# Patient Record
Sex: FEMALE | Race: BLACK OR AFRICAN AMERICAN | NOT HISPANIC OR LATINO | Employment: OTHER | URBAN - METROPOLITAN AREA
[De-identification: names, ages, dates, MRNs, and addresses within clinical notes are randomized per-mention and may not be internally consistent; named-entity substitution may affect disease eponyms.]

---

## 2017-01-16 ENCOUNTER — TELEPHONE (OUTPATIENT)
Dept: GASTROENTEROLOGY | Facility: CLINIC | Age: 67
End: 2017-01-16

## 2017-01-16 DIAGNOSIS — K63.5 COLON POLYPS: Primary | ICD-10-CM

## 2017-01-16 PROBLEM — K21.9 GASTROESOPHAGEAL REFLUX DISEASE: Status: ACTIVE | Noted: 2017-01-16

## 2017-01-16 PROBLEM — R13.10 DYSPHAGIA: Status: ACTIVE | Noted: 2017-01-16

## 2017-01-16 PROBLEM — R89.9 ABNORMAL LABORATORY TEST RESULT: Status: ACTIVE | Noted: 2017-01-16

## 2017-02-14 ENCOUNTER — OFFICE VISIT (OUTPATIENT)
Dept: GASTROENTEROLOGY | Facility: CLINIC | Age: 67
End: 2017-02-14

## 2017-02-14 VITALS
DIASTOLIC BLOOD PRESSURE: 70 MMHG | WEIGHT: 111.6 LBS | SYSTOLIC BLOOD PRESSURE: 112 MMHG | BODY MASS INDEX: 17.94 KG/M2 | HEIGHT: 66 IN

## 2017-02-14 DIAGNOSIS — R13.10 DYSPHAGIA, UNSPECIFIED TYPE: ICD-10-CM

## 2017-02-14 DIAGNOSIS — K21.9 GASTROESOPHAGEAL REFLUX DISEASE WITHOUT ESOPHAGITIS: Primary | ICD-10-CM

## 2017-02-14 DIAGNOSIS — K63.5 COLON POLYPS: ICD-10-CM

## 2017-02-14 PROCEDURE — 99213 OFFICE O/P EST LOW 20 MIN: CPT | Performed by: INTERNAL MEDICINE

## 2017-02-14 RX ORDER — OMEPRAZOLE 20 MG/1
20 CAPSULE, DELAYED RELEASE ORAL 2 TIMES DAILY
Qty: 60 CAPSULE | Refills: 5 | Status: SHIPPED | OUTPATIENT
Start: 2017-02-14 | End: 2017-09-25 | Stop reason: SDUPTHER

## 2017-02-14 NOTE — PROGRESS NOTES
"    PATIENT INFORMATION  Argelia Nguyen       - 1950    CHIEF COMPLAINT  Chief Complaint   Patient presents with   • Difficulty Swallowing   • Heartburn       HISTORY OF PRESENT ILLNESS  Difficulty Swallowing     Heartburn     67 yo with GERD on prilosec daily here with increasing nocturnal reflux. She states I get \"stopped up\" at night. She has regurgitation at night. No difficulty swallowing solids or liquids.  She does note that large pills are difficult to swallow.  She does feel reflux is getting worse. She recalls being dilated previously. Path from last years egd and clsWeight has been stable.  She denies any odynophagia. No hematemesis. She drink one can of carbonated beverage. One cup of coffee daily.  She eats dinner at 5. She does not snack after dinner. Bed is at 9pm.   She has multiple episodes of regurgitation at night.        REVIEW OF SYSTEMS  Review of Systems   Gastrointestinal:        REFLUX   All other systems reviewed and are negative.        ACTIVE PROBLEMS  Patient Active Problem List    Diagnosis   • Abnormal laboratory test result [R89.9]   • Dysphagia [R13.10]   • Gastroesophageal reflux disease [K21.9]   • SVT (supraventricular tachycardia) [I47.1]   • Leukopenia [D72.819]   • Monoclonal gammopathy [D47.2]   • Neutropenia [D70.9]   • Normocytic anemia [D64.9]         PAST MEDICAL HISTORY  Past Medical History   Diagnosis Date   • Anxiety    • Diabetes mellitus    • Dysphagia    • Fatigue    • GERD (gastroesophageal reflux disease)    • Hypertension    • Reflux gastritis          SURGICAL HISTORY  Past Surgical History   Procedure Laterality Date   • Hernia repair     • Cyst removal           FAMILY HISTORY  Family History   Problem Relation Age of Onset   • Colon cancer Mother    • Colon cancer Other    • Lung cancer Father    • Breast cancer Maternal Aunt          SOCIAL HISTORY  Social History     Occupational History   • Not on file.     Social History Main Topics   • Smoking " "status: Never Smoker   • Smokeless tobacco: Not on file   • Alcohol use No   • Drug use: Not on file   • Sexual activity: Not on file         CURRENT MEDICATIONS    Current Outpatient Prescriptions:   •  Brinzolamide-Brimonidine (SIMBRINZA) 1-0.2 % suspension, Apply  to eye., Disp: , Rfl:   •  LORazepam (ATIVAN) 1 MG tablet, Take by mouth., Disp: , Rfl:   •  metoprolol succinate XL (TOPROL-XL) 25 MG 24 hr tablet, Take 1 tablet by mouth Daily., Disp: 90 tablet, Rfl: 3  •  omeprazole (priLOSEC) 20 MG capsule, Take 1 capsule by mouth 2 (Two) Times a Day., Disp: 60 capsule, Rfl: 5  •  polyethylene glycol (GoLYTELY) 236 G solution, Drink 1/2 starting at 2:00pm the day prior. Drink remaining 1/2 at 10:00 pm. May add flavor packet., Disp: 4000 mL, Rfl: 0  •  Saxagliptin-Metformin ER 5-1000 MG tablet sustained-release 24 hour, Take by mouth., Disp: , Rfl:   •  timolol (BETIMOL) 0.25 % ophthalmic solution, 1-2 drops 2 (Two) Times a Day., Disp: , Rfl:     ALLERGIES  Review of patient's allergies indicates no known allergies.    VITALS  Vitals:    02/14/17 1329   BP: 112/70   Weight: 111 lb 9.6 oz (50.6 kg)   Height: 66\" (167.6 cm)       LAST RESULTS   Hospital Outpatient Visit on 02/08/2016   Component Date Value Ref Range Status   • Glucose 02/08/2016 95  60 - 110 mg/dL Final     No results found.    PHYSICAL EXAM  Physical Exam   Constitutional: She is oriented to person, place, and time. She appears well-developed and well-nourished. No distress.   HENT:   Head: Normocephalic and atraumatic.   Mouth/Throat: Oropharynx is clear and moist.   Eyes: EOM are normal. Pupils are equal, round, and reactive to light.   Neck: Normal range of motion. No tracheal deviation present.   Cardiovascular: Normal rate, regular rhythm, normal heart sounds and intact distal pulses.  Exam reveals no gallop and no friction rub.    No murmur heard.  Pulmonary/Chest: Effort normal and breath sounds normal. No stridor. No respiratory distress. She " has no wheezes. She has no rales. She exhibits no tenderness.   Abdominal: Soft. Bowel sounds are normal. She exhibits no distension. There is no tenderness. There is no rebound and no guarding.   Musculoskeletal: She exhibits no edema.   Lymphadenopathy:     She has no cervical adenopathy.   Neurological: She is alert and oriented to person, place, and time.   Skin: Skin is warm. She is not diaphoretic.   Psychiatric: She has a normal mood and affect. Her behavior is normal. Judgment and thought content normal.   Nursing note and vitals reviewed.      ASSESSMENT  Diagnoses and all orders for this visit:    Gastroesophageal reflux disease without esophagitis    Dysphagia, unspecified type    Colon polyps    Other orders  -     omeprazole (priLOSEC) 20 MG capsule; Take 1 capsule by mouth 2 (Two) Times a Day.          PLAN  No Follow-up on file.  Colon is scheduled for in March.    Antireflux measures and dietary modifications reviewed. Low acid diet reviewed. Keep head of bed elevated. Stop eating/drinking at least 3 hours prior to bedtime. Eliminate caffeine and carbonated beverages.  Weight loss encouraged if BMI over 25.        5-6 small,low fat meals daily.  Antireflux measures discussed.

## 2017-03-28 ENCOUNTER — ANESTHESIA EVENT (OUTPATIENT)
Dept: PERIOP | Facility: HOSPITAL | Age: 67
End: 2017-03-28

## 2017-03-29 ENCOUNTER — ANESTHESIA (OUTPATIENT)
Dept: PERIOP | Facility: HOSPITAL | Age: 67
End: 2017-03-29

## 2017-03-29 ENCOUNTER — HOSPITAL ENCOUNTER (OUTPATIENT)
Facility: HOSPITAL | Age: 67
Setting detail: HOSPITAL OUTPATIENT SURGERY
Discharge: HOME | End: 2017-03-29
Attending: INTERNAL MEDICINE | Admitting: INTERNAL MEDICINE

## 2017-03-29 VITALS
DIASTOLIC BLOOD PRESSURE: 73 MMHG | RESPIRATION RATE: 14 BRPM | WEIGHT: 109 LBS | OXYGEN SATURATION: 98 % | HEART RATE: 70 BPM | SYSTOLIC BLOOD PRESSURE: 114 MMHG | TEMPERATURE: 97.8 F | HEIGHT: 66 IN | BODY MASS INDEX: 17.52 KG/M2

## 2017-03-29 DIAGNOSIS — K63.5 COLON POLYPS: ICD-10-CM

## 2017-03-29 LAB — GLUCOSE BLDC GLUCOMTR-MCNC: 73 MG/DL (ref 70–130)

## 2017-03-29 PROCEDURE — C1726 CATH, BAL DIL, NON-VASCULAR: HCPCS | Performed by: INTERNAL MEDICINE

## 2017-03-29 PROCEDURE — 43249 ESOPH EGD DILATION <30 MM: CPT | Performed by: INTERNAL MEDICINE

## 2017-03-29 PROCEDURE — 82962 GLUCOSE BLOOD TEST: CPT

## 2017-03-29 PROCEDURE — 25010000002 PROPOFOL 10 MG/ML EMULSION: Performed by: NURSE ANESTHETIST, CERTIFIED REGISTERED

## 2017-03-29 PROCEDURE — 45380 COLONOSCOPY AND BIOPSY: CPT | Performed by: INTERNAL MEDICINE

## 2017-03-29 RX ORDER — LIDOCAINE HYDROCHLORIDE 10 MG/ML
0.3 INJECTION, SOLUTION EPIDURAL; INFILTRATION; INTRACAUDAL; PERINEURAL ONCE
Status: COMPLETED | OUTPATIENT
Start: 2017-03-29 | End: 2017-03-29

## 2017-03-29 RX ORDER — LIDOCAINE HYDROCHLORIDE 10 MG/ML
INJECTION, SOLUTION EPIDURAL; INFILTRATION; INTRACAUDAL; PERINEURAL
Status: COMPLETED
Start: 2017-03-29 | End: 2017-03-29

## 2017-03-29 RX ORDER — LIDOCAINE HYDROCHLORIDE 20 MG/ML
INJECTION, SOLUTION INFILTRATION; PERINEURAL AS NEEDED
Status: DISCONTINUED | OUTPATIENT
Start: 2017-03-29 | End: 2017-03-29 | Stop reason: SURG

## 2017-03-29 RX ORDER — SODIUM CHLORIDE, SODIUM LACTATE, POTASSIUM CHLORIDE, CALCIUM CHLORIDE 600; 310; 30; 20 MG/100ML; MG/100ML; MG/100ML; MG/100ML
9 INJECTION, SOLUTION INTRAVENOUS CONTINUOUS
Status: DISCONTINUED | OUTPATIENT
Start: 2017-03-29 | End: 2017-03-29 | Stop reason: HOSPADM

## 2017-03-29 RX ORDER — PROPOFOL 10 MG/ML
VIAL (ML) INTRAVENOUS AS NEEDED
Status: DISCONTINUED | OUTPATIENT
Start: 2017-03-29 | End: 2017-03-29 | Stop reason: SURG

## 2017-03-29 RX ORDER — SODIUM CHLORIDE 0.9 % (FLUSH) 0.9 %
1-10 SYRINGE (ML) INJECTION AS NEEDED
Status: DISCONTINUED | OUTPATIENT
Start: 2017-03-29 | End: 2017-03-29 | Stop reason: HOSPADM

## 2017-03-29 RX ADMIN — PROPOFOL 40 MG: 10 INJECTION, EMULSION INTRAVENOUS at 09:24

## 2017-03-29 RX ADMIN — LIDOCAINE HYDROCHLORIDE 0.3 ML: 10 INJECTION, SOLUTION EPIDURAL; INFILTRATION; INTRACAUDAL; PERINEURAL at 09:05

## 2017-03-29 RX ADMIN — PROPOFOL 50 MG: 10 INJECTION, EMULSION INTRAVENOUS at 09:55

## 2017-03-29 RX ADMIN — PROPOFOL 30 MG: 10 INJECTION, EMULSION INTRAVENOUS at 09:23

## 2017-03-29 RX ADMIN — SODIUM CHLORIDE, POTASSIUM CHLORIDE, SODIUM LACTATE AND CALCIUM CHLORIDE: 600; 310; 30; 20 INJECTION, SOLUTION INTRAVENOUS at 09:05

## 2017-03-29 RX ADMIN — PROPOFOL 50 MG: 10 INJECTION, EMULSION INTRAVENOUS at 09:39

## 2017-03-29 RX ADMIN — PROPOFOL 50 MG: 10 INJECTION, EMULSION INTRAVENOUS at 09:44

## 2017-03-29 RX ADMIN — PROPOFOL 50 MG: 10 INJECTION, EMULSION INTRAVENOUS at 09:38

## 2017-03-29 RX ADMIN — PROPOFOL 50 MG: 10 INJECTION, EMULSION INTRAVENOUS at 09:29

## 2017-03-29 RX ADMIN — SODIUM CHLORIDE, POTASSIUM CHLORIDE, SODIUM LACTATE AND CALCIUM CHLORIDE 9 ML/HR: 600; 310; 30; 20 INJECTION, SOLUTION INTRAVENOUS at 09:05

## 2017-03-29 RX ADMIN — PROPOFOL 80 MG: 10 INJECTION, EMULSION INTRAVENOUS at 09:21

## 2017-03-29 RX ADMIN — LIDOCAINE HYDROCHLORIDE 100 MG: 20 INJECTION, SOLUTION INFILTRATION; PERINEURAL at 09:20

## 2017-03-29 NOTE — ANESTHESIA PREPROCEDURE EVALUATION
Anesthesia Evaluation     Patient summary reviewed and Nursing notes reviewed   NPO Status: > 8 hours   Airway   Mallampati: II  TM distance: >3 FB  Neck ROM: full  no difficulty expected  Dental - normal exam     Pulmonary - negative pulmonary ROS and normal exam    breath sounds clear to auscultation  Cardiovascular - normal exam    Rhythm: regular  Rate: normal    (+) hypertension poorly controlled, dysrhythmias Tachycardia,       Neuro/Psych  (+) psychiatric history Anxiety,    GI/Hepatic/Renal/Endo    (+)  GERD poorly controlled, diabetes mellitus type 2 well controlled,     Musculoskeletal     (+) back pain,   Abdominal  - normal exam   Substance History - negative use     OB/GYN negative ob/gyn ROS         Other   (+) arthritis                                 Anesthesia Plan    ASA 2     MAC     intravenous induction   Anesthetic plan and risks discussed with patient.  Use of blood products discussed with patient  Consented to blood products.

## 2017-03-29 NOTE — ANESTHESIA POSTPROCEDURE EVALUATION
Patient: Argelia Nguyen    Procedure Summary     Date Anesthesia Start Anesthesia Stop Room / Location    03/29/17 0916 [facility] / [facility]       Procedure Diagnosis Surgeon Provider    COLONOSCOPY; POLYPECTOMY (N/A ); ESOPHAGOGASTRODUODENOSCOPY WITH DILITATION (N/A Esophagus) Colon polyps  (Colon polyps [K63.5]) MD Onesimo Anne CRNA          Anesthesia Type: MAC  Last vitals  /73 (03/29/17 1007)    Temp      Pulse 70 (03/29/17 1007)   Resp 14 (03/29/17 1007)    SpO2 98 % (03/29/17 1007)      Post Anesthesia Care and Evaluation    Patient location during evaluation: bedside  Patient participation: complete - patient participated  Level of consciousness: awake and alert  Pain score: 0  Pain management: adequate  Airway patency: patent  Anesthetic complications: No anesthetic complications    Cardiovascular status: acceptable  Respiratory status: acceptable  Hydration status: acceptable

## 2017-04-04 LAB
LAB AP CASE REPORT: NORMAL
Lab: NORMAL
PATH REPORT.FINAL DX SPEC: NORMAL

## 2017-04-17 PROBLEM — K44.9 HIATAL HERNIA: Status: ACTIVE | Noted: 2017-04-17

## 2017-04-17 PROBLEM — K63.5 COLON POLYPS: Status: ACTIVE | Noted: 2017-04-17

## 2017-04-17 PROBLEM — K64.8 INTERNAL HEMORRHOIDS: Status: ACTIVE | Noted: 2017-04-17

## 2017-04-26 ENCOUNTER — OFFICE VISIT (OUTPATIENT)
Dept: CARDIOLOGY | Facility: CLINIC | Age: 67
End: 2017-04-26

## 2017-04-26 VITALS
DIASTOLIC BLOOD PRESSURE: 80 MMHG | BODY MASS INDEX: 18.48 KG/M2 | HEART RATE: 71 BPM | SYSTOLIC BLOOD PRESSURE: 136 MMHG | HEIGHT: 66 IN | WEIGHT: 115 LBS

## 2017-04-26 DIAGNOSIS — I47.1 SVT (SUPRAVENTRICULAR TACHYCARDIA) (HCC): Primary | ICD-10-CM

## 2017-04-26 PROCEDURE — 93000 ELECTROCARDIOGRAM COMPLETE: CPT | Performed by: INTERNAL MEDICINE

## 2017-04-26 PROCEDURE — 99213 OFFICE O/P EST LOW 20 MIN: CPT | Performed by: INTERNAL MEDICINE

## 2017-04-26 NOTE — PROGRESS NOTES
Subjective:     Encounter Date:4/26/2017      Patient ID: Argelia Nguyen is a 67 y.o. female.    Chief Complaint:  History of Present Illness    Dear Dr. Oviedo,    I had the pleasure of seeing this patient in the office today for follow-up of her history of palpitations and SVT.  She called her office and stated that she felt a little funny a couple of days ago; she states that if she turned her head she just felt the slightest little dizziness and she wondered if she was hypertensive.  We asked to come in to be seen.    She states she's been feeling absolutely fantastic from her heart standpoint.  She's had absolutely no sensation of any recurrent symptoms but trivial to her SVT since she was started on the metoprolol.This patient denies any chest pain, pressure, tightness, squeezing, or heartburn.  This patient has not experienced any feeling of palpitations, tachycardia or heart racing and no presyncope or syncope.  There has not been any orthopnea or PND, and no problems with lower extremity edema.  This patient denies any shortness of breath at rest or with activity and has not had any wheezing.  This patient has not had any problems with unexplained nausea or vomiting. The patient has continued to perform daily activities of living without any specific problem or change in the level of activity.  This patient has not been recently hospitalized for any reason.    She noted that a couple of days ago that she turned her head it just felt a little funny.  Not dizzy per se, and she had no presyncope or syncope.  It lasted for a few hours and then went away.    This patient has no known cardiac history.  This patient has no history of coronary artery disease, congestive heart failure, rheumatic fever, rheumatic heart disease, congenital heart disease or heart murmur.  This patient has never required invasive cardiovascular evaluation.    The following portions of the patient's history were reviewed and  updated as appropriate: allergies, current medications, past family history, past medical history, past social history, past surgical history and problem list.    Past Medical History:   Diagnosis Date   • Anxiety    • Colon polyp    • Diabetes mellitus    • Dysphagia    • Fatigue    • GERD (gastroesophageal reflux disease)    • Hypertension    • Reflux gastritis        Past Surgical History:   Procedure Laterality Date   • COLONOSCOPY N/A 3/29/2017    Procedure: COLONOSCOPY; POLYPECTOMY;  Surgeon: Brooke Garrido MD;  Location: [facility] OR;  Service:    • CYST REMOVAL     • ENDOSCOPY N/A 3/29/2017    Procedure: ESOPHAGOGASTRODUODENOSCOPY WITH DILITATION;  Surgeon: Brooke Garrido MD;  Location: [facility] OR;  Service:    • HERNIA REPAIR     • HYSTERECTOMY         Social History     Social History   • Marital status: Single     Spouse name: N/A   • Number of children: N/A   • Years of education: N/A     Occupational History   • Not on file.     Social History Main Topics   • Smoking status: Never Smoker   • Smokeless tobacco: Never Used   • Alcohol use No   • Drug use: No   • Sexual activity: Defer     Other Topics Concern   • Not on file     Social History Narrative       Review of Systems   Constitution: Negative for chills, decreased appetite, fever and night sweats.   HENT: Negative for ear discharge, ear pain, hearing loss, nosebleeds and sore throat.    Eyes: Negative for double vision and pain.   Cardiovascular: Negative for cyanosis.   Respiratory: Negative for hemoptysis and sputum production.    Endocrine: Negative for cold intolerance and heat intolerance.   Hematologic/Lymphatic: Negative for adenopathy.   Skin: Negative for dry skin, itching, nail changes, rash and suspicious lesions.   Musculoskeletal: Negative for arthritis, gout, muscle cramps, muscle weakness and myalgias.   Gastrointestinal: Negative for anorexia, bowel incontinence, constipation, diarrhea, dysphagia, hematemesis and jaundice.  "  Genitourinary: Negative for bladder incontinence, dysuria, flank pain, frequency, hematuria and nocturia.   Neurological: Negative for focal weakness, numbness, paresthesias and seizures.   Psychiatric/Behavioral: Negative for altered mental status, hallucinations, hypervigilance, suicidal ideas and thoughts of violence.   Allergic/Immunologic: Negative for persistent infections.         ECG 12 Lead  Date/Time: 4/26/2017 9:35 AM  Performed by: HENRI BOLANOS III  Authorized by: HENRI BOLANOS III   Comparison: compared with previous ECG   Similar to previous ECG  Rhythm: sinus rhythm  Rate: normal  Conduction: conduction normal  ST Segments: ST segments normal  T Waves: T waves normal  QRS axis: normal  Other: no other findings  Clinical impression: normal ECG               Objective:     Vitals:    04/26/17 0914   BP: 136/80   Pulse: 71   Weight: 115 lb (52.2 kg)   Height: 66\" (167.6 cm)         Physical Exam   Constitutional: She is oriented to person, place, and time. She appears well-developed and well-nourished. No distress.   HENT:   Head: Normocephalic and atraumatic.   Nose: Nose normal.   Mouth/Throat: Oropharynx is clear and moist.   Eyes: Conjunctivae and EOM are normal. Pupils are equal, round, and reactive to light. Right eye exhibits no discharge. Left eye exhibits no discharge.   Neck: Normal range of motion. Neck supple. No tracheal deviation present. No thyromegaly present.   Cardiovascular: Normal rate, regular rhythm, S1 normal, S2 normal, normal heart sounds and normal pulses.  Exam reveals no S3.    Pulmonary/Chest: Effort normal and breath sounds normal. No stridor. No respiratory distress. She exhibits no tenderness.   Abdominal: Soft. Bowel sounds are normal. She exhibits no distension and no mass. There is no tenderness. There is no rebound and no guarding.   Musculoskeletal: Normal range of motion. She exhibits no tenderness or deformity.   Lymphadenopathy:     She has no cervical " adenopathy.   Neurological: She is alert and oriented to person, place, and time. She has normal reflexes.   Skin: Skin is warm and dry. No rash noted. She is not diaphoretic. No erythema.   Psychiatric: She has a normal mood and affect. Thought content normal.       Lab Review:             Performed        Assessment:          Diagnosis Plan   1. SVT (supraventricular tachycardia)  ECG 12 Lead          Plan:       Patient is doing well with no recurrent symptoms of palpitations or SVT.  We will continue her current medical regimen and see her back in one year.    Thank you very much for allowing us to participate in the care of this pleasant patient.  Please don't hesitate to call if I can be of assistance in any way.      Current Outpatient Prescriptions:   •  Brinzolamide-Brimonidine (SIMBRINZA) 1-0.2 % suspension, Apply  to eye., Disp: , Rfl:   •  LORazepam (ATIVAN) 1 MG tablet, Take 1 mg by mouth Daily., Disp: , Rfl:   •  metoprolol succinate XL (TOPROL-XL) 25 MG 24 hr tablet, Take 1 tablet by mouth Daily., Disp: 90 tablet, Rfl: 3  •  omeprazole (priLOSEC) 20 MG capsule, Take 1 capsule by mouth 2 (Two) Times a Day., Disp: 60 capsule, Rfl: 5  •  Saxagliptin-Metformin ER 5-1000 MG tablet sustained-release 24 hour, Take 1 tablet by mouth Daily., Disp: , Rfl:   •  timolol (BETIMOL) 0.25 % ophthalmic solution, 1-2 drops 2 (Two) Times a Day., Disp: , Rfl:          EMR Dragon/Transcription disclaimer:    Much of this encounter note is an electronic transcription/translation of spoken language to printed text. The electronic translation of spoken language may permit erroneous, or at times, nonsensical words or phrases to be inadvertently transcribed; Although I have reviewed the note for such errors, some may still exist.

## 2017-09-06 ENCOUNTER — TRANSCRIBE ORDERS (OUTPATIENT)
Dept: ADMINISTRATIVE | Facility: HOSPITAL | Age: 67
End: 2017-09-06

## 2017-09-06 DIAGNOSIS — Z12.31 VISIT FOR SCREENING MAMMOGRAM: Primary | ICD-10-CM

## 2017-09-22 ENCOUNTER — APPOINTMENT (OUTPATIENT)
Dept: MAMMOGRAPHY | Facility: HOSPITAL | Age: 67
End: 2017-09-22

## 2017-09-25 RX ORDER — OMEPRAZOLE 20 MG/1
CAPSULE, DELAYED RELEASE ORAL
Qty: 30 CAPSULE | Refills: 11 | Status: SHIPPED | OUTPATIENT
Start: 2017-09-25 | End: 2018-11-02 | Stop reason: SDUPTHER

## 2017-10-02 ENCOUNTER — HOSPITAL ENCOUNTER (OUTPATIENT)
Dept: MAMMOGRAPHY | Facility: HOSPITAL | Age: 67
Discharge: HOME | End: 2017-10-02
Admitting: FAMILY MEDICINE

## 2017-10-02 DIAGNOSIS — Z12.31 VISIT FOR SCREENING MAMMOGRAM: ICD-10-CM

## 2017-10-02 PROCEDURE — 77063 BREAST TOMOSYNTHESIS BI: CPT

## 2017-10-02 PROCEDURE — G0202 SCR MAMMO BI INCL CAD: HCPCS

## 2017-10-13 ENCOUNTER — TRANSCRIBE ORDERS (OUTPATIENT)
Dept: ADMINISTRATIVE | Facility: HOSPITAL | Age: 67
End: 2017-10-13

## 2017-10-13 DIAGNOSIS — IMO0001 THE CHANGE: Primary | ICD-10-CM

## 2017-10-17 ENCOUNTER — APPOINTMENT (OUTPATIENT)
Dept: BONE DENSITY | Facility: HOSPITAL | Age: 67
End: 2017-10-17

## 2017-10-17 DIAGNOSIS — IMO0001 THE CHANGE: ICD-10-CM

## 2017-10-17 PROCEDURE — 77080 DXA BONE DENSITY AXIAL: CPT

## 2017-10-30 DIAGNOSIS — I47.1 SVT (SUPRAVENTRICULAR TACHYCARDIA) (HCC): ICD-10-CM

## 2017-10-30 RX ORDER — METOPROLOL SUCCINATE 25 MG/1
TABLET, EXTENDED RELEASE ORAL
Qty: 90 TABLET | Refills: 1 | Status: SHIPPED | OUTPATIENT
Start: 2017-10-30 | End: 2018-04-25 | Stop reason: SDUPTHER

## 2018-04-25 DIAGNOSIS — I47.1 SVT (SUPRAVENTRICULAR TACHYCARDIA) (HCC): ICD-10-CM

## 2018-04-26 RX ORDER — METOPROLOL SUCCINATE 25 MG/1
TABLET, EXTENDED RELEASE ORAL
Qty: 90 TABLET | Refills: 0 | Status: SHIPPED | OUTPATIENT
Start: 2018-04-26 | End: 2018-07-25 | Stop reason: SDUPTHER

## 2018-07-25 DIAGNOSIS — I47.1 SVT (SUPRAVENTRICULAR TACHYCARDIA) (HCC): ICD-10-CM

## 2018-07-25 RX ORDER — METOPROLOL SUCCINATE 25 MG/1
TABLET, EXTENDED RELEASE ORAL
Qty: 30 TABLET | Refills: 0 | Status: SHIPPED | OUTPATIENT
Start: 2018-07-25 | End: 2018-09-05

## 2018-09-05 ENCOUNTER — OFFICE VISIT (OUTPATIENT)
Dept: CARDIOLOGY | Facility: CLINIC | Age: 68
End: 2018-09-05

## 2018-09-05 VITALS
BODY MASS INDEX: 18.64 KG/M2 | WEIGHT: 116 LBS | HEIGHT: 66 IN | HEART RATE: 69 BPM | SYSTOLIC BLOOD PRESSURE: 144 MMHG | DIASTOLIC BLOOD PRESSURE: 82 MMHG

## 2018-09-05 DIAGNOSIS — I47.1 SVT (SUPRAVENTRICULAR TACHYCARDIA) (HCC): ICD-10-CM

## 2018-09-05 PROCEDURE — 93000 ELECTROCARDIOGRAM COMPLETE: CPT | Performed by: INTERNAL MEDICINE

## 2018-09-05 PROCEDURE — 99213 OFFICE O/P EST LOW 20 MIN: CPT | Performed by: INTERNAL MEDICINE

## 2018-09-05 RX ORDER — METOPROLOL SUCCINATE 25 MG/1
25 TABLET, EXTENDED RELEASE ORAL DAILY
Qty: 90 TABLET | Refills: 3 | Status: SHIPPED | OUTPATIENT
Start: 2018-09-05 | End: 2019-01-16 | Stop reason: SDUPTHER

## 2018-09-05 NOTE — PROGRESS NOTES
Subjective:     Encounter Date:9/5/2018      Patient ID: Argelia Nguyen is a 68 y.o. female.    Chief Complaint: SVT  History of Present Illness    Dear Dr. Oviedo,    I had the pleasure of seeing this patient in the office today for follow-up of her history of palpitations and SVT.      It has been one year. She has not had any palpitations since her last visit. This patient denies any chest pain, pressure, tightness, squeezing, or heartburn.  This patient has not experienced any feeling of palpitations, tachycardia or heart racing and no presyncope or syncope.  There has not been any problems with dizziness or lightheadedness.  There has not been any orthopnea or PND, and no problems with lower extremity edema.  This patient denies any shortness of breath at rest or with activity and has not had any wheezing.  The patient has continued to perform daily activities of living without any specific problem or change in the level of activity.    This patient has no known cardiac history.  This patient has no history of coronary artery disease, congestive heart failure, rheumatic fever, rheumatic heart disease, congenital heart disease or heart murmur.  This patient has never required invasive cardiovascular evaluation.    The following portions of the patient's history were reviewed and updated as appropriate: allergies, current medications, past family history, past medical history, past social history, past surgical history and problem list.    Past Medical History:   Diagnosis Date   • Anxiety    • Colon polyp    • Diabetes mellitus (CMS/HCC)    • Dysphagia    • Fatigue    • GERD (gastroesophageal reflux disease)    • Hypertension    • Reflux gastritis        Past Surgical History:   Procedure Laterality Date   • COLONOSCOPY N/A 3/29/2017    Procedure: COLONOSCOPY; POLYPECTOMY;  Surgeon: Brooke Garrido MD;  Location: [facility];  Service:    • CYST REMOVAL     • ENDOSCOPY N/A 3/29/2017    Procedure:  ESOPHAGOGASTRODUODENOSCOPY WITH DILITATION;  Surgeon: Brooke Garrido MD;  Location: [facility];  Service:    • HERNIA REPAIR     • HYSTERECTOMY         Social History     Social History   • Marital status: Single     Spouse name: N/A   • Number of children: N/A   • Years of education: N/A     Occupational History   • Not on file.     Social History Main Topics   • Smoking status: Never Smoker   • Smokeless tobacco: Never Used   • Alcohol use No   • Drug use: No   • Sexual activity: Defer     Other Topics Concern   • Not on file     Social History Narrative   • No narrative on file       Review of Systems   Constitution: Negative for chills, decreased appetite, fever and night sweats.   HENT: Negative for ear discharge, ear pain, hearing loss, nosebleeds and sore throat.    Eyes: Negative for double vision and pain.   Cardiovascular: Negative for cyanosis.   Respiratory: Negative for hemoptysis and sputum production.    Endocrine: Negative for cold intolerance and heat intolerance.   Hematologic/Lymphatic: Negative for adenopathy.   Skin: Negative for dry skin, itching, nail changes, rash and suspicious lesions.   Musculoskeletal: Negative for arthritis, gout, muscle cramps, muscle weakness and myalgias.   Gastrointestinal: Negative for anorexia, bowel incontinence, constipation, diarrhea, dysphagia, hematemesis and jaundice.   Genitourinary: Negative for bladder incontinence, dysuria, flank pain, frequency, hematuria and nocturia.   Neurological: Negative for focal weakness, numbness, paresthesias and seizures.   Psychiatric/Behavioral: Negative for altered mental status, hallucinations, hypervigilance, suicidal ideas and thoughts of violence.   Allergic/Immunologic: Negative for persistent infections.         ECG 12 Lead  Date/Time: 9/5/2018 1:58 PM  Performed by: HENRI BOLANOS III  Authorized by: HENRI BOLANOS III   Comparison: compared with previous ECG   Similar to previous ECG  Rhythm: sinus rhythm  Rate:  "normal  Conduction: conduction normal  ST Segments: ST segments normal  T Waves: T waves normal  QRS axis: normal  Other: no other findings  Clinical impression: normal ECG               Objective:     Vitals:    09/05/18 1318   BP: 144/82   Pulse: 69   Weight: 52.6 kg (116 lb)   Height: 167.6 cm (66\")         Physical Exam   Constitutional: She is oriented to person, place, and time. She appears well-developed and well-nourished. No distress.   HENT:   Head: Normocephalic and atraumatic.   Nose: Nose normal.   Mouth/Throat: Oropharynx is clear and moist.   Eyes: Pupils are equal, round, and reactive to light. Conjunctivae and EOM are normal. Right eye exhibits no discharge. Left eye exhibits no discharge.   Neck: Normal range of motion. Neck supple. No tracheal deviation present. No thyromegaly present.   Cardiovascular: Normal rate, regular rhythm, S1 normal, S2 normal, normal heart sounds and normal pulses.  Exam reveals no S3.    Pulmonary/Chest: Effort normal and breath sounds normal. No stridor. No respiratory distress. She exhibits no tenderness.   Abdominal: Soft. Bowel sounds are normal. She exhibits no distension and no mass. There is no tenderness. There is no rebound and no guarding.   Musculoskeletal: Normal range of motion. She exhibits no tenderness or deformity.   Lymphadenopathy:     She has no cervical adenopathy.   Neurological: She is alert and oriented to person, place, and time. She has normal reflexes.   Skin: Skin is warm and dry. No rash noted. She is not diaphoretic. No erythema.   Psychiatric: She has a normal mood and affect. Thought content normal.       Lab Review:             Performed        Assessment:          Diagnosis Plan   1. SVT (supraventricular tachycardia) (CMS/HCC)  metoprolol succinate XL (TOPROL-XL) 25 MG 24 hr tablet    ECG 12 Lead          Plan:       Patient is doing well with no recurrent symptoms of palpitations or SVT.  We will continue her current medical regimen " and see her back in one year.    Thank you very much for allowing us to participate in the care of this pleasant patient.  Please don't hesitate to call if I can be of assistance in any way.      Current Outpatient Prescriptions:   •  Brinzolamide-Brimonidine (SIMBRINZA) 1-0.2 % suspension, Apply  to eye., Disp: , Rfl:   •  LORazepam (ATIVAN) 1 MG tablet, Take 1 mg by mouth Daily., Disp: , Rfl:   •  metoprolol succinate XL (TOPROL-XL) 25 MG 24 hr tablet, TAKE 1 TABLET BY MOUTH DAILY., Disp: 30 tablet, Rfl: 0  •  omeprazole (priLOSEC) 20 MG capsule, TAKE ONE CAPSULE BY MOUTH DAILY., Disp: 30 capsule, Rfl: 11  •  Saxagliptin-Metformin ER 5-1000 MG tablet sustained-release 24 hour, Take 1 tablet by mouth Daily., Disp: , Rfl:   •  timolol (BETIMOL) 0.25 % ophthalmic solution, 1-2 drops 2 (Two) Times a Day., Disp: , Rfl:          EMR Dragon/Transcription disclaimer:    Much of this encounter note is an electronic transcription/translation of spoken language to printed text. The electronic translation of spoken language may permit erroneous, or at times, nonsensical words or phrases to be inadvertently transcribed; Although I have reviewed the note for such errors, some may still exist.

## 2018-10-29 RX ORDER — OMEPRAZOLE 20 MG/1
CAPSULE, DELAYED RELEASE ORAL
Qty: 30 CAPSULE | Refills: 0 | OUTPATIENT
Start: 2018-10-29

## 2018-11-05 RX ORDER — OMEPRAZOLE 20 MG/1
CAPSULE, DELAYED RELEASE ORAL
Qty: 30 CAPSULE | Refills: 0 | Status: SHIPPED | OUTPATIENT
Start: 2018-11-05 | End: 2018-11-29 | Stop reason: SDUPTHER

## 2018-11-29 ENCOUNTER — OFFICE VISIT (OUTPATIENT)
Dept: GASTROENTEROLOGY | Facility: CLINIC | Age: 68
End: 2018-11-29

## 2018-11-29 VITALS
WEIGHT: 114.4 LBS | DIASTOLIC BLOOD PRESSURE: 80 MMHG | SYSTOLIC BLOOD PRESSURE: 138 MMHG | HEIGHT: 66 IN | BODY MASS INDEX: 18.39 KG/M2

## 2018-11-29 DIAGNOSIS — K21.9 GASTROESOPHAGEAL REFLUX DISEASE, ESOPHAGITIS PRESENCE NOT SPECIFIED: Primary | ICD-10-CM

## 2018-11-29 PROCEDURE — 99212 OFFICE O/P EST SF 10 MIN: CPT | Performed by: INTERNAL MEDICINE

## 2018-11-29 RX ORDER — OMEPRAZOLE 20 MG/1
20 CAPSULE, DELAYED RELEASE ORAL DAILY
Qty: 30 CAPSULE | Refills: 11 | Status: SHIPPED | OUTPATIENT
Start: 2018-11-29 | End: 2019-11-27

## 2018-11-29 NOTE — PROGRESS NOTES
PATIENT INFORMATION  Argelia Nguyen       - 1950    CHIEF COMPLAINT  Chief Complaint   Patient presents with   • Heartburn       HISTORY OF PRESENT ILLNESS  HPI    She is here for gerd, managed on omeprazole. She only has trouble swallowing big pills. Weight has been stable.  egd done last year with cls.  3 TA removed, due in .  REVIEW OF SYSTEMS  Review of Systems   Constitutional: Positive for chills.   HENT: Positive for rhinorrhea and trouble swallowing.    Eyes: Positive for itching.   Respiratory: Positive for cough.    Cardiovascular: Positive for palpitations.   Gastrointestinal: Positive for nausea.        Reflux   Endocrine: Positive for cold intolerance.   Genitourinary: Positive for pelvic pain.   Neurological: Positive for dizziness and light-headedness.   All other systems reviewed and are negative.        ACTIVE PROBLEMS  Patient Active Problem List    Diagnosis   • Hiatal hernia [K44.9]   • Colon polyps [K63.5]   • Internal hemorrhoids [K64.8]   • Abnormal laboratory test result [R89.9]   • Dysphagia [R13.10]   • Gastroesophageal reflux disease [K21.9]   • SVT (supraventricular tachycardia) (CMS/HCC) [I47.1]   • Leukopenia [D72.819]   • Monoclonal gammopathy [D47.2]   • Neutropenia (CMS/HCC) [D70.9]   • Normocytic anemia [D64.9]         PAST MEDICAL HISTORY  Past Medical History:   Diagnosis Date   • Anxiety    • Colon polyp    • Diabetes mellitus (CMS/HCC)    • Dysphagia    • Fatigue    • GERD (gastroesophageal reflux disease)    • Hypertension    • Reflux gastritis          SURGICAL HISTORY  Past Surgical History:   Procedure Laterality Date   • CYST REMOVAL     • HERNIA REPAIR     • HYSTERECTOMY           FAMILY HISTORY  Family History   Problem Relation Age of Onset   • Colon cancer Mother    • Colon cancer Other    • Lung cancer Father    • Breast cancer Maternal Aunt          SOCIAL HISTORY  Social History     Occupational History   • Not on file   Tobacco Use   • Smoking  "status: Never Smoker   • Smokeless tobacco: Never Used   Substance and Sexual Activity   • Alcohol use: No   • Drug use: No   • Sexual activity: Defer       Debilities/Disabilities Identified: None    Emotional Behavior: Appropriate    CURRENT MEDICATIONS    Current Outpatient Medications:   •  Brinzolamide-Brimonidine (SIMBRINZA) 1-0.2 % suspension, Apply  to eye., Disp: , Rfl:   •  LORazepam (ATIVAN) 1 MG tablet, Take 1 mg by mouth Daily., Disp: , Rfl:   •  metoprolol succinate XL (TOPROL-XL) 25 MG 24 hr tablet, Take 1 tablet by mouth Daily., Disp: 90 tablet, Rfl: 3  •  omeprazole (priLOSEC) 20 MG capsule, Take 1 capsule by mouth Daily., Disp: 30 capsule, Rfl: 11  •  Saxagliptin-Metformin ER 5-1000 MG tablet sustained-release 24 hour, Take 1 tablet by mouth Daily., Disp: , Rfl:   •  timolol (BETIMOL) 0.25 % ophthalmic solution, 1-2 drops 2 (Two) Times a Day., Disp: , Rfl:     ALLERGIES  Patient has no known allergies.    VITALS  Vitals:    11/29/18 1106   BP: 138/80   Weight: 51.9 kg (114 lb 6.4 oz)   Height: 167.6 cm (65.98\")       LAST RESULTS   Admission on 03/29/2017, Discharged on 03/29/2017   Component Date Value Ref Range Status   • Glucose 03/29/2017 73  70 - 130 mg/dL Final   • Case Report 03/29/2017    Final                    Value:Surgical Pathology Report                         Case: [id number]                                  Authorizing Provider:  Brooke Garrido MD          Collected:           03/29/2017 09:52 AM          Ordering Location:     [facility]   Received:            03/29/2017 12:44 PM                                 OR                                                                           Pathologist:           Yoni Schreiber MD                                                      Specimen:    Large Intestine, Left / Descending Colon, X3                                              • Final Diagnosis 03/29/2017    Final                    Value:This result " contains rich text formatting which cannot be displayed here.     No results found.    PHYSICAL EXAM  Physical Exam   Constitutional: She is oriented to person, place, and time. She appears well-developed and well-nourished. No distress.   HENT:   Head: Normocephalic and atraumatic.   Mouth/Throat: Oropharynx is clear and moist.   Eyes: EOM are normal. Pupils are equal, round, and reactive to light.   Neck: Normal range of motion. No tracheal deviation present.   Cardiovascular: Normal rate, regular rhythm, normal heart sounds and intact distal pulses. Exam reveals no gallop and no friction rub.   No murmur heard.  Pulmonary/Chest: Effort normal and breath sounds normal. No stridor. No respiratory distress. She has no wheezes. She has no rales. She exhibits no tenderness.   Abdominal: Soft. Bowel sounds are normal. She exhibits no distension. There is no tenderness. There is no rebound and no guarding.   Musculoskeletal: She exhibits no edema.   Lymphadenopathy:     She has no cervical adenopathy.   Neurological: She is alert and oriented to person, place, and time.   Skin: Skin is warm. She is not diaphoretic.   Psychiatric: She has a normal mood and affect. Her behavior is normal. Judgment and thought content normal.   Nursing note and vitals reviewed.      ASSESSMENT  Diagnoses and all orders for this visit:    Gastroesophageal reflux disease, esophagitis presence not specified    Other orders  -     omeprazole (priLOSEC) 20 MG capsule; Take 1 capsule by mouth Daily.          PLAN  No Follow-up on file.    Antireflux measures and dietary modifications reviewed. Low acid diet reviewed. Keep head of bed elevated. Stop eating/drinking at least 3 hours prior to bedtime. Eliminate caffeine and carbonated beverages.  Weight loss encouraged if BMI over 25.

## 2019-01-16 ENCOUNTER — APPOINTMENT (OUTPATIENT)
Dept: CT IMAGING | Facility: HOSPITAL | Age: 69
End: 2019-01-16

## 2019-01-16 ENCOUNTER — HOSPITAL ENCOUNTER (EMERGENCY)
Facility: HOSPITAL | Age: 69
Discharge: HOME | End: 2019-01-16
Attending: EMERGENCY MEDICINE | Admitting: EMERGENCY MEDICINE

## 2019-01-16 ENCOUNTER — APPOINTMENT (OUTPATIENT)
Dept: GENERAL RADIOLOGY | Facility: HOSPITAL | Age: 69
End: 2019-01-16

## 2019-01-16 VITALS
RESPIRATION RATE: 18 BRPM | WEIGHT: 111 LBS | OXYGEN SATURATION: 95 % | TEMPERATURE: 97.5 F | BODY MASS INDEX: 18.95 KG/M2 | HEART RATE: 99 BPM | DIASTOLIC BLOOD PRESSURE: 78 MMHG | HEIGHT: 64 IN | SYSTOLIC BLOOD PRESSURE: 120 MMHG

## 2019-01-16 DIAGNOSIS — I48.19 PERSISTENT ATRIAL FIBRILLATION (HCC): Primary | ICD-10-CM

## 2019-01-16 DIAGNOSIS — I47.1 SVT (SUPRAVENTRICULAR TACHYCARDIA) (HCC): ICD-10-CM

## 2019-01-16 LAB
ALBUMIN SERPL-MCNC: 4 G/DL (ref 3.5–5.2)
ALBUMIN/GLOB SERPL: 1.2 G/DL
ALP SERPL-CCNC: 88 U/L (ref 40–129)
ALT SERPL W P-5'-P-CCNC: 10 U/L (ref 5–33)
ANION GAP SERPL CALCULATED.3IONS-SCNC: 17 MMOL/L
AST SERPL-CCNC: 24 U/L (ref 5–32)
BASOPHILS # BLD AUTO: 0.03 10*3/MM3 (ref 0–0.2)
BASOPHILS NFR BLD AUTO: 0.8 % (ref 0–2)
BILIRUB SERPL-MCNC: 0.4 MG/DL (ref 0.2–1.2)
BUN BLD-MCNC: 19 MG/DL (ref 8–23)
BUN/CREAT SERPL: 18.6 (ref 7–25)
CALCIUM SPEC-SCNC: 9.3 MG/DL (ref 8.8–10.5)
CHLORIDE SERPL-SCNC: 101 MMOL/L (ref 98–107)
CO2 SERPL-SCNC: 22 MMOL/L (ref 22–29)
CREAT BLD-MCNC: 1.02 MG/DL (ref 0.57–1)
DEPRECATED RDW RBC AUTO: 50.7 FL (ref 37–54)
EOSINOPHIL # BLD AUTO: 0.04 10*3/MM3 (ref 0.1–0.3)
EOSINOPHIL NFR BLD AUTO: 1 % (ref 0–4)
ERYTHROCYTE [DISTWIDTH] IN BLOOD BY AUTOMATED COUNT: 16.5 % (ref 11.5–14.5)
GFR SERPL CREATININE-BSD FRML MDRD: 65 ML/MIN/1.73
GLOBULIN UR ELPH-MCNC: 3.4 GM/DL
GLUCOSE BLD-MCNC: 118 MG/DL (ref 65–99)
HCT VFR BLD AUTO: 38 % (ref 37–47)
HGB BLD-MCNC: 12.4 G/DL (ref 12–16)
IMM GRANULOCYTES # BLD AUTO: 0.01 10*3/MM3 (ref 0–0.03)
IMM GRANULOCYTES NFR BLD AUTO: 0.3 % (ref 0–0.5)
LYMPHOCYTES # BLD AUTO: 1.53 10*3/MM3 (ref 0.6–4.8)
LYMPHOCYTES NFR BLD AUTO: 38.8 % (ref 20–45)
MCH RBC QN AUTO: 27.6 PG (ref 27–31)
MCHC RBC AUTO-ENTMCNC: 32.6 G/DL (ref 31–37)
MCV RBC AUTO: 84.4 FL (ref 81–99)
MONOCYTES # BLD AUTO: 0.4 10*3/MM3 (ref 0–1)
MONOCYTES NFR BLD AUTO: 10.2 % (ref 3–8)
NEUTROPHILS # BLD AUTO: 1.93 10*3/MM3 (ref 1.5–8.3)
NEUTROPHILS NFR BLD AUTO: 48.9 % (ref 45–70)
NRBC BLD AUTO-RTO: 0 /100 WBC (ref 0–0)
PLATELET # BLD AUTO: 242 10*3/MM3 (ref 140–500)
PMV BLD AUTO: 11.5 FL (ref 7.4–10.4)
POTASSIUM BLD-SCNC: 4 MMOL/L (ref 3.5–5.2)
PROT SERPL-MCNC: 7.4 G/DL (ref 6–8.5)
RBC # BLD AUTO: 4.5 10*6/MM3 (ref 4.2–5.4)
SODIUM BLD-SCNC: 140 MMOL/L (ref 136–145)
TROPONIN T SERPL-MCNC: 0.03 NG/ML (ref 0–0.03)
WBC NRBC COR # BLD: 3.94 10*3/MM3 (ref 4.8–10.8)

## 2019-01-16 PROCEDURE — 85025 COMPLETE CBC W/AUTO DIFF WBC: CPT | Performed by: EMERGENCY MEDICINE

## 2019-01-16 PROCEDURE — 84484 ASSAY OF TROPONIN QUANT: CPT | Performed by: EMERGENCY MEDICINE

## 2019-01-16 PROCEDURE — 99284 EMERGENCY DEPT VISIT MOD MDM: CPT | Performed by: EMERGENCY MEDICINE

## 2019-01-16 PROCEDURE — 93010 ELECTROCARDIOGRAM REPORT: CPT | Performed by: INTERNAL MEDICINE

## 2019-01-16 PROCEDURE — 71045 X-RAY EXAM CHEST 1 VIEW: CPT

## 2019-01-16 PROCEDURE — 99284 EMERGENCY DEPT VISIT MOD MDM: CPT

## 2019-01-16 PROCEDURE — 80053 COMPREHEN METABOLIC PANEL: CPT | Performed by: EMERGENCY MEDICINE

## 2019-01-16 PROCEDURE — 70450 CT HEAD/BRAIN W/O DYE: CPT

## 2019-01-16 PROCEDURE — 93005 ELECTROCARDIOGRAM TRACING: CPT | Performed by: EMERGENCY MEDICINE

## 2019-01-16 PROCEDURE — 93005 ELECTROCARDIOGRAM TRACING: CPT

## 2019-01-16 RX ORDER — METOPROLOL SUCCINATE 25 MG/1
50 TABLET, EXTENDED RELEASE ORAL DAILY
Qty: 90 TABLET | Refills: 0 | Status: SHIPPED | OUTPATIENT
Start: 2019-01-16 | End: 2019-01-27 | Stop reason: SDUPTHER

## 2019-01-16 NOTE — ED PROVIDER NOTES
Subjective   History of Present Illness  History of Present Illness    Chief complaint: Dizziness and rapid heart rate    Location: Home    Quality/Severity:  No shortness of breath    Timing/Onset/Duration: Acute onset last night    Modifying Factors: Chest pain with exertion    Associated Symptoms: No headache.  No fever chills or cough.  No sore throat earache or nasal congestion.  The patient had chest pain with exertion.  Patient complains of fast heartbeat.  No shortness of breath.  No abdominal pain.  No diarrhea or burning when she urinates.  No nausea or vomiting.  No numbness, tingling, weakness, or change in bladder or bowel function.  No change in medication or dosage changes.    Narrative: This 68-year-old -American female presents with dizziness, and rapid heartbeat.  It started last night.  She has a history of atrial fibrillation.    PCP: Ivelisse    Cardiology: [facility]      Review of Systems   Constitutional: Negative for chills and fever.   HENT: Negative for ear pain and sore throat.    Eyes: Negative for visual disturbance.   Respiratory: Negative for cough, chest tightness, shortness of breath and wheezing.    Cardiovascular: Positive for chest pain and palpitations. Negative for leg swelling.   Gastrointestinal: Negative for abdominal pain, blood in stool, diarrhea, nausea and vomiting.   Genitourinary: Negative for difficulty urinating and dysuria.   Musculoskeletal: Negative for back pain and neck stiffness.   Skin: Negative for rash.   Neurological: Positive for dizziness and light-headedness. Negative for speech difficulty, weakness, numbness and headaches.   Hematological: Negative for adenopathy.   Psychiatric/Behavioral: Negative.  Negative for confusion.        Medication List      ASK your doctor about these medications    LORazepam 1 MG tablet  Commonly known as:  ATIVAN     metoprolol succinate XL 25 MG 24 hr tablet  Commonly known as:  TOPROL-XL  Take 1 tablet by mouth Daily.      omeprazole 20 MG capsule  Commonly known as:  priLOSEC  Take 1 capsule by mouth Daily.     SAXagliptin-MetFORMIN ER 5-1000 MG tablet sustained-release 24 hour     SIMBRINZA 1-0.2 % suspension  Generic drug:  Brinzolamide-Brimonidine     timolol 0.25 % ophthalmic solution  Commonly known as:  BETIMOL          Past Medical History:   Diagnosis Date   • Anxiety    • Colon polyp    • Diabetes mellitus (CMS/HCC)    • Dysphagia    • Fatigue    • GERD (gastroesophageal reflux disease)    • Hypertension    • Reflux gastritis        No Known Allergies    Past Surgical History:   Procedure Laterality Date   • COLONOSCOPY N/A 3/29/2017    Procedure: COLONOSCOPY; POLYPECTOMY;  Surgeon: Brooke Garrido MD;  Location: formerly [facility] OR;  Service:    • CYST REMOVAL     • ENDOSCOPY N/A 3/29/2017    Procedure: ESOPHAGOGASTRODUODENOSCOPY WITH DILITATION;  Surgeon: Brooke Garrido MD;  Location: formerly [facility] OR;  Service:    • HERNIA REPAIR     • HYSTERECTOMY         Family History   Problem Relation Age of Onset   • Colon cancer Mother    • Colon cancer Other    • Lung cancer Father    • Breast cancer Maternal Aunt        Social History     Socioeconomic History   • Marital status: Single     Spouse name: Not on file   • Number of children: Not on file   • Years of education: Not on file   • Highest education level: Not on file   Tobacco Use   • Smoking status: Never Smoker   • Smokeless tobacco: Never Used   Substance and Sexual Activity   • Alcohol use: No   • Drug use: No   • Sexual activity: Defer           Objective   Physical Exam   Constitutional: She is oriented to person, place, and time. She appears well-developed and well-nourished. No distress.   ED Triage Vitals (01/16/19 1400)  Temp: 96 °F (35.6 °C)  Heart Rate: 74  Resp: 18  BP: 118/78  SpO2: 100 %  Temp src: n/a  Heart Rate Source: n/a  Patient Position: n/a  BP Location: n/a  FiO2 (%): n/a    The patient's vitals were reviewed by me.  Unless otherwise noted they are within normal  limits.     HENT:   Head: Normocephalic and atraumatic.   Right Ear: External ear normal.   Left Ear: External ear normal.   Nose: Nose normal.   Mouth/Throat: Oropharynx is clear and moist.   Eyes: Conjunctivae and EOM are normal. Pupils are equal, round, and reactive to light. Right eye exhibits no discharge. Left eye exhibits no discharge.   Neck: Normal range of motion. Neck supple. No JVD present. No tracheal deviation present. No thyromegaly present.   Cardiovascular: Normal heart sounds and intact distal pulses. An irregularly irregular rhythm present. Tachycardia present. Exam reveals no gallop and no friction rub.   No murmur heard.  Pulmonary/Chest: Effort normal and breath sounds normal. No stridor. No respiratory distress. She has no wheezes. She has no rales. She exhibits no tenderness.   Abdominal: Soft. Bowel sounds are normal. She exhibits no distension and no mass. There is no tenderness. There is no rebound and no guarding. No hernia.   Musculoskeletal: Normal range of motion. She exhibits no edema or deformity.   Lymphadenopathy:     She has no cervical adenopathy.   Neurological: She is alert and oriented to person, place, and time.   Skin: Skin is warm and dry. No rash noted. She is not diaphoretic. No erythema. No pallor.   Psychiatric: Her behavior is normal.   Nursing note and vitals reviewed.      Procedures           ED Course  ED Course as of Jan 16 1716   Wed Jan 16, 2019   1530 The laboratory values were reviewed by me.  The serum glucose is 118.  The creatinine is 1.02.  The white blood cell count is 3.94.  Laboratory values are otherwise unremarkable.  [RC]   1715 The laboratory values were reviewed by me.  The serum glucose is 118.  Synchrony is 1.02.  White blood cell count 3.94.  Lipitor values are otherwise unremarkable.  [RC]      ED Course User Index  [RC] Hany Palma MD        5:24 PM, 01/16/19:  The patient was reassessed.  She feels better.  There is no dizziness.  Her  vital signs were reviewed and are stable.  Patient has no chest pain or shortness of breath.    5:29 PM, 01/16/19:  Review of the patient's old chart indicates that she has a history of SVT but not atrial fibrillation.  She is not anticoagulated.    5:29 PM, 01/16/19:  I spoke with Dr. Mccabe, on call for neurology, he does not feel this is a TIA or stroke.    6:01 PM, 01/16/19:  I spoke with Dr. Walters, he advised increasing the patient's metoprolol to 50 by mouth daily.  He advised consultation with cardiology regarding anticoagulation.    6:01 PM, 01/16/19:  I spoke with Dr. Ray, on-call for , he advised starting the patient on Eliquis 5 mg by mouth twice a day with follow-up with  within 1-2 days.    6:02 PM, 01/16/19:   The patient's diagnosis of new-onset atrial fibrillation was discussed with her.  The patient should increase her metoprolol to 50 mg by mouth daily.  She should begin Elocon 5 mg by mouth twice a day.  She should follow-up with  within 1-2 days.  Patient should return to emergency department if there is increased dizziness, chest pain, shortness of breath, worse in any way at all.  All the patient's questions were answered she will be discharged in good condition.                MDM    CT Head Without Contrast   ED Interpretation   CT examination the head by Dr. Rg Holman is negative.      Final Result   Negative head CT examination.       This report was finalized on 1/16/2019 3:15 PM by Dr. Rg Holman MD.          XR Chest 1 View   ED Interpretation   The chest x-ray is read by Dr. Rg Holman shows severe chronic reticulonodular interstitial lung disease with an upper lung distribution.  Consider sarcoidosis.  Consider pneumo cardiologist and chronic granulomatous infection, including TB.  No comparison studies available.  There is cardiomegaly.      Final Result   1. Severe chronic reticulonodular interstitial lung disease with an    upper lung distribution. Consider sarcoidosis, pneumoconiosis and   chronic granulomatous infection, including TB.   2. No comparison studies here.   3. Cardiomegaly.       This report was finalized on 1/16/2019 3:14 PM by Dr. Rg Holman MD.            Labs Reviewed   COMPREHENSIVE METABOLIC PANEL - Abnormal; Notable for the following components:       Result Value    Glucose 118 (*)     Creatinine 1.02 (*)     All other components within normal limits   CBC WITH AUTO DIFFERENTIAL - Abnormal; Notable for the following components:    WBC 3.94 (*)     RDW 16.5 (*)     MPV 11.5 (*)     Monocyte % 10.2 (*)     Eosinophils, Absolute 0.04 (*)     All other components within normal limits   TROPONIN (IN-HOUSE) - Normal    Narrative:     Troponin T Reference Ranges:  Less than 0.03 ng/mL:    Negative for AMI  0.03 to 0.09 ng/mL:      Indeterminant for AMI  Greater than 0.09 ng/mL: Positive for AMI   CBC AND DIFFERENTIAL    Narrative:     The following orders were created for panel order CBC & Differential.  Procedure                               Abnormality         Status                     ---------                               -----------         ------                     CBC Auto Differential[[id number]]        Abnormal            Final result                 Please view results for these tests on the individual orders.     Ct Head Without Contrast    Result Date: 1/16/2019  Narrative: CT HEAD, NONCONTRAST, 1/16/2019  HISTORY: 68-year-old female in the ED complaining of one day history of dizziness.  TECHNIQUE:  CT examination of the head without IV contrast. Radiation dose reduction techniques included automated exposure control or exposure modulation based on body size. Radiation audit for CT and nuclear cardiology exams in the last 12 months: 0.  FINDINGS:  The examination is negative. No evidence of intracranial hemorrhage, mass, mass effect, cerebral edema or hydrocephalus. Visualized upper paranasal  sinuses and mastoid air spaces are grossly clear.       Impression: Negative head CT examination.  This report was finalized on 1/16/2019 3:15 PM by Dr. Rg Holman MD.      Xr Chest 1 View    Result Date: 1/16/2019  Narrative: CHEST X-RAY, 1/16/2019     HISTORY: 68-year-old female in the ED with new onset shortness of air and left side chest pain today. Dizziness.  TECHNIQUE: AP portable upright chest x-ray.  FINDINGS: The examination shows evidence of advanced chronic reticulonodular interstitial lung disease with an upper lung distribution. There is associated fibrosis and volume loss within the upper lungs with retractile bronchiectasis and upper retraction of the kimberly. The appearance is typical for chronic granulomatous lung disease. Differential diagnosis includes sarcoidosis, pneumoconiosis and chronic granulomatous infection, including TB. No comparison chest imaging here.  The lower lungs are clear. The heart is mildly enlarged, but there is no evidence of pulmonary vascular congestion. No pleural effusion.      Impression: 1. Severe chronic reticulonodular interstitial lung disease with an upper lung distribution. Consider sarcoidosis, pneumoconiosis and chronic granulomatous infection, including TB. 2. No comparison studies here. 3. Cardiomegaly.  This report was finalized on 1/16/2019 3:14 PM by Dr. Rg Holman MD.        Final diagnoses:   Persistent atrial fibrillation (CMS/HCC)         ED Medications:  Medications - No data to display    New Medications:     Medication List      ASK your doctor about these medications    LORazepam 1 MG tablet  Commonly known as:  ATIVAN     metoprolol succinate XL 25 MG 24 hr tablet  Commonly known as:  TOPROL-XL  Take 1 tablet by mouth Daily.     omeprazole 20 MG capsule  Commonly known as:  priLOSEC  Take 1 capsule by mouth Daily.     SAXagliptin-MetFORMIN ER 5-1000 MG tablet sustained-release 24 hour     SIMBRINZA 1-0.2 % suspension  Generic drug:   Brinzolamide-Brimonidine     timolol 0.25 % ophthalmic solution  Commonly known as:  BETIMOL          Stopped Medications:     Medication List      ASK your doctor about these medications    LORazepam 1 MG tablet  Commonly known as:  ATIVAN     metoprolol succinate XL 25 MG 24 hr tablet  Commonly known as:  TOPROL-XL  Take 1 tablet by mouth Daily.     omeprazole 20 MG capsule  Commonly known as:  priLOSEC  Take 1 capsule by mouth Daily.     SAXagliptin-MetFORMIN ER 5-1000 MG tablet sustained-release 24 hour     SIMBRINZA 1-0.2 % suspension  Generic drug:  Brinzolamide-Brimonidine     timolol 0.25 % ophthalmic solution  Commonly known as:  BETIMOL            Final diagnoses:   Persistent atrial fibrillation (CMS/[facility], located within [facility])            Hany Palma MD  01/16/19 1721       Hany Palma MD  01/16/19 1817

## 2019-01-16 NOTE — DISCHARGE INSTRUCTIONS
Follow-up with  tomorrow.  Turns emergency department if there is return of dizziness, chest pain, shortness of breath, worse in any way at all.

## 2019-01-16 NOTE — ED NOTES
Consult call placed to [facility] per Dr. Palma request.      Rhoda Jose  01/16/19 2800

## 2019-01-18 ENCOUNTER — TELEPHONE (OUTPATIENT)
Dept: CARDIOLOGY | Facility: CLINIC | Age: 69
End: 2019-01-18

## 2019-01-18 NOTE — TELEPHONE ENCOUNTER
OK to check on pt assistance.  She also needs an gabriele tto see me or JF soon to f/u from ER since this is a new diagnosis

## 2019-01-18 NOTE — TELEPHONE ENCOUNTER
Pt called stating that Eliquis is going to cost her $500 and she can not afford  I called pt back and informed her to call her insurance co in regards to which blood thinner would be more affordable for her.  According to pt she does not have any medication coverage    Please advise on what you would like for pt to try.  We could try pt assistance since she does not have rx coverage

## 2019-01-23 ENCOUNTER — OFFICE VISIT (OUTPATIENT)
Dept: CARDIOLOGY | Facility: CLINIC | Age: 69
End: 2019-01-23

## 2019-01-23 VITALS
DIASTOLIC BLOOD PRESSURE: 78 MMHG | WEIGHT: 111 LBS | SYSTOLIC BLOOD PRESSURE: 118 MMHG | HEART RATE: 99 BPM | HEIGHT: 65 IN | BODY MASS INDEX: 18.49 KG/M2

## 2019-01-23 DIAGNOSIS — I48.19 PERSISTENT ATRIAL FIBRILLATION (HCC): Primary | Chronic | ICD-10-CM

## 2019-01-23 DIAGNOSIS — Z79.01 CHRONIC ANTICOAGULATION: Chronic | ICD-10-CM

## 2019-01-23 PROCEDURE — 93000 ELECTROCARDIOGRAM COMPLETE: CPT | Performed by: INTERNAL MEDICINE

## 2019-01-23 PROCEDURE — 99214 OFFICE O/P EST MOD 30 MIN: CPT | Performed by: INTERNAL MEDICINE

## 2019-01-23 NOTE — PROGRESS NOTES
Subjective:     Encounter Date: 1/30/2019      Patient ID: Argelia Nguyen is a 68 y.o. female.    Chief Complaint: SVT  History of Present Illness    Dear Dr. Oviedo,    I had the pleasure of seeing this patient in the office today for follow-up.  She was just seen in the emergency room with atrial fibrillation with RVR.  This was a new diagnosis for her.  She is a prior history of hypertension and SVT.    She was in our office in September that point she was doing fine.  She states the day she came to the emergency room on January 16, 2019 she just felt dizzy and weak.  She felt sometimes that her heart was jumping around.  On arrival she was found to be in atrial fibrillation with RVR.  Metoprolol was increased from 25-50 mg daily.  She also was started on Eliquis, although she states that she went to pick it up she could not afford it and use now started her on Xarelto.      The following portions of the patient's history were reviewed and updated as appropriate: allergies, current medications, past family history, past medical history, past social history, past surgical history and problem list.    Past Medical History:   Diagnosis Date   • Anxiety    • Chronic anticoagulation 1/23/2019   • Colon polyp    • Diabetes mellitus (CMS/HCC)    • Dysphagia    • Fatigue    • GERD (gastroesophageal reflux disease)    • Hypertension    • Persistent atrial fibrillation (CMS/HCC) 1/23/2019   • Reflux gastritis        Past Surgical History:   Procedure Laterality Date   • COLONOSCOPY N/A 3/29/2017    Procedure: COLONOSCOPY; POLYPECTOMY;  Surgeon: Brooke Garrido MD;  Location: [facility] OR;  Service:    • CYST REMOVAL     • ENDOSCOPY N/A 3/29/2017    Procedure: ESOPHAGOGASTRODUODENOSCOPY WITH DILITATION;  Surgeon: Brooke Garrido MD;  Location: [facility] OR;  Service:    • HERNIA REPAIR     • HYSTERECTOMY         Social History     Socioeconomic History   • Marital status: Single     Spouse name: Not on file   • Number of  children: Not on file   • Years of education: Not on file   • Highest education level: Not on file   Social Needs   • Financial resource strain: Not on file   • Food insecurity - worry: Not on file   • Food insecurity - inability: Not on file   • Transportation needs - medical: Not on file   • Transportation needs - non-medical: Not on file   Occupational History   • Not on file   Tobacco Use   • Smoking status: Never Smoker   • Smokeless tobacco: Never Used   Substance and Sexual Activity   • Alcohol use: No   • Drug use: No   • Sexual activity: Defer   Other Topics Concern   • Not on file   Social History Narrative   • Not on file       Review of Systems   Constitution: Negative for chills, decreased appetite, fever and night sweats.   HENT: Negative for ear discharge, ear pain, hearing loss, nosebleeds and sore throat.    Eyes: Negative for double vision and pain.   Cardiovascular: Negative for cyanosis.   Respiratory: Negative for hemoptysis and sputum production.    Endocrine: Negative for cold intolerance and heat intolerance.   Hematologic/Lymphatic: Negative for adenopathy.   Skin: Negative for dry skin, itching, nail changes, rash and suspicious lesions.   Musculoskeletal: Negative for arthritis, gout, muscle cramps, muscle weakness and myalgias.   Gastrointestinal: Negative for anorexia, bowel incontinence, constipation, diarrhea, dysphagia, hematemesis and jaundice.   Genitourinary: Negative for bladder incontinence, dysuria, flank pain, frequency, hematuria and nocturia.   Neurological: Negative for focal weakness, numbness, paresthesias and seizures.   Psychiatric/Behavioral: Negative for altered mental status, hallucinations, hypervigilance, suicidal ideas and thoughts of violence.   Allergic/Immunologic: Negative for persistent infections.         ECG 12 Lead  Date/Time: 1/23/2019 4:36 PM  Performed by: Aidan Adams III, MD  Authorized by: Aidan Adams III, MD   Comparison: compared with  "previous ECG   Similar to previous ECG  Rhythm: atrial fibrillation  Rate: normal  Conduction: conduction normal  ST Segments: ST segments normal  T Waves: T waves normal  QRS axis: normal  Other: no other findings  Clinical impression: abnormal ECG               Objective:     Vitals:    01/23/19 1417   BP: 118/78   Pulse: 99   Weight: 50.3 kg (111 lb)   Height: 165.1 cm (65\")         Physical Exam   Constitutional: She is oriented to person, place, and time. She appears well-developed and well-nourished. No distress.   HENT:   Head: Normocephalic and atraumatic.   Nose: Nose normal.   Mouth/Throat: Oropharynx is clear and moist.   Eyes: Conjunctivae and EOM are normal. Pupils are equal, round, and reactive to light. Right eye exhibits no discharge. Left eye exhibits no discharge.   Neck: Normal range of motion. Neck supple. No tracheal deviation present. No thyromegaly present.   Cardiovascular: Normal rate, regular rhythm, S1 normal, S2 normal, normal heart sounds and normal pulses. Exam reveals no S3.   Pulmonary/Chest: Effort normal and breath sounds normal. No stridor. No respiratory distress. She exhibits no tenderness.   Abdominal: Soft. Bowel sounds are normal. She exhibits no distension and no mass. There is no tenderness. There is no rebound and no guarding.   Musculoskeletal: Normal range of motion. She exhibits no tenderness or deformity.   Lymphadenopathy:     She has no cervical adenopathy.   Neurological: She is alert and oriented to person, place, and time. She has normal reflexes.   Skin: Skin is warm and dry. No rash noted. She is not diaphoretic. No erythema.   Psychiatric: She has a normal mood and affect. Thought content normal.       Lab Review:             Performed        Assessment:          Diagnosis Plan   1. Persistent atrial fibrillation (CMS/HCC)  Holter Monitor - 24 Hour    Adult Transthoracic Echo Complete W/ Cont if Necessary Per Protocol   2. Chronic anticoagulation  Holter Monitor " - 24 Hour    Adult Transthoracic Echo Complete W/ Cont if Necessary Per Protocol          Plan:       1.  New onset persistent atrial fibrillation-I will arrange for Holter monitor to assess rate control now we also will obtain an echocardiogram.  She is instructed to remain on her Xarelto that you have prescribed.  2.  Supraventricular tachycardia-no evidence of recurrence      Thank you very much for allowing us to participate in the care of this pleasant patient.  Please don't hesitate to call if I can be of assistance in any way.      Current Outpatient Medications:   •  Brinzolamide-Brimonidine (SIMBRINZA) 1-0.2 % suspension, Apply  to eye., Disp: , Rfl:   •  LORazepam (ATIVAN) 1 MG tablet, Take 1 mg by mouth Daily., Disp: , Rfl:   •  metoprolol succinate XL (TOPROL-XL) 25 MG 24 hr tablet, Take 2 tablets by mouth Daily., Disp: 90 tablet, Rfl: 0  •  omeprazole (priLOSEC) 20 MG capsule, Take 1 capsule by mouth Daily., Disp: 30 capsule, Rfl: 11  •  rivaroxaban (XARELTO) 20 MG tablet, Take 20 mg by mouth Daily., Disp: , Rfl:   •  Saxagliptin-Metformin ER 5-1000 MG tablet sustained-release 24 hour, Take 1 tablet by mouth Daily., Disp: , Rfl:   •  timolol (BETIMOL) 0.25 % ophthalmic solution, 1-2 drops 2 (Two) Times a Day., Disp: , Rfl:          EMR Dragon/Transcription disclaimer:    Much of this encounter note is an electronic transcription/translation of spoken language to printed text. The electronic translation of spoken language may permit erroneous, or at times, nonsensical words or phrases to be inadvertently transcribed; Although I have reviewed the note for such errors, some may still exist.

## 2019-01-27 ENCOUNTER — HOSPITAL ENCOUNTER (EMERGENCY)
Facility: HOSPITAL | Age: 69
Discharge: HOME | End: 2019-01-27
Attending: EMERGENCY MEDICINE | Admitting: EMERGENCY MEDICINE

## 2019-01-27 ENCOUNTER — APPOINTMENT (OUTPATIENT)
Dept: GENERAL RADIOLOGY | Facility: HOSPITAL | Age: 69
End: 2019-01-27

## 2019-01-27 VITALS
SYSTOLIC BLOOD PRESSURE: 118 MMHG | BODY MASS INDEX: 17.68 KG/M2 | OXYGEN SATURATION: 100 % | HEART RATE: 66 BPM | RESPIRATION RATE: 15 BRPM | DIASTOLIC BLOOD PRESSURE: 77 MMHG | HEIGHT: 66 IN | TEMPERATURE: 96 F | WEIGHT: 110 LBS

## 2019-01-27 DIAGNOSIS — R42 DIZZINESS ON STANDING: Primary | ICD-10-CM

## 2019-01-27 DIAGNOSIS — I47.1 SVT (SUPRAVENTRICULAR TACHYCARDIA) (HCC): ICD-10-CM

## 2019-01-27 DIAGNOSIS — N28.9 RENAL INSUFFICIENCY: ICD-10-CM

## 2019-01-27 DIAGNOSIS — E86.0 DEHYDRATION: ICD-10-CM

## 2019-01-27 LAB
ALBUMIN SERPL-MCNC: 3.8 G/DL (ref 3.5–5.2)
ALBUMIN/GLOB SERPL: 1.1 G/DL
ALP SERPL-CCNC: 141 U/L (ref 40–129)
ALT SERPL W P-5'-P-CCNC: 127 U/L (ref 5–33)
ANION GAP SERPL CALCULATED.3IONS-SCNC: 20 MMOL/L
APTT PPP: 39.3 SECONDS (ref 24.3–38.1)
AST SERPL-CCNC: 100 U/L (ref 5–32)
BACTERIA UR QL AUTO: ABNORMAL /HPF
BASOPHILS # BLD AUTO: 0.01 10*3/MM3 (ref 0–0.2)
BASOPHILS NFR BLD AUTO: 0.2 % (ref 0–2)
BILIRUB SERPL-MCNC: 0.5 MG/DL (ref 0.2–1.2)
BILIRUB UR QL STRIP: ABNORMAL
BUN BLD-MCNC: 36 MG/DL (ref 8–23)
BUN/CREAT SERPL: 22.1 (ref 7–25)
CALCIUM SPEC-SCNC: 9.6 MG/DL (ref 8.8–10.5)
CHLORIDE SERPL-SCNC: 100 MMOL/L (ref 98–107)
CLARITY UR: CLEAR
CO2 SERPL-SCNC: 19 MMOL/L (ref 22–29)
COLOR UR: YELLOW
CREAT BLD-MCNC: 1.63 MG/DL (ref 0.57–1)
DEPRECATED RDW RBC AUTO: 54.9 FL (ref 37–54)
EOSINOPHIL # BLD AUTO: 0 10*3/MM3 (ref 0.1–0.3)
EOSINOPHIL NFR BLD AUTO: 0 % (ref 0–4)
ERYTHROCYTE [DISTWIDTH] IN BLOOD BY AUTOMATED COUNT: 17.3 % (ref 11.5–14.5)
GFR SERPL CREATININE-BSD FRML MDRD: 38 ML/MIN/1.73
GLOBULIN UR ELPH-MCNC: 3.4 GM/DL
GLUCOSE BLD-MCNC: 146 MG/DL (ref 65–99)
GLUCOSE UR STRIP-MCNC: NEGATIVE MG/DL
HCT VFR BLD AUTO: 36.6 % (ref 37–47)
HGB BLD-MCNC: 11.6 G/DL (ref 12–16)
HGB UR QL STRIP.AUTO: NEGATIVE
HYALINE CASTS UR QL AUTO: ABNORMAL /LPF
IMM GRANULOCYTES # BLD AUTO: 0.01 10*3/MM3 (ref 0–0.03)
IMM GRANULOCYTES NFR BLD AUTO: 0.2 % (ref 0–0.5)
INR PPP: 1.87 (ref 0.9–1.1)
KETONES UR QL STRIP: ABNORMAL
LEUKOCYTE ESTERASE UR QL STRIP.AUTO: NEGATIVE
LYMPHOCYTES # BLD AUTO: 0.8 10*3/MM3 (ref 0.6–4.8)
LYMPHOCYTES NFR BLD AUTO: 19.7 % (ref 20–45)
MCH RBC QN AUTO: 27.5 PG (ref 27–31)
MCHC RBC AUTO-ENTMCNC: 31.7 G/DL (ref 31–37)
MCV RBC AUTO: 86.7 FL (ref 81–99)
MONOCYTES # BLD AUTO: 0.28 10*3/MM3 (ref 0–1)
MONOCYTES NFR BLD AUTO: 6.9 % (ref 3–8)
NEUTROPHILS # BLD AUTO: 2.97 10*3/MM3 (ref 1.5–8.3)
NEUTROPHILS NFR BLD AUTO: 73 % (ref 45–70)
NITRITE UR QL STRIP: NEGATIVE
NRBC BLD AUTO-RTO: 0.5 /100 WBC (ref 0–0)
PH UR STRIP.AUTO: 5.5 [PH] (ref 4.5–8)
PLATELET # BLD AUTO: 201 10*3/MM3 (ref 140–500)
PMV BLD AUTO: 12.7 FL (ref 7.4–10.4)
POTASSIUM BLD-SCNC: 4.3 MMOL/L (ref 3.5–5.2)
PROT SERPL-MCNC: 7.2 G/DL (ref 6–8.5)
PROT UR QL STRIP: ABNORMAL
PROTHROMBIN TIME: 21.6 SECONDS (ref 12.1–15)
RBC # BLD AUTO: 4.22 10*6/MM3 (ref 4.2–5.4)
RBC # UR: ABNORMAL /HPF
REF LAB TEST METHOD: ABNORMAL
SODIUM BLD-SCNC: 139 MMOL/L (ref 136–145)
SP GR UR STRIP: 1.02 (ref 1–1.03)
SQUAMOUS #/AREA URNS HPF: ABNORMAL /HPF
UROBILINOGEN UR QL STRIP: ABNORMAL
WBC NRBC COR # BLD: 4.07 10*3/MM3 (ref 4.8–10.8)
WBC UR QL AUTO: ABNORMAL /HPF

## 2019-01-27 PROCEDURE — 96360 HYDRATION IV INFUSION INIT: CPT

## 2019-01-27 PROCEDURE — 85610 PROTHROMBIN TIME: CPT | Performed by: EMERGENCY MEDICINE

## 2019-01-27 PROCEDURE — 99284 EMERGENCY DEPT VISIT MOD MDM: CPT

## 2019-01-27 PROCEDURE — 71046 X-RAY EXAM CHEST 2 VIEWS: CPT

## 2019-01-27 PROCEDURE — 93010 ELECTROCARDIOGRAM REPORT: CPT | Performed by: INTERNAL MEDICINE

## 2019-01-27 PROCEDURE — 80053 COMPREHEN METABOLIC PANEL: CPT | Performed by: EMERGENCY MEDICINE

## 2019-01-27 PROCEDURE — 85730 THROMBOPLASTIN TIME PARTIAL: CPT | Performed by: EMERGENCY MEDICINE

## 2019-01-27 PROCEDURE — 85025 COMPLETE CBC W/AUTO DIFF WBC: CPT | Performed by: EMERGENCY MEDICINE

## 2019-01-27 PROCEDURE — 93005 ELECTROCARDIOGRAM TRACING: CPT | Performed by: EMERGENCY MEDICINE

## 2019-01-27 PROCEDURE — 81001 URINALYSIS AUTO W/SCOPE: CPT | Performed by: EMERGENCY MEDICINE

## 2019-01-27 PROCEDURE — 99284 EMERGENCY DEPT VISIT MOD MDM: CPT | Performed by: EMERGENCY MEDICINE

## 2019-01-27 RX ORDER — SODIUM CHLORIDE 0.9 % (FLUSH) 0.9 %
10 SYRINGE (ML) INJECTION AS NEEDED
Status: DISCONTINUED | OUTPATIENT
Start: 2019-01-27 | End: 2019-01-27 | Stop reason: HOSPADM

## 2019-01-27 RX ORDER — METOPROLOL SUCCINATE 25 MG/1
25 TABLET, EXTENDED RELEASE ORAL DAILY
Qty: 30 TABLET | Refills: 0 | Status: SHIPPED | OUTPATIENT
Start: 2019-01-27 | End: 2019-02-13

## 2019-01-27 RX ADMIN — SODIUM CHLORIDE 1000 ML: 900 INJECTION, SOLUTION INTRAVENOUS at 14:54

## 2019-01-30 ENCOUNTER — HOSPITAL ENCOUNTER (OUTPATIENT)
Dept: CARDIOLOGY | Facility: HOSPITAL | Age: 69
Discharge: HOME | End: 2019-01-30
Attending: INTERNAL MEDICINE | Admitting: INTERNAL MEDICINE

## 2019-01-30 ENCOUNTER — HOSPITAL ENCOUNTER (OUTPATIENT)
Dept: CARDIOLOGY | Facility: HOSPITAL | Age: 69
Discharge: HOME | End: 2019-01-30
Attending: INTERNAL MEDICINE

## 2019-01-30 VITALS
DIASTOLIC BLOOD PRESSURE: 90 MMHG | WEIGHT: 110 LBS | BODY MASS INDEX: 18.33 KG/M2 | HEIGHT: 65 IN | SYSTOLIC BLOOD PRESSURE: 122 MMHG

## 2019-01-30 DIAGNOSIS — I48.19 PERSISTENT ATRIAL FIBRILLATION (HCC): ICD-10-CM

## 2019-01-30 DIAGNOSIS — Z79.01 CHRONIC ANTICOAGULATION: ICD-10-CM

## 2019-01-30 LAB
BH CV ECHO MEAS - ACS: 1.7 CM
BH CV ECHO MEAS - AO MAX PG (FULL): 1.3 MMHG
BH CV ECHO MEAS - AO MAX PG: 4.2 MMHG
BH CV ECHO MEAS - AO MEAN PG (FULL): 1 MMHG
BH CV ECHO MEAS - AO MEAN PG: 2 MMHG
BH CV ECHO MEAS - AO ROOT AREA (BSA CORRECTED): 2
BH CV ECHO MEAS - AO ROOT AREA: 7.1 CM^2
BH CV ECHO MEAS - AO ROOT DIAM: 3 CM
BH CV ECHO MEAS - AO V2 MAX: 102 CM/SEC
BH CV ECHO MEAS - AO V2 MEAN: 65 CM/SEC
BH CV ECHO MEAS - AO V2 VTI: 19.8 CM
BH CV ECHO MEAS - AVA(I,A): 1.8 CM^2
BH CV ECHO MEAS - AVA(I,D): 1.8 CM^2
BH CV ECHO MEAS - AVA(V,A): 2.1 CM^2
BH CV ECHO MEAS - AVA(V,D): 2.1 CM^2
BH CV ECHO MEAS - BSA(HAYCOCK): 1.5 M^2
BH CV ECHO MEAS - BSA: 1.5 M^2
BH CV ECHO MEAS - BZI_BMI: 18.3 KILOGRAMS/M^2
BH CV ECHO MEAS - BZI_METRIC_HEIGHT: 165.1 CM
BH CV ECHO MEAS - BZI_METRIC_WEIGHT: 49.9 KG
BH CV ECHO MEAS - EDV(CUBED): 29.8 ML
BH CV ECHO MEAS - EDV(MOD-SP2): 61.6 ML
BH CV ECHO MEAS - EDV(MOD-SP4): 40.5 ML
BH CV ECHO MEAS - EDV(TEICH): 37.9 ML
BH CV ECHO MEAS - EF(CUBED): 67.6 %
BH CV ECHO MEAS - EF(MOD-BP): 63 %
BH CV ECHO MEAS - EF(MOD-SP2): 67.2 %
BH CV ECHO MEAS - EF(MOD-SP4): 61.2 %
BH CV ECHO MEAS - EF(TEICH): 60.6 %
BH CV ECHO MEAS - ESV(CUBED): 9.7 ML
BH CV ECHO MEAS - ESV(MOD-SP2): 20.2 ML
BH CV ECHO MEAS - ESV(MOD-SP4): 15.7 ML
BH CV ECHO MEAS - ESV(TEICH): 14.9 ML
BH CV ECHO MEAS - FS: 31.3 %
BH CV ECHO MEAS - IVS/LVPW: 1.3
BH CV ECHO MEAS - IVSD: 1.2 CM
BH CV ECHO MEAS - LA DIMENSION: 3.2 CM
BH CV ECHO MEAS - LA/AO: 1.1
BH CV ECHO MEAS - LAT PEAK E' VEL: 11 CM/SEC
BH CV ECHO MEAS - LV DIASTOLIC VOL/BSA (35-75): 26.4 ML/M^2
BH CV ECHO MEAS - LV MASS(C)D: 88.7 GRAMS
BH CV ECHO MEAS - LV MASS(C)DI: 57.8 GRAMS/M^2
BH CV ECHO MEAS - LV MAX PG: 2.8 MMHG
BH CV ECHO MEAS - LV MEAN PG: 1 MMHG
BH CV ECHO MEAS - LV SYSTOLIC VOL/BSA (12-30): 10.2 ML/M^2
BH CV ECHO MEAS - LV V1 MAX: 84.2 CM/SEC
BH CV ECHO MEAS - LV V1 MEAN: 44.4 CM/SEC
BH CV ECHO MEAS - LV V1 VTI: 14.2 CM
BH CV ECHO MEAS - LVIDD: 3.1 CM
BH CV ECHO MEAS - LVIDS: 2.1 CM
BH CV ECHO MEAS - LVLD AP2: 6.3 CM
BH CV ECHO MEAS - LVLD AP4: 5.7 CM
BH CV ECHO MEAS - LVLS AP2: 5.8 CM
BH CV ECHO MEAS - LVLS AP4: 4.7 CM
BH CV ECHO MEAS - LVOT AREA (M): 2.5 CM^2
BH CV ECHO MEAS - LVOT AREA: 2.5 CM^2
BH CV ECHO MEAS - LVOT DIAM: 1.8 CM
BH CV ECHO MEAS - LVPWD: 0.88 CM
BH CV ECHO MEAS - MED PEAK E' VEL: 10 CM/SEC
BH CV ECHO MEAS - MR MAX PG: 141.1 MMHG
BH CV ECHO MEAS - MR MAX VEL: 594 CM/SEC
BH CV ECHO MEAS - MR MEAN PG: 87 MMHG
BH CV ECHO MEAS - MR MEAN VEL: 433 CM/SEC
BH CV ECHO MEAS - MR VTI: 198 CM
BH CV ECHO MEAS - MV A DUR: 0.16 SEC
BH CV ECHO MEAS - MV A MAX VEL: 36.9 CM/SEC
BH CV ECHO MEAS - MV DEC SLOPE: 572 CM/SEC^2
BH CV ECHO MEAS - MV DEC TIME: 0.1 SEC
BH CV ECHO MEAS - MV E MAX VEL: 77.6 CM/SEC
BH CV ECHO MEAS - MV E/A: 2.1
BH CV ECHO MEAS - MV MAX PG: 4.2 MMHG
BH CV ECHO MEAS - MV MEAN PG: 2 MMHG
BH CV ECHO MEAS - MV P1/2T MAX VEL: 125 CM/SEC
BH CV ECHO MEAS - MV P1/2T: 64 MSEC
BH CV ECHO MEAS - MV V2 MAX: 103 CM/SEC
BH CV ECHO MEAS - MV V2 MEAN: 73.6 CM/SEC
BH CV ECHO MEAS - MV V2 VTI: 33 CM
BH CV ECHO MEAS - MVA P1/2T LCG: 1.8 CM^2
BH CV ECHO MEAS - MVA(P1/2T): 3.4 CM^2
BH CV ECHO MEAS - MVA(VTI): 1.1 CM^2
BH CV ECHO MEAS - PA ACC TIME: 0.1 SEC
BH CV ECHO MEAS - PA MAX PG (FULL): 0.44 MMHG
BH CV ECHO MEAS - PA MAX PG: 1.6 MMHG
BH CV ECHO MEAS - PA PR(ACCEL): 33.1 MMHG
BH CV ECHO MEAS - PA V2 MAX: 63.9 CM/SEC
BH CV ECHO MEAS - PULM A REVS DUR: 0.12 SEC
BH CV ECHO MEAS - PULM A REVS VEL: 31 CM/SEC
BH CV ECHO MEAS - PULM DIAS VEL: 57.2 CM/SEC
BH CV ECHO MEAS - PULM S/D: 0.88
BH CV ECHO MEAS - PULM SYS VEL: 50.4 CM/SEC
BH CV ECHO MEAS - PVA(V,A): 2.2 CM^2
BH CV ECHO MEAS - PVA(V,D): 2.2 CM^2
BH CV ECHO MEAS - QP/QS: 0.86
BH CV ECHO MEAS - RAP SYSTOLE: 8 MMHG
BH CV ECHO MEAS - RV MAX PG: 1.2 MMHG
BH CV ECHO MEAS - RV MEAN PG: 1 MMHG
BH CV ECHO MEAS - RV V1 MAX: 54.7 CM/SEC
BH CV ECHO MEAS - RV V1 MEAN: 36.8 CM/SEC
BH CV ECHO MEAS - RV V1 VTI: 12.2 CM
BH CV ECHO MEAS - RVOT AREA: 2.5 CM^2
BH CV ECHO MEAS - RVOT DIAM: 1.8 CM
BH CV ECHO MEAS - RVSP: 43 MMHG
BH CV ECHO MEAS - SI(AO): 91.2 ML/M^2
BH CV ECHO MEAS - SI(CUBED): 13.1 ML/M^2
BH CV ECHO MEAS - SI(LVOT): 23.6 ML/M^2
BH CV ECHO MEAS - SI(MOD-SP2): 27 ML/M^2
BH CV ECHO MEAS - SI(MOD-SP4): 16.2 ML/M^2
BH CV ECHO MEAS - SI(TEICH): 15 ML/M^2
BH CV ECHO MEAS - SV(AO): 140 ML
BH CV ECHO MEAS - SV(CUBED): 20.1 ML
BH CV ECHO MEAS - SV(LVOT): 36.1 ML
BH CV ECHO MEAS - SV(MOD-SP2): 41.4 ML
BH CV ECHO MEAS - SV(MOD-SP4): 24.8 ML
BH CV ECHO MEAS - SV(RVOT): 31 ML
BH CV ECHO MEAS - SV(TEICH): 23 ML
BH CV ECHO MEAS - TAPSE (>1.6): 2.2 CM2
BH CV ECHO MEAS - TR MAX VEL: 328 CM/SEC
BH CV ECHO MEASUREMENTS AVERAGE E/E' RATIO: 7.39
BH CV VAS BP RIGHT ARM: NORMAL MMHG
BH CV XLRA - RV BASE: 5.4 CM
BH CV XLRA - TDI S': 13 CM/SEC
LEFT ATRIUM VOLUME INDEX: 53 ML/M2
MAXIMAL PREDICTED HEART RATE: 151 BPM
STRESS TARGET HR: 128 BPM

## 2019-01-30 PROCEDURE — 93306 TTE W/DOPPLER COMPLETE: CPT | Performed by: INTERNAL MEDICINE

## 2019-01-30 PROCEDURE — 93226 XTRNL ECG REC<48 HR SCAN A/R: CPT

## 2019-01-30 PROCEDURE — 93306 TTE W/DOPPLER COMPLETE: CPT

## 2019-01-30 PROCEDURE — 93225 XTRNL ECG REC<48 HRS REC: CPT

## 2019-02-01 PROCEDURE — 93227 XTRNL ECG REC<48 HR R&I: CPT | Performed by: INTERNAL MEDICINE

## 2019-02-13 ENCOUNTER — OFFICE VISIT (OUTPATIENT)
Dept: CARDIOLOGY | Facility: CLINIC | Age: 69
End: 2019-02-13

## 2019-02-13 VITALS
WEIGHT: 106.4 LBS | HEIGHT: 65 IN | BODY MASS INDEX: 17.73 KG/M2 | HEART RATE: 63 BPM | DIASTOLIC BLOOD PRESSURE: 100 MMHG | SYSTOLIC BLOOD PRESSURE: 150 MMHG

## 2019-02-13 DIAGNOSIS — I36.1 NON-RHEUMATIC TRICUSPID VALVE INSUFFICIENCY: ICD-10-CM

## 2019-02-13 DIAGNOSIS — I10 ESSENTIAL HYPERTENSION: ICD-10-CM

## 2019-02-13 DIAGNOSIS — I34.0 NON-RHEUMATIC MITRAL REGURGITATION: ICD-10-CM

## 2019-02-13 DIAGNOSIS — Z79.01 CHRONIC ANTICOAGULATION: Chronic | ICD-10-CM

## 2019-02-13 DIAGNOSIS — I48.19 PERSISTENT ATRIAL FIBRILLATION (HCC): Primary | Chronic | ICD-10-CM

## 2019-02-13 DIAGNOSIS — I47.1 SVT (SUPRAVENTRICULAR TACHYCARDIA) (HCC): Chronic | ICD-10-CM

## 2019-02-13 PROCEDURE — 99214 OFFICE O/P EST MOD 30 MIN: CPT | Performed by: NURSE PRACTITIONER

## 2019-02-13 RX ORDER — METOPROLOL SUCCINATE 100 MG/1
100 TABLET, EXTENDED RELEASE ORAL DAILY
Qty: 30 TABLET | Refills: 6 | Status: SHIPPED | OUTPATIENT
Start: 2019-02-13 | End: 2020-04-07

## 2019-02-13 RX ORDER — LOSARTAN POTASSIUM 25 MG/1
25 TABLET ORAL DAILY
Qty: 30 TABLET | Refills: 6 | Status: SHIPPED | OUTPATIENT
Start: 2019-02-13 | End: 2019-09-16 | Stop reason: SDUPTHER

## 2019-02-13 NOTE — PROGRESS NOTES
Subjective:     Encounter Date:02/13/2019      Patient ID: Argelia Nguyen is a 68 y.o. female.    Chief Complaint:  PAF, follow up with testing  History of Present Illness  Ms. Nguyen is a new patient to me and I have reviewed her past medical records.  She is a patient of  and was last seen 1/23/19.  She has a new diagnosis of atrial fibrillation.  She has a history of SVT and HTN.      She was in our office in September that point she was doing fine.  She states the day she came to the emergency room on January 16, 2019 she just felt dizzy and weak.  She felt sometimes that her heart was jumping around.  On arrival she was found to be in atrial fibrillation with RVR.  Metoprolol was increased from 25-50 mg daily.  She also was started on Eliquis, although she states that she went to pick it up she could not afford it and use now started her on Xarelto.    Echocardiogram 1/30/19:   · Calculated EF = 63.0%.  · Left ventricular systolic function is normal.  · The left ventricular cavity is small.  · Left ventricular wall thickness is consistent with moderate concentric   hypertrophy.  · Right atrial cavity size is severely dilated.  · Left atrial cavity size is moderate-to-severely dilated.  · Right ventricular cavity is severely dilated.  · Right ventricular wall thickness is consistent with moderate   hypertrophy.  · Mildly reduced right ventricular systolic function noted.  · Severe mitral valve regurgitation is present  · Severe tricuspid valve regurgitation is present.  · Estimated right ventricular systolic pressure from tricuspid   regurgitation is mildly elevated (35-45 mmHg).  · Calculated right ventricular systolic pressure from tricuspid   regurgitation is 43 mmHg.     Holter monitor 1/30/19    An abnormal monitor study.  · Evidence of atrial fibrillation was noted. Had atrial fibrillation 59.7%   of the time.     She presents today, 2/13/19, for follow up of her atrial fibrillation and  to review her test results.   She still feels palpitations at times and when this happens she does get short of breath.  She was having some trouble with edema in her feet and ankles but that has resolved.  She denies any lightheadedness or dizziness.  She does feel some chest discomfort she's happen the palpitations but doesn't describe it necessarily as pain.  She is fatigued.  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  She has not been back to work since her onset of atrial fibrillation.  She denies any leg pain, numbness or tingling in her upper or lower extremities.  She is currently on Xarelto and has not had any issues with bleeding.       The following portions of the patient's history were reviewed and updated as appropriate: allergies, current medications, past family history, past medical history, past social history, past surgical history and problem list.    Review of Systems   Constitution: Negative for weakness and malaise Fatigue   HENT: Negative for congestion, hoarse voice and sore throat.    Eyes: Negative for blurred vision, double vision, photophobia, vision loss in left eye and vision loss in right eye.   Cardiovascular: Negative for chest pain, dyspnea on exertion, leg swelling, near-syncope, orthopnea, paroxysmal nocturnal dyspnea and syncope. Positive for irregular heartbeat, palpitations  Respiratory: Negative for cough, hemoptysis, shortness of breath, sleep disturbances due to breathing, snoring, sputum production and wheezing.    Endocrine: Negative.    Hematologic/Lymphatic: Does not bruise/bleed easily.   Skin: Negative for color change, dry skin, poor wound healing and rash.   Musculoskeletal: Negative for back pain, falls, gout, joint pain, joint swelling, muscle cramps and muscle weakness.   Gastrointestinal: Negative for abdominal pain, constipation, diarrhea, dysphagia, melena, nausea and vomiting.   Neurological: Negative for  excessive daytime sleepiness, dizziness, headaches, light-headedness, loss of balance, numbness, paresthesias, seizures and vertigo.   Psychiatric/Behavioral: Negative for depression and substance abuse. The patient is not nervous/anxious.      Procedures       Objective:         Physical Exam   Constitutional: He is oriented to person, place, and time. Vital signs are normal. He appears well-developed and well-nourished. No distress.   HENT:   Head: Normocephalic and atraumatic.   Right Ear: Hearing normal.   Left Ear: Hearing normal.   Eyes: Conjunctivae and lids are normal.   Neck: Normal range of motion. Neck supple. No JVD present. Carotid bruit is not present. No thyromegaly present.   Cardiovascular: Normal rate, regular rhythm, S1 normal, S2 normal, normal heart sounds and intact distal pulses.  PMI is not displaced.  Exam reveals no gallop.    Murmur heard across the precordium  Pulses:       Carotid pulses are 2+ on the right side, and 2+ on the left side.       Radial pulses are 2+ on the right side, and 2+ on the left side.        Dorsalis pedis pulses are 2+ on the right side, and 2+ on the left side.        Posterior tibial pulses are 2+ on the right side, and 2+ on the left side.   Pulmonary/Chest: Effort normal and breath sounds normal. No respiratory distress. He has no wheezes. He has no rhonchi. He has no rales. He exhibits no tenderness.   Abdominal: Soft. Normal appearance and bowel sounds are normal. He exhibits no distension, no abdominal bruit and no mass. There is no tenderness.   Musculoskeletal: Normal range of motion.   Exhibits no edema or deformity   Lymphadenopathy:     He has no cervical adenopathy.   Neurological: He is alert and oriented to person, place, and time. No cranial nerve deficit. Coordination and gait normal.   Oriented to person, place and time.   Skin: Skin is warm, dry and intact. No rash noted. He is not diaphoretic. No cyanosis. Nails show no clubbing.   Psychiatric:  "He has a normal mood and affect. His speech is normal and behavior is normal. Judgment and thought content normal. Cognition and memory are normal.     Vitals:    02/13/19 1241 02/13/19 1250 02/13/19 1252   BP:  130/90 150/100   BP Location:  Right arm Left arm   Patient Position:  Sitting Sitting   Pulse: 63     Weight: 48.3 kg (106 lb 6.4 oz)     Height: 165.1 cm (65\")             Lab Review:       Assessment:          Diagnosis Plan   1. Persistent atrial fibrillation (CMS/HCC)     2. SVT (supraventricular tachycardia) (CMS/HCC)     3. Chronic anticoagulation     4. Essential hypertension     5. Non-rheumatic mitral regurgitation     6. Non-rheumatic tricuspid valve insufficiency            Plan:       1.  Persistent atrial fibrillation - By palpation and ausculation, HR is regular.  Per Holter, average HR is 95 bpm with highest 182 BPM.  Will increase metoprolol to 100 mg daily.      Atrial Fibrillation and Atrial Flutter  Assessment  • The patient has paroxysmal atrial fibrillation  • This is non-valvular in etiology  • The patient's CHADS2-VASc score is 4  • A [id number] score of 2 or more is considered a high risk for a thromboembolic event  • Rivaroxaban prescribed    Plan  • Attempt to maintain sinus rhythm  • Continue rivaroxaban for antithrombotic therapy, bleeding issues discussed  • Continue beta blocker for rhythm control  • Continue beta blocker for rate control  • Increase metoprolol to 100 mg daily    2.  SVT - no episodes recorded during Holter monitoring    3.  Chronic anticoagulation - on Xarelto    4.  HTN - inadequately controlled,  Increased BB and add losartan 25 mg daily.  She will take her BB in the AM and take her losartan in the evening.    5/6.  Severe mitral and tricuspid regurgitation - noted on echo 1/30/19.  EF 63%,  LV systolic function is normal. Right and left atria severely dilated, right ventricle severely dilated.  RV wall thickness consistent with moderate hypertrophy.  " Mildly reduced RV systolic function. RVSP mildly elevated at 43 mmHg.  Add losartan 25 mg for afterload reduction.       RTO I month with EDEL Rodriguez    Current Outpatient Medications:   •  Brinzolamide-Brimonidine (SIMBRINZA) 1-0.2 % suspension, Apply  to eye., Disp: , Rfl:   •  LORazepam (ATIVAN) 1 MG tablet, Take 1 mg by mouth Daily., Disp: , Rfl:   •  omeprazole (priLOSEC) 20 MG capsule, Take 1 capsule by mouth Daily., Disp: 30 capsule, Rfl: 11  •  rivaroxaban (XARELTO) 20 MG tablet, Take 20 mg by mouth Daily., Disp: , Rfl:   •  Saxagliptin-Metformin ER 5-1000 MG tablet sustained-release 24 hour, Take 1 tablet by mouth Daily., Disp: , Rfl:   •  timolol (BETIMOL) 0.25 % ophthalmic solution, 1-2 drops 2 (Two) Times a Day., Disp: , Rfl:   •  losartan (COZAAR) 25 MG tablet, Take 1 tablet by mouth Daily., Disp: 30 tablet, Rfl: 6  •  metoprolol succinate XL (TOPROL-XL) 100 MG 24 hr tablet, Take 1 tablet by mouth Daily., Disp: 30 tablet, Rfl: 6

## 2019-03-27 ENCOUNTER — OFFICE VISIT (OUTPATIENT)
Dept: CARDIOLOGY | Facility: CLINIC | Age: 69
End: 2019-03-27

## 2019-03-27 VITALS
SYSTOLIC BLOOD PRESSURE: 154 MMHG | BODY MASS INDEX: 16.71 KG/M2 | DIASTOLIC BLOOD PRESSURE: 90 MMHG | WEIGHT: 104 LBS | HEART RATE: 73 BPM | HEIGHT: 66 IN

## 2019-03-27 DIAGNOSIS — I47.1 SVT (SUPRAVENTRICULAR TACHYCARDIA) (HCC): Primary | Chronic | ICD-10-CM

## 2019-03-27 DIAGNOSIS — I48.19 PERSISTENT ATRIAL FIBRILLATION (HCC): Chronic | ICD-10-CM

## 2019-03-27 DIAGNOSIS — Z79.01 CHRONIC ANTICOAGULATION: Chronic | ICD-10-CM

## 2019-03-27 DIAGNOSIS — I34.0 NONRHEUMATIC MITRAL VALVE REGURGITATION: Chronic | ICD-10-CM

## 2019-03-27 PROCEDURE — 99213 OFFICE O/P EST LOW 20 MIN: CPT | Performed by: INTERNAL MEDICINE

## 2019-03-27 NOTE — PROGRESS NOTES
Subjective:     Encounter Date: 3/27/2019      Patient ID: Argelia Nguyen is a 69 y.o. female.    Chief Complaint: SVT,AFIB  History of Present Illness    Dear Dr. Oviedo,    I had the pleasure of seeing this patient in the office today for follow-up.  She has paroxysmal atrial fibrillation, hypertension, and SVT.    She was just seen recently in our office and medical therapy was adjusted after she was found to have paroxysmal atrial fibrillation.  Metoprolol was increased and losartan was also added because of hypertension.  This occurred on February 13, 2019.    Since then she says she has been feeling fine.  She has not felt any more tachycardia or palpitations like she was feeling before.    Echocardiogram 1/30/19:   · Calculated EF = 63.0%.  · Left ventricular systolic function is normal.  · The left ventricular cavity is small.  · Left ventricular wall thickness is consistent with moderate concentric   hypertrophy.  · Right atrial cavity size is severely dilated.  · Left atrial cavity size is moderate-to-severely dilated.  · Right ventricular cavity is severely dilated.  · Right ventricular wall thickness is consistent with moderate   hypertrophy.  · Mildly reduced right ventricular systolic function noted.  · Severe mitral valve regurgitation is present  · Severe tricuspid valve regurgitation is present.  · Estimated right ventricular systolic pressure from tricuspid   regurgitation is mildly elevated (35-45 mmHg).  · Calculated right ventricular systolic pressure from tricuspid   regurgitation is 43 mmHg.     Holter monitor 1/30/19     An abnormal monitor study.  · Evidence of atrial fibrillation was noted. Had atrial fibrillation 59.7%   of the time.      The following portions of the patient's history were reviewed and updated as appropriate: allergies, current medications, past family history, past medical history, past social history, past surgical history and problem list.    Past Medical  History:   Diagnosis Date   • Anxiety    • Chronic anticoagulation 1/23/2019   • Colon polyp    • Diabetes mellitus (CMS/HCC)    • Dysphagia    • Fatigue    • GERD (gastroesophageal reflux disease)    • Hypertension    • Persistent atrial fibrillation (CMS/HCC) 1/23/2019   • Reflux gastritis        Past Surgical History:   Procedure Laterality Date   • COLONOSCOPY N/A 3/29/2017    Procedure: COLONOSCOPY; POLYPECTOMY;  Surgeon: Brooke Garrido MD;  Location: [facility] OR;  Service:    • CYST REMOVAL     • ENDOSCOPY N/A 3/29/2017    Procedure: ESOPHAGOGASTRODUODENOSCOPY WITH DILITATION;  Surgeon: Brooke Garrido MD;  Location: [facility] OR;  Service:    • HERNIA REPAIR     • HYSTERECTOMY         Social History     Socioeconomic History   • Marital status: Single     Spouse name: Not on file   • Number of children: Not on file   • Years of education: Not on file   • Highest education level: Not on file   Tobacco Use   • Smoking status: Never Smoker   • Smokeless tobacco: Never Used   Substance and Sexual Activity   • Alcohol use: No   • Drug use: No   • Sexual activity: Defer       Review of Systems   Constitution: Negative for chills, decreased appetite, fever and night sweats.   HENT: Negative for ear discharge, ear pain, hearing loss, nosebleeds and sore throat.    Eyes: Negative for double vision and pain.   Cardiovascular: Negative for cyanosis.   Respiratory: Negative for hemoptysis and sputum production.    Endocrine: Negative for cold intolerance and heat intolerance.   Hematologic/Lymphatic: Negative for adenopathy.   Skin: Negative for dry skin, itching, nail changes, rash and suspicious lesions.   Musculoskeletal: Negative for arthritis, gout, muscle cramps, muscle weakness and myalgias.   Gastrointestinal: Negative for anorexia, bowel incontinence, constipation, diarrhea, dysphagia, hematemesis and jaundice.   Genitourinary: Negative for bladder incontinence, dysuria, flank pain, frequency, hematuria and nocturia.  "  Neurological: Negative for focal weakness, numbness, paresthesias and seizures.   Psychiatric/Behavioral: Negative for altered mental status, hallucinations, hypervigilance, suicidal ideas and thoughts of violence.   Allergic/Immunologic: Negative for persistent infections.       Procedures       Objective:     Vitals:    03/27/19 1314   BP: 154/90   Pulse: 73   Weight: 47.2 kg (104 lb)   Height: 167.6 cm (66\")         Physical Exam   Constitutional: She is oriented to person, place, and time. She appears well-developed and well-nourished. No distress.   HENT:   Head: Normocephalic and atraumatic.   Nose: Nose normal.   Mouth/Throat: Oropharynx is clear and moist.   Eyes: Conjunctivae and EOM are normal. Pupils are equal, round, and reactive to light. Right eye exhibits no discharge. Left eye exhibits no discharge.   Neck: Normal range of motion. Neck supple. No tracheal deviation present. No thyromegaly present.   Cardiovascular: Normal rate, regular rhythm, S1 normal, S2 normal, normal heart sounds and normal pulses. Exam reveals no S3.   Pulmonary/Chest: Effort normal and breath sounds normal. No stridor. No respiratory distress. She exhibits no tenderness.   Abdominal: Soft. Bowel sounds are normal. She exhibits no distension and no mass. There is no tenderness. There is no rebound and no guarding.   Musculoskeletal: Normal range of motion. She exhibits no tenderness or deformity.   Lymphadenopathy:     She has no cervical adenopathy.   Neurological: She is alert and oriented to person, place, and time. She has normal reflexes.   Skin: Skin is warm and dry. No rash noted. She is not diaphoretic. No erythema.   Psychiatric: She has a normal mood and affect. Thought content normal.       Lab Review:             Performed        Assessment:         No diagnosis found.       Plan:       1.  Atrial Fibrillation and Atrial Flutter  Assessment  • The patient has paroxysmal atrial fibrillation  • The patient's " CHADS2-VASc score is 2  • A [id number] score of 2 or more is considered a high risk for a thromboembolic event  • Rivaroxaban prescribed    Plan  • Attempt to maintain sinus rhythm  • Continue rivaroxaban for antithrombotic therapy, bleeding issues discussed  • Continue beta blocker for rhythm control    2.  SVT - no episodes recorded during Holter monitoring     3.  Chronic anticoagulation - on Xarelto     4.  HTN - better control on meds, follow to effect     5/6.  Severe mitral and tricuspid regurgitation - noted on echo 1/30/19.  EF 63%,  LV systolic function is normal. Right and left atria severely dilated, right ventricle severely dilated.  RV wall thickness consistent with moderate hypertrophy.  Mildly reduced RV systolic function. RVSP mildly elevated at 43 mmHg. Follow, on losartan 25 mg for afterload reduction.    Thank you very much for allowing us to participate in the care of this pleasant patient.  Please don't hesitate to call if I can be of assistance in any way.      Current Outpatient Medications:   •  Brinzolamide-Brimonidine (SIMBRINZA) 1-0.2 % suspension, Apply  to eye., Disp: , Rfl:   •  LORazepam (ATIVAN) 1 MG tablet, Take 1 mg by mouth Daily., Disp: , Rfl:   •  losartan (COZAAR) 25 MG tablet, Take 1 tablet by mouth Daily., Disp: 30 tablet, Rfl: 6  •  metoprolol succinate XL (TOPROL-XL) 100 MG 24 hr tablet, Take 1 tablet by mouth Daily., Disp: 30 tablet, Rfl: 6  •  omeprazole (priLOSEC) 20 MG capsule, Take 1 capsule by mouth Daily., Disp: 30 capsule, Rfl: 11  •  rivaroxaban (XARELTO) 20 MG tablet, Take 20 mg by mouth Daily., Disp: , Rfl:   •  Saxagliptin-Metformin ER 5-1000 MG tablet sustained-release 24 hour, Take 1 tablet by mouth Daily., Disp: , Rfl:   •  timolol (BETIMOL) 0.25 % ophthalmic solution, 1-2 drops 2 (Two) Times a Day., Disp: , Rfl:          EMR Dragon/Transcription disclaimer:    Much of this encounter note is an electronic transcription/translation of spoken language to  printed text. The electronic translation of spoken language may permit erroneous, or at times, nonsensical words or phrases to be inadvertently transcribed; Although I have reviewed the note for such errors, some may still exist.

## 2019-04-17 ENCOUNTER — TELEPHONE (OUTPATIENT)
Dept: CARDIOLOGY | Facility: CLINIC | Age: 69
End: 2019-04-17

## 2019-04-17 NOTE — TELEPHONE ENCOUNTER
Dr. Najera (oral surgeon) office called and said that the patient is needing a tooth extraction. They are wondering if she can come off of her xarelto and if so how long.   They said if she is not able to they will have it performed at [facility].     Dr. Najera's office number [phone]

## 2019-07-24 ENCOUNTER — TRANSCRIBE ORDERS (OUTPATIENT)
Dept: ADMINISTRATIVE | Facility: HOSPITAL | Age: 69
End: 2019-07-24

## 2019-07-24 DIAGNOSIS — Z12.39 SCREENING BREAST EXAMINATION: Primary | ICD-10-CM

## 2019-08-22 ENCOUNTER — TRANSCRIBE ORDERS (OUTPATIENT)
Dept: ADMINISTRATIVE | Facility: HOSPITAL | Age: 69
End: 2019-08-22

## 2019-08-22 DIAGNOSIS — Z78.0 POSTMENOPAUSAL STATUS: ICD-10-CM

## 2019-08-22 DIAGNOSIS — Z78.0 POSTMENOPAUSAL: Primary | ICD-10-CM

## 2019-09-17 RX ORDER — LOSARTAN POTASSIUM 25 MG/1
25 TABLET ORAL DAILY
Qty: 30 TABLET | Refills: 0 | Status: SHIPPED | OUTPATIENT
Start: 2019-09-17 | End: 2019-10-18 | Stop reason: SDUPTHER

## 2019-09-25 ENCOUNTER — OFFICE VISIT (OUTPATIENT)
Dept: CARDIOLOGY | Facility: CLINIC | Age: 69
End: 2019-09-25

## 2019-09-25 VITALS
HEIGHT: 66 IN | WEIGHT: 109 LBS | DIASTOLIC BLOOD PRESSURE: 80 MMHG | HEART RATE: 73 BPM | SYSTOLIC BLOOD PRESSURE: 122 MMHG | BODY MASS INDEX: 17.52 KG/M2

## 2019-09-25 DIAGNOSIS — I34.0 NONRHEUMATIC MITRAL VALVE REGURGITATION: Chronic | ICD-10-CM

## 2019-09-25 DIAGNOSIS — I48.0 PAROXYSMAL ATRIAL FIBRILLATION (HCC): Primary | Chronic | ICD-10-CM

## 2019-09-25 DIAGNOSIS — I47.1 SVT (SUPRAVENTRICULAR TACHYCARDIA) (HCC): Chronic | ICD-10-CM

## 2019-09-25 DIAGNOSIS — Z79.01 CHRONIC ANTICOAGULATION: Chronic | ICD-10-CM

## 2019-09-25 PROCEDURE — 99213 OFFICE O/P EST LOW 20 MIN: CPT | Performed by: INTERNAL MEDICINE

## 2019-09-25 PROCEDURE — 93000 ELECTROCARDIOGRAM COMPLETE: CPT | Performed by: INTERNAL MEDICINE

## 2019-09-25 NOTE — PROGRESS NOTES
Subjective:     Encounter Date: 9/25/2019      Patient ID: Argelia Nguyen is a 69 y.o. female.    Chief Complaint: SVT,AFIB  History of Present Illness    Dear Dr. Oviedo,    I had the pleasure of seeing this patient in the office today for follow-up.  She has paroxysmal atrial fibrillation, hypertension, and SVT.    Over the last 6 months she has been doing great without any cardiac complaints.  She does have a history of GERD, and states that a couple of weeks ago she had a day or 2 where she had some reflux symptoms.  Outside of that, no chest pain or chest discomfort.  No shortness of breath.  No palpitations or tachycardia.  No presyncope or syncope.  She has not had any orthopnea or PND.    She was seen on February 13, 2019 in our office and medical therapy was adjusted after she was found to have paroxysmal atrial fibrillation.  Metoprolol was increased and losartan was also added because of hypertension.     Echocardiogram 1/30/19:   · Calculated EF = 63.0%.  · Left ventricular systolic function is normal.  · The left ventricular cavity is small.  · Left ventricular wall thickness is consistent with moderate concentric   hypertrophy.  · Right atrial cavity size is severely dilated.  · Left atrial cavity size is moderate-to-severely dilated.  · Right ventricular cavity is severely dilated.  · Right ventricular wall thickness is consistent with moderate   hypertrophy.  · Mildly reduced right ventricular systolic function noted.  · Severe mitral valve regurgitation is present  · Severe tricuspid valve regurgitation is present.  · Estimated right ventricular systolic pressure from tricuspid   regurgitation is mildly elevated (35-45 mmHg).  · Calculated right ventricular systolic pressure from tricuspid   regurgitation is 43 mmHg.     Holter monitor 1/30/19     An abnormal monitor study.  · Evidence of atrial fibrillation was noted. Had atrial fibrillation 59.7%   of the time.      The following portions of  the patient's history were reviewed and updated as appropriate: allergies, current medications, past family history, past medical history, past social history, past surgical history and problem list.    Past Medical History:   Diagnosis Date   • Anxiety    • Chronic anticoagulation 1/23/2019   • Colon polyp    • Diabetes mellitus (CMS/HCC)    • Dysphagia    • Fatigue    • GERD (gastroesophageal reflux disease)    • Hypertension    • Nonrheumatic mitral valve regurgitation 3/27/2019   • Paroxysmal atrial fibrillation (CMS/HCC) 1/23/2019   • Persistent atrial fibrillation (CMS/HCC) 1/23/2019   • Reflux gastritis        Past Surgical History:   Procedure Laterality Date   • COLONOSCOPY N/A 3/29/2017    Procedure: COLONOSCOPY; POLYPECTOMY;  Surgeon: Brooke Garrido MD;  Location: [facility] OR;  Service:    • CYST REMOVAL     • ENDOSCOPY N/A 3/29/2017    Procedure: ESOPHAGOGASTRODUODENOSCOPY WITH DILITATION;  Surgeon: Brooke Garrido MD;  Location: [facility] OR;  Service:    • HERNIA REPAIR     • HYSTERECTOMY         Social History     Socioeconomic History   • Marital status: Single     Spouse name: Not on file   • Number of children: Not on file   • Years of education: Not on file   • Highest education level: Not on file   Tobacco Use   • Smoking status: Never Smoker   • Smokeless tobacco: Never Used   Substance and Sexual Activity   • Alcohol use: No   • Drug use: No   • Sexual activity: Defer       Review of Systems   Constitution: Negative for chills, decreased appetite, fever and night sweats.   HENT: Negative for ear discharge, ear pain, hearing loss, nosebleeds and sore throat.    Eyes: Negative for double vision and pain.   Cardiovascular: Negative for cyanosis.   Respiratory: Negative for hemoptysis and sputum production.    Endocrine: Negative for cold intolerance and heat intolerance.   Hematologic/Lymphatic: Negative for adenopathy.   Skin: Negative for dry skin, itching, nail changes, rash and suspicious lesions.  "  Musculoskeletal: Negative for arthritis, gout, muscle cramps, muscle weakness and myalgias.   Gastrointestinal: Negative for anorexia, bowel incontinence, constipation, diarrhea, dysphagia, hematemesis and jaundice.   Genitourinary: Negative for bladder incontinence, dysuria, flank pain, frequency, hematuria and nocturia.   Neurological: Negative for focal weakness, numbness, paresthesias and seizures.   Psychiatric/Behavioral: Negative for altered mental status, hallucinations, hypervigilance, suicidal ideas and thoughts of violence.   Allergic/Immunologic: Negative for persistent infections.         ECG 12 Lead  Date/Time: 9/25/2019 2:31 PM  Performed by: Aidan Adams III, MD  Authorized by: Aidan Adams III, MD   Comparison: compared with previous ECG   Similar to previous ECG  Rhythm: sinus rhythm  Rate: normal  Conduction: conduction normal  ST Segments: ST segments normal  T Waves: T waves normal  QRS axis: normal  Other: no other findings    Clinical impression: normal ECG               Objective:     Vitals:    09/25/19 1332   BP: 122/80   Pulse: 73   Weight: 49.4 kg (109 lb)   Height: 167.6 cm (66\")         Physical Exam   Constitutional: She is oriented to person, place, and time. She appears well-developed and well-nourished. No distress.   HENT:   Head: Normocephalic and atraumatic.   Nose: Nose normal.   Mouth/Throat: Oropharynx is clear and moist.   Eyes: Conjunctivae and EOM are normal. Pupils are equal, round, and reactive to light. Right eye exhibits no discharge. Left eye exhibits no discharge.   Neck: Normal range of motion. Neck supple. No tracheal deviation present. No thyromegaly present.   Cardiovascular: Normal rate, regular rhythm, S1 normal, S2 normal and normal pulses. Exam reveals no S3.   Murmur heard.  High-pitched blowing holosystolic murmur is present at the apex.  Pulmonary/Chest: Effort normal and breath sounds normal. No stridor. No respiratory distress. She exhibits no " tenderness.   Abdominal: Soft. Bowel sounds are normal. She exhibits no distension and no mass. There is no tenderness. There is no rebound and no guarding.   Musculoskeletal: Normal range of motion. She exhibits no tenderness or deformity.   Lymphadenopathy:     She has no cervical adenopathy.   Neurological: She is alert and oriented to person, place, and time. She has normal reflexes.   Skin: Skin is warm and dry. No rash noted. She is not diaphoretic. No erythema.   Psychiatric: She has a normal mood and affect. Thought content normal.       Lab Review:             Performed        Assessment:          Diagnosis Plan   1. Paroxysmal atrial fibrillation (CMS/HCC)  ECG 12 Lead   2. SVT (supraventricular tachycardia) (CMS/HCC)  ECG 12 Lead   3. Nonrheumatic mitral valve regurgitation  ECG 12 Lead   4. Chronic anticoagulation  ECG 12 Lead          Plan:       1.  Atrial Fibrillation and Atrial Flutter  Assessment  • The patient has paroxysmal atrial fibrillation  • The patient's CHADS2-VASc score is 2  • A [id number] score of 2 or more is considered a high risk for a thromboembolic event  • Rivaroxaban prescribed    Plan  • Attempt to maintain sinus rhythm  • Continue rivaroxaban for antithrombotic therapy, bleeding issues discussed  • Continue beta blocker for rhythm control    2.  SVT - no episodes recorded during Holter monitoring     3.  Chronic anticoagulation - on Xarelto     4.  HTN - better control on meds, follow to effect     5/6.  Severe mitral and tricuspid regurgitation - noted on echo 1/30/19.  EF 63%,  LV systolic function is normal. Right and left atria severely dilated, right ventricle severely dilated.  RV wall thickness consistent with moderate hypertrophy.  Mildly reduced RV systolic function. RVSP mildly elevated at 43 mmHg. Follow, on losartan 25 mg for afterload reduction.  Anticipate that we will repeat an echocardiogram at her next visit.    Thank you very much for allowing us to  participate in the care of this pleasant patient.  Please don't hesitate to call if I can be of assistance in any way.      Current Outpatient Medications:   •  Brinzolamide-Brimonidine (SIMBRINZA) 1-0.2 % suspension, Apply  to eye., Disp: , Rfl:   •  LORazepam (ATIVAN) 1 MG tablet, Take 1 mg by mouth Daily., Disp: , Rfl:   •  losartan (COZAAR) 25 MG tablet, TAKE 1 TABLET BY MOUTH DAILY., Disp: 30 tablet, Rfl: 0  •  metoprolol succinate XL (TOPROL-XL) 100 MG 24 hr tablet, Take 1 tablet by mouth Daily., Disp: 30 tablet, Rfl: 6  •  omeprazole (priLOSEC) 20 MG capsule, Take 1 capsule by mouth Daily., Disp: 30 capsule, Rfl: 11  •  rivaroxaban (XARELTO) 20 MG tablet, Take 20 mg by mouth Daily., Disp: , Rfl:   •  timolol (BETIMOL) 0.25 % ophthalmic solution, 1-2 drops 2 (Two) Times a Day., Disp: , Rfl:          EMR Dragon/Transcription disclaimer:    Much of this encounter note is an electronic transcription/translation of spoken language to printed text. The electronic translation of spoken language may permit erroneous, or at times, nonsensical words or phrases to be inadvertently transcribed; Although I have reviewed the note for such errors, some may still exist.

## 2019-09-25 NOTE — PROGRESS NOTES
Subjective:     Encounter Date: 9/25/2019      Patient ID: Argelia Nguyen is a 69 y.o. female.    Chief Complaint: SVT,AFIB  History of Present Illness    Dear Dr. Oviedo,    I had the pleasure of seeing this patient in the office today for follow-up.  She has paroxysmal atrial fibrillation, hypertension, and SVT.    She was just seen recently in our office and medical therapy was adjusted after she was found to have paroxysmal atrial fibrillation.  Metoprolol was increased and losartan was also added because of hypertension.  This occurred on February 13, 2019.        Echocardiogram 1/30/19:   · Calculated EF = 63.0%.  · Left ventricular systolic function is normal.  · The left ventricular cavity is small.  · Left ventricular wall thickness is consistent with moderate concentric   hypertrophy.  · Right atrial cavity size is severely dilated.  · Left atrial cavity size is moderate-to-severely dilated.  · Right ventricular cavity is severely dilated.  · Right ventricular wall thickness is consistent with moderate   hypertrophy.  · Mildly reduced right ventricular systolic function noted.  · Severe mitral valve regurgitation is present  · Severe tricuspid valve regurgitation is present.  · Estimated right ventricular systolic pressure from tricuspid   regurgitation is mildly elevated (35-45 mmHg).  · Calculated right ventricular systolic pressure from tricuspid   regurgitation is 43 mmHg.     Holter monitor 1/30/19     An abnormal monitor study.  · Evidence of atrial fibrillation was noted. Had atrial fibrillation 59.7%   of the time.      The following portions of the patient's history were reviewed and updated as appropriate: allergies, current medications, past family history, past medical history, past social history, past surgical history and problem list.    Past Medical History:   Diagnosis Date   • Anxiety    • Chronic anticoagulation 1/23/2019   • Colon polyp    • Diabetes mellitus (CMS/HCC)    •  Dysphagia    • Fatigue    • GERD (gastroesophageal reflux disease)    • Hypertension    • Nonrheumatic mitral valve regurgitation 3/27/2019   • Paroxysmal atrial fibrillation (CMS/HCC) 1/23/2019   • Persistent atrial fibrillation (CMS/HCC) 1/23/2019   • Reflux gastritis        Past Surgical History:   Procedure Laterality Date   • COLONOSCOPY N/A 3/29/2017    Procedure: COLONOSCOPY; POLYPECTOMY;  Surgeon: Brooke Garrido MD;  Location: [facility] OR;  Service:    • CYST REMOVAL     • ENDOSCOPY N/A 3/29/2017    Procedure: ESOPHAGOGASTRODUODENOSCOPY WITH DILITATION;  Surgeon: Brooke Garrido MD;  Location: [facility] OR;  Service:    • HERNIA REPAIR     • HYSTERECTOMY         Social History     Socioeconomic History   • Marital status: Single     Spouse name: Not on file   • Number of children: Not on file   • Years of education: Not on file   • Highest education level: Not on file   Tobacco Use   • Smoking status: Never Smoker   • Smokeless tobacco: Never Used   Substance and Sexual Activity   • Alcohol use: No   • Drug use: No   • Sexual activity: Defer       Review of Systems   Constitution: Negative for chills, decreased appetite, fever and night sweats.   HENT: Negative for ear discharge, ear pain, hearing loss, nosebleeds and sore throat.    Eyes: Negative for double vision and pain.   Cardiovascular: Negative for cyanosis.   Respiratory: Negative for hemoptysis and sputum production.    Endocrine: Negative for cold intolerance and heat intolerance.   Hematologic/Lymphatic: Negative for adenopathy.   Skin: Negative for dry skin, itching, nail changes, rash and suspicious lesions.   Musculoskeletal: Negative for arthritis, gout, muscle cramps, muscle weakness and myalgias.   Gastrointestinal: Negative for anorexia, bowel incontinence, constipation, diarrhea, dysphagia, hematemesis and jaundice.   Genitourinary: Negative for bladder incontinence, dysuria, flank pain, frequency, hematuria and nocturia.   Neurological:  "Negative for focal weakness, numbness, paresthesias and seizures.   Psychiatric/Behavioral: Negative for altered mental status, hallucinations, hypervigilance, suicidal ideas and thoughts of violence.   Allergic/Immunologic: Negative for persistent infections.         ECG 12 Lead  Date/Time: 9/25/2019 1:56 PM  Performed by: Aidan Adams III, MD  Authorized by: Aidan Adams III, MD   Comparison: compared with previous ECG   Similar to previous ECG  Rhythm: sinus rhythm  Rate: normal  Conduction: conduction normal  ST Segments: ST segments normal  T Waves: T waves normal  QRS axis: normal  Other: no other findings    Clinical impression: normal ECG               Objective:     Vitals:    09/25/19 1332   BP: 122/80   Pulse: 73   Weight: 49.4 kg (109 lb)   Height: 167.6 cm (66\")         Physical Exam   Constitutional: She is oriented to person, place, and time. She appears well-developed and well-nourished. No distress.   HENT:   Head: Normocephalic and atraumatic.   Nose: Nose normal.   Mouth/Throat: Oropharynx is clear and moist.   Eyes: Conjunctivae and EOM are normal. Pupils are equal, round, and reactive to light. Right eye exhibits no discharge. Left eye exhibits no discharge.   Neck: Normal range of motion. Neck supple. No tracheal deviation present. No thyromegaly present.   Cardiovascular: Normal rate, regular rhythm, S1 normal, S2 normal, normal heart sounds and normal pulses. Exam reveals no S3.   Pulmonary/Chest: Effort normal and breath sounds normal. No stridor. No respiratory distress. She exhibits no tenderness.   Abdominal: Soft. Bowel sounds are normal. She exhibits no distension and no mass. There is no tenderness. There is no rebound and no guarding.   Musculoskeletal: Normal range of motion. She exhibits no tenderness or deformity.   Lymphadenopathy:     She has no cervical adenopathy.   Neurological: She is alert and oriented to person, place, and time. She has normal reflexes.   Skin: Skin " is warm and dry. No rash noted. She is not diaphoretic. No erythema.   Psychiatric: She has a normal mood and affect. Thought content normal.       Lab Review:             Performed        Assessment:          Diagnosis Plan   1. Paroxysmal atrial fibrillation (CMS/HCC)  ECG 12 Lead   2. SVT (supraventricular tachycardia) (CMS/HCC)  ECG 12 Lead   3. Nonrheumatic mitral valve regurgitation  ECG 12 Lead   4. Chronic anticoagulation  ECG 12 Lead          Plan:       1.  Atrial Fibrillation and Atrial Flutter  Assessment  • The patient has paroxysmal atrial fibrillation  • The patient's CHADS2-VASc score is 2  • A [id number] score of 2 or more is considered a high risk for a thromboembolic event  • Rivaroxaban prescribed    Plan  • Attempt to maintain sinus rhythm  • Continue rivaroxaban for antithrombotic therapy, bleeding issues discussed  • Continue beta blocker for rhythm control    2.  SVT - no episodes recorded during Holter monitoring     3.  Chronic anticoagulation - on Xarelto     4.  HTN - better control on meds, follow to effect     5/6.  Severe mitral and tricuspid regurgitation - noted on echo 1/30/19.  EF 63%,  LV systolic function is normal. Right and left atria severely dilated, right ventricle severely dilated.  RV wall thickness consistent with moderate hypertrophy.  Mildly reduced RV systolic function. RVSP mildly elevated at 43 mmHg. Follow, on losartan 25 mg for afterload reduction.    Thank you very much for allowing us to participate in the care of this pleasant patient.  Please don't hesitate to call if I can be of assistance in any way.      Current Outpatient Medications:   •  Brinzolamide-Brimonidine (SIMBRINZA) 1-0.2 % suspension, Apply  to eye., Disp: , Rfl:   •  LORazepam (ATIVAN) 1 MG tablet, Take 1 mg by mouth Daily., Disp: , Rfl:   •  losartan (COZAAR) 25 MG tablet, TAKE 1 TABLET BY MOUTH DAILY., Disp: 30 tablet, Rfl: 0  •  metoprolol succinate XL (TOPROL-XL) 100 MG 24 hr tablet, Take  1 tablet by mouth Daily., Disp: 30 tablet, Rfl: 6  •  omeprazole (priLOSEC) 20 MG capsule, Take 1 capsule by mouth Daily., Disp: 30 capsule, Rfl: 11  •  rivaroxaban (XARELTO) 20 MG tablet, Take 20 mg by mouth Daily., Disp: , Rfl:   •  timolol (BETIMOL) 0.25 % ophthalmic solution, 1-2 drops 2 (Two) Times a Day., Disp: , Rfl:          EMR Dragon/Transcription disclaimer:    Much of this encounter note is an electronic transcription/translation of spoken language to printed text. The electronic translation of spoken language may permit erroneous, or at times, nonsensical words or phrases to be inadvertently transcribed; Although I have reviewed the note for such errors, some may still exist.

## 2019-10-03 ENCOUNTER — APPOINTMENT (OUTPATIENT)
Dept: BONE DENSITY | Facility: HOSPITAL | Age: 69
End: 2019-10-03

## 2019-10-03 ENCOUNTER — HOSPITAL ENCOUNTER (OUTPATIENT)
Dept: MAMMOGRAPHY | Facility: HOSPITAL | Age: 69
Discharge: HOME | End: 2019-10-03
Admitting: FAMILY MEDICINE

## 2019-10-03 DIAGNOSIS — Z12.39 SCREENING BREAST EXAMINATION: ICD-10-CM

## 2019-10-03 PROCEDURE — 77067 SCR MAMMO BI INCL CAD: CPT

## 2019-10-03 PROCEDURE — 77063 BREAST TOMOSYNTHESIS BI: CPT

## 2019-10-21 ENCOUNTER — APPOINTMENT (OUTPATIENT)
Dept: BONE DENSITY | Facility: HOSPITAL | Age: 69
End: 2019-10-21

## 2019-10-21 DIAGNOSIS — Z78.0 POSTMENOPAUSAL: ICD-10-CM

## 2019-10-21 PROCEDURE — 77080 DXA BONE DENSITY AXIAL: CPT

## 2019-10-21 RX ORDER — LOSARTAN POTASSIUM 25 MG/1
25 TABLET ORAL DAILY
Qty: 30 TABLET | Refills: 11 | Status: SHIPPED | OUTPATIENT
Start: 2019-10-21 | End: 2020-07-09

## 2019-11-27 ENCOUNTER — OFFICE VISIT (OUTPATIENT)
Dept: GASTROENTEROLOGY | Facility: CLINIC | Age: 69
End: 2019-11-27

## 2019-11-27 VITALS
HEIGHT: 66 IN | SYSTOLIC BLOOD PRESSURE: 148 MMHG | DIASTOLIC BLOOD PRESSURE: 82 MMHG | BODY MASS INDEX: 17.84 KG/M2 | WEIGHT: 111 LBS

## 2019-11-27 DIAGNOSIS — K21.9 GASTROESOPHAGEAL REFLUX DISEASE, ESOPHAGITIS PRESENCE NOT SPECIFIED: Primary | ICD-10-CM

## 2019-11-27 PROCEDURE — 99213 OFFICE O/P EST LOW 20 MIN: CPT | Performed by: INTERNAL MEDICINE

## 2019-11-27 RX ORDER — OMEPRAZOLE 20 MG/1
20 CAPSULE, DELAYED RELEASE ORAL DAILY
Qty: 30 CAPSULE | Refills: 11 | Status: SHIPPED | OUTPATIENT
Start: 2019-11-27 | End: 2021-01-18 | Stop reason: SDUPTHER

## 2019-11-27 RX ORDER — CHOLECALCIFEROL (VITAMIN D3) 125 MCG
500 CAPSULE ORAL DAILY
COMMUNITY
End: 2020-07-09

## 2019-11-27 NOTE — PROGRESS NOTES
PATIENT INFORMATION  Argelia Nguyen       - 1950    CHIEF COMPLAINT  Chief Complaint   Patient presents with   • Follow-up     reflux       HISTORY OF PRESENT ILLNESS  HPI  She is here in follow up for gerd, managed on omeprazole. She has been having breakthrough symptoms at night. She takes maalox or cracker as needed and this helps.  No dysphagia.  Dinner at 530  Bed at 8 pm    REVIEW OF SYSTEMS  Review of Systems   Constitutional: Positive for chills.   HENT: Positive for rhinorrhea and sore throat.    Eyes: Positive for itching.   Respiratory: Positive for cough.    Gastrointestinal: Positive for abdominal pain and nausea.   Endocrine: Positive for cold intolerance.   Genitourinary: Positive for pelvic pain.   Musculoskeletal: Positive for back pain.   Neurological: Positive for dizziness, weakness and light-headedness.   Psychiatric/Behavioral: The patient is nervous/anxious.    All other systems reviewed and are negative.        ACTIVE PROBLEMS  Patient Active Problem List    Diagnosis   • Nonrheumatic mitral valve regurgitation [I34.0]   • Paroxysmal atrial fibrillation (CMS/HCC) [I48.0]   • Chronic anticoagulation [Z79.01]   • Hiatal hernia [K44.9]   • Colon polyps [K63.5]   • Internal hemorrhoids [K64.8]   • Abnormal laboratory test result [R89.9]   • Dysphagia [R13.10]   • Gastroesophageal reflux disease [K21.9]   • SVT (supraventricular tachycardia) (CMS/HCC) [I47.1]   • Leukopenia [D72.819]   • Monoclonal gammopathy [D47.2]   • Neutropenia (CMS/HCC) [D70.9]   • Normocytic anemia [D64.9]         PAST MEDICAL HISTORY  Past Medical History:   Diagnosis Date   • Anxiety    • Chronic anticoagulation 2019   • Colon polyp    • Diabetes mellitus (CMS/HCC)    • Dysphagia    • Fatigue    • GERD (gastroesophageal reflux disease)    • Hypertension    • Nonrheumatic mitral valve regurgitation 3/27/2019   • Paroxysmal atrial fibrillation (CMS/HCC) 2019   • Persistent atrial fibrillation  "1/23/2019   • Reflux gastritis          SURGICAL HISTORY  Past Surgical History:   Procedure Laterality Date   • COLONOSCOPY N/A 3/29/2017    Procedure: COLONOSCOPY; POLYPECTOMY;  Surgeon: Brooke Garrido MD;  Location: [facility] OR;  Service:    • CYST REMOVAL     • ENDOSCOPY N/A 3/29/2017    Procedure: ESOPHAGOGASTRODUODENOSCOPY WITH DILITATION;  Surgeon: Brooke Garrido MD;  Location: [facility] OR;  Service:    • HERNIA REPAIR     • HYSTERECTOMY           FAMILY HISTORY  Family History   Problem Relation Age of Onset   • Colon cancer Mother    • Colon cancer Other    • Lung cancer Father    • Breast cancer Maternal Aunt          SOCIAL HISTORY  Social History     Occupational History   • Not on file   Tobacco Use   • Smoking status: Never Smoker   • Smokeless tobacco: Never Used   Substance and Sexual Activity   • Alcohol use: No   • Drug use: No   • Sexual activity: Defer       Debilities/Disabilities Identified: None    Emotional Behavior: Appropriate    CURRENT MEDICATIONS    Current Outpatient Medications:   •  Brinzolamide-Brimonidine (SIMBRINZA) 1-0.2 % suspension, Apply  to eye., Disp: , Rfl:   •  LORazepam (ATIVAN) 1 MG tablet, Take 1 mg by mouth Daily., Disp: , Rfl:   •  losartan (COZAAR) 25 MG tablet, TAKE 1 TABLET BY MOUTH DAILY., Disp: 30 tablet, Rfl: 11  •  metoprolol succinate XL (TOPROL-XL) 100 MG 24 hr tablet, Take 1 tablet by mouth Daily., Disp: 30 tablet, Rfl: 6  •  rivaroxaban (XARELTO) 20 MG tablet, Take 20 mg by mouth Daily., Disp: , Rfl:   •  timolol (BETIMOL) 0.25 % ophthalmic solution, 1-2 drops 2 (Two) Times a Day., Disp: , Rfl:   •  vitamin B-12 (CYANOCOBALAMIN) 500 MCG tablet, Take 500 mcg by mouth Daily., Disp: , Rfl:   •  omeprazole (priLOSEC) 20 MG capsule, Take 1 capsule by mouth Daily., Disp: 30 capsule, Rfl: 11    ALLERGIES  Patient has no known allergies.    VITALS  Vitals:    11/27/19 1518   BP: 148/82   Weight: 50.3 kg (111 lb)   Height: 167.6 cm (66\")       LAST [facility] " Outpatient Visit on 01/30/2019   Component Date Value Ref Range Status   • BSA 01/30/2019 1.5  m^2 Final   • IVSd 01/30/2019 1.2  cm Final   • LVIDd 01/30/2019 3.1  cm Final   • LVIDs 01/30/2019 2.1  cm Final   • LVPWd 01/30/2019 0.88  cm Final   • IVS/LVPW 01/30/2019 1.3   Final   • FS 01/30/2019 31.3  % Final   • EDV(Teich) 01/30/2019 37.9  ml Final   • ESV(Teich) 01/30/2019 14.9  ml Final   • EF(Teich) 01/30/2019 60.6  % Final   • EDV(cubed) 01/30/2019 29.8  ml Final   • ESV(cubed) 01/30/2019 9.7  ml Final   • EF(cubed) 01/30/2019 67.6  % Final   • LV mass(C)d 01/30/2019 88.7  grams Final   • LV mass(C)dI 01/30/2019 57.8  grams/m^2 Final   • SV(Teich) 01/30/2019 23.0  ml Final   • SI(Teich) 01/30/2019 15.0  ml/m^2 Final   • SV(cubed) 01/30/2019 20.1  ml Final   • SI(cubed) 01/30/2019 13.1  ml/m^2 Final   • Ao root diam 01/30/2019 3.0  cm Final   • Ao root area 01/30/2019 7.1  cm^2 Final   • ACS 01/30/2019 1.7  cm Final   • LA dimension 01/30/2019 3.2  cm Final   • LA/Ao 01/30/2019 1.1   Final   • LVOT diam 01/30/2019 1.8  cm Final   • LVOT area 01/30/2019 2.5  cm^2 Final   • LVOT area(traced) 01/30/2019 2.5  cm^2 Final   • RVOT diam 01/30/2019 1.8  cm Final   • RVOT area 01/30/2019 2.5  cm^2 Final   • LVLd ap4 01/30/2019 5.7  cm Final   • EDV(MOD-sp4) 01/30/2019 40.5  ml Final   • LVLs ap4 01/30/2019 4.7  cm Final   • ESV(MOD-sp4) 01/30/2019 15.7  ml Final   • EF(MOD-sp4) 01/30/2019 61.2  % Final   • LVLd ap2 01/30/2019 6.3  cm Final   • EDV(MOD-sp2) 01/30/2019 61.6  ml Final   • LVLs ap2 01/30/2019 5.8  cm Final   • ESV(MOD-sp2) 01/30/2019 20.2  ml Final   • EF(MOD-sp2) 01/30/2019 67.2  % Final   • SV(MOD-sp4) 01/30/2019 24.8  ml Final   • SI(MOD-sp4) 01/30/2019 16.2  ml/m^2 Final   • SV(MOD-sp2) 01/30/2019 41.4  ml Final   • SI(MOD-sp2) 01/30/2019 27.0  ml/m^2 Final   • Ao root area (BSA corrected) 01/30/2019 2.0   Final   • LV Johnson Vol (BSA corrected) 01/30/2019 26.4  ml/m^2 Final   • LV Sys Vol (BSA  corrected) 01/30/2019 10.2  ml/m^2 Final   • MV A dur 01/30/2019 0.16  sec Final   • MV E max boaz 01/30/2019 77.6  cm/sec Final   • MV A max boaz 01/30/2019 36.9  cm/sec Final   • MV E/A 01/30/2019 2.1   Final   • MV V2 max 01/30/2019 103.0  cm/sec Final   • MV max PG 01/30/2019 4.2  mmHg Final   • MV V2 mean 01/30/2019 73.6  cm/sec Final   • MV mean PG 01/30/2019 2.0  mmHg Final   • MV V2 VTI 01/30/2019 33.0  cm Final   • MVA(VTI) 01/30/2019 1.1  cm^2 Final   • MV P1/2t max boaz 01/30/2019 125.0  cm/sec Final   • MV P1/2t 01/30/2019 64.0  msec Final   • MVA(P1/2t) 01/30/2019 3.4  cm^2 Final   • MV dec slope 01/30/2019 572.0  cm/sec^2 Final   • MV dec time 01/30/2019 0.1  sec Final   • Ao pk boaz 01/30/2019 102.0  cm/sec Final   • Ao max PG 01/30/2019 4.2  mmHg Final   • Ao max PG (full) 01/30/2019 1.3  mmHg Final   • Ao V2 mean 01/30/2019 65.0  cm/sec Final   • Ao mean PG 01/30/2019 2.0  mmHg Final   • Ao mean PG (full) 01/30/2019 1.0  mmHg Final   • Ao V2 VTI 01/30/2019 19.8  cm Final   • SENA(I,A) 01/30/2019 1.8  cm^2 Final   • SENA(I,D) 01/30/2019 1.8  cm^2 Final   • SENA(V,A) 01/30/2019 2.1  cm^2 Final   • SENA(V,D) 01/30/2019 2.1  cm^2 Final   • LV V1 max PG 01/30/2019 2.8  mmHg Final   • LV V1 mean PG 01/30/2019 1.0  mmHg Final   • LV V1 max 01/30/2019 84.2  cm/sec Final   • LV V1 mean 01/30/2019 44.4  cm/sec Final   • LV V1 VTI 01/30/2019 14.2  cm Final   • MR max boaz 01/30/2019 594.0  cm/sec Final   • MR max PG 01/30/2019 141.1  mmHg Final   • MR mean boaz 01/30/2019 433.0  cm/sec Final   • MR mean PG 01/30/2019 87.0  mmHg Final   • MR VTI 01/30/2019 198.0  cm Final   • SV(Ao) 01/30/2019 140.0  ml Final   • SI(Ao) 01/30/2019 91.2  ml/m^2 Final   • SV(LVOT) 01/30/2019 36.1  ml Final   • SV(RVOT) 01/30/2019 31.0  ml Final   • SI(LVOT) 01/30/2019 23.6  ml/m^2 Final   • PA V2 max 01/30/2019 63.9  cm/sec Final   • PA max PG 01/30/2019 1.6  mmHg Final   • PA max PG (full) 01/30/2019 0.44  mmHg Final   • BH CV ECHO MIGUEL -  PVA(V,A) 01/30/2019 2.2  cm^2 Final   • BH CV ECHO MIGUEL - PVA(V,D) 01/30/2019 2.2  cm^2 Final   • PA acc time 01/30/2019 0.1  sec Final   • RV V1 max PG 01/30/2019 1.2  mmHg Final   • RV V1 mean PG 01/30/2019 1.0  mmHg Final   • RV V1 max 01/30/2019 54.7  cm/sec Final   • RV V1 mean 01/30/2019 36.8  cm/sec Final   • RV V1 VTI 01/30/2019 12.2  cm Final   • TR max derian 01/30/2019 328  cm/sec Final   • PA pr(Accel) 01/30/2019 33.1  mmHg Final   • Pulm Sys Derian 01/30/2019 50.4  cm/sec Final   • Pulm Johnson Derian 01/30/2019 57.2  cm/sec Final   • Pulm S/D 01/30/2019 0.88   Final   • Qp/Qs 01/30/2019 0.86   Final   • Pulm A Revs Dur 01/30/2019 0.12  sec Final   • Pulm A Revs Derian 01/30/2019 31.0  cm/sec Final   • MVA P1/2T LCG 01/30/2019 1.8  cm^2 Final   • BH CV ECHO MIGUEL - BZI_BMI 01/30/2019 18.3  kilograms/m^2 Final   • BH CV ECHO MIGUEL - BSA([place]OCK) 01/30/2019 1.5  m^2 Final   • BH CV ECHO MIGUEL - BZI_METRIC_WEIGHT 01/30/2019 49.9  kg Final   • BH CV ECHO MIGUEL - BZI_METRIC_HEIGHT 01/30/2019 165.1  cm Final   • Target HR (85%) 01/30/2019 128  bpm Final   • Max. Pred. HR (100%) 01/30/2019 151  bpm Final   • BH CV VAS BP RIGHT ARM 01/30/2019 122/90  mmHg Final   • TDI S' 01/30/2019 13.00  cm/sec Final   • RV Base 01/30/2019 5.40  cm Final   • LA Volume Index 01/30/2019 53.0  mL/m2 Final   • Avg E/e' ratio 01/30/2019 7.39   Final   • EF(MOD-bp) 01/30/2019 63.0  % Final   • Lat Peak E' Derian 01/30/2019 11.0  cm/sec Final   • Med Peak E' Derian 01/30/2019 10.00  cm/sec Final   • RAP systole 01/30/2019 8  mmHg Final   • RVSP(TR) 01/30/2019 43  mmHg Final   • TAPSE (>1.6) 01/30/2019 2.20  cm2 Final     No results found.    PHYSICAL EXAM  Physical Exam   Constitutional: She is oriented to person, place, and time. She appears well-developed and well-nourished. No distress.   HENT:   Head: Normocephalic and atraumatic.   Mouth/Throat: Oropharynx is clear and moist.   Eyes: EOM are normal. Pupils are equal, round, and reactive to light.    Neck: Normal range of motion. No tracheal deviation present.   Cardiovascular: Normal rate, regular rhythm, normal heart sounds and intact distal pulses. Exam reveals no gallop and no friction rub.   No murmur heard.  Pulmonary/Chest: Effort normal and breath sounds normal. No stridor. No respiratory distress. She has no wheezes. She has no rales. She exhibits no tenderness.   Abdominal: Soft. Bowel sounds are normal. She exhibits no distension. There is no tenderness. There is no rebound and no guarding.   Musculoskeletal: She exhibits no edema.   Lymphadenopathy:     She has no cervical adenopathy.   Neurological: She is alert and oriented to person, place, and time.   Skin: Skin is warm. She is not diaphoretic.   Psychiatric: She has a normal mood and affect. Her behavior is normal. Judgment and thought content normal.   Nursing note and vitals reviewed.      ASSESSMENT  Diagnoses and all orders for this visit:    Gastroesophageal reflux disease, esophagitis presence not specified    Other orders  -     vitamin B-12 (CYANOCOBALAMIN) 500 MCG tablet; Take 500 mcg by mouth Daily.  -     omeprazole (priLOSEC) 20 MG capsule; Take 1 capsule by mouth Daily.          PLAN  No Follow-up on file.    Antireflux measures and dietary modifications reviewed. Low acid diet reviewed. Keep head of bed elevated. Stop eating/drinking at least 3 hours prior to bedtime. Eliminate caffeine and carbonated beverages.  Weight loss encouraged if BMI over 25.    cls in 2017 3 ta removed. Due for cls in 2020.

## 2019-12-20 ENCOUNTER — HOSPITAL ENCOUNTER (EMERGENCY)
Facility: HOSPITAL | Age: 69
Discharge: HOME | End: 2019-12-20
Attending: EMERGENCY MEDICINE | Admitting: EMERGENCY MEDICINE

## 2019-12-20 ENCOUNTER — APPOINTMENT (OUTPATIENT)
Dept: GENERAL RADIOLOGY | Facility: HOSPITAL | Age: 69
End: 2019-12-20

## 2019-12-20 VITALS
OXYGEN SATURATION: 97 % | RESPIRATION RATE: 15 BRPM | HEIGHT: 66 IN | WEIGHT: 110 LBS | BODY MASS INDEX: 17.68 KG/M2 | TEMPERATURE: 98.1 F | HEART RATE: 75 BPM | SYSTOLIC BLOOD PRESSURE: 155 MMHG | DIASTOLIC BLOOD PRESSURE: 102 MMHG

## 2019-12-20 DIAGNOSIS — S40.022A ARM CONTUSION, LEFT, INITIAL ENCOUNTER: Primary | ICD-10-CM

## 2019-12-20 DIAGNOSIS — R93.89 ABNORMAL FINDING ON CHEST XRAY: ICD-10-CM

## 2019-12-20 PROCEDURE — 73030 X-RAY EXAM OF SHOULDER: CPT

## 2019-12-20 PROCEDURE — 99284 EMERGENCY DEPT VISIT MOD MDM: CPT | Performed by: EMERGENCY MEDICINE

## 2019-12-20 PROCEDURE — 86481 TB AG RESPONSE T-CELL SUSP: CPT | Performed by: EMERGENCY MEDICINE

## 2019-12-20 PROCEDURE — 99283 EMERGENCY DEPT VISIT LOW MDM: CPT

## 2019-12-20 NOTE — ED PROVIDER NOTES
" EMERGENCY DEPARTMENT ENCOUNTER      Room Number: 6/06      HPI:    Chief complaint: Arm pain    Location: Left shoulder and proximal left humerus    Quality/Severity: Described as an ache of mild to moderate severity    Timing/Duration: Symptoms started approximately 3 weeks ago after her arm was stuck in elevator doors.    Modifying Factors: Movement and palpation causes discomfort    Associated Symptoms: Initial mild bruising    Narrative: Pt is a 69 y.o. female who presents complaining of left shoulder, left axilla and left proximal humeral pain as noted above.  The patient was in our facility when she tried to stop elevator doors from closing, but the doors did close and remained closed on her proximal left humerus.  Patient did have mild swelling and bruising initially, however the patient continues to have pain in this area.  Full range of motion maintained in the left upper extremity.      PMD: Hany Oviedo MD    REVIEW OF SYSTEMS  Review of Systems   Constitutional: Negative for activity change and appetite change.   Respiratory: Positive for cough (Recent and patient states \"cold\".). Negative for shortness of breath and wheezing.    Cardiovascular: Negative for chest pain and leg swelling.   Musculoskeletal:        Left shoulder, left axilla and left proximal humeral pain as previously noted.   Skin: Negative for rash.   All other systems reviewed and are negative.      PAST MEDICAL HISTORY  Active Ambulatory Problems     Diagnosis Date Noted   • SVT (supraventricular tachycardia) (CMS/[facility]) 11/17/2016   • Abnormal laboratory test result 01/16/2017   • Dysphagia 01/16/2017   • Gastroesophageal reflux disease 01/16/2017   • Leukopenia 07/15/2015   • Monoclonal gammopathy 03/25/2013   • Neutropenia (CMS/HCC) 02/21/2013   • Normocytic anemia 02/21/2013   • Hiatal hernia 04/17/2017   • Colon polyps 04/17/2017   • Internal hemorrhoids 04/17/2017   • Paroxysmal atrial fibrillation (CMS/HCC) 01/23/2019   • " Chronic anticoagulation 01/23/2019   • Nonrheumatic mitral valve regurgitation 03/27/2019     Resolved Ambulatory Problems     Diagnosis Date Noted   • No Resolved Ambulatory Problems     Past Medical History:   Diagnosis Date   • Anxiety    • Colon polyp    • Diabetes mellitus (CMS/HCC)    • Fatigue    • GERD (gastroesophageal reflux disease)    • Hypertension    • Persistent atrial fibrillation 1/23/2019   • Reflux gastritis        PAST SURGICAL HISTORY  Past Surgical History:   Procedure Laterality Date   • COLONOSCOPY N/A 3/29/2017    Procedure: COLONOSCOPY; POLYPECTOMY;  Surgeon: Brooke Garrido MD;  Location: [facility] OR;  Service:    • CYST REMOVAL     • ENDOSCOPY N/A 3/29/2017    Procedure: ESOPHAGOGASTRODUODENOSCOPY WITH DILITATION;  Surgeon: Brooke Garrido MD;  Location: [facility] OR;  Service:    • HERNIA REPAIR     • HYSTERECTOMY         FAMILY HISTORY  Family History   Problem Relation Age of Onset   • Colon cancer Mother    • Colon cancer Other    • Lung cancer Father    • Breast cancer Maternal Aunt        SOCIAL HISTORY  Social History     Socioeconomic History   • Marital status: Single     Spouse name: Not on file   • Number of children: Not on file   • Years of education: Not on file   • Highest education level: Not on file   Tobacco Use   • Smoking status: Never Smoker   • Smokeless tobacco: Never Used   Substance and Sexual Activity   • Alcohol use: No   • Drug use: No   • Sexual activity: Defer       ALLERGIES  Patient has no known allergies.    PHYSICAL EXAM  ED Triage Vitals [12/20/19 1508]   Temp Heart Rate Resp BP SpO2   98.1 °F (36.7 °C) 75 15 (!) 155/102 97 %      Temp src Heart Rate Source Patient Position BP Location FiO2 (%)   Oral Monitor Sitting Right arm --       Physical Exam   Constitutional:   The patient is a thin, normally developed, 69-year-old, black female no acute distress.   Pulmonary/Chest: Effort normal and breath sounds normal. No respiratory distress. She has no wheezes.    Musculoskeletal:   Examination of the left shoulder does reveal some mild discomfort at the extreme ends of range of motion.  Proximal humerus subjectively tender to palpation without any obvious external signs of trauma.  Neuromuscular exams intact distally.   Skin: Skin is warm and dry. No rash noted.   Psychiatric: Affect and judgment normal.       LAB RESULTS  Results for orders placed or performed during the hospital encounter of 01/30/19   Adult Transthoracic Echo Complete W/ Cont if Necessary Per Protocol   Result Value Ref Range    BSA 1.5 m^2    IVSd 1.2 cm    LVIDd 3.1 cm    LVIDs 2.1 cm    LVPWd 0.88 cm    IVS/LVPW 1.3     FS 31.3 %    EDV(Teich) 37.9 ml    ESV(Teich) 14.9 ml    EF(Teich) 60.6 %    EDV(cubed) 29.8 ml    ESV(cubed) 9.7 ml    EF(cubed) 67.6 %    LV mass(C)d 88.7 grams    LV mass(C)dI 57.8 grams/m^2    SV(Teich) 23.0 ml    SI(Teich) 15.0 ml/m^2    SV(cubed) 20.1 ml    SI(cubed) 13.1 ml/m^2    Ao root diam 3.0 cm    Ao root area 7.1 cm^2    ACS 1.7 cm    LA dimension 3.2 cm    LA/Ao 1.1     LVOT diam 1.8 cm    LVOT area 2.5 cm^2    LVOT area(traced) 2.5 cm^2    RVOT diam 1.8 cm    RVOT area 2.5 cm^2    LVLd ap4 5.7 cm    EDV(MOD-sp4) 40.5 ml    LVLs ap4 4.7 cm    ESV(MOD-sp4) 15.7 ml    EF(MOD-sp4) 61.2 %    LVLd ap2 6.3 cm    EDV(MOD-sp2) 61.6 ml    LVLs ap2 5.8 cm    ESV(MOD-sp2) 20.2 ml    EF(MOD-sp2) 67.2 %    SV(MOD-sp4) 24.8 ml    SI(MOD-sp4) 16.2 ml/m^2    SV(MOD-sp2) 41.4 ml    SI(MOD-sp2) 27.0 ml/m^2    Ao root area (BSA corrected) 2.0     LV Johnson Vol (BSA corrected) 26.4 ml/m^2    LV Sys Vol (BSA corrected) 10.2 ml/m^2    MV A dur 0.16 sec    MV E max boaz 77.6 cm/sec    MV A max boaz 36.9 cm/sec    MV E/A 2.1     MV V2 max 103.0 cm/sec    MV max PG 4.2 mmHg    MV V2 mean 73.6 cm/sec    MV mean PG 2.0 mmHg    MV V2 VTI 33.0 cm    MVA(VTI) 1.1 cm^2    MV P1/2t max boaz 125.0 cm/sec    MV P1/2t 64.0 msec    MVA(P1/2t) 3.4 cm^2    MV dec slope 572.0 cm/sec^2    MV dec time 0.1 sec    Ao  pk derian 102.0 cm/sec    Ao max PG 4.2 mmHg    Ao max PG (full) 1.3 mmHg    Ao V2 mean 65.0 cm/sec    Ao mean PG 2.0 mmHg    Ao mean PG (full) 1.0 mmHg    Ao V2 VTI 19.8 cm    SENA(I,A) 1.8 cm^2    SENA(I,D) 1.8 cm^2    SENA(V,A) 2.1 cm^2    SENA(V,D) 2.1 cm^2    LV V1 max PG 2.8 mmHg    LV V1 mean PG 1.0 mmHg    LV V1 max 84.2 cm/sec    LV V1 mean 44.4 cm/sec    LV V1 VTI 14.2 cm    MR max derian 594.0 cm/sec    MR max .1 mmHg    MR mean derian 433.0 cm/sec    MR mean PG 87.0 mmHg    MR .0 cm    SV(Ao) 140.0 ml    SI(Ao) 91.2 ml/m^2    SV(LVOT) 36.1 ml    SV(RVOT) 31.0 ml    SI(LVOT) 23.6 ml/m^2    PA V2 max 63.9 cm/sec    PA max PG 1.6 mmHg    PA max PG (full) 0.44 mmHg     CV ECHO MIGUEL - PVA(V,A) 2.2 cm^2     CV ECHO MIGUEL - PVA(V,D) 2.2 cm^2    PA acc time 0.1 sec    RV V1 max PG 1.2 mmHg    RV V1 mean PG 1.0 mmHg    RV V1 max 54.7 cm/sec    RV V1 mean 36.8 cm/sec    RV V1 VTI 12.2 cm    TR max derian 328 cm/sec    PA pr(Accel) 33.1 mmHg    Pulm Sys Derian 50.4 cm/sec    Pulm Johnson Derian 57.2 cm/sec    Pulm S/D 0.88     Qp/Qs 0.86     Pulm A Revs Dur 0.12 sec    Pulm A Revs Derian 31.0 cm/sec    MVA P1/2T LCG 1.8 cm^2     CV ECHO MIGUEL - BZI_BMI 18.3 kilograms/m^2     CV ECHO MIGUEL - BSA([facility]) 1.5 m^2     CV ECHO MIGUEL - BZI_METRIC_WEIGHT 49.9 kg     CV ECHO MIGUEL - BZI_METRIC_HEIGHT 165.1 cm    Target HR (85%) 128 bpm    Max. Pred. HR (100%) 151 bpm    BH CV VAS BP RIGHT /90 mmHg    TDI S' 13.00 cm/sec    RV Base 5.40 cm    LA Volume Index 53.0 mL/m2    Avg E/e' ratio 7.39     EF(MOD-bp) 63.0 %    Lat Peak E' Derian 11.0 cm/sec    Med Peak E' Derian 10.00 cm/sec    RAP systole 8 mmHg    RVSP(TR) 43 mmHg    TAPSE (>1.6) 2.20 cm2         I ordered the above labs and reviewed the results    RADIOLOGY  Xr Shoulder 2+ View Left    Result Date: 12/20/2019  Narrative: XR SHOULDER 2+ VW LEFT-: 12/20/2019 3:35 PM  INDICATION: Elevated or hip patient left shoulder 3 weeks ago with persistent pain.  COMPARISON: Chest  x-ray 01/27/2019.  FINDINGS: 2 views of the left shoulder.   No fracture or dislocation.  The acromioclavicular and glenohumeral articulations are within normal limits.  No foreign body. Parenchymal densities in the upper lobes are unchanged from the old chest x-ray      Impression: Negative left shoulder.  This report was finalized on 12/20/2019 3:52 PM by Dr. Samir Velez MD.        I ordered the above radiologic testing and reviewed the results    PROCEDURES  Procedures      PROGRESS AND CONSULTS  ED Course as of Dec 20 1641   Fri Dec 20, 2019   1636 Final radiology report noted.  Again there was a mention of abnormal lung findings.  This was discussed with the patient and she was unaware of this finding that was previously noted in January of this year.  Patient denies any significant current or past respiratory problems.  O2 saturations 97% on room air in the department.  Case and findings discussed with Dr. Salas who suggested that the patient be checked for tuberculosis and refer patient back to her primary provider, Dr. Hany Oviedo.  Patient advised to follow-up with Dr. Oviedo in the near future and patient related that she had an appointment in approximately 2 weeks.    [ML]      ED Course User Index  [ML] Leonard Woodruff MD           MEDICAL DECISION MAKING  Results were reviewed/discussed with the patient and they were also made aware of online access. Pt also made aware that some labs, such as cultures, will not be resulted during ER visit and follow up with PMD is necessary.     MDM       DIAGNOSIS  Final diagnoses:   Arm contusion, left, initial encounter   Abnormal finding on chest xray       Latest Documented Vital Signs:  As of 4:41 PM  BP- (!) 155/102 HR- 75 Temp- 98.1 °F (36.7 °C) (Oral) O2 sat- 97%    DISPOSITION  Discharged in good condition       Medication List      No changes were made to your prescriptions during this visit.       Follow-up Information     Hany Oviedo MD In 2  weeks.    Specialty:  Family Medicine  Why:  As scheduled and please discuss your abnormal chest x-ray with him.  Contact information:  [address] [place] [id number] [phone]                      Leonard Woodruff MD  12/20/19 1072

## 2019-12-22 LAB
TSPOT INTERPRETATION: NEGATIVE
TSPOT NIL CONTROL INTERPRETATION: NORMAL
TSPOT PANEL A: 0
TSPOT PANEL B: 0
TSPOT POS CONTROL INTERPRETATION: NORMAL

## 2020-04-07 ENCOUNTER — OFFICE VISIT (OUTPATIENT)
Dept: CARDIOLOGY | Facility: CLINIC | Age: 70
End: 2020-04-07

## 2020-04-07 VITALS — WEIGHT: 120 LBS | BODY MASS INDEX: 19.99 KG/M2 | HEIGHT: 65 IN

## 2020-04-07 DIAGNOSIS — I34.0 NONRHEUMATIC MITRAL VALVE REGURGITATION: Chronic | ICD-10-CM

## 2020-04-07 DIAGNOSIS — Z79.01 CHRONIC ANTICOAGULATION: Chronic | ICD-10-CM

## 2020-04-07 DIAGNOSIS — I48.0 PAROXYSMAL ATRIAL FIBRILLATION (HCC): Primary | Chronic | ICD-10-CM

## 2020-04-07 DIAGNOSIS — I47.1 SVT (SUPRAVENTRICULAR TACHYCARDIA) (HCC): ICD-10-CM

## 2020-04-07 PROCEDURE — 99214 OFFICE O/P EST MOD 30 MIN: CPT | Performed by: INTERNAL MEDICINE

## 2020-04-07 RX ORDER — METOPROLOL SUCCINATE 200 MG/1
100 TABLET, EXTENDED RELEASE ORAL DAILY
Qty: 90 TABLET | Refills: 3 | Status: SHIPPED | OUTPATIENT
Start: 2020-04-07 | End: 2020-06-25 | Stop reason: DRUGHIGH

## 2020-04-07 NOTE — PROGRESS NOTES
Subjective:     Encounter Date:  04/07/20        Patient ID: Argelia Nguyen is a 70 y.o. female.    Chief Complaint: SVT,AFIB  History of Present Illness    Dear Dr. Oviedo,    This patient has consented to a telehealth visit via audio. The visit was scheduled as a audio visit to comply with patient safety concerns in accordance with [facility] recommendations.  All vitals recorded within this visit are reported by the patient.  I spent  28 minutes in total including but not limited to the 14 minutes spent in direct conversation with this patient.      She has paroxysmal atrial fibrillation, hypertension, and SVT.    Lately this patient has been having some heart racing and palpitations.  She notes it more when she is lying down at night.  She claims that she has been compliant with all her medication.  She does not have any chest pain or chest discomfort, either at the time when she feel the palpitations or at any other time.  No shortness of breath.  No orthopnea or PND.  She has very rare dizziness if she stands up quickly but that is minimal and transient.  No presyncope or syncope.    She was seen on February 13, 2019 in our office and medical therapy was adjusted after she was found to have paroxysmal atrial fibrillation.  Metoprolol was increased and losartan was also added because of hypertension.     Echocardiogram 1/30/19:   · Calculated EF = 63.0%.  · Left ventricular systolic function is normal.  · The left ventricular cavity is small.  · Left ventricular wall thickness is consistent with moderate concentric   hypertrophy.  · Right atrial cavity size is severely dilated.  · Left atrial cavity size is moderate-to-severely dilated.  · Right ventricular cavity is severely dilated.  · Right ventricular wall thickness is consistent with moderate   hypertrophy.  · Mildly reduced right ventricular systolic function noted.  · Severe mitral valve regurgitation is present  · Severe tricuspid valve regurgitation is  present.  · Estimated right ventricular systolic pressure from tricuspid   regurgitation is mildly elevated (35-45 mmHg).  · Calculated right ventricular systolic pressure from tricuspid   regurgitation is 43 mmHg.     Holter monitor 1/30/19     An abnormal monitor study.  · Evidence of atrial fibrillation was noted. Had atrial fibrillation 59.7%   of the time.      The following portions of the patient's history were reviewed and updated as appropriate: allergies, current medications, past family history, past medical history, past social history, past surgical history and problem list.    Past Medical History:   Diagnosis Date   • Anxiety    • Chronic anticoagulation 1/23/2019   • Colon polyp    • Diabetes mellitus (CMS/HCC)    • Dysphagia    • Fatigue    • GERD (gastroesophageal reflux disease)    • Hypertension    • Nonrheumatic mitral valve regurgitation 3/27/2019   • Paroxysmal atrial fibrillation (CMS/HCC) 1/23/2019   • Persistent atrial fibrillation 1/23/2019   • Reflux gastritis        Past Surgical History:   Procedure Laterality Date   • COLONOSCOPY N/A 3/29/2017    Procedure: COLONOSCOPY; POLYPECTOMY;  Surgeon: Brooke Garrido MD;  Location: [facility] OR;  Service:    • CYST REMOVAL     • ENDOSCOPY N/A 3/29/2017    Procedure: ESOPHAGOGASTRODUODENOSCOPY WITH DILITATION;  Surgeon: Brooke Garrido MD;  Location: [facility] OR;  Service:    • HERNIA REPAIR     • HYSTERECTOMY         Social History     Socioeconomic History   • Marital status: Single     Spouse name: Not on file   • Number of children: Not on file   • Years of education: Not on file   • Highest education level: Not on file   Tobacco Use   • Smoking status: Never Smoker   • Smokeless tobacco: Never Used   Substance and Sexual Activity   • Alcohol use: No   • Drug use: No   • Sexual activity: Defer       Review of Systems   Constitution: Negative for chills, decreased appetite, fever and night sweats.   HENT: Negative for ear discharge, ear pain,  "hearing loss, nosebleeds and sore throat.    Eyes: Negative for double vision and pain.   Cardiovascular: Negative for cyanosis.   Respiratory: Negative for hemoptysis and sputum production.    Endocrine: Negative for cold intolerance and heat intolerance.   Hematologic/Lymphatic: Negative for adenopathy.   Skin: Negative for dry skin, itching, nail changes, rash and suspicious lesions.   Musculoskeletal: Negative for arthritis, gout, muscle cramps, muscle weakness and myalgias.   Gastrointestinal: Negative for anorexia, bowel incontinence, constipation, diarrhea, dysphagia, hematemesis and jaundice.   Genitourinary: Negative for bladder incontinence, dysuria, flank pain, frequency, hematuria and nocturia.   Neurological: Negative for focal weakness, numbness, paresthesias and seizures.   Psychiatric/Behavioral: Negative for altered mental status, hallucinations, hypervigilance, suicidal ideas and thoughts of violence.   Allergic/Immunologic: Negative for persistent infections.       Procedures       Objective:     Vitals:    04/07/20 1210   Weight: 54.4 kg (120 lb)   Height: 165.1 cm (65\")     Patient is alert and oriented x3.  Thought content and judgment is normal.  She is speaking in full sentences with no evidence of respiratory distress.      Lab Review:             Performed        Assessment:          Diagnosis Plan   1. Paroxysmal atrial fibrillation (CMS/HCC)     2. SVT (supraventricular tachycardia) (CMS/HCC)     3. Nonrheumatic mitral valve regurgitation     4. Chronic anticoagulation            Plan:       1.  Atrial Fibrillation and Atrial Flutter  Assessment  • The patient has paroxysmal atrial fibrillation  • The patient's CHADS2-VASc score is 2  • A [id number] score of 2 or more is considered a high risk for a thromboembolic event  • Rivaroxaban prescribed    Plan  • Attempt to maintain sinus rhythm  • Continue rivaroxaban for antithrombotic therapy, bleeding issues discussed  • Continue beta " blocker for rhythm control  • Given her breakthrough symptoms I will increase her metoprolol from 100 mg daily up to 200 mg daily    2.  SVT - no episodes recorded during Holter monitoring     3.  Chronic anticoagulation - on Xarelto     4.  HTN - better control on meds, follow to effect     5/6.  Severe mitral and tricuspid regurgitation - noted on echo 1/30/19.  EF 63%,  LV systolic function is normal. Right and left atria severely dilated, right ventricle severely dilated.  RV wall thickness consistent with moderate hypertrophy.  Mildly reduced RV systolic function. RVSP mildly elevated at 43 mmHg. Follow, on losartan 25 mg for afterload reduction.  Anticipate that we will repeat an echocardiogram at her next visit. I will see her in 6 months    Thank you very much for allowing us to participate in the care of this pleasant patient.  Please don't hesitate to call if I can be of assistance in any way.      Current Outpatient Medications:   •  Brinzolamide-Brimonidine (SIMBRINZA) 1-0.2 % suspension, Apply  to eye., Disp: , Rfl:   •  LORazepam (ATIVAN) 1 MG tablet, Take 1 mg by mouth Daily., Disp: , Rfl:   •  losartan (COZAAR) 25 MG tablet, TAKE 1 TABLET BY MOUTH DAILY., Disp: 30 tablet, Rfl: 11  •  metoprolol succinate XL (TOPROL-XL) 100 MG 24 hr tablet, Take 1 tablet by mouth Daily., Disp: 30 tablet, Rfl: 6  •  omeprazole (priLOSEC) 20 MG capsule, Take 1 capsule by mouth Daily., Disp: 30 capsule, Rfl: 11  •  rivaroxaban (XARELTO) 20 MG tablet, Take 20 mg by mouth Daily., Disp: , Rfl:   •  timolol (BETIMOL) 0.25 % ophthalmic solution, 1-2 drops 2 (Two) Times a Day., Disp: , Rfl:   •  vitamin B-12 (CYANOCOBALAMIN) 500 MCG tablet, Take 500 mcg by mouth Daily., Disp: , Rfl:          EMR Dragon/Transcription disclaimer:    Much of this encounter note is an electronic transcription/translation of spoken language to printed text. The electronic translation of spoken language may permit erroneous, or at times, nonsensical  words or phrases to be inadvertently transcribed; Although I have reviewed the note for such errors, some may still exist.

## 2020-04-22 ENCOUNTER — TELEPHONE (OUTPATIENT)
Dept: GASTROENTEROLOGY | Facility: CLINIC | Age: 70
End: 2020-04-22

## 2020-04-22 NOTE — TELEPHONE ENCOUNTER
Received fast track for recall colonoscopy. Sent to provider for review. Last scope 2017 w/ polyps, fx colon cancer, on Xarelto. Patient checked heartburn and trouble swallowing.

## 2020-04-26 ENCOUNTER — PREP FOR SURGERY (OUTPATIENT)
Dept: OTHER | Facility: HOSPITAL | Age: 70
End: 2020-04-26

## 2020-04-26 DIAGNOSIS — Z86.010 PERSONAL HISTORY OF COLONIC POLYPS: ICD-10-CM

## 2020-04-26 DIAGNOSIS — R13.19 ESOPHAGEAL DYSPHAGIA: Primary | ICD-10-CM

## 2020-04-27 NOTE — TELEPHONE ENCOUNTER
Double / Lag / dysphagia, polyps- she only asked for colon but has been treated for reflux w/o EGd an now has dysphagia in her 70s

## 2020-05-12 ENCOUNTER — APPOINTMENT (OUTPATIENT)
Dept: GENERAL RADIOLOGY | Facility: HOSPITAL | Age: 70
End: 2020-05-12

## 2020-05-12 ENCOUNTER — HOSPITAL ENCOUNTER (EMERGENCY)
Facility: HOSPITAL | Age: 70
Discharge: HOME | End: 2020-05-12
Attending: EMERGENCY MEDICINE | Admitting: EMERGENCY MEDICINE

## 2020-05-12 VITALS
RESPIRATION RATE: 16 BRPM | HEART RATE: 69 BPM | WEIGHT: 130 LBS | OXYGEN SATURATION: 98 % | SYSTOLIC BLOOD PRESSURE: 100 MMHG | HEIGHT: 65 IN | TEMPERATURE: 94.5 F | DIASTOLIC BLOOD PRESSURE: 65 MMHG | BODY MASS INDEX: 21.66 KG/M2

## 2020-05-12 DIAGNOSIS — R07.89 ATYPICAL CHEST PAIN: ICD-10-CM

## 2020-05-12 DIAGNOSIS — R60.9 PERIPHERAL EDEMA: ICD-10-CM

## 2020-05-12 DIAGNOSIS — R06.00 DYSPNEA, UNSPECIFIED TYPE: Primary | ICD-10-CM

## 2020-05-12 LAB
ALBUMIN SERPL-MCNC: 3.7 G/DL (ref 3.5–5.2)
ALBUMIN/GLOB SERPL: 1.4 G/DL
ALP SERPL-CCNC: 107 U/L (ref 39–117)
ALT SERPL W P-5'-P-CCNC: 77 U/L (ref 1–33)
ANION GAP SERPL CALCULATED.3IONS-SCNC: 13 MMOL/L (ref 5–15)
AST SERPL-CCNC: 57 U/L (ref 1–32)
BASOPHILS # BLD AUTO: 0.01 10*3/MM3 (ref 0–0.2)
BASOPHILS NFR BLD AUTO: 0.2 % (ref 0–1.5)
BILIRUB SERPL-MCNC: 0.4 MG/DL (ref 0.2–1.2)
BUN BLD-MCNC: 19 MG/DL (ref 8–23)
BUN/CREAT SERPL: 22.9 (ref 7–25)
CALCIUM SPEC-SCNC: 8.8 MG/DL (ref 8.6–10.5)
CHLORIDE SERPL-SCNC: 106 MMOL/L (ref 98–107)
CO2 SERPL-SCNC: 24 MMOL/L (ref 22–29)
CREAT BLD-MCNC: 0.83 MG/DL (ref 0.57–1)
DEPRECATED RDW RBC AUTO: 58.8 FL (ref 37–54)
EOSINOPHIL # BLD AUTO: 0.1 10*3/MM3 (ref 0–0.4)
EOSINOPHIL NFR BLD AUTO: 2.2 % (ref 0.3–6.2)
ERYTHROCYTE [DISTWIDTH] IN BLOOD BY AUTOMATED COUNT: 18.6 % (ref 12.3–15.4)
GFR SERPL CREATININE-BSD FRML MDRD: 82 ML/MIN/1.73
GLOBULIN UR ELPH-MCNC: 2.6 GM/DL
GLUCOSE BLD-MCNC: 95 MG/DL (ref 65–99)
HCT VFR BLD AUTO: 32.9 % (ref 34–46.6)
HGB BLD-MCNC: 10.3 G/DL (ref 12–15.9)
IMM GRANULOCYTES # BLD AUTO: 0.01 10*3/MM3 (ref 0–0.05)
IMM GRANULOCYTES NFR BLD AUTO: 0.2 % (ref 0–0.5)
LYMPHOCYTES # BLD AUTO: 1.15 10*3/MM3 (ref 0.7–3.1)
LYMPHOCYTES NFR BLD AUTO: 24.8 % (ref 19.6–45.3)
MCH RBC QN AUTO: 27.4 PG (ref 26.6–33)
MCHC RBC AUTO-ENTMCNC: 31.3 G/DL (ref 31.5–35.7)
MCV RBC AUTO: 87.5 FL (ref 79–97)
MONOCYTES # BLD AUTO: 0.43 10*3/MM3 (ref 0.1–0.9)
MONOCYTES NFR BLD AUTO: 9.3 % (ref 5–12)
NEUTROPHILS # BLD AUTO: 2.94 10*3/MM3 (ref 1.7–7)
NEUTROPHILS NFR BLD AUTO: 63.3 % (ref 42.7–76)
NRBC BLD AUTO-RTO: 0 /100 WBC (ref 0–0.2)
PLAT MORPH BLD: NORMAL
PLATELET # BLD AUTO: 170 10*3/MM3 (ref 140–450)
PMV BLD AUTO: 12.1 FL (ref 6–12)
POTASSIUM BLD-SCNC: 3.4 MMOL/L (ref 3.5–5.2)
PROT SERPL-MCNC: 6.3 G/DL (ref 6–8.5)
RBC # BLD AUTO: 3.76 10*6/MM3 (ref 3.77–5.28)
RBC MORPH BLD: NORMAL
SODIUM BLD-SCNC: 143 MMOL/L (ref 136–145)
TROPONIN T SERPL-MCNC: <0.01 NG/ML (ref 0–0.03)
WBC MORPH BLD: NORMAL
WBC NRBC COR # BLD: 4.64 10*3/MM3 (ref 3.4–10.8)

## 2020-05-12 PROCEDURE — 71045 X-RAY EXAM CHEST 1 VIEW: CPT

## 2020-05-12 PROCEDURE — 99284 EMERGENCY DEPT VISIT MOD MDM: CPT | Performed by: EMERGENCY MEDICINE

## 2020-05-12 PROCEDURE — 84484 ASSAY OF TROPONIN QUANT: CPT | Performed by: EMERGENCY MEDICINE

## 2020-05-12 PROCEDURE — 99283 EMERGENCY DEPT VISIT LOW MDM: CPT

## 2020-05-12 PROCEDURE — 85025 COMPLETE CBC W/AUTO DIFF WBC: CPT | Performed by: EMERGENCY MEDICINE

## 2020-05-12 PROCEDURE — 85007 BL SMEAR W/DIFF WBC COUNT: CPT | Performed by: EMERGENCY MEDICINE

## 2020-05-12 PROCEDURE — 80053 COMPREHEN METABOLIC PANEL: CPT | Performed by: EMERGENCY MEDICINE

## 2020-05-12 PROCEDURE — 93005 ELECTROCARDIOGRAM TRACING: CPT | Performed by: EMERGENCY MEDICINE

## 2020-05-12 PROCEDURE — 93010 ELECTROCARDIOGRAM REPORT: CPT | Performed by: INTERNAL MEDICINE

## 2020-05-12 NOTE — ED PROVIDER NOTES
Subjective     Shortness of Breath   Severity:  Mild  Onset quality:  Gradual  Timing:  Intermittent  Progression:  Waxing and waning  Chronicity:  New  Context comment:  Says she had abrupt soa and alonso feet swelling yesterday, also some brief chest discomfort with coughing  Relieved by:  Rest  Worsened by:  Exertion  Ineffective treatments:  None tried  Associated symptoms: no abdominal pain, no chest pain, no cough, no fever, no rash, no sputum production, no swollen glands, no vomiting and no wheezing        Review of Systems   Constitutional: Negative for fever.   Respiratory: Positive for shortness of breath. Negative for cough, sputum production and wheezing.    Cardiovascular: Negative for chest pain.   Gastrointestinal: Negative for abdominal pain and vomiting.   Skin: Negative for rash.   All other systems reviewed and are negative.      Past Medical History:   Diagnosis Date   • Anxiety    • Chronic anticoagulation 1/23/2019   • Colon polyp    • Diabetes mellitus (CMS/HCC)    • Dysphagia    • Fatigue    • GERD (gastroesophageal reflux disease)    • Hypertension    • Nonrheumatic mitral valve regurgitation 3/27/2019   • Paroxysmal atrial fibrillation (CMS/HCC) 1/23/2019   • Persistent atrial fibrillation 1/23/2019   • Reflux gastritis        No Known Allergies    Past Surgical History:   Procedure Laterality Date   • COLONOSCOPY N/A 3/29/2017    Procedure: COLONOSCOPY; POLYPECTOMY;  Surgeon: Brooke Garrido MD;  Location: [facility] OR;  Service:    • CYST REMOVAL     • ENDOSCOPY N/A 3/29/2017    Procedure: ESOPHAGOGASTRODUODENOSCOPY WITH DILITATION;  Surgeon: Brooke Garrido MD;  Location: [facility] OR;  Service:    • HERNIA REPAIR     • HYSTERECTOMY         Family History   Problem Relation Age of Onset   • Colon cancer Mother    • Colon cancer Other    • Lung cancer Father    • Breast cancer Maternal Aunt        Social History     Socioeconomic History   • Marital status: Single     Spouse name: Not on file   •  Number of children: Not on file   • Years of education: Not on file   • Highest education level: Not on file   Tobacco Use   • Smoking status: Never Smoker   • Smokeless tobacco: Never Used   Substance and Sexual Activity   • Alcohol use: No   • Drug use: No   • Sexual activity: Defer           Objective   Physical Exam   Constitutional: She is oriented to person, place, and time. She appears well-developed and well-nourished.  Non-toxic appearance. She does not appear ill. No distress.   HENT:   Head: Normocephalic and atraumatic.   Eyes: Pupils are equal, round, and reactive to light. EOM are normal.   Neck: Normal range of motion. Neck supple. No JVD present.   Cardiovascular: Normal rate, regular rhythm, intact distal pulses and normal pulses. Exam reveals no gallop.   No murmur heard.  Pulmonary/Chest: Effort normal and breath sounds normal. She has no decreased breath sounds. She has no wheezes. She has no rales.   Musculoskeletal: Normal range of motion.        Right lower leg: Normal. She exhibits no tenderness and no edema.        Left lower leg: Normal. She exhibits no tenderness and no edema.   Neurological: She is alert and oriented to person, place, and time. She is not disoriented.   Skin: Skin is warm and dry. No rash noted. She is not diaphoretic. No erythema. No pallor.   Psychiatric: She has a normal mood and affect. Her mood appears not anxious.   Nursing note and vitals reviewed.      Procedures           ED Course  ED Course as of May 12 1253   Tue May 12, 2020   1201 Ekg by me afib 92, qrs narrow, non st elev    [BC]      ED Course User Index  [BC] Jose Bonilla MD         reeval, appears well, vss, ok with plan to f/u pcp                                  MDM  Number of Diagnoses or Management Options  Atypical chest pain:   Dyspnea, unspecified type:   Peripheral edema:      Amount and/or Complexity of Data Reviewed  Clinical lab tests: ordered and reviewed  Tests in the radiology  section of CPT®: ordered and reviewed    Risk of Complications, Morbidity, and/or Mortality  Presenting problems: moderate  Diagnostic procedures: moderate  Management options: moderate    Patient Progress  Patient progress: stable      Final diagnoses:   Dyspnea, unspecified type   Peripheral edema   Atypical chest pain            Jose Bonilla MD  05/12/20 1255

## 2020-05-27 ENCOUNTER — TRANSCRIBE ORDERS (OUTPATIENT)
Dept: ADMINISTRATIVE | Facility: HOSPITAL | Age: 70
End: 2020-05-27

## 2020-05-27 DIAGNOSIS — I50.9 HEART FAILURE, UNSPECIFIED HF CHRONICITY, UNSPECIFIED HEART FAILURE TYPE (HCC): Primary | ICD-10-CM

## 2020-06-02 ENCOUNTER — TELEPHONE (OUTPATIENT)
Dept: GASTROENTEROLOGY | Facility: CLINIC | Age: 70
End: 2020-06-02

## 2020-06-02 PROBLEM — Z86.010 PERSONAL HISTORY OF COLONIC POLYPS: Status: ACTIVE | Noted: 2020-06-02

## 2020-06-02 PROBLEM — R13.19 ESOPHAGEAL DYSPHAGIA: Status: ACTIVE | Noted: 2020-06-02

## 2020-06-02 NOTE — TELEPHONE ENCOUNTER
PATIENT IS SCHEDULED FOR EGD & COLONOSCOPY ON 08/03/2020.  CHECKING TO SEE IF PATIENT CAN HOLD PRIOR TO PROCEDURE?  IF SO, HOW MANY DAYS?

## 2020-06-03 ENCOUNTER — HOSPITAL ENCOUNTER (OUTPATIENT)
Dept: CARDIOLOGY | Facility: HOSPITAL | Age: 70
Discharge: HOME | End: 2020-06-03
Admitting: FAMILY MEDICINE

## 2020-06-03 VITALS
HEIGHT: 65 IN | SYSTOLIC BLOOD PRESSURE: 111 MMHG | WEIGHT: 130 LBS | BODY MASS INDEX: 21.66 KG/M2 | DIASTOLIC BLOOD PRESSURE: 64 MMHG

## 2020-06-03 DIAGNOSIS — I50.9 HEART FAILURE, UNSPECIFIED HF CHRONICITY, UNSPECIFIED HEART FAILURE TYPE (HCC): ICD-10-CM

## 2020-06-03 LAB
BH CV ECHO MEAS - ACS: 1.7 CM
BH CV ECHO MEAS - AO MAX PG: 11 MMHG
BH CV ECHO MEAS - AO MEAN PG (FULL): 2.5 MMHG
BH CV ECHO MEAS - AO MEAN PG: 4.5 MMHG
BH CV ECHO MEAS - AO ROOT AREA (BSA CORRECTED): 1.5
BH CV ECHO MEAS - AO ROOT AREA: 4.5 CM^2
BH CV ECHO MEAS - AO ROOT DIAM: 2.4 CM
BH CV ECHO MEAS - AO V2 MAX: 166 CM/SEC
BH CV ECHO MEAS - AO V2 MEAN: 95.4 CM/SEC
BH CV ECHO MEAS - AO V2 VTI: 24.1 CM
BH CV ECHO MEAS - ASC AORTA: 2.8 CM
BH CV ECHO MEAS - AVA(I,A): 1.7 CM^2
BH CV ECHO MEAS - AVA(I,D): 1.7 CM^2
BH CV ECHO MEAS - BSA(HAYCOCK): 1.6 M^2
BH CV ECHO MEAS - BSA: 1.6 M^2
BH CV ECHO MEAS - BZI_BMI: 21.6 KILOGRAMS/M^2
BH CV ECHO MEAS - BZI_METRIC_HEIGHT: 165.1 CM
BH CV ECHO MEAS - BZI_METRIC_WEIGHT: 59 KG
BH CV ECHO MEAS - EDV(CUBED): 32.3 ML
BH CV ECHO MEAS - EDV(MOD-SP2): 84.2 ML
BH CV ECHO MEAS - EDV(MOD-SP4): 64.8 ML
BH CV ECHO MEAS - EDV(TEICH): 40.5 ML
BH CV ECHO MEAS - EF(CUBED): 55 %
BH CV ECHO MEAS - EF(MOD-BP): 60 %
BH CV ECHO MEAS - EF(MOD-SP2): 59.3 %
BH CV ECHO MEAS - EF(MOD-SP4): 65.7 %
BH CV ECHO MEAS - EF(TEICH): 48.1 %
BH CV ECHO MEAS - ESV(CUBED): 14.5 ML
BH CV ECHO MEAS - ESV(MOD-SP2): 34.3 ML
BH CV ECHO MEAS - ESV(MOD-SP4): 22.2 ML
BH CV ECHO MEAS - ESV(TEICH): 21 ML
BH CV ECHO MEAS - FS: 23.4 %
BH CV ECHO MEAS - IVS/LVPW: 0.97
BH CV ECHO MEAS - IVSD: 1 CM
BH CV ECHO MEAS - LV DIASTOLIC VOL/BSA (35-75): 39.3 ML/M^2
BH CV ECHO MEAS - LV MASS(C)D: 92.8 GRAMS
BH CV ECHO MEAS - LV MASS(C)DI: 56.3 GRAMS/M^2
BH CV ECHO MEAS - LV MAX PG: 5 MMHG
BH CV ECHO MEAS - LV MEAN PG: 2 MMHG
BH CV ECHO MEAS - LV SYSTOLIC VOL/BSA (12-30): 13.5 ML/M^2
BH CV ECHO MEAS - LV V1 MAX: 108 CM/SEC
BH CV ECHO MEAS - LV V1 MEAN: 71.4 CM/SEC
BH CV ECHO MEAS - LV V1 VTI: 16.4 CM
BH CV ECHO MEAS - LVIDD: 3.2 CM
BH CV ECHO MEAS - LVIDS: 2.4 CM
BH CV ECHO MEAS - LVLD AP2: 7.3 CM
BH CV ECHO MEAS - LVLD AP4: 7.3 CM
BH CV ECHO MEAS - LVLS AP2: 6.4 CM
BH CV ECHO MEAS - LVLS AP4: 5.9 CM
BH CV ECHO MEAS - LVOT AREA (M): 2.5 CM^2
BH CV ECHO MEAS - LVOT AREA: 2.5 CM^2
BH CV ECHO MEAS - LVOT DIAM: 1.8 CM
BH CV ECHO MEAS - LVPWD: 1 CM
BH CV ECHO MEAS - MR MEAN PG: 69 MMHG
BH CV ECHO MEAS - MR MEAN VEL: 389 CM/SEC
BH CV ECHO MEAS - MR VTI: 136 CM
BH CV ECHO MEAS - MV DEC SLOPE: 855.5 CM/SEC^2
BH CV ECHO MEAS - MV MEAN PG: 2 MMHG
BH CV ECHO MEAS - MV P1/2T MAX VEL: 112 CM/SEC
BH CV ECHO MEAS - MV P1/2T: 38.3 MSEC
BH CV ECHO MEAS - MV V2 MEAN: 65 CM/SEC
BH CV ECHO MEAS - MV V2 VTI: 15.8 CM
BH CV ECHO MEAS - MVA P1/2T LCG: 2 CM^2
BH CV ECHO MEAS - MVA(P1/2T): 5.7 CM^2
BH CV ECHO MEAS - MVA(VTI): 2.6 CM^2
BH CV ECHO MEAS - PA ACC TIME: 0.11 SEC
BH CV ECHO MEAS - PA MAX PG: 3.4 MMHG
BH CV ECHO MEAS - PA PR(ACCEL): 29.1 MMHG
BH CV ECHO MEAS - PA V2 MAX: 92.3 CM/SEC
BH CV ECHO MEAS - PULM DIAS VEL: 75.7 CM/SEC
BH CV ECHO MEAS - PULM S/D: 1
BH CV ECHO MEAS - PULM SYS VEL: 76.6 CM/SEC
BH CV ECHO MEAS - QP/QS: 1
BH CV ECHO MEAS - RAP SYSTOLE: 3 MMHG
BH CV ECHO MEAS - RV BASE (<4.1) - OBSOLETE: 4.7 CM
BH CV ECHO MEAS - RV LENGTH (<8.5) - OBSOLETE: 7.7 CM
BH CV ECHO MEAS - RV MEAN PG: 1 MMHG
BH CV ECHO MEAS - RV V1 MEAN: 46.5 CM/SEC
BH CV ECHO MEAS - RV V1 VTI: 14.9 CM
BH CV ECHO MEAS - RVOT AREA: 2.8 CM^2
BH CV ECHO MEAS - RVOT DIAM: 1.9 CM
BH CV ECHO MEAS - RVSP: 41 MMHG
BH CV ECHO MEAS - SI(AO): 66.2 ML/M^2
BH CV ECHO MEAS - SI(CUBED): 10.8 ML/M^2
BH CV ECHO MEAS - SI(LVOT): 25.3 ML/M^2
BH CV ECHO MEAS - SI(MOD-SP2): 30.3 ML/M^2
BH CV ECHO MEAS - SI(MOD-SP4): 25.9 ML/M^2
BH CV ECHO MEAS - SI(TEICH): 11.8 ML/M^2
BH CV ECHO MEAS - SV(AO): 109 ML
BH CV ECHO MEAS - SV(CUBED): 17.8 ML
BH CV ECHO MEAS - SV(LVOT): 41.7 ML
BH CV ECHO MEAS - SV(MOD-SP2): 49.9 ML
BH CV ECHO MEAS - SV(MOD-SP4): 42.6 ML
BH CV ECHO MEAS - SV(RVOT): 42.2 ML
BH CV ECHO MEAS - SV(TEICH): 19.5 ML
BH CV ECHO MEAS - TAPSE (>1.6): 1.9 CM
BH CV ECHO MEAS - TR MAX VEL: 307.3 CM/SEC
BH CV VAS BP RIGHT ARM: NORMAL MMHG
BH CV XLRA - RV BASE: 4.7 CM
BH CV XLRA - RV LENGTH: 7.7 CM
BH CV XLRA - RV MID: 3.7 CM
BH CV XLRA - TDI S': 14 CM/SEC
LEFT ATRIUM VOLUME INDEX: 56 ML/M2
MAXIMAL PREDICTED HEART RATE: 149 BPM
STRESS TARGET HR: 127 BPM

## 2020-06-03 PROCEDURE — 93306 TTE W/DOPPLER COMPLETE: CPT

## 2020-06-03 PROCEDURE — 93306 TTE W/DOPPLER COMPLETE: CPT | Performed by: INTERNAL MEDICINE

## 2020-06-18 ENCOUNTER — HOSPITAL ENCOUNTER (OUTPATIENT)
Dept: CARDIOLOGY | Facility: HOSPITAL | Age: 70
Discharge: HOME | End: 2020-06-18
Admitting: INTERNAL MEDICINE

## 2020-06-18 ENCOUNTER — OFFICE VISIT (OUTPATIENT)
Dept: CARDIOLOGY | Facility: CLINIC | Age: 70
End: 2020-06-18

## 2020-06-18 VITALS
WEIGHT: 113 LBS | DIASTOLIC BLOOD PRESSURE: 90 MMHG | RESPIRATION RATE: 16 BRPM | OXYGEN SATURATION: 97 % | BODY MASS INDEX: 19.29 KG/M2 | HEIGHT: 64 IN | SYSTOLIC BLOOD PRESSURE: 120 MMHG | HEART RATE: 89 BPM

## 2020-06-18 DIAGNOSIS — D47.2 MONOCLONAL GAMMOPATHY: ICD-10-CM

## 2020-06-18 DIAGNOSIS — I34.0 NONRHEUMATIC MITRAL VALVE REGURGITATION: Primary | Chronic | ICD-10-CM

## 2020-06-18 DIAGNOSIS — I47.1 SVT (SUPRAVENTRICULAR TACHYCARDIA) (HCC): Chronic | ICD-10-CM

## 2020-06-18 DIAGNOSIS — I50.33 ACUTE ON CHRONIC DIASTOLIC CHF (CONGESTIVE HEART FAILURE) (HCC): ICD-10-CM

## 2020-06-18 DIAGNOSIS — Z79.01 CHRONIC ANTICOAGULATION: ICD-10-CM

## 2020-06-18 DIAGNOSIS — I47.1 SVT (SUPRAVENTRICULAR TACHYCARDIA) (HCC): ICD-10-CM

## 2020-06-18 DIAGNOSIS — I48.0 PAROXYSMAL ATRIAL FIBRILLATION (HCC): Chronic | ICD-10-CM

## 2020-06-18 DIAGNOSIS — Z79.01 CHRONIC ANTICOAGULATION: Chronic | ICD-10-CM

## 2020-06-18 DIAGNOSIS — I34.0 NONRHEUMATIC MITRAL VALVE REGURGITATION: ICD-10-CM

## 2020-06-18 DIAGNOSIS — I48.0 PAROXYSMAL ATRIAL FIBRILLATION (HCC): ICD-10-CM

## 2020-06-18 PROCEDURE — 93000 ELECTROCARDIOGRAM COMPLETE: CPT | Performed by: INTERNAL MEDICINE

## 2020-06-18 PROCEDURE — 99214 OFFICE O/P EST MOD 30 MIN: CPT | Performed by: INTERNAL MEDICINE

## 2020-06-18 PROCEDURE — 93225 XTRNL ECG REC<48 HRS REC: CPT

## 2020-06-18 PROCEDURE — 93226 XTRNL ECG REC<48 HR SCAN A/R: CPT

## 2020-06-18 RX ORDER — TORSEMIDE 10 MG/1
10 TABLET ORAL DAILY
Qty: 90 TABLET | Refills: 3 | Status: SHIPPED | OUTPATIENT
Start: 2020-06-18 | End: 2021-03-02 | Stop reason: HOSPADM

## 2020-06-18 NOTE — PROGRESS NOTES
Subjective:     Encounter Date: 06/18/20        Patient ID: Argelia Nguyen is a 70 y.o. female.    Chief Complaint: SVT,AFIB  History of Present Illness    Dear Dr. Oviedo,    I had the pleasure of seeing this patient in the office today for follow-up.  She has paroxysmal atrial fibrillation, CDCHF, hypertension, and SVT.    Recently she has been having increasing lower extremity edema and shortness of breath.  She was in the emergency room although minimal evaluation was performed at that time.  She did have an echocardiogram June 2020 and was placed on diuretics by yourself for a short period of time.  She is back off the diuretics and is started to have some increasing swelling again.  He thinks that she was on Lasix and states that she had trouble with it because it made her run to the bathroom so much.  She is not having chest pain or chest discomfort.  No dizziness or lightheadedness.  She is more fatigued.  She has had occasional palpitations but no tachycardia.  She has not had any orthopnea or PND.  No chills or fevers.  She has not had any exposure to anyone known to have COVID-19.    Echocardiogram June 2020:  Interpretation Summary     · Calculated right ventricular systolic pressure from tricuspid regurgitation is 41.0 mmHg.  · Left ventricular systolic function is normal.  · Calculated EF = 60.0%.  · Right ventricular cavity is severely dilated.  · Left atrial cavity size is severely dilated.  · Right atrial cavity size is severely dilated.  · Mild-to-moderate mitral valve regurgitation is present  · Severe tricuspid valve regurgitation is present.            She was seen on February 13, 2019 in our office and medical therapy was adjusted after she was found to have paroxysmal atrial fibrillation.  Metoprolol was increased and losartan was also added because of hypertension.     Echocardiogram 1/30/19:   · Calculated EF = 63.0%.  · Left ventricular systolic function is normal.  · The left  ventricular cavity is small.  · Left ventricular wall thickness is consistent with moderate concentric   hypertrophy.  · Right atrial cavity size is severely dilated.  · Left atrial cavity size is moderate-to-severely dilated.  · Right ventricular cavity is severely dilated.  · Right ventricular wall thickness is consistent with moderate   hypertrophy.  · Mildly reduced right ventricular systolic function noted.  · Severe mitral valve regurgitation is present  · Severe tricuspid valve regurgitation is present.  · Estimated right ventricular systolic pressure from tricuspid   regurgitation is mildly elevated (35-45 mmHg).  · Calculated right ventricular systolic pressure from tricuspid   regurgitation is 43 mmHg.     Holter monitor 1/30/19     An abnormal monitor study.  · Evidence of atrial fibrillation was noted. Had atrial fibrillation 59.7%   of the time.      The following portions of the patient's history were reviewed and updated as appropriate: allergies, current medications, past family history, past medical history, past social history, past surgical history and problem list.    Past Medical History:   Diagnosis Date   • Anxiety    • Chronic anticoagulation 1/23/2019   • Colon polyp    • Diabetes mellitus (CMS/HCC)    • Dysphagia    • Fatigue    • GERD (gastroesophageal reflux disease)    • Hypertension    • Nonrheumatic mitral valve regurgitation 3/27/2019   • Paroxysmal atrial fibrillation (CMS/HCC) 1/23/2019   • Persistent atrial fibrillation (CMS/HCC) 1/23/2019   • Reflux gastritis        Past Surgical History:   Procedure Laterality Date   • COLONOSCOPY N/A 3/29/2017    Procedure: COLONOSCOPY; POLYPECTOMY;  Surgeon: Brooke Garrido MD;  Location: [facility] OR;  Service:    • CYST REMOVAL     • ENDOSCOPY N/A 3/29/2017    Procedure: ESOPHAGOGASTRODUODENOSCOPY WITH DILITATION;  Surgeon: Brooke Garrido MD;  Location: [facility] OR;  Service:    • HERNIA REPAIR     • HYSTERECTOMY         Social History      Socioeconomic History   • Marital status: Single     Spouse name: Not on file   • Number of children: Not on file   • Years of education: Not on file   • Highest education level: Not on file   Tobacco Use   • Smoking status: Never Smoker   • Smokeless tobacco: Never Used   Substance and Sexual Activity   • Alcohol use: No   • Drug use: No   • Sexual activity: Defer       Review of Systems   Constitution: Negative for chills, decreased appetite, fever and night sweats.   HENT: Negative for ear discharge, ear pain, hearing loss, nosebleeds and sore throat.    Eyes: Negative for double vision and pain.   Cardiovascular: Negative for cyanosis.   Respiratory: Negative for hemoptysis and sputum production.    Endocrine: Negative for cold intolerance and heat intolerance.   Hematologic/Lymphatic: Negative for adenopathy.   Skin: Negative for dry skin, itching, nail changes, rash and suspicious lesions.   Musculoskeletal: Negative for arthritis, gout, muscle cramps, muscle weakness and myalgias.   Gastrointestinal: Negative for anorexia, bowel incontinence, constipation, diarrhea, dysphagia, hematemesis and jaundice.   Genitourinary: Negative for bladder incontinence, dysuria, flank pain, frequency, hematuria and nocturia.   Neurological: Negative for focal weakness, numbness, paresthesias and seizures.   Psychiatric/Behavioral: Negative for altered mental status, hallucinations, hypervigilance, suicidal ideas and thoughts of violence.   Allergic/Immunologic: Negative for persistent infections.         ECG 12 Lead  Date/Time: 6/18/2020 1:59 PM  Performed by: Aidan Adams III, MD  Authorized by: Aidan Adams III, MD   Comparison: compared with previous ECG   Similar to previous ECG  Rhythm: atrial fibrillation  Rate: normal  Conduction: conduction normal  ST Segments: ST segments normal  T Waves: T waves normal  QRS axis: normal  Other: no other findings    Clinical impression: abnormal EKG               Objective:  "    Vitals:    06/18/20 1253   BP: 120/90   Pulse: 89   Resp: 16   SpO2: 97%   Weight: 51.3 kg (113 lb)   Height: 162.6 cm (64\")         Physical Exam   Constitutional: She is oriented to person, place, and time. She appears well-developed and well-nourished. No distress.   HENT:   Head: Normocephalic and atraumatic.   Nose: Nose normal.   Mouth/Throat: Oropharynx is clear and moist.   Eyes: Pupils are equal, round, and reactive to light. Conjunctivae and EOM are normal. Right eye exhibits no discharge. Left eye exhibits no discharge.   Neck: Normal range of motion. Neck supple. No tracheal deviation present. No thyromegaly present.   Cardiovascular: Normal rate, regular rhythm, S1 normal, S2 normal and normal pulses. Exam reveals no S3.   Murmur heard.  High-pitched blowing holosystolic murmur is present at the apex.  Pulmonary/Chest: Effort normal and breath sounds normal. No stridor. No respiratory distress. She exhibits no tenderness.   Abdominal: Soft. Bowel sounds are normal. She exhibits no distension and no mass. There is no tenderness. There is no rebound and no guarding.   Musculoskeletal: Normal range of motion. She exhibits no tenderness or deformity.   Lymphadenopathy:     She has no cervical adenopathy.   Neurological: She is alert and oriented to person, place, and time. She has normal reflexes.   Skin: Skin is warm and dry. No rash noted. She is not diaphoretic. No erythema.   Psychiatric: She has a normal mood and affect. Thought content normal.       Lab Review:             Performed        Assessment:          Diagnosis Plan   1. Nonrheumatic mitral valve regurgitation  Holter Monitor - 24 Hour    torsemide (DEMADEX) 10 MG tablet   2. Paroxysmal atrial fibrillation (CMS/HCC)  Holter Monitor - 24 Hour    torsemide (DEMADEX) 10 MG tablet   3. SVT (supraventricular tachycardia) (CMS/HCC)  Holter Monitor - 24 Hour    torsemide (DEMADEX) 10 MG tablet   4. Chronic anticoagulation  Holter Monitor - 24 " Hour    torsemide (DEMADEX) 10 MG tablet   5. Monoclonal gammopathy  Holter Monitor - 24 Hour    torsemide (DEMADEX) 10 MG tablet   6. Acute on chronic diastolic CHF (congestive heart failure) (CMS/[facility])  Holter Monitor - 24 Hour    torsemide (DEMADEX) 10 MG tablet          Plan:       1.  Atrial Fibrillation and Atrial Flutter  Assessment  • The patient has paroxysmal atrial fibrillation  • The patient's CHADS2-VASc score is 2  • A PLL0FN2-LFIh score of 2 or more is considered a high risk for a thromboembolic event  • Rivaroxaban prescribed    Plan  • Attempt to maintain sinus rhythm  • Continue rivaroxaban for antithrombotic therapy, bleeding issues discussed  • Continue beta blocker for rhythm control  • Currently in atrial fibrillation, I am to place a Holter monitor to assess heart rate response    2.  SVT - no episodes recorded during Holter monitoring  3.  Chronic anticoagulation - on Xarelto  4.  HTN - better control on meds, follow to effect  5.  Severe mitral and trace regurgitation  6.  Chronic diastolic congestive heart failure-I am to start torsemide 10 mg to take once daily and then we will await the results of the Holter       Thank you very much for allowing us to participate in the care of this pleasant patient.  Please don't hesitate to call if I can be of assistance in any way.      Current Outpatient Medications:   •  Brinzolamide-Brimonidine (SIMBRINZA) 1-0.2 % suspension, Apply  to eye., Disp: , Rfl:   •  LORazepam (ATIVAN) 1 MG tablet, Take 1 mg by mouth Daily., Disp: , Rfl:   •  losartan (COZAAR) 25 MG tablet, TAKE 1 TABLET BY MOUTH DAILY., Disp: 30 tablet, Rfl: 11  •  metoprolol succinate XL (TOPROL-XL) 200 MG 24 hr tablet, Take 0.5 tablets by mouth Daily., Disp: 90 tablet, Rfl: 3  •  omeprazole (priLOSEC) 20 MG capsule, Take 1 capsule by mouth Daily., Disp: 30 capsule, Rfl: 11  •  rivaroxaban (XARELTO) 20 MG tablet, Take 20 mg by mouth Daily., Disp: , Rfl:   •  timolol (BETIMOL) 0.25 %  ophthalmic solution, 1-2 drops 2 (Two) Times a Day., Disp: , Rfl:   •  vitamin B-12 (CYANOCOBALAMIN) 500 MCG tablet, Take 500 mcg by mouth Daily., Disp: , Rfl:   •  torsemide (DEMADEX) 10 MG tablet, Take 1 tablet by mouth Daily., Disp: 90 tablet, Rfl: 3         EMR Dragon/Transcription disclaimer:    Much of this encounter note is an electronic transcription/translation of spoken language to printed text. The electronic translation of spoken language may permit erroneous, or at times, nonsensical words or phrases to be inadvertently transcribed; Although I have reviewed the note for such errors, some may still exist.

## 2020-06-24 PROCEDURE — 93227 XTRNL ECG REC<48 HR R&I: CPT | Performed by: INTERNAL MEDICINE

## 2020-06-25 ENCOUNTER — TELEPHONE (OUTPATIENT)
Dept: CARDIOLOGY | Facility: CLINIC | Age: 70
End: 2020-06-25

## 2020-06-25 RX ORDER — METOPROLOL SUCCINATE 50 MG/1
50 TABLET, EXTENDED RELEASE ORAL DAILY
COMMUNITY
End: 2020-07-09 | Stop reason: SDUPTHER

## 2020-06-25 NOTE — TELEPHONE ENCOUNTER
I spoke to the pt. She states that she is only taking Metoprolol 25 MG daily. She never picked up the RX for 200 MG when it was increased on 4/7/20.     What would you like the pt to dp? Should she take Metoprolol 100 MG or do you want her to start the 200 MG daily?    PT #: [phone]    Thanks Matilde

## 2020-06-25 NOTE — TELEPHONE ENCOUNTER
Per , She should increase her Metoprolol to 50 MG daily.     I spoke to the pt. She is aware to take Metoprolol 50 MG daily and she has an appointment with Kodi on 7/9/20 at 12:45PM.    Thanks Matilde

## 2020-06-25 NOTE — TELEPHONE ENCOUNTER
----- Message from Aidan Adams III, MD sent at 6/25/2020  2:15 PM EDT -----  Please call pt. Her HR is increased. Have her increase her metoprolol to a full tab daily (she is taking 1/2 of a metoprolol 200 mg tablet currently) and then a f/u appt with JOSELUIS in 2 weeks

## 2020-07-09 ENCOUNTER — OFFICE VISIT (OUTPATIENT)
Dept: CARDIOLOGY | Facility: CLINIC | Age: 70
End: 2020-07-09

## 2020-07-09 VITALS
WEIGHT: 105 LBS | RESPIRATION RATE: 16 BRPM | OXYGEN SATURATION: 99 % | BODY MASS INDEX: 17.93 KG/M2 | HEIGHT: 64 IN | SYSTOLIC BLOOD PRESSURE: 106 MMHG | HEART RATE: 95 BPM | DIASTOLIC BLOOD PRESSURE: 80 MMHG

## 2020-07-09 DIAGNOSIS — I34.0 NONRHEUMATIC MITRAL VALVE REGURGITATION: Chronic | ICD-10-CM

## 2020-07-09 DIAGNOSIS — I48.0 PAROXYSMAL ATRIAL FIBRILLATION (HCC): Chronic | ICD-10-CM

## 2020-07-09 DIAGNOSIS — I47.1 SVT (SUPRAVENTRICULAR TACHYCARDIA) (HCC): Primary | Chronic | ICD-10-CM

## 2020-07-09 PROCEDURE — 99214 OFFICE O/P EST MOD 30 MIN: CPT | Performed by: NURSE PRACTITIONER

## 2020-07-09 PROCEDURE — 93000 ELECTROCARDIOGRAM COMPLETE: CPT | Performed by: NURSE PRACTITIONER

## 2020-07-09 RX ORDER — METOPROLOL SUCCINATE 50 MG/1
50 TABLET, EXTENDED RELEASE ORAL DAILY
Qty: 30 TABLET | Refills: 11 | Status: ON HOLD | OUTPATIENT
Start: 2020-07-09 | End: 2020-08-03

## 2020-07-09 NOTE — PROGRESS NOTES
Date of Office Visit: 2020  Encounter Provider: EDEL Acosta  Place of Service: [facility] CARDIOLOGY  Patient Name: Argelia Nguyen  :1950  Primary Cardiologist:      CC:  2 week follow up    Dear Dr. Oviedo    HPI: Argelia Nguyen is a pleasant 70 y.o. female who presents 2020 for cardiac follow up. She has paroxysmal atrial fibrillation, CDCHF, hypertension, and SVT.     She was seen on 2019 in our office and medical therapy was adjusted after she was found to have paroxysmal atrial fibrillation.  Metoprolol was increased and losartan was also added because of hypertension.      Echocardiogram 19:   · Calculated EF = 63.0%.  · Left ventricular systolic function is normal.  · The left ventricular cavity is small.  · Left ventricular wall thickness is consistent with moderate concentric   hypertrophy.  · Right atrial cavity size is severely dilated.  · Left atrial cavity size is moderate-to-severely dilated.  · Right ventricular cavity is severely dilated.  · Right ventricular wall thickness is consistent with moderate   hypertrophy.  · Mildly reduced right ventricular systolic function noted.  · Severe mitral valve regurgitation is present  · Severe tricuspid valve regurgitation is present.  · Estimated right ventricular systolic pressure from tricuspid   regurgitation is mildly elevated (35-45 mmHg).  · Calculated right ventricular systolic pressure from tricuspid   regurgitation is 43 mmHg.     Holter monitor 19     An abnormal monitor study.  · Evidence of atrial fibrillation was noted. Had atrial fibrillation 59.7%   of the time.     She was seen 2020 and had recently  been having increasing lower extremity edema and shortness of breath.  She was in the emergency room although minimal evaluation was performed at that time.  She did have an echocardiogram 2020 and was placed on diuretics by yourself for a short period  of time.  She was back off the diuretics and  had started to have some increasing swelling again.  She thought  she was on Lasix and stated that she had trouble with it because it made her run to the bathroom so much.   She was more fatigued.  She has had occasional palpitations but no tachycardia.   She has not had any exposure to anyone known to have COVID-19.     Echocardiogram June 2020:  Interpretation Summary      · Calculated right ventricular systolic pressure from tricuspid regurgitation is 41.0 mmHg.  · Left ventricular systolic function is normal.  · Calculated EF = 60.0%.  · Right ventricular cavity is severely dilated.  · Left atrial cavity size is severely dilated.  · Right atrial cavity size is severely dilated.  · Mild-to-moderate mitral valve regurgitation is present  · Severe tricuspid valve regurgitation is present.         She had a Holter monitor 6/24/2020:    · An abnormal monitor study.  · Atrial fibrillation is present throughout  · Inadequate heart rate control. Maximal heart rate 176 bpm, average heart rate 95 bpm. Minimal heart rate 51, no pauses seen.    She is here today for 2-week follow-up.  She was unable to tolerate the increased dose of metoprolol.  She states she was just dizzy all the time and felt weak and fatigued.  She continues to feel palpitations with exertion.  She also has some shortness of air with any exertion.  She denies any lower extremity edema however she does still have a trace of pitting edema in her bilateral lower extremities.  She states she has had a couple episodes of sharp nonradiating midsternal chest pain that does not last but only a second or 2.  Her blood pressure is borderline low.  She denies any leg pain, numbness or tingling upper lower extremities.  She states she does not snore and denies any PND, cough, orthopnea.  She is taking her medications as directed.  She is on Xarelto and denies any bleeding or dark tarry stools.    Past Medical History:    Diagnosis Date   • Anxiety    • Chronic anticoagulation 1/23/2019   • Colon polyp    • Diabetes mellitus (CMS/HCC)    • Dysphagia    • Fatigue    • GERD (gastroesophageal reflux disease)    • Hypertension    • Nonrheumatic mitral valve regurgitation 3/27/2019   • Paroxysmal atrial fibrillation (CMS/HCC) 1/23/2019   • Persistent atrial fibrillation (CMS/HCC) 1/23/2019   • Reflux gastritis        Past Surgical History:   Procedure Laterality Date   • COLONOSCOPY N/A 3/29/2017    Procedure: COLONOSCOPY; POLYPECTOMY;  Surgeon: Brooke Garrido MD;  Location: [facility] OR;  Service:    • CYST REMOVAL     • ENDOSCOPY N/A 3/29/2017    Procedure: ESOPHAGOGASTRODUODENOSCOPY WITH DILITATION;  Surgeon: Brooke Garrido MD;  Location: [facility] OR;  Service:    • HERNIA REPAIR     • HYSTERECTOMY         Social History     Socioeconomic History   • Marital status: Single     Spouse name: Not on file   • Number of children: Not on file   • Years of education: Not on file   • Highest education level: Not on file   Tobacco Use   • Smoking status: Never Smoker   • Smokeless tobacco: Never Used   • Tobacco comment: daily caffiene   Substance and Sexual Activity   • Alcohol use: No   • Drug use: No   • Sexual activity: Defer       Family History   Problem Relation Age of Onset   • Colon cancer Mother    • Colon cancer Other    • Lung cancer Father    • Breast cancer Maternal Aunt        The following portion of the patient's history were reviewed and updated as appropriate: past medical history, past surgical history, past social history, past family history, allergies, current medications, and problem list.    Review of Systems   Constitution: Positive for malaise/fatigue. Negative for diaphoresis and fever.   HENT: Negative for congestion, hearing loss, hoarse voice, nosebleeds and sore throat.    Eyes: Negative for photophobia, vision loss in left eye, vision loss in right eye and visual disturbance.   Cardiovascular: Positive for chest  pain (sharp, very short acting, non radiating, intermittent), leg swelling and palpitations. Negative for dyspnea on exertion, irregular heartbeat, near-syncope, orthopnea, paroxysmal nocturnal dyspnea and syncope.   Respiratory: Positive for shortness of breath (with exertion). Negative for cough, hemoptysis, sleep disturbances due to breathing, snoring, sputum production and wheezing.    Endocrine: Negative for cold intolerance, heat intolerance, polydipsia, polyphagia and polyuria.   Hematologic/Lymphatic: Negative for bleeding problem. Does not bruise/bleed easily.   Skin: Negative for color change, dry skin, poor wound healing, rash and suspicious lesions.   Musculoskeletal: Negative for arthritis, back pain, falls, gout, joint pain, joint swelling, muscle cramps, muscle weakness and myalgias.   Gastrointestinal: Negative for bloating, abdominal pain, constipation, diarrhea, dysphagia, melena, nausea and vomiting.   Neurological: Positive for dizziness. Negative for excessive daytime sleepiness, headaches, light-headedness, loss of balance, numbness, paresthesias, seizures, vertigo and weakness.   Psychiatric/Behavioral: Negative for depression, memory loss and substance abuse. The patient is not nervous/anxious.        No Known Allergies      Current Outpatient Medications:   •  Brinzolamide-Brimonidine (SIMBRINZA) 1-0.2 % suspension, Apply  to eye., Disp: , Rfl:   •  LORazepam (ATIVAN) 1 MG tablet, Take 1 mg by mouth Daily., Disp: , Rfl:   •  losartan (COZAAR) 25 MG tablet, TAKE 1 TABLET BY MOUTH DAILY., Disp: 30 tablet, Rfl: 11  •  metoprolol succinate XL (TOPROL-XL) 50 MG 24 hr tablet, Take 50 mg by mouth Daily., Disp: , Rfl:   •  omeprazole (priLOSEC) 20 MG capsule, Take 1 capsule by mouth Daily., Disp: 30 capsule, Rfl: 11  •  rivaroxaban (XARELTO) 20 MG tablet, Take 20 mg by mouth Daily., Disp: , Rfl:   •  timolol (BETIMOL) 0.25 % ophthalmic solution, 1-2 drops 2 (Two) Times a Day., Disp: , Rfl:   •   "torsemide (DEMADEX) 10 MG tablet, Take 1 tablet by mouth Daily., Disp: 90 tablet, Rfl: 3        Objective:     Vitals:    07/09/20 1310   BP: 106/80   Pulse: 95   Resp: 16   SpO2: 99%   Weight: 47.6 kg (105 lb)   Height: 162.6 cm (64\")     Body mass index is 18.02 kg/m².      Physical Exam   Constitutional: She is oriented to person, place, and time. She appears well-developed and well-nourished. No distress.   HENT:   Head: Normocephalic and atraumatic.   Right Ear: External ear normal.   Left Ear: External ear normal.   Nose: Nose normal.   Eyes: Pupils are equal, round, and reactive to light. Conjunctivae are normal. Right eye exhibits no discharge. Left eye exhibits no discharge.   Neck: Normal range of motion. Neck supple. No JVD present. No tracheal deviation present. No thyromegaly present.   Cardiovascular: Normal rate, normal heart sounds and intact distal pulses. An irregular rhythm present. Exam reveals no gallop and no friction rub.   No murmur heard.  Pulmonary/Chest: Effort normal and breath sounds normal. No respiratory distress. She has no wheezes. She has no rales. She exhibits no tenderness.   Abdominal: Soft. Bowel sounds are normal. She exhibits no distension. There is no tenderness.   Musculoskeletal: Normal range of motion. She exhibits edema. She exhibits no tenderness or deformity.   Lymphadenopathy:     She has no cervical adenopathy.   Neurological: She is alert and oriented to person, place, and time. Coordination normal.   Skin: Skin is warm and dry. No rash noted. No erythema.   Psychiatric: She has a normal mood and affect. Her behavior is normal. Judgment and thought content normal.             ECG 12 Lead  Date/Time: 7/9/2020 1:19 PM  Performed by: Alyssa Choi APRN  Authorized by: Alyssa Choi APRN   Comparison: compared with previous ECG from 6/18/2020  Similar to previous ECG  Rhythm: atrial fibrillation  Ectopy: couplets  Rate: normal  Conduction: conduction normal  ST " Segments: ST segments normal  T flattening: aVL and aVF  QRS axis: left    Clinical impression: abnormal EKG              Assessment:       Diagnosis Plan   1. SVT (supraventricular tachycardia) (CMS/HCC)     2. Paroxysmal atrial fibrillation (CMS/HCC)     3. Nonrheumatic mitral valve regurgitation            Plan:       1.  SVT - no episodes on Holter from 6/24/2020  2.  PAF - she remains in atrial fib. Her HR is 95 today.  She is on Xarelto.  She could not tolerate the increased dose on Toprol secondary to dizziness and fatigue.  He BP is borderline low.  Discussed with .  Will stop the losartan and try to increase the Toprol XL to 50 mg a day.   She is agreeable to this change.     3.  Non rheumatic mitral valve regurgitation - mild to moderate on echo 6/2020.     4.  HTN - borderline low - will stop the losartan and increase Toprol for better rate control.     5.  Chronic diastolic CHF - now on torsemide, mild LE edema.     RTO in 2 weeks.       As always, it has been a pleasure to participate in your patient's care. Thank you.       Sincerely,       EDEL Acosta      Current Outpatient Medications:   •  Brinzolamide-Brimonidine (SIMBRINZA) 1-0.2 % suspension, Apply  to eye., Disp: , Rfl:   •  LORazepam (ATIVAN) 1 MG tablet, Take 1 mg by mouth Daily., Disp: , Rfl:   •  metoprolol succinate XL (TOPROL-XL) 50 MG 24 hr tablet, Take 1 tablet by mouth Daily., Disp: 30 tablet, Rfl: 11  •  omeprazole (priLOSEC) 20 MG capsule, Take 1 capsule by mouth Daily., Disp: 30 capsule, Rfl: 11  •  rivaroxaban (XARELTO) 20 MG tablet, Take 20 mg by mouth Daily., Disp: , Rfl:   •  timolol (BETIMOL) 0.25 % ophthalmic solution, 1-2 drops 2 (Two) Times a Day., Disp: , Rfl:   •  torsemide (DEMADEX) 10 MG tablet, Take 1 tablet by mouth Daily., Disp: 90 tablet, Rfl: 3    Dictated utilizing Dragon dictation

## 2020-07-28 ENCOUNTER — TRANSCRIBE ORDERS (OUTPATIENT)
Dept: ADMINISTRATIVE | Facility: HOSPITAL | Age: 70
End: 2020-07-28

## 2020-07-28 DIAGNOSIS — Z01.818 OTHER SPECIFIED PRE-OPERATIVE EXAMINATION: Primary | ICD-10-CM

## 2020-07-31 ENCOUNTER — ANESTHESIA EVENT (OUTPATIENT)
Dept: PERIOP | Facility: HOSPITAL | Age: 70
End: 2020-07-31

## 2020-07-31 ENCOUNTER — LAB (OUTPATIENT)
Dept: LAB | Facility: HOSPITAL | Age: 70
End: 2020-07-31

## 2020-07-31 DIAGNOSIS — Z01.818 OTHER SPECIFIED PRE-OPERATIVE EXAMINATION: ICD-10-CM

## 2020-07-31 PROCEDURE — C9803 HOPD COVID-19 SPEC COLLECT: HCPCS

## 2020-07-31 PROCEDURE — U0002 COVID-19 LAB TEST NON-CDC: HCPCS

## 2020-07-31 PROCEDURE — U0004 COV-19 TEST NON-CDC HGH THRU: HCPCS

## 2020-08-02 LAB
REF LAB TEST METHOD: NORMAL
SARS-COV-2 RNA RESP QL NAA+PROBE: NOT DETECTED

## 2020-08-03 ENCOUNTER — HOSPITAL ENCOUNTER (OUTPATIENT)
Facility: HOSPITAL | Age: 70
Setting detail: HOSPITAL OUTPATIENT SURGERY
Discharge: HOME | End: 2020-08-03
Attending: INTERNAL MEDICINE | Admitting: INTERNAL MEDICINE

## 2020-08-03 ENCOUNTER — ANESTHESIA (OUTPATIENT)
Dept: PERIOP | Facility: HOSPITAL | Age: 70
End: 2020-08-03

## 2020-08-03 VITALS
DIASTOLIC BLOOD PRESSURE: 83 MMHG | OXYGEN SATURATION: 97 % | WEIGHT: 105.2 LBS | SYSTOLIC BLOOD PRESSURE: 107 MMHG | TEMPERATURE: 97.5 F | RESPIRATION RATE: 20 BRPM | BODY MASS INDEX: 18.06 KG/M2 | HEART RATE: 111 BPM

## 2020-08-03 DIAGNOSIS — Z86.010 PERSONAL HISTORY OF COLONIC POLYPS: ICD-10-CM

## 2020-08-03 DIAGNOSIS — R13.19 ESOPHAGEAL DYSPHAGIA: ICD-10-CM

## 2020-08-03 LAB — GLUCOSE BLDC GLUCOMTR-MCNC: 105 MG/DL (ref 70–130)

## 2020-08-03 PROCEDURE — 82962 GLUCOSE BLOOD TEST: CPT

## 2020-08-03 PROCEDURE — 25010000002 PROPOFOL 10 MG/ML EMULSION: Performed by: NURSE ANESTHETIST, CERTIFIED REGISTERED

## 2020-08-03 PROCEDURE — 43239 EGD BIOPSY SINGLE/MULTIPLE: CPT | Performed by: INTERNAL MEDICINE

## 2020-08-03 PROCEDURE — 45380 COLONOSCOPY AND BIOPSY: CPT | Performed by: INTERNAL MEDICINE

## 2020-08-03 PROCEDURE — 25010000002 PHENYLEPHRINE PER 1 ML: Performed by: NURSE ANESTHETIST, CERTIFIED REGISTERED

## 2020-08-03 PROCEDURE — 88305 TISSUE EXAM BY PATHOLOGIST: CPT | Performed by: INTERNAL MEDICINE

## 2020-08-03 RX ORDER — SODIUM CHLORIDE, SODIUM LACTATE, POTASSIUM CHLORIDE, CALCIUM CHLORIDE 600; 310; 30; 20 MG/100ML; MG/100ML; MG/100ML; MG/100ML
9 INJECTION, SOLUTION INTRAVENOUS CONTINUOUS
Status: DISCONTINUED | OUTPATIENT
Start: 2020-08-03 | End: 2020-08-03 | Stop reason: HOSPADM

## 2020-08-03 RX ORDER — PROPOFOL 10 MG/ML
VIAL (ML) INTRAVENOUS AS NEEDED
Status: DISCONTINUED | OUTPATIENT
Start: 2020-08-03 | End: 2020-08-03 | Stop reason: SURG

## 2020-08-03 RX ORDER — LIDOCAINE HYDROCHLORIDE 20 MG/ML
INJECTION, SOLUTION INFILTRATION; PERINEURAL AS NEEDED
Status: DISCONTINUED | OUTPATIENT
Start: 2020-08-03 | End: 2020-08-03 | Stop reason: SURG

## 2020-08-03 RX ORDER — LIDOCAINE HYDROCHLORIDE 10 MG/ML
0.5 INJECTION, SOLUTION EPIDURAL; INFILTRATION; INTRACAUDAL; PERINEURAL ONCE AS NEEDED
Status: DISCONTINUED | OUTPATIENT
Start: 2020-08-03 | End: 2020-08-03 | Stop reason: HOSPADM

## 2020-08-03 RX ORDER — SODIUM CHLORIDE 0.9 % (FLUSH) 0.9 %
10 SYRINGE (ML) INJECTION AS NEEDED
Status: DISCONTINUED | OUTPATIENT
Start: 2020-08-03 | End: 2020-08-03 | Stop reason: HOSPADM

## 2020-08-03 RX ORDER — ONDANSETRON 2 MG/ML
4 INJECTION INTRAMUSCULAR; INTRAVENOUS ONCE AS NEEDED
Status: DISCONTINUED | OUTPATIENT
Start: 2020-08-03 | End: 2020-08-03 | Stop reason: HOSPADM

## 2020-08-03 RX ORDER — SODIUM CHLORIDE, SODIUM LACTATE, POTASSIUM CHLORIDE, CALCIUM CHLORIDE 600; 310; 30; 20 MG/100ML; MG/100ML; MG/100ML; MG/100ML
100 INJECTION, SOLUTION INTRAVENOUS CONTINUOUS
Status: DISCONTINUED | OUTPATIENT
Start: 2020-08-03 | End: 2020-08-03 | Stop reason: HOSPADM

## 2020-08-03 RX ORDER — MAGNESIUM HYDROXIDE 1200 MG/15ML
LIQUID ORAL AS NEEDED
Status: DISCONTINUED | OUTPATIENT
Start: 2020-08-03 | End: 2020-08-03 | Stop reason: HOSPADM

## 2020-08-03 RX ORDER — SODIUM CHLORIDE 9 MG/ML
40 INJECTION, SOLUTION INTRAVENOUS AS NEEDED
Status: DISCONTINUED | OUTPATIENT
Start: 2020-08-03 | End: 2020-08-03 | Stop reason: HOSPADM

## 2020-08-03 RX ORDER — METOPROLOL SUCCINATE 50 MG/1
50 TABLET, EXTENDED RELEASE ORAL DAILY
COMMUNITY
End: 2020-12-17

## 2020-08-03 RX ORDER — SODIUM CHLORIDE 0.9 % (FLUSH) 0.9 %
10 SYRINGE (ML) INJECTION EVERY 12 HOURS SCHEDULED
Status: DISCONTINUED | OUTPATIENT
Start: 2020-08-03 | End: 2020-08-03 | Stop reason: HOSPADM

## 2020-08-03 RX ADMIN — PROPOFOL 50 MG: 10 INJECTION, EMULSION INTRAVENOUS at 09:44

## 2020-08-03 RX ADMIN — PHENYLEPHRINE HYDROCHLORIDE 50 MCG: 10 INJECTION, SOLUTION INTRAMUSCULAR; INTRAVENOUS; SUBCUTANEOUS at 10:11

## 2020-08-03 RX ADMIN — SODIUM CHLORIDE, POTASSIUM CHLORIDE, SODIUM LACTATE AND CALCIUM CHLORIDE: 600; 310; 30; 20 INJECTION, SOLUTION INTRAVENOUS at 07:52

## 2020-08-03 RX ADMIN — PHENYLEPHRINE HYDROCHLORIDE 50 MCG: 10 INJECTION, SOLUTION INTRAMUSCULAR; INTRAVENOUS; SUBCUTANEOUS at 09:46

## 2020-08-03 RX ADMIN — PROPOFOL 100 MG: 10 INJECTION, EMULSION INTRAVENOUS at 09:24

## 2020-08-03 RX ADMIN — PROPOFOL 50 MG: 10 INJECTION, EMULSION INTRAVENOUS at 09:41

## 2020-08-03 RX ADMIN — PROPOFOL 50 MG: 10 INJECTION, EMULSION INTRAVENOUS at 09:50

## 2020-08-03 RX ADMIN — EPHEDRINE SULFATE 10 MG: 50 INJECTION, SOLUTION INTRAVENOUS at 09:31

## 2020-08-03 RX ADMIN — LIDOCAINE HYDROCHLORIDE 100 MG: 20 INJECTION, SOLUTION INFILTRATION; PERINEURAL at 09:25

## 2020-08-03 RX ADMIN — PHENYLEPHRINE HYDROCHLORIDE 100 MCG: 10 INJECTION, SOLUTION INTRAMUSCULAR; INTRAVENOUS; SUBCUTANEOUS at 09:35

## 2020-08-03 NOTE — ANESTHESIA POSTPROCEDURE EVALUATION
Patient: Argelia Nguyen    Procedure Summary     Date:  08/03/20 Room / Location:  [facility] ENDOSCOPY 1 /  [facility]    Anesthesia Start:  0916 Anesthesia Stop:  1002    Procedures:       ESOPHAGOGASTRODUODENOSCOPY with biopsies (N/A Esophagus)      COLONOSCOPY with polypectomy (N/A ) Diagnosis:       Esophageal dysphagia      Personal history of colonic polyps      Gastritis      Reflux esophagitis      Colon polyp      (Esophageal dysphagia [R13.10])      (Personal history of colonic polyps [Z86.010])    Surgeon:  Rui Shi MD Provider:  Onesimo Vasques CRNA    Anesthesia Type:  MAC ASA Status:  3          Anesthesia Type: MAC    Vitals  Vitals Value Taken Time   BP 97/63 8/3/2020 10:20 AM   Temp 97.5 °F (36.4 °C) 8/3/2020 10:03 AM   Pulse 135 8/3/2020 10:20 AM   Resp 18 8/3/2020 10:20 AM   SpO2 98 % 8/3/2020 10:20 AM           Post Anesthesia Care and Evaluation    Patient location during evaluation: PHASE II  Patient participation: complete - patient participated  Level of consciousness: awake and alert  Pain score: 0  Pain management: adequate  Airway patency: patent  Anesthetic complications: No anesthetic complications    Cardiovascular status: acceptable  Respiratory status: acceptable  Hydration status: hypovolemic

## 2020-08-03 NOTE — ANESTHESIA PREPROCEDURE EVALUATION
Anesthesia Evaluation     Patient summary reviewed and Nursing notes reviewed   no history of anesthetic complications:               Airway   Mallampati: II  TM distance: >3 FB  Neck ROM: full  No difficulty expected  Dental          Pulmonary    (+) shortness of breath, decreased breath sounds,   Cardiovascular   Exercise tolerance: good (4-7 METS)    ECG reviewed  Patient on routine beta blocker, Beta blocker given within 24 hours of surgery and Beta blocker not taken-may be given intraoperatively  Rhythm: irregular  Rate: normal    (+) hypertension well controlled, valvular problems/murmurs MR, dysrhythmias (Paroxcismal SVT) Atrial Fib, CHF Diastolic >=55%, PVD,     ROS comment: hasnt taken metoprolol or xarelto since 7/30    · Calculated right ventricular systolic pressure from tricuspid regurgitation is 41.0 mmHg.  · Left ventricular systolic function is normal.  · Calculated EF = 60.0%.  · Right ventricular cavity is severely dilated.  · Left atrial cavity size is severely dilated.  · Right atrial cavity size is severely dilated.  · Mild-to-moderate mitral valve regurgitation is present  · Severe tricuspid valve regurgitation is present.       Neuro/Psych  (+) dizziness/light headedness, psychiatric history Anxiety,     GI/Hepatic/Renal/Endo    (+)  hiatal hernia, GERD,  diabetes mellitus (diet controlled, accu check 105) well controlled,     Musculoskeletal     (+) back pain,   Abdominal  - normal exam   Substance History - negative use     OB/GYN          Other - negative ROS                     Anesthesia Plan    ASA 3     MAC       Anesthetic plan, all risks, benefits, and alternatives have been provided, discussed and informed consent has been obtained with: patient.  Use of blood products discussed with patient  Consented to blood products.

## 2020-08-03 NOTE — H&P
Patient Care Team:  Hany Oviedo MD as PCP - General  Hany Oviedo MD as PCP - Family Medicine    CHIEF COMPLAINT: Personal hx colon polyps and dysphagia    HISTORY OF PRESENT ILLNESS:  Last colon was 2017    Past Medical History:   Diagnosis Date   • Anxiety    • Chronic anticoagulation 1/23/2019   • Colon polyp    • Diabetes mellitus (CMS/HCC)    • Dysphagia    • Fatigue    • GERD (gastroesophageal reflux disease)    • Hypertension    • Nonrheumatic mitral valve regurgitation 3/27/2019   • Paroxysmal atrial fibrillation (CMS/HCC) 1/23/2019   • Persistent atrial fibrillation (CMS/HCC) 1/23/2019   • Reflux gastritis      Past Surgical History:   Procedure Laterality Date   • COLONOSCOPY N/A 3/29/2017    Procedure: COLONOSCOPY; POLYPECTOMY;  Surgeon: Brooke Garrido MD;  Location: [facility] OR;  Service:    • CYST REMOVAL     • ENDOSCOPY N/A 3/29/2017    Procedure: ESOPHAGOGASTRODUODENOSCOPY WITH DILITATION;  Surgeon: Brooke Garrido MD;  Location: [facility] OR;  Service:    • HERNIA REPAIR     • HYSTERECTOMY       Family History   Problem Relation Age of Onset   • Colon cancer Mother    • Colon cancer Other    • Lung cancer Father    • Breast cancer Maternal Aunt      Social History     Tobacco Use   • Smoking status: Never Smoker   • Smokeless tobacco: Never Used   • Tobacco comment: daily caffiene   Substance Use Topics   • Alcohol use: No   • Drug use: No     Medications Prior to Admission   Medication Sig Dispense Refill Last Dose   • torsemide (DEMADEX) 10 MG tablet Take 1 tablet by mouth Daily. 90 tablet 3 8/2/2020 at Unknown time   • Brinzolamide-Brimonidine (SIMBRINZA) 1-0.2 % suspension Apply  to eye.   Taking   • LORazepam (ATIVAN) 1 MG tablet Take 1 mg by mouth Daily.   Taking   • metoprolol succinate XL (TOPROL-XL) 50 MG 24 hr tablet Take 1 tablet by mouth Daily. 30 tablet 11    • omeprazole (priLOSEC) 20 MG capsule Take 1 capsule by mouth Daily. 30 capsule 11 Taking   • rivaroxaban (XARELTO) 20 MG tablet  Take 20 mg by mouth Daily.   7/31/2020   • timolol (BETIMOL) 0.25 % ophthalmic solution 1-2 drops 2 (Two) Times a Day.   Taking     Allergies:  Patient has no known allergies.    REVIEW OF SYSTEMS:  Please see the above history of present illness for pertinent positives and negatives.  The remainder of the patient's systems have been reviewed and are negative.     Vital Signs  Temp:  [97.7 °F (36.5 °C)] 97.7 °F (36.5 °C)  Heart Rate:  [79] 79  Resp:  [15] 15  BP: (143)/(99) 143/99    Flowsheet Rows      First Filed Value   Admission Height  --   Admission Weight  47.7 kg (105 lb 3.2 oz) Documented at 08/03/2020 0836           Physical Exam:  Physical Exam   Constitutional: Patient appears well-developed and well-nourished and in no acute distress   HEENT:   Head: Normocephalic and atraumatic.   Eyes:  Pupils are equal, round, and reactive to light. EOM are intact. Sclerae are anicteric and non-injected.  Mouth and Throat: Patient has moist mucous membranes. Oropharynx is clear of any erythema or exudate.     Neck: Neck supple. No JVD present. No thyromegaly present. No lymphadenopathy present.  Cardiovascular: Regular rate, regular rhythm, S1 normal and S2 normal.  Exam reveals no gallop and no friction rub.  No murmur heard.  Pulmonary/Chest: Lungs are clear to auscultation bilaterally. No respiratory distress. No wheezes. No rhonchi. No rales.   Abdominal: Soft. Bowel sounds are normal. No distension and no mass. There is no hepatosplenomegaly. There is no tenderness.   Musculoskeletal: Normal Muscle tone  Extremities: No edema. Pulses are palpable in all 4 extremities.  Neurological: Patient is alert and oriented to person, place, and time. Cranial nerves II-XII are grossly intact with no focal deficits.  Skin: Skin is warm. No rash noted. Nails show no clubbing.  No cyanosis or erythema.    Debilities/Disabilities Identified: None  Emotional Behavior: Appropriate     Results Review:    I reviewed the patient's  new clinical results.  Lab Results (most recent)     None          Imaging Results (Most Recent)     None        reviewed    ECG/EMG Results (most recent)     None        reviewed    Assessment/Plan   Personal hx colon polyps and dysphagia/  EGD and colonoscopy      I discussed the patients findings and my recommendations with patient.     Rui Shi MD  08/03/20  08:51    Time: 10 min prior to procedure.

## 2020-08-03 NOTE — OP NOTE
ESOPHAGOGASTRODUODENOSCOPY, COLONOSCOPY  Procedure Report    Patient Name:  Argelia Nguyen  YOB: 1950    Date of Surgery:  8/3/2020     Indications:  Esophageal dysphagia [R13.10]  Personal history of colonic polyps [Z86.010]      Pre-op Diagnosis:   Esophageal dysphagia [R13.10]  Personal history of colonic polyps [Z86.010]    Post-Op Diagnosis Codes:     * Esophageal dysphagia [R13.10]     * Personal history of colonic polyps [Z86.010]     * Gastritis [K29.70]     * Reflux esophagitis [K21.0]     * Colon polyp [K63.5]         Procedure/CPT® Codes:      Procedure(s):  ESOPHAGOGASTRODUODENOSCOPY with biopsies  COLONOSCOPY with polypectomy    Staff:  Surgeon(s):  Rui Shi MD         Anesthesia: Monitored Anesthesia Care    Estimated Blood Loss: none    Specimens:   ID Type Source Tests Collected by Time   A (Not marked as sent) : gastric biopsy Tissue Stomach TISSUE PATHOLOGY EXAM Rui Shi MD 8/3/2020 0929   B (Not marked as sent) : distal esophagus biopsy Tissue Esophagus, Distal TISSUE PATHOLOGY EXAM Rui Shi MD 8/3/2020 0933   C (Not marked as sent) : ascending polyp Polyp Large Intestine, Right / Ascending Colon TISSUE PATHOLOGY EXAM Rui Shi MD 8/3/2020 0947   D (Not marked as sent) : transverse polyp Polyp Large Intestine, Transverse Colon TISSUE PATHOLOGY EXAM Rui Shi MD 8/3/2020 0951   E (Not marked as sent) : rectal polyp Polyp Large Intestine, Rectum TISSUE PATHOLOGY EXAM Rui Shi MD 8/3/2020 0955       Implants:    Nothing was implanted during the procedure      Description of Procedure: After having signed informed consent, she was brought to the endoscopy suite, placed in left lateral decubitus position and given her IV sedation. A bite block was placed between her incisors. The scope was introduced in the oropharynx, advanced under direct visualization with ease in the  esophagus, through the distal esophagus. The Z-line was only mildly irregular. There was no active ulcer or stricture. The scope was advanced in the stomach and retroflexed, revealing normal fundus and cardia with the exception of 2 linear areas of erythema along the lesser curve. The scope was deretroflexed and advanced in the antrum. The antrum was particularly normal-appearing. The scope was advanced through the pylorus, through the duodenal bulb which was examined and normal, around the angle up to the 2nd and 3rd portions of the duodenum which were normal as well. The scope was withdrawn back in the antrum and retroflexed. The areas of erythema were then reidentified. The scope was deretroflexed and withdrawn in the cardia. Biopsies were taken in a targeted fashion to the erythematous folds within the stomach. The scope was then advanced in the antrum. Biopsies were taken as well in the body and antrum. These were sent together as random gastric biopsies. The scope was withdrawn in the distal esophagus where biopsies were taken in a targeted fashion to rule out short segment Maddox’s esophagus, but this was not suspected visually. As the scope was withdrawn, the remainder of the esophagus was normal-appearing. The scope was taken from the patient. She tolerated that procedure well.     She patient was kept in a left lateral decubitus position and given her IV sedation. Rectal exam revealed no external lesions, normal anal tone, no rectal mass. The scope was introduced in the rectum and advanced under direct visualization with ease through the rectum, sigmoid colon, descending colon, to and around the splenic flexure; reduced; advanced through the transverse colon. The scope was advanced with some looping to and around the hepatic flexure; reduced in the ascending colon that was poorly prepped and into the cecum that was poorly prepped as well. There was particulate matter that could not be removed. As the  scope was withdrawn, there was a diminutive polyp noted in the ascending colon. This was biopsied, felt to be completely removed, sent separately as ascending colon polyp. The scope was withdrawn through the remainder of the ascending colon there was particulate matter that could not be removed, around the hepatic flexure, into the transverse colon. In the transverse colon, there was a sessile diminutive polyp that was biopsied and this was sent separately as transverse colon polyp. The scope was then withdrawn through the remainder of the transverse, descending and sigmoid colon. No further mucosal lesions were noted there. No diverticular openings were encountered. Within the rectum, there was a diminutive 4 mm polyp that was noted, biopsied, sent separately as rectal polyp. The scope was retroflexed revealing an intact dentate line and no mucosal lesion. The scope was deretroflexed and withdrawn. The patient tolerated the procedure well.           Findings:  Normal Duodenum  Mild Gastritis- Biopsy  Reflux Esophagitis-Biopsy    Colon to Cecum  Polyps(3)-Biopsy     Complications: None    Recommendations: Results to be called. and Repeat in 3 years      Rui Shi MD     Date: 8/3/2020  Time: 10:02

## 2020-08-03 NOTE — BRIEF OP NOTE
ESOPHAGOGASTRODUODENOSCOPY, COLONOSCOPY  Progress Note    Argelia Nguyen  8/3/2020    Pre-op Diagnosis:   Esophageal dysphagia [R13.10]  Personal history of colonic polyps [Z86.010]       Post-Op Diagnosis Codes:     * Esophageal dysphagia [R13.10]     * Personal history of colonic polyps [Z86.010]     * Gastritis [K29.70]     * Reflux esophagitis [K21.0]     * Colon polyp [K63.5]    Procedure/CPT® Codes:        Procedure(s):  ESOPHAGOGASTRODUODENOSCOPY with biopsies  COLONOSCOPY with polypectomy    Surgeon(s):  Rui Shi MD    Anesthesia: Monitored Anesthesia Care    Staff:   Circulator: Debora Diego RN  Scrub Person: Beronica Villeda         Estimated Blood Loss: none    Urine Voided: * No values recorded between 8/3/2020  9:16 AM and 8/3/2020  9:56 AM *    Specimens:                Specimens     ID Source Type Tests Collected By Collected At Frozen?      A Stomach Tissue · TISSUE PATHOLOGY EXAM   Rui Shi MD 8/3/20 0929 No     Description: gastric biopsy    This specimen was not marked as sent.    B Esophagus, Distal Tissue · TISSUE PATHOLOGY EXAM   Rui Shi MD 8/3/20 0933 No     Description: distal esophagus biopsy    This specimen was not marked as sent.    C Large Intestine, Right / Ascending Colon Polyp · TISSUE PATHOLOGY EXAM   Rui Shi MD 8/3/20 0947 No     Description: ascending polyp    This specimen was not marked as sent.    D Large Intestine, Transverse Colon Polyp · TISSUE PATHOLOGY EXAM   Rui Shi MD 8/3/20 0951 No     Description: transverse polyp    This specimen was not marked as sent.    E Large Intestine, Rectum Polyp · TISSUE PATHOLOGY EXAM   Rui Shi MD 8/3/20 0955 No     Description: rectal polyp    This specimen was not marked as sent.                Drains: * No LDAs found *    Findings: Normal Duodenum  Mild Gastritis- Biopsy  Reflux Esophagitis-Biopsy    Colon to  Cecum  Polyps(3)-Biopsy    Complications: None          Rui Shi MD     Date: 8/3/2020  Time: 10:00

## 2020-08-04 LAB
CYTO UR: NORMAL
LAB AP CASE REPORT: NORMAL
PATH REPORT.FINAL DX SPEC: NORMAL
PATH REPORT.GROSS SPEC: NORMAL

## 2020-08-04 NOTE — PROGRESS NOTES
Date of Office Visit: 2020  Encounter Provider: EDEL Acosta  Place of Service: [facility] CARDIOLOGY  Patient Name: Argelia Nguyen  :1950  Primary Cardiologist:     CC:  2 week follow up    Dear Dr. Oviedo    HPI: Argelia Nguyen is a pleasant 70 y.o. female who presents 2020 for cardiac follow up.     She was seen on 2019 in our office and medical therapy was adjusted after she was found to have paroxysmal atrial fibrillation.  Metoprolol was increased and losartan was also added because of hypertension.      Echocardiogram 19:   · Calculated EF = 63.0%.  · Left ventricular systolic function is normal.  · The left ventricular cavity is small.  · Left ventricular wall thickness is consistent with moderate concentric   hypertrophy.  · Right atrial cavity size is severely dilated.  · Left atrial cavity size is moderate-to-severely dilated.  · Right ventricular cavity is severely dilated.  · Right ventricular wall thickness is consistent with moderate   hypertrophy.  · Mildly reduced right ventricular systolic function noted.  · Severe mitral valve regurgitation is present  · Severe tricuspid valve regurgitation is present.  · Estimated right ventricular systolic pressure from tricuspid   regurgitation is mildly elevated (35-45 mmHg).  · Calculated right ventricular systolic pressure from tricuspid   regurgitation is 43 mmHg.     Holter monitor 19     An abnormal monitor study.  · Evidence of atrial fibrillation was noted. Had atrial fibrillation 59.7%   of the time.     She was seen 2020 and had recently  been having increasing lower extremity edema and shortness of breath.  She was in the emergency room although minimal evaluation was performed at that time.  She did have an echocardiogram 2020 and was placed on diuretics by yourself for a short period of time.  She was back off the diuretics and  had started to have  some increasing swelling again.  She thought  she was on Lasix and stated that she had trouble with it because it made her run to the bathroom so much.   She was more fatigued.  She has had occasional palpitations but no tachycardia.   She has not had any exposure to anyone known to have COVID-19.     Echocardiogram June 2020:  Interpretation Summary      · Calculated right ventricular systolic pressure from tricuspid regurgitation is 41.0 mmHg.  · Left ventricular systolic function is normal.  · Calculated EF = 60.0%.  · Right ventricular cavity is severely dilated.  · Left atrial cavity size is severely dilated.  · Right atrial cavity size is severely dilated.  · Mild-to-moderate mitral valve regurgitation is present  · Severe tricuspid valve regurgitation is present.         She had a Holter monitor 6/24/2020:     · An abnormal monitor study.  · Atrial fibrillation is present throughout  · Inadequate heart rate control. Maximal heart rate 176 bpm, average heart rate 95 bpm. Minimal heart rate 51, no pauses seen.      She was unable to tolerate the increased dose of metoprolol.  She states she was just dizzy all the time and felt weak and fatigued.  She continued to feel palpitations with exertion.  She also has some shortness of air with any exertion. Her losartan was stopped and her metoprolol was increased.  She now reports          Past Medical History:   Diagnosis Date   • Anxiety    • Chronic anticoagulation 1/23/2019   • Colon polyp    • Diabetes mellitus (CMS/HCC)    • Dysphagia    • Fatigue    • GERD (gastroesophageal reflux disease)    • Hypertension    • Nonrheumatic mitral valve regurgitation 3/27/2019   • Paroxysmal atrial fibrillation (CMS/HCC) 1/23/2019   • Persistent atrial fibrillation (CMS/HCC) 1/23/2019   • Reflux gastritis        Past Surgical History:   Procedure Laterality Date   • COLONOSCOPY N/A 3/29/2017    Procedure: COLONOSCOPY; POLYPECTOMY;  Surgeon: Brooke Garrido MD;  Location: [facility]  OR;  Service:    • COLONOSCOPY N/A 8/3/2020    Procedure: COLONOSCOPY with polypectomy;  Surgeon: Rui Shi MD;  Location: [facility] OR;  Service: Gastroenterology;  Laterality: N/A;  ascending polyp  transverse polyp  rectal polyp   • CYST REMOVAL     • ENDOSCOPY N/A 3/29/2017    Procedure: ESOPHAGOGASTRODUODENOSCOPY WITH DILITATION;  Surgeon: Brooke Garrido MD;  Location: [facility] OR;  Service:    • ENDOSCOPY N/A 8/3/2020    Procedure: ESOPHAGOGASTRODUODENOSCOPY with biopsies;  Surgeon: Rui Shi MD;  Location: [facility] OR;  Service: Gastroenterology;  Laterality: N/A;  esophagitis  gastritis   • HERNIA REPAIR     • HYSTERECTOMY         Social History     Socioeconomic History   • Marital status: Single     Spouse name: Not on file   • Number of children: Not on file   • Years of education: Not on file   • Highest education level: Not on file   Tobacco Use   • Smoking status: Never Smoker   • Smokeless tobacco: Never Used   • Tobacco comment: daily caffiene   Substance and Sexual Activity   • Alcohol use: No   • Drug use: No   • Sexual activity: Defer       Family History   Problem Relation Age of Onset   • Colon cancer Mother    • Colon cancer Other    • Lung cancer Father    • Breast cancer Maternal Aunt        The following portion of the patient's history were reviewed and updated as appropriate: past medical history, past surgical history, past social history, past family history, allergies, current medications, and problem list.    Review of Systems   Constitution: Negative for diaphoresis, fever and malaise/fatigue.   HENT: Negative for congestion, hearing loss, hoarse voice, nosebleeds and sore throat.    Eyes: Negative for photophobia, vision loss in left eye, vision loss in right eye and visual disturbance.   Cardiovascular: Negative for chest pain, dyspnea on exertion, irregular heartbeat, leg swelling, near-syncope, orthopnea, palpitations, paroxysmal nocturnal dyspnea and  "syncope.   Respiratory: Negative for cough, hemoptysis, shortness of breath, sleep disturbances due to breathing, snoring, sputum production and wheezing.    Endocrine: Negative for cold intolerance, heat intolerance, polydipsia, polyphagia and polyuria.   Hematologic/Lymphatic: Negative for bleeding problem. Does not bruise/bleed easily.   Skin: Negative for color change, dry skin, poor wound healing, rash and suspicious lesions.   Musculoskeletal: Negative for arthritis, back pain, falls, gout, joint pain, joint swelling, muscle cramps, muscle weakness and myalgias.   Gastrointestinal: Negative for bloating, abdominal pain, constipation, diarrhea, dysphagia, melena, nausea and vomiting.   Neurological: Negative for excessive daytime sleepiness, dizziness, headaches, light-headedness, loss of balance, numbness, paresthesias, seizures, vertigo and weakness.   Psychiatric/Behavioral: Negative for depression, memory loss and substance abuse. The patient is not nervous/anxious.        No Known Allergies      Current Outpatient Medications:   •  Brinzolamide-Brimonidine (SIMBRINZA) 1-0.2 % suspension, Apply  to eye., Disp: , Rfl:   •  LORazepam (ATIVAN) 1 MG tablet, Take 1 mg by mouth Daily., Disp: , Rfl:   •  metoprolol succinate XL (TOPROL-XL) 50 MG 24 hr tablet, Take 50 mg by mouth Daily., Disp: , Rfl:   •  omeprazole (priLOSEC) 20 MG capsule, Take 1 capsule by mouth Daily., Disp: 30 capsule, Rfl: 11  •  rivaroxaban (XARELTO) 20 MG tablet, Take 20 mg by mouth Daily., Disp: , Rfl:   •  timolol (BETIMOL) 0.25 % ophthalmic solution, 1-2 drops 2 (Two) Times a Day., Disp: , Rfl:   •  torsemide (DEMADEX) 10 MG tablet, Take 1 tablet by mouth Daily., Disp: 90 tablet, Rfl: 3        Objective:     Vitals:    08/05/20 1309   BP: 106/78   Pulse: 86   Resp: 16   SpO2: 99%   Weight: 47.2 kg (104 lb)   Height: 162.6 cm (64\")     Body mass index is 17.85 kg/m².      Physical Exam   Constitutional: She is oriented to person, place, " and time. She appears well-developed and well-nourished. No distress.   HENT:   Head: Normocephalic and atraumatic.   Right Ear: External ear normal.   Left Ear: External ear normal.   Nose: Nose normal.   Eyes: Pupils are equal, round, and reactive to light. Conjunctivae are normal. Right eye exhibits no discharge. Left eye exhibits no discharge.   Neck: Normal range of motion. Neck supple. No JVD present. No tracheal deviation present. No thyromegaly present.   Cardiovascular: Normal rate, regular rhythm, normal heart sounds and intact distal pulses. Exam reveals no gallop and no friction rub.   No murmur heard.  Pulmonary/Chest: Effort normal and breath sounds normal. No respiratory distress. She has no wheezes. She has no rales. She exhibits no tenderness.   Abdominal: Soft. Bowel sounds are normal. She exhibits no distension. There is no tenderness.   Musculoskeletal: Normal range of motion. She exhibits no edema, tenderness or deformity.   Lymphadenopathy:     She has no cervical adenopathy.   Neurological: She is alert and oriented to person, place, and time. Coordination normal.   Skin: Skin is warm and dry. No rash noted. No erythema.   Psychiatric: She has a normal mood and affect. Her behavior is normal. Judgment and thought content normal.             ECG 12 Lead  Date/Time: 8/5/2020 1:14 PM  Performed by: Alyssa Choi APRN  Authorized by: Alyssa Choi APRN   Comparison: compared with previous ECG from 7/9/2020  Similar to previous ECG  Rhythm: atrial fibrillation  Rate: normal  Conduction: conduction normal  ST Segments: ST segments normal  T inversion: V2  QRS axis: normal    Clinical impression: abnormal EKG              Assessment:       Diagnosis Plan   1. Paroxysmal atrial fibrillation (CMS/HCC)     2. SVT (supraventricular tachycardia) (CMS/HCC)     3. Nonrheumatic mitral valve regurgitation            Plan:       1.  SVT - no episodes on Holter from 6/24/2020    2.  PAF - she remains in  atrial fib. Her HR is 85 which is a good response to the higher dose of BB.   She is on Xarelto.       3.  Non rheumatic mitral valve regurgitation - mild to moderate on echo 6/2020.      4.  HTN - stable, no further dizziness since stopping the losartan.     5.  Chronic diastolic CHF - now on torsemide, mild LE edema.     RTO in 3 months with JA    As always, it has been a pleasure to participate in your patient's care. Thank you.       Sincerely,       EDEL Acosta      Current Outpatient Medications:   •  Brinzolamide-Brimonidine (SIMBRINZA) 1-0.2 % suspension, Apply  to eye., Disp: , Rfl:   •  LORazepam (ATIVAN) 1 MG tablet, Take 1 mg by mouth Daily., Disp: , Rfl:   •  metoprolol succinate XL (TOPROL-XL) 50 MG 24 hr tablet, Take 50 mg by mouth Daily., Disp: , Rfl:   •  omeprazole (priLOSEC) 20 MG capsule, Take 1 capsule by mouth Daily., Disp: 30 capsule, Rfl: 11  •  rivaroxaban (XARELTO) 20 MG tablet, Take 20 mg by mouth Daily., Disp: , Rfl:   •  timolol (BETIMOL) 0.25 % ophthalmic solution, 1-2 drops 2 (Two) Times a Day., Disp: , Rfl:   •  torsemide (DEMADEX) 10 MG tablet, Take 1 tablet by mouth Daily., Disp: 90 tablet, Rfl: 3    Dictated utilizing Dragon dictation

## 2020-08-05 ENCOUNTER — OFFICE VISIT (OUTPATIENT)
Dept: CARDIOLOGY | Facility: CLINIC | Age: 70
End: 2020-08-05

## 2020-08-05 VITALS
HEART RATE: 86 BPM | DIASTOLIC BLOOD PRESSURE: 78 MMHG | BODY MASS INDEX: 17.75 KG/M2 | RESPIRATION RATE: 16 BRPM | SYSTOLIC BLOOD PRESSURE: 106 MMHG | HEIGHT: 64 IN | OXYGEN SATURATION: 99 % | WEIGHT: 104 LBS

## 2020-08-05 DIAGNOSIS — I48.0 PAROXYSMAL ATRIAL FIBRILLATION (HCC): Primary | Chronic | ICD-10-CM

## 2020-08-05 DIAGNOSIS — I34.0 NONRHEUMATIC MITRAL VALVE REGURGITATION: Chronic | ICD-10-CM

## 2020-08-05 DIAGNOSIS — I47.1 SVT (SUPRAVENTRICULAR TACHYCARDIA) (HCC): Chronic | ICD-10-CM

## 2020-08-05 PROCEDURE — 99214 OFFICE O/P EST MOD 30 MIN: CPT | Performed by: NURSE PRACTITIONER

## 2020-08-05 PROCEDURE — 93000 ELECTROCARDIOGRAM COMPLETE: CPT | Performed by: NURSE PRACTITIONER

## 2020-10-07 ENCOUNTER — LAB (OUTPATIENT)
Dept: LAB | Facility: HOSPITAL | Age: 70
End: 2020-10-07

## 2020-10-07 ENCOUNTER — OFFICE VISIT (OUTPATIENT)
Dept: CARDIOLOGY | Facility: CLINIC | Age: 70
End: 2020-10-07

## 2020-10-07 VITALS
BODY MASS INDEX: 17.36 KG/M2 | SYSTOLIC BLOOD PRESSURE: 122 MMHG | WEIGHT: 108 LBS | DIASTOLIC BLOOD PRESSURE: 90 MMHG | HEIGHT: 66 IN | OXYGEN SATURATION: 98 % | HEART RATE: 87 BPM

## 2020-10-07 DIAGNOSIS — I50.31 ACUTE DIASTOLIC CHF (CONGESTIVE HEART FAILURE) (HCC): Primary | ICD-10-CM

## 2020-10-07 DIAGNOSIS — I48.0 PAROXYSMAL ATRIAL FIBRILLATION (HCC): Chronic | ICD-10-CM

## 2020-10-07 DIAGNOSIS — I34.0 NONRHEUMATIC MITRAL VALVE REGURGITATION: Chronic | ICD-10-CM

## 2020-10-07 DIAGNOSIS — I47.1 SVT (SUPRAVENTRICULAR TACHYCARDIA) (HCC): Chronic | ICD-10-CM

## 2020-10-07 DIAGNOSIS — I50.31 ACUTE DIASTOLIC CHF (CONGESTIVE HEART FAILURE) (HCC): ICD-10-CM

## 2020-10-07 LAB
ALBUMIN SERPL-MCNC: 3.6 G/DL (ref 3.5–5.2)
ALBUMIN/GLOB SERPL: 1.3 G/DL
ALP SERPL-CCNC: 113 U/L (ref 39–117)
ALT SERPL W P-5'-P-CCNC: 34 U/L (ref 1–33)
ANION GAP SERPL CALCULATED.3IONS-SCNC: 13.4 MMOL/L (ref 5–15)
AST SERPL-CCNC: 41 U/L (ref 1–32)
BILIRUB SERPL-MCNC: 0.6 MG/DL (ref 0–1.2)
BUN SERPL-MCNC: 15 MG/DL (ref 8–23)
BUN/CREAT SERPL: 16.9 (ref 7–25)
CALCIUM SPEC-SCNC: 9.4 MG/DL (ref 8.6–10.5)
CHLORIDE SERPL-SCNC: 104 MMOL/L (ref 98–107)
CO2 SERPL-SCNC: 24.6 MMOL/L (ref 22–29)
CREAT SERPL-MCNC: 0.89 MG/DL (ref 0.57–1)
DEPRECATED RDW RBC AUTO: 50.3 FL (ref 37–54)
ERYTHROCYTE [DISTWIDTH] IN BLOOD BY AUTOMATED COUNT: 18.4 % (ref 12.3–15.4)
GFR SERPL CREATININE-BSD FRML MDRD: 76 ML/MIN/1.73
GLOBULIN UR ELPH-MCNC: 2.7 GM/DL
GLUCOSE SERPL-MCNC: 73 MG/DL (ref 65–99)
HCT VFR BLD AUTO: 35.7 % (ref 34–46.6)
HGB BLD-MCNC: 10.5 G/DL (ref 12–15.9)
MCH RBC QN AUTO: 22.8 PG (ref 26.6–33)
MCHC RBC AUTO-ENTMCNC: 29.4 G/DL (ref 31.5–35.7)
MCV RBC AUTO: 77.4 FL (ref 79–97)
NT-PROBNP SERPL-MCNC: 2062 PG/ML (ref 0–900)
PLATELET # BLD AUTO: 177 10*3/MM3 (ref 140–450)
POTASSIUM SERPL-SCNC: 4 MMOL/L (ref 3.5–5.2)
PROT SERPL-MCNC: 6.3 G/DL (ref 6–8.5)
RBC # BLD AUTO: 4.61 10*6/MM3 (ref 3.77–5.28)
SODIUM SERPL-SCNC: 142 MMOL/L (ref 136–145)
WBC # BLD AUTO: 3.15 10*3/MM3 (ref 3.4–10.8)

## 2020-10-07 PROCEDURE — 80053 COMPREHEN METABOLIC PANEL: CPT

## 2020-10-07 PROCEDURE — 83880 ASSAY OF NATRIURETIC PEPTIDE: CPT

## 2020-10-07 PROCEDURE — 85027 COMPLETE CBC AUTOMATED: CPT

## 2020-10-07 PROCEDURE — 99214 OFFICE O/P EST MOD 30 MIN: CPT | Performed by: INTERNAL MEDICINE

## 2020-10-07 PROCEDURE — 93000 ELECTROCARDIOGRAM COMPLETE: CPT | Performed by: INTERNAL MEDICINE

## 2020-10-07 PROCEDURE — 36415 COLL VENOUS BLD VENIPUNCTURE: CPT

## 2020-10-07 NOTE — PROGRESS NOTES
Subjective:     Encounter Date: 10/07/20        Patient ID: Argelia Nguyen is a 70 y.o. female.    Chief Complaint: SVT,AFIB  Shortness of Breath  Pertinent negatives include no ear pain, fever, hemoptysis, rash, sore throat or sputum production.   Fatigue  Associated symptoms include fatigue. Pertinent negatives include no anorexia, chills, fever, myalgias, numbness, rash or sore throat.   Dizziness  Associated symptoms include fatigue. Pertinent negatives include no anorexia, chills, fever, myalgias, numbness, rash or sore throat.       Dear Dr. Oviedo,    I had the pleasure of seeing this patient in the office today for follow-up.  She has paroxysmal atrial fibrillation, CDCHF, MR, hypertension, and SVT.    She comes in today because she has been feeling worse.  She is getting more shortness of breath with activity.  In June she had an echocardiogram that Dr. Dos Santos felt that the mitral valve was only leaking mild to moderately, although when I review it I think possibly could be more significant than that, I just can't be sure.  We had her wear a Holter monitor that showed reasonable rate control.  We tried to increase her metoprolol, to get additional rate control and see if that would help, but she states she just felt worse and felt dizzy.  She gets real short of breath with any activity now.  She is having some shortness of breath at rest.  She has been having some increasing lower extremity edema.  She states that when she walked from the waiting room to her exam room that she got short of breath.  She is having a little bit of chest discomfort at times when she gets real short of breath.  She cut her metoprolol in half because she is been having dizziness and that did not seem to help; she did not notice any change in the degree of shortness of breath.    Echocardiogram June 2020:  Interpretation Summary     · Calculated right ventricular systolic pressure from tricuspid regurgitation is 41.0  mmHg.  · Left ventricular systolic function is normal.  · Calculated EF = 60.0%.  · Right ventricular cavity is severely dilated.  · Left atrial cavity size is severely dilated.  · Right atrial cavity size is severely dilated.  · Mild-to-moderate mitral valve regurgitation is present  · Severe tricuspid valve regurgitation is present.            She was seen on February 13, 2019 in our office and medical therapy was adjusted after she was found to have paroxysmal atrial fibrillation.  Metoprolol was increased and losartan was also added because of hypertension.     Echocardiogram 1/30/19:   · Calculated EF = 63.0%.  · Left ventricular systolic function is normal.  · The left ventricular cavity is small.  · Left ventricular wall thickness is consistent with moderate concentric   hypertrophy.  · Right atrial cavity size is severely dilated.  · Left atrial cavity size is moderate-to-severely dilated.  · Right ventricular cavity is severely dilated.  · Right ventricular wall thickness is consistent with moderate   hypertrophy.  · Mildly reduced right ventricular systolic function noted.  · Severe mitral valve regurgitation is present  · Severe tricuspid valve regurgitation is present.  · Estimated right ventricular systolic pressure from tricuspid   regurgitation is mildly elevated (35-45 mmHg).  · Calculated right ventricular systolic pressure from tricuspid   regurgitation is 43 mmHg.     Holter monitor 1/30/19     An abnormal monitor study.  · Evidence of atrial fibrillation was noted. Had atrial fibrillation 59.7%   of the time.      The following portions of the patient's history were reviewed and updated as appropriate: allergies, current medications, past family history, past medical history, past social history, past surgical history and problem list.    Past Medical History:   Diagnosis Date   • Anxiety    • Chronic anticoagulation 1/23/2019   • Colon polyp    • Diabetes mellitus (CMS/HCC)    • Dysphagia    •  Fatigue    • GERD (gastroesophageal reflux disease)    • Hypertension    • Nonrheumatic mitral valve regurgitation 3/27/2019   • Paroxysmal atrial fibrillation (CMS/HCC) 1/23/2019   • Persistent atrial fibrillation (CMS/HCC) 1/23/2019   • Reflux gastritis        Past Surgical History:   Procedure Laterality Date   • COLONOSCOPY N/A 3/29/2017    Procedure: COLONOSCOPY; POLYPECTOMY;  Surgeon: Brooke Garrido MD;  Location: [facility] OR;  Service:    • COLONOSCOPY N/A 8/3/2020    Procedure: COLONOSCOPY with polypectomy;  Surgeon: Rui Shi MD;  Location: [facility] OR;  Service: Gastroenterology;  Laterality: N/A;  ascending polyp  transverse polyp  rectal polyp   • CYST REMOVAL     • ENDOSCOPY N/A 3/29/2017    Procedure: ESOPHAGOGASTRODUODENOSCOPY WITH DILITATION;  Surgeon: Brooke Garrido MD;  Location: [facility] OR;  Service:    • ENDOSCOPY N/A 8/3/2020    Procedure: ESOPHAGOGASTRODUODENOSCOPY with biopsies;  Surgeon: Rui Shi MD;  Location: [facility] OR;  Service: Gastroenterology;  Laterality: N/A;  esophagitis  gastritis   • HERNIA REPAIR     • HYSTERECTOMY         Social History     Socioeconomic History   • Marital status: Single     Spouse name: Not on file   • Number of children: Not on file   • Years of education: Not on file   • Highest education level: Not on file   Tobacco Use   • Smoking status: Never Smoker   • Smokeless tobacco: Never Used   • Tobacco comment: daily caffiene   Substance and Sexual Activity   • Alcohol use: No   • Drug use: No   • Sexual activity: Defer       Review of Systems   Constitution: Positive for fatigue. Negative for chills, decreased appetite, fever and night sweats.   HENT: Negative for ear discharge, ear pain, hearing loss, nosebleeds and sore throat.    Eyes: Negative for double vision and pain.   Cardiovascular: Negative for cyanosis.   Respiratory: Positive for shortness of breath. Negative for hemoptysis and sputum production.    Endocrine:  "Negative for cold intolerance and heat intolerance.   Hematologic/Lymphatic: Negative for adenopathy.   Skin: Negative for dry skin, itching, nail changes, rash and suspicious lesions.   Musculoskeletal: Negative for arthritis, gout, muscle cramps, muscle weakness and myalgias.   Gastrointestinal: Negative for anorexia, bowel incontinence, constipation, diarrhea, dysphagia, hematemesis and jaundice.   Genitourinary: Negative for bladder incontinence, dysuria, flank pain, frequency, hematuria and nocturia.   Neurological: Positive for dizziness. Negative for focal weakness, numbness, paresthesias and seizures.   Psychiatric/Behavioral: Negative for altered mental status, hallucinations, hypervigilance, suicidal ideas and thoughts of violence.   Allergic/Immunologic: Negative for persistent infections.         ECG 12 Lead    Date/Time: 10/7/2020 12:47 PM  Performed by: Aidan Adams III, MD  Authorized by: Aidan Adams III, MD   Comparison: compared with previous ECG   Similar to previous ECG  Rhythm: atrial fibrillation  Rate: normal  Conduction: conduction normal  ST Segments: ST segments normal  T Waves: T waves normal  QRS axis: normal  Other: no other findings    Clinical impression: abnormal EKG               Objective:     Vitals:    10/07/20 1128   BP: 122/90   Pulse: 87   SpO2: 98%   Weight: 49 kg (108 lb)   Height: 167.6 cm (66\")         Physical Exam   Constitutional: She is oriented to person, place, and time. She appears well-developed and well-nourished. No distress.   HENT:   Head: Normocephalic and atraumatic.   Nose: Nose normal.   Mouth/Throat: Oropharynx is clear and moist.   Eyes: Pupils are equal, round, and reactive to light. Conjunctivae and EOM are normal. Right eye exhibits no discharge. Left eye exhibits no discharge.   Neck: Normal range of motion. Neck supple. No tracheal deviation present. No thyromegaly present.   Cardiovascular: Normal rate, regular rhythm, S1 normal, S2 normal and " normal pulses. Exam reveals no S3.   Murmur heard.  High-pitched blowing holosystolic murmur is present at the apex.  Pulmonary/Chest: Effort normal and breath sounds normal. No stridor. No respiratory distress. She exhibits no tenderness.   Abdominal: Soft. Bowel sounds are normal. She exhibits no distension and no mass. There is no abdominal tenderness. There is no rebound and no guarding.   Musculoskeletal: Normal range of motion.         General: No tenderness or deformity.   Lymphadenopathy:     She has no cervical adenopathy.   Neurological: She is alert and oriented to person, place, and time. She has normal reflexes.   Skin: Skin is warm and dry. No rash noted. She is not diaphoretic. No erythema.   Psychiatric: She has a normal mood and affect. Thought content normal.       Lab Review:             Performed        Assessment:          Diagnosis Plan   1. Acute diastolic CHF (congestive heart failure) (CMS/HCC)  Comprehensive Metabolic Panel    CBC (No Diff)    BNP    XR Chest 2 View    ECG 12 Lead   2. Paroxysmal atrial fibrillation (CMS/HCC)  Comprehensive Metabolic Panel    CBC (No Diff)    BNP    XR Chest 2 View    ECG 12 Lead   3. Nonrheumatic mitral valve regurgitation  Comprehensive Metabolic Panel    CBC (No Diff)    BNP    XR Chest 2 View    ECG 12 Lead   4. SVT (supraventricular tachycardia) (CMS/HCC)  Comprehensive Metabolic Panel    CBC (No Diff)    BNP    XR Chest 2 View    ECG 12 Lead          Plan:       1.  Atrial Fibrillation and Atrial Flutter  Assessment  • The patient has paroxysmal atrial fibrillation  • The patient's CHADS2-VASc score is 2  • A [id number] score of 2 or more is considered a high risk for a thromboembolic event  • Rivaroxaban prescribed    Plan  • Attempt to maintain sinus rhythm  • Continue rivaroxaban for antithrombotic therapy, bleeding issues discussed  • Continue beta blocker for rhythm control    2.  SVT - no episodes recorded during Holter monitoring  3.  Chronic  anticoagulation - on Xarelto  4.  HTN - better control on meds, follow to effect  5.  Severe mitral and trace regurgitation echocardiogram in June was felt to show only mild to moderate MR, but when I reviewed I cannot be certain that it is not worse.  She is having worsening symptoms.  I am going to send her down for lab work and chest x-ray.  We will review the results of the laboratory data and chest x-ray and consider transesophageal echocardiogram  6.  Chronic diastolic congestive heart failure-I am to start torsemide 10 mg to take once daily and then we will await the results of the Holter       Thank you very much for allowing us to participate in the care of this pleasant patient.  Please don't hesitate to call if I can be of assistance in any way.      Current Outpatient Medications:   •  Brinzolamide-Brimonidine (SIMBRINZA) 1-0.2 % suspension, Apply  to eye., Disp: , Rfl:   •  LORazepam (ATIVAN) 1 MG tablet, Take 1 mg by mouth Daily., Disp: , Rfl:   •  metoprolol succinate XL (TOPROL-XL) 50 MG 24 hr tablet, Take 50 mg by mouth Daily., Disp: , Rfl:   •  omeprazole (priLOSEC) 20 MG capsule, Take 1 capsule by mouth Daily., Disp: 30 capsule, Rfl: 11  •  rivaroxaban (XARELTO) 20 MG tablet, Take 20 mg by mouth Daily., Disp: , Rfl:   •  timolol (BETIMOL) 0.25 % ophthalmic solution, 1-2 drops 2 (Two) Times a Day., Disp: , Rfl:   •  torsemide (DEMADEX) 10 MG tablet, Take 1 tablet by mouth Daily., Disp: 90 tablet, Rfl: 3         EMR Dragon/Transcription disclaimer:    Much of this encounter note is an electronic transcription/translation of spoken language to printed text. The electronic translation of spoken language may permit erroneous, or at times, nonsensical words or phrases to be inadvertently transcribed; Although I have reviewed the note for such errors, some may still exist.

## 2020-10-09 DIAGNOSIS — I34.0 NONRHEUMATIC MITRAL VALVE REGURGITATION: Primary | ICD-10-CM

## 2020-10-14 ENCOUNTER — HOSPITAL ENCOUNTER (OUTPATIENT)
Dept: GENERAL RADIOLOGY | Facility: HOSPITAL | Age: 70
Discharge: HOME | End: 2020-10-14
Admitting: INTERNAL MEDICINE

## 2020-10-14 DIAGNOSIS — I50.31 ACUTE DIASTOLIC CHF (CONGESTIVE HEART FAILURE) (HCC): ICD-10-CM

## 2020-10-14 DIAGNOSIS — I34.0 NONRHEUMATIC MITRAL VALVE REGURGITATION: Chronic | ICD-10-CM

## 2020-10-14 DIAGNOSIS — I47.1 SVT (SUPRAVENTRICULAR TACHYCARDIA) (HCC): Chronic | ICD-10-CM

## 2020-10-14 DIAGNOSIS — I48.0 PAROXYSMAL ATRIAL FIBRILLATION (HCC): Chronic | ICD-10-CM

## 2020-10-14 PROCEDURE — 71046 X-RAY EXAM CHEST 2 VIEWS: CPT

## 2020-10-15 ENCOUNTER — TRANSCRIBE ORDERS (OUTPATIENT)
Dept: CARDIOLOGY | Facility: CLINIC | Age: 70
End: 2020-10-15

## 2020-10-15 DIAGNOSIS — I34.0 NONRHEUMATIC MITRAL VALVE REGURGITATION: ICD-10-CM

## 2020-10-15 DIAGNOSIS — Z13.6 SCREENING FOR CARDIOVASCULAR CONDITION: ICD-10-CM

## 2020-10-15 DIAGNOSIS — Z01.810 PREOP CARDIOVASCULAR EXAM: Primary | ICD-10-CM

## 2020-10-20 ENCOUNTER — TRANSCRIBE ORDERS (OUTPATIENT)
Dept: OBSTETRICS AND GYNECOLOGY | Facility: CLINIC | Age: 70
End: 2020-10-20

## 2020-10-20 ENCOUNTER — TRANSCRIBE ORDERS (OUTPATIENT)
Dept: SLEEP MEDICINE | Facility: HOSPITAL | Age: 70
End: 2020-10-20

## 2020-10-20 DIAGNOSIS — Z01.818 OTHER SPECIFIED PRE-OPERATIVE EXAMINATION: Primary | ICD-10-CM

## 2020-10-22 ENCOUNTER — LAB (OUTPATIENT)
Dept: LAB | Facility: HOSPITAL | Age: 70
End: 2020-10-22

## 2020-10-22 DIAGNOSIS — Z13.6 SCREENING FOR CARDIOVASCULAR CONDITION: ICD-10-CM

## 2020-10-22 DIAGNOSIS — Z01.810 PREOP CARDIOVASCULAR EXAM: ICD-10-CM

## 2020-10-22 DIAGNOSIS — I34.0 NONRHEUMATIC MITRAL VALVE REGURGITATION: ICD-10-CM

## 2020-10-22 LAB
ANION GAP SERPL CALCULATED.3IONS-SCNC: 13.8 MMOL/L (ref 5–15)
BASOPHILS # BLD AUTO: 0.02 10*3/MM3 (ref 0–0.2)
BASOPHILS NFR BLD AUTO: 0.7 % (ref 0–1.5)
BUN SERPL-MCNC: 23 MG/DL (ref 8–23)
BUN/CREAT SERPL: 20.7 (ref 7–25)
CALCIUM SPEC-SCNC: 9.1 MG/DL (ref 8.6–10.5)
CHLORIDE SERPL-SCNC: 103 MMOL/L (ref 98–107)
CO2 SERPL-SCNC: 25.2 MMOL/L (ref 22–29)
CREAT SERPL-MCNC: 1.11 MG/DL (ref 0.57–1)
DEPRECATED RDW RBC AUTO: 51.9 FL (ref 37–54)
EOSINOPHIL # BLD AUTO: 0.07 10*3/MM3 (ref 0–0.4)
EOSINOPHIL NFR BLD AUTO: 2.4 % (ref 0.3–6.2)
ERYTHROCYTE [DISTWIDTH] IN BLOOD BY AUTOMATED COUNT: 19.4 % (ref 12.3–15.4)
GFR SERPL CREATININE-BSD FRML MDRD: 59 ML/MIN/1.73
GLUCOSE SERPL-MCNC: 42 MG/DL (ref 65–99)
HCT VFR BLD AUTO: 32.4 % (ref 34–46.6)
HGB BLD-MCNC: 9.8 G/DL (ref 12–15.9)
IMM GRANULOCYTES # BLD AUTO: 0 10*3/MM3 (ref 0–0.05)
IMM GRANULOCYTES NFR BLD AUTO: 0 % (ref 0–0.5)
LYMPHOCYTES # BLD AUTO: 0.82 10*3/MM3 (ref 0.7–3.1)
LYMPHOCYTES NFR BLD AUTO: 28.4 % (ref 19.6–45.3)
MCH RBC QN AUTO: 22.9 PG (ref 26.6–33)
MCHC RBC AUTO-ENTMCNC: 30.2 G/DL (ref 31.5–35.7)
MCV RBC AUTO: 75.7 FL (ref 79–97)
MONOCYTES # BLD AUTO: 0.34 10*3/MM3 (ref 0.1–0.9)
MONOCYTES NFR BLD AUTO: 11.8 % (ref 5–12)
NEUTROPHILS NFR BLD AUTO: 1.64 10*3/MM3 (ref 1.7–7)
NEUTROPHILS NFR BLD AUTO: 56.7 % (ref 42.7–76)
NRBC BLD AUTO-RTO: 0 /100 WBC (ref 0–0.2)
PLATELET # BLD AUTO: 210 10*3/MM3 (ref 140–450)
POTASSIUM SERPL-SCNC: 3.7 MMOL/L (ref 3.5–5.2)
RBC # BLD AUTO: 4.28 10*6/MM3 (ref 3.77–5.28)
SODIUM SERPL-SCNC: 142 MMOL/L (ref 136–145)
WBC # BLD AUTO: 2.89 10*3/MM3 (ref 3.4–10.8)

## 2020-10-22 PROCEDURE — 85025 COMPLETE CBC W/AUTO DIFF WBC: CPT

## 2020-10-22 PROCEDURE — 80048 BASIC METABOLIC PNL TOTAL CA: CPT

## 2020-10-22 PROCEDURE — 36415 COLL VENOUS BLD VENIPUNCTURE: CPT

## 2020-10-27 ENCOUNTER — LAB (OUTPATIENT)
Dept: LAB | Facility: HOSPITAL | Age: 70
End: 2020-10-27

## 2020-10-27 DIAGNOSIS — Z01.818 OTHER SPECIFIED PRE-OPERATIVE EXAMINATION: ICD-10-CM

## 2020-10-27 PROCEDURE — U0004 COV-19 TEST NON-CDC HGH THRU: HCPCS | Performed by: OBSTETRICS & GYNECOLOGY

## 2020-10-27 PROCEDURE — C9803 HOPD COVID-19 SPEC COLLECT: HCPCS

## 2020-10-28 LAB — SARS-COV-2 RNA RESP QL NAA+PROBE: NOT DETECTED

## 2020-10-29 ENCOUNTER — HOSPITAL ENCOUNTER (OUTPATIENT)
Dept: CARDIOLOGY | Facility: HOSPITAL | Age: 70
Discharge: HOME | End: 2020-10-29
Admitting: INTERNAL MEDICINE

## 2020-10-29 VITALS
HEART RATE: 77 BPM | SYSTOLIC BLOOD PRESSURE: 93 MMHG | HEIGHT: 64 IN | RESPIRATION RATE: 16 BRPM | OXYGEN SATURATION: 98 % | DIASTOLIC BLOOD PRESSURE: 53 MMHG | WEIGHT: 108 LBS | BODY MASS INDEX: 18.44 KG/M2

## 2020-10-29 DIAGNOSIS — I34.0 NONRHEUMATIC MITRAL VALVE REGURGITATION: ICD-10-CM

## 2020-10-29 PROCEDURE — A9270 NON-COVERED ITEM OR SERVICE: HCPCS | Performed by: INTERNAL MEDICINE

## 2020-10-29 PROCEDURE — 93320 DOPPLER ECHO COMPLETE: CPT | Performed by: INTERNAL MEDICINE

## 2020-10-29 PROCEDURE — 25010000002 MIDAZOLAM PER 1 MG: Performed by: INTERNAL MEDICINE

## 2020-10-29 PROCEDURE — 25010000002 FENTANYL CITRATE (PF) 100 MCG/2ML SOLUTION: Performed by: INTERNAL MEDICINE

## 2020-10-29 PROCEDURE — 99152 MOD SED SAME PHYS/QHP 5/>YRS: CPT

## 2020-10-29 PROCEDURE — 93312 ECHO TRANSESOPHAGEAL: CPT

## 2020-10-29 PROCEDURE — 93312 ECHO TRANSESOPHAGEAL: CPT | Performed by: INTERNAL MEDICINE

## 2020-10-29 PROCEDURE — 93325 DOPPLER ECHO COLOR FLOW MAPG: CPT | Performed by: INTERNAL MEDICINE

## 2020-10-29 PROCEDURE — 93320 DOPPLER ECHO COMPLETE: CPT

## 2020-10-29 PROCEDURE — 93325 DOPPLER ECHO COLOR FLOW MAPG: CPT

## 2020-10-29 PROCEDURE — 63710000001 LIDOCAINE VISCOUS HCL 2 % SOLUTION: Performed by: INTERNAL MEDICINE

## 2020-10-29 RX ORDER — MIDAZOLAM HYDROCHLORIDE 1 MG/ML
INJECTION INTRAMUSCULAR; INTRAVENOUS
Status: COMPLETED | OUTPATIENT
Start: 2020-10-29 | End: 2020-10-29

## 2020-10-29 RX ORDER — LIDOCAINE HYDROCHLORIDE 20 MG/ML
SOLUTION OROPHARYNGEAL
Status: COMPLETED | OUTPATIENT
Start: 2020-10-29 | End: 2020-10-29

## 2020-10-29 RX ORDER — SODIUM CHLORIDE 9 MG/ML
INJECTION, SOLUTION INTRAVENOUS
Status: COMPLETED | OUTPATIENT
Start: 2020-10-29 | End: 2020-10-29

## 2020-10-29 RX ORDER — FENTANYL CITRATE 50 UG/ML
INJECTION, SOLUTION INTRAMUSCULAR; INTRAVENOUS
Status: COMPLETED | OUTPATIENT
Start: 2020-10-29 | End: 2020-10-29

## 2020-10-29 RX ADMIN — FENTANYL CITRATE 25 MCG: 50 INJECTION, SOLUTION INTRAMUSCULAR; INTRAVENOUS at 09:58

## 2020-10-29 RX ADMIN — MIDAZOLAM HYDROCHLORIDE 2 MG: 1 INJECTION, SOLUTION INTRAMUSCULAR; INTRAVENOUS at 09:58

## 2020-10-29 RX ADMIN — FENTANYL CITRATE 25 MCG: 50 INJECTION, SOLUTION INTRAMUSCULAR; INTRAVENOUS at 09:52

## 2020-10-29 RX ADMIN — LIDOCAINE HYDROCHLORIDE 10 ML: 20 SOLUTION ORAL; TOPICAL at 09:47

## 2020-10-29 RX ADMIN — FENTANYL CITRATE 25 MCG: 50 INJECTION, SOLUTION INTRAMUSCULAR; INTRAVENOUS at 09:53

## 2020-10-29 RX ADMIN — MIDAZOLAM HYDROCHLORIDE 2 MG: 1 INJECTION, SOLUTION INTRAMUSCULAR; INTRAVENOUS at 09:52

## 2020-10-29 RX ADMIN — MIDAZOLAM HYDROCHLORIDE 2 MG: 1 INJECTION, SOLUTION INTRAMUSCULAR; INTRAVENOUS at 09:53

## 2020-10-29 RX ADMIN — SODIUM CHLORIDE 50 ML/HR: 9 INJECTION, SOLUTION INTRAVENOUS at 09:46

## 2020-10-30 DIAGNOSIS — I34.0 NONRHEUMATIC MITRAL VALVE REGURGITATION: Primary | ICD-10-CM

## 2020-10-30 LAB
BH CV ECHO MEAS - BSA(HAYCOCK): 1.5 M^2
BH CV ECHO MEAS - BSA: 1.5 M^2
BH CV ECHO MEAS - BZI_BMI: 18.5 KILOGRAMS/M^2
BH CV ECHO MEAS - BZI_METRIC_HEIGHT: 162.6 CM
BH CV ECHO MEAS - BZI_METRIC_WEIGHT: 49 KG
BH CV VAS BP RIGHT ARM: NORMAL MMHG
LV EF 2D ECHO EST: 60 %

## 2020-10-30 RX ORDER — VALSARTAN 80 MG/1
80 TABLET ORAL DAILY
Qty: 90 TABLET | Refills: 3 | Status: SHIPPED | OUTPATIENT
Start: 2020-10-30 | End: 2020-11-24 | Stop reason: DRUGHIGH

## 2020-11-23 ENCOUNTER — TELEPHONE (OUTPATIENT)
Dept: CARDIOLOGY | Facility: CLINIC | Age: 70
End: 2020-11-23

## 2020-11-23 NOTE — TELEPHONE ENCOUNTER
Pt is calling with an updte since you started her on valsartan 80 MG daily on 10/30/20.        Pt is feeling a little better. She was dizzy and weak. She start cutting her valsartan 80 mg in half. So she has been taking valsartan 80 MG half tablet daily. Since she decreased the valsartan the dizziness and weakness has went away. Pt does not have a BP log to report.     Is that ok? PT #: [phone]

## 2020-11-24 RX ORDER — VALSARTAN 40 MG/1
80 TABLET ORAL DAILY
COMMUNITY
End: 2020-12-17

## 2020-12-12 ENCOUNTER — HOSPITAL ENCOUNTER (EMERGENCY)
Facility: HOSPITAL | Age: 70
Discharge: HOME | End: 2020-12-12
Attending: EMERGENCY MEDICINE | Admitting: EMERGENCY MEDICINE

## 2020-12-12 ENCOUNTER — APPOINTMENT (OUTPATIENT)
Dept: GENERAL RADIOLOGY | Facility: HOSPITAL | Age: 70
End: 2020-12-12

## 2020-12-12 ENCOUNTER — APPOINTMENT (OUTPATIENT)
Dept: CT IMAGING | Facility: HOSPITAL | Age: 70
End: 2020-12-12

## 2020-12-12 VITALS
RESPIRATION RATE: 14 BRPM | WEIGHT: 106.8 LBS | HEIGHT: 65 IN | OXYGEN SATURATION: 96 % | SYSTOLIC BLOOD PRESSURE: 111 MMHG | HEART RATE: 83 BPM | DIASTOLIC BLOOD PRESSURE: 69 MMHG | TEMPERATURE: 95.1 F | BODY MASS INDEX: 17.79 KG/M2

## 2020-12-12 DIAGNOSIS — R53.1 WEAKNESS: Primary | ICD-10-CM

## 2020-12-12 LAB
ALBUMIN SERPL-MCNC: 3.8 G/DL (ref 3.5–5.2)
ALBUMIN/GLOB SERPL: 1.3 G/DL
ALP SERPL-CCNC: 141 U/L (ref 39–117)
ALT SERPL W P-5'-P-CCNC: 44 U/L (ref 1–33)
ANION GAP SERPL CALCULATED.3IONS-SCNC: 16.8 MMOL/L (ref 5–15)
ANISOCYTOSIS BLD QL: NORMAL
AST SERPL-CCNC: 62 U/L (ref 1–32)
BASOPHILS # BLD AUTO: 0.01 10*3/MM3 (ref 0–0.2)
BASOPHILS NFR BLD AUTO: 0.3 % (ref 0–1.5)
BILIRUB SERPL-MCNC: 0.9 MG/DL (ref 0–1.2)
BILIRUB UR QL STRIP: NEGATIVE
BUN SERPL-MCNC: 25 MG/DL (ref 8–23)
BUN/CREAT SERPL: 20.5 (ref 7–25)
CALCIUM SPEC-SCNC: 9.7 MG/DL (ref 8.6–10.5)
CHLORIDE SERPL-SCNC: 100 MMOL/L (ref 98–107)
CLARITY UR: CLEAR
CO2 SERPL-SCNC: 24.2 MMOL/L (ref 22–29)
COLOR UR: YELLOW
CREAT SERPL-MCNC: 1.22 MG/DL (ref 0.57–1)
DEPRECATED RDW RBC AUTO: 63.6 FL (ref 37–54)
EOSINOPHIL # BLD AUTO: 0.01 10*3/MM3 (ref 0–0.4)
EOSINOPHIL NFR BLD AUTO: 0.3 % (ref 0.3–6.2)
ERYTHROCYTE [DISTWIDTH] IN BLOOD BY AUTOMATED COUNT: 24.2 % (ref 12.3–15.4)
GFR SERPL CREATININE-BSD FRML MDRD: 53 ML/MIN/1.73
GLOBULIN UR ELPH-MCNC: 2.9 GM/DL
GLUCOSE SERPL-MCNC: 125 MG/DL (ref 65–99)
GLUCOSE UR STRIP-MCNC: ABNORMAL MG/DL
HCT VFR BLD AUTO: 34.7 % (ref 34–46.6)
HGB BLD-MCNC: 10.6 G/DL (ref 12–15.9)
HGB UR QL STRIP.AUTO: NEGATIVE
IMM GRANULOCYTES # BLD AUTO: 0.01 10*3/MM3 (ref 0–0.05)
IMM GRANULOCYTES NFR BLD AUTO: 0.3 % (ref 0–0.5)
KETONES UR QL STRIP: NEGATIVE
LARGE PLATELETS: NORMAL
LEUKOCYTE ESTERASE UR QL STRIP.AUTO: NEGATIVE
LYMPHOCYTES # BLD AUTO: 0.44 10*3/MM3 (ref 0.7–3.1)
LYMPHOCYTES NFR BLD AUTO: 15 % (ref 19.6–45.3)
MCH RBC QN AUTO: 23.7 PG (ref 26.6–33)
MCHC RBC AUTO-ENTMCNC: 30.5 G/DL (ref 31.5–35.7)
MCV RBC AUTO: 77.5 FL (ref 79–97)
MONOCYTES # BLD AUTO: 0.25 10*3/MM3 (ref 0.1–0.9)
MONOCYTES NFR BLD AUTO: 8.5 % (ref 5–12)
NEUTROPHILS NFR BLD AUTO: 2.22 10*3/MM3 (ref 1.7–7)
NEUTROPHILS NFR BLD AUTO: 75.6 % (ref 42.7–76)
NITRITE UR QL STRIP: NEGATIVE
NRBC BLD AUTO-RTO: 0 /100 WBC (ref 0–0.2)
NT-PROBNP SERPL-MCNC: 1128 PG/ML (ref 0–900)
PH UR STRIP.AUTO: 5.5 [PH] (ref 4.5–8)
PLATELET # BLD AUTO: 119 10*3/MM3 (ref 140–450)
PMV BLD AUTO: ABNORMAL FL
POIKILOCYTOSIS BLD QL SMEAR: NORMAL
POTASSIUM SERPL-SCNC: 3.4 MMOL/L (ref 3.5–5.2)
PROT SERPL-MCNC: 6.7 G/DL (ref 6–8.5)
PROT UR QL STRIP: ABNORMAL
RBC # BLD AUTO: 4.48 10*6/MM3 (ref 3.77–5.28)
SODIUM SERPL-SCNC: 141 MMOL/L (ref 136–145)
SP GR UR STRIP: 1.02 (ref 1–1.03)
TROPONIN T SERPL-MCNC: <0.01 NG/ML (ref 0–0.03)
UROBILINOGEN UR QL STRIP: ABNORMAL
WBC # BLD AUTO: 2.94 10*3/MM3 (ref 3.4–10.8)
WBC MORPH BLD: NORMAL

## 2020-12-12 PROCEDURE — 80053 COMPREHEN METABOLIC PANEL: CPT | Performed by: PHYSICIAN ASSISTANT

## 2020-12-12 PROCEDURE — 81003 URINALYSIS AUTO W/O SCOPE: CPT | Performed by: PHYSICIAN ASSISTANT

## 2020-12-12 PROCEDURE — 70450 CT HEAD/BRAIN W/O DYE: CPT

## 2020-12-12 PROCEDURE — 83880 ASSAY OF NATRIURETIC PEPTIDE: CPT | Performed by: PHYSICIAN ASSISTANT

## 2020-12-12 PROCEDURE — 99283 EMERGENCY DEPT VISIT LOW MDM: CPT

## 2020-12-12 PROCEDURE — 71046 X-RAY EXAM CHEST 2 VIEWS: CPT

## 2020-12-12 PROCEDURE — P9612 CATHETERIZE FOR URINE SPEC: HCPCS

## 2020-12-12 PROCEDURE — 93010 ELECTROCARDIOGRAM REPORT: CPT | Performed by: INTERNAL MEDICINE

## 2020-12-12 PROCEDURE — 84484 ASSAY OF TROPONIN QUANT: CPT | Performed by: PHYSICIAN ASSISTANT

## 2020-12-12 PROCEDURE — 93005 ELECTROCARDIOGRAM TRACING: CPT | Performed by: EMERGENCY MEDICINE

## 2020-12-12 PROCEDURE — 85007 BL SMEAR W/DIFF WBC COUNT: CPT | Performed by: PHYSICIAN ASSISTANT

## 2020-12-12 PROCEDURE — 85025 COMPLETE CBC W/AUTO DIFF WBC: CPT | Performed by: PHYSICIAN ASSISTANT

## 2020-12-12 RX ORDER — EMPAGLIFLOZIN AND LINAGLIPTIN 25; 5 MG/1; MG/1
1 TABLET, FILM COATED ORAL DAILY
COMMUNITY
End: 2021-03-02 | Stop reason: HOSPADM

## 2020-12-12 RX ORDER — SODIUM CHLORIDE 0.9 % (FLUSH) 0.9 %
10 SYRINGE (ML) INJECTION AS NEEDED
Status: DISCONTINUED | OUTPATIENT
Start: 2020-12-12 | End: 2020-12-12 | Stop reason: HOSPADM

## 2020-12-13 LAB — QT INTERVAL: 403 MS

## 2020-12-14 ENCOUNTER — TELEPHONE (OUTPATIENT)
Dept: CARDIOLOGY | Facility: CLINIC | Age: 70
End: 2020-12-14

## 2020-12-14 NOTE — TELEPHONE ENCOUNTER
Pt called stating she was seen in the E.R. this past wknd for a reaction to her Valsartan 80 mg. Stating even if she breaks it in half it causes problem with dizziness, slurred speech, shakiness and sometimes breathing. Was told to stop the valsartan on Saturday and has started to feel better over the last couple of days. Was given a walker in the E.R.  Please advise.    DA

## 2020-12-17 ENCOUNTER — OFFICE VISIT (OUTPATIENT)
Dept: CARDIOLOGY | Facility: CLINIC | Age: 70
End: 2020-12-17

## 2020-12-17 VITALS
SYSTOLIC BLOOD PRESSURE: 116 MMHG | OXYGEN SATURATION: 98 % | HEART RATE: 100 BPM | HEIGHT: 65 IN | WEIGHT: 104 LBS | DIASTOLIC BLOOD PRESSURE: 70 MMHG | RESPIRATION RATE: 16 BRPM | BODY MASS INDEX: 17.33 KG/M2

## 2020-12-17 DIAGNOSIS — I47.1 SVT (SUPRAVENTRICULAR TACHYCARDIA) (HCC): Chronic | ICD-10-CM

## 2020-12-17 DIAGNOSIS — I48.0 PAROXYSMAL ATRIAL FIBRILLATION (HCC): Primary | Chronic | ICD-10-CM

## 2020-12-17 DIAGNOSIS — I34.0 NONRHEUMATIC MITRAL VALVE REGURGITATION: Chronic | ICD-10-CM

## 2020-12-17 DIAGNOSIS — I50.31 ACUTE DIASTOLIC CHF (CONGESTIVE HEART FAILURE) (HCC): ICD-10-CM

## 2020-12-17 PROCEDURE — 93000 ELECTROCARDIOGRAM COMPLETE: CPT | Performed by: NURSE PRACTITIONER

## 2020-12-17 PROCEDURE — 99214 OFFICE O/P EST MOD 30 MIN: CPT | Performed by: NURSE PRACTITIONER

## 2020-12-17 NOTE — PROGRESS NOTES
Date of Office Visit: 2020  Encounter Provider: EDEL Acosta  Place of Service: [facility] CARDIOLOGY  Patient Name: Argelia Nguyen  :1950  Primary Cardiologist:      CC:  ER follow up    Dear Dr. Oviedo    HPI: Argelia Nguyen is a pleasant 70 y.o. female who presents 2020 for cardiac follow up. She has paroxysmal atrial fibrillation, CDCHF, MR, hypertension, and SVT.     She was seen on 2019 in our office and medical therapy was adjusted after she was found to have paroxysmal atrial fibrillation.  Metoprolol was increased and losartan was also added because of hypertension.      Echocardiogram 19:   · Calculated EF = 63.0%.  · Left ventricular systolic function is normal.  · The left ventricular cavity is small.  · Left ventricular wall thickness is consistent with moderate concentric   hypertrophy.  · Right atrial cavity size is severely dilated.  · Left atrial cavity size is moderate-to-severely dilated.  · Right ventricular cavity is severely dilated.  · Right ventricular wall thickness is consistent with moderate   hypertrophy.  · Mildly reduced right ventricular systolic function noted.  · Severe mitral valve regurgitation is present  · Severe tricuspid valve regurgitation is present.  · Estimated right ventricular systolic pressure from tricuspid   regurgitation is mildly elevated (35-45 mmHg).  · Calculated right ventricular systolic pressure from tricuspid   regurgitation is 43 mmHg.     Holter monitor 19:  An abnormal monitor study. · Evidence of atrial fibrillation was noted. Had atrial fibrillation 59.7%  of the time.    She saw  on 10/7/2020 because she has been feeling worse.  She was getting more shortness of breath with activity.  In  she had an echocardiogram that Dr. Dos Santos felt that the mitral valve was only leaking mild to moderately, although when he review it, he thought it  possibly could be  more significant than that, but was unsure.  She wore a Holter monitor that showed reasonable rate control.  We tried to increase her metoprolol, to get additional rate control and see if that would help, but she stated she just felt worse and felt dizzy.  She complained of getting very SOA with any activity as well as some shortness of breath at rest.  She was having some increasing lower extremity edema.  She states that when she walked from the waiting room to her exam room that day,  she got short of breath.  She was having a little bit of chest discomfort at times when she gets real short of breath.  She cut her metoprolol in half because she was having dizziness and that did not seem to help; she did not notice any change in the degree of shortness of breath.     Echocardiogram June 2020:  Interpretation Summary      · Calculated right ventricular systolic pressure from tricuspid regurgitation is 41.0 mmHg.  · Left ventricular systolic function is normal.  · Calculated EF = 60.0%.  · Right ventricular cavity is severely dilated.  · Left atrial cavity size is severely dilated.  · Right atrial cavity size is severely dilated.  · Mild-to-moderate mitral valve regurgitation is present  · Severe tricuspid valve regurgitation is present.          As echo was repeated 10/30/2020:  Interpretation Summary    · Estimated left ventricular EF = 60% Left ventricular systolic function is normal.  · The right ventricular cavity is moderately dilated.  · The left atrial cavity is severely dilated.  · The right atrial cavity is severely dilated.  · Mild mitral valve regurgitation is present.  · Moderate to severe tricuspid valve regurgitation is present     Valsartan 80 mg was ordered but she has been unable to tolerate it.  She gets dizzy and weak even if she cuts it to 40 mg daily. She was seen in the ED on 12/12/2020 with complaints of dizziness and weakness.  She was instructed to stop the Valsartan and follow up in our  office.    She returns today in follow-up.  She states that since stopping the valsartan she feels much better.  It is noted she was also intolerant to metoprolol.  Both of these medications made her feel weak and very dizzy.  She denies any palpitations, chest pain or chest pressure.  She states she still has dizziness but it is better than what it was to taking the valsartan and the metoprolol.  She also continues with shortness of breath but states it is not as bad and is intermittent with her level of activity.  She also states she is not doing much because she still just does not feel well.  She has lower extremity edema.  Her legs are tight and shiny with 2+ edema mildly pitting.  They are not weeping.  She states that she is feeling better but is far from her baseline.  She is taking the torsemide 10 mg and her Xarelto.  She denies any unexplained bleeding, dark or tarry stools.  She remains in atrial fibrillation with a heart rate 100-1 05.  Her blood pressure is well controlled being off of the blood pressure medications.  Today at visit she is 116/70.    Past Medical History:   Diagnosis Date   • Anxiety    • Chronic anticoagulation 1/23/2019   • Colon polyp    • Diabetes mellitus (CMS/HCC)    • Dysphagia    • Fatigue    • GERD (gastroesophageal reflux disease)    • Hypertension    • Nonrheumatic mitral valve regurgitation 3/27/2019   • Paroxysmal atrial fibrillation (CMS/HCC) 1/23/2019   • Persistent atrial fibrillation (CMS/HCC) 1/23/2019   • Reflux gastritis        Past Surgical History:   Procedure Laterality Date   • COLONOSCOPY N/A 3/29/2017    Procedure: COLONOSCOPY; POLYPECTOMY;  Surgeon: Brooke Garrido MD;  Location: [facility] OR;  Service:    • COLONOSCOPY N/A 8/3/2020    Procedure: COLONOSCOPY with polypectomy;  Surgeon: Rui Shi MD;  Location: [facility] OR;  Service: Gastroenterology;  Laterality: N/A;  ascending polyp  transverse polyp  rectal polyp   • CYST REMOVAL     •  ENDOSCOPY N/A 3/29/2017    Procedure: ESOPHAGOGASTRODUODENOSCOPY WITH DILITATION;  Surgeon: Brooke Garrido MD;  Location:  [facility] OR;  Service:    • ENDOSCOPY N/A 8/3/2020    Procedure: ESOPHAGOGASTRODUODENOSCOPY with biopsies;  Surgeon: Rui Shi MD;  Location:  [facility] OR;  Service: Gastroenterology;  Laterality: N/A;  esophagitis  gastritis   • HERNIA REPAIR     • HYSTERECTOMY         Social History     Socioeconomic History   • Marital status: Single     Spouse name: Not on file   • Number of children: Not on file   • Years of education: Not on file   • Highest education level: Not on file   Tobacco Use   • Smoking status: Never Smoker   • Smokeless tobacco: Never Used   • Tobacco comment: daily caffiene   Substance and Sexual Activity   • Alcohol use: No   • Drug use: No   • Sexual activity: Defer       Family History   Problem Relation Age of Onset   • Colon cancer Mother    • Colon cancer Other    • Lung cancer Father    • Breast cancer Maternal Aunt        The following portion of the patient's history were reviewed and updated as appropriate: past medical history, past surgical history, past social history, past family history, allergies, current medications, and problem list.    Review of Systems   Constitution: Positive for malaise/fatigue. Negative for diaphoresis and fever.   HENT: Negative for congestion, hearing loss, hoarse voice, nosebleeds and sore throat.    Eyes: Negative for photophobia, vision loss in left eye, vision loss in right eye and visual disturbance.   Cardiovascular: Positive for dyspnea on exertion, irregular heartbeat and leg swelling. Negative for chest pain, near-syncope, orthopnea, palpitations, paroxysmal nocturnal dyspnea and syncope.   Respiratory: Positive for shortness of breath. Negative for cough, hemoptysis, sleep disturbances due to breathing, snoring, sputum production and wheezing.    Endocrine: Negative for cold intolerance, heat intolerance,  "polydipsia, polyphagia and polyuria.   Hematologic/Lymphatic: Negative for bleeding problem. Does not bruise/bleed easily.   Skin: Negative for color change, dry skin, poor wound healing, rash and suspicious lesions.        LE tight and shiny   Musculoskeletal: Negative for arthritis, back pain, falls, gout, joint pain, joint swelling, muscle cramps, muscle weakness and myalgias.   Gastrointestinal: Negative for bloating, abdominal pain, constipation, diarrhea, dysphagia, melena, nausea and vomiting.   Neurological: Positive for dizziness and weakness. Negative for excessive daytime sleepiness, headaches, light-headedness, loss of balance, numbness, paresthesias, seizures and vertigo.   Psychiatric/Behavioral: Negative for depression, memory loss and substance abuse. The patient is not nervous/anxious.        No Known Allergies      Current Outpatient Medications:   •  Brinzolamide-Brimonidine (SIMBRINZA) 1-0.2 % suspension, Apply  to eye., Disp: , Rfl:   •  Empagliflozin-linaGLIPtin (Glyxambi) 25-5 MG tablet, Take 1 tablet by mouth Daily., Disp: , Rfl:   •  LORazepam (ATIVAN) 1 MG tablet, Take 1 mg by mouth Daily., Disp: , Rfl:   •  omeprazole (priLOSEC) 20 MG capsule, Take 1 capsule by mouth Daily., Disp: 30 capsule, Rfl: 11  •  rivaroxaban (XARELTO) 20 MG tablet, Take 20 mg by mouth Daily., Disp: , Rfl:   •  timolol (BETIMOL) 0.25 % ophthalmic solution, 1-2 drops 2 (Two) Times a Day., Disp: , Rfl:   •  torsemide (DEMADEX) 10 MG tablet, Take 1 tablet by mouth Daily., Disp: 90 tablet, Rfl: 3        Objective:     Vitals:    12/17/20 1132   BP: 116/70   Pulse: 100   Resp: 16   SpO2: 98%   Weight: 47.2 kg (104 lb)   Height: 165.1 cm (65\")     Body mass index is 17.31 kg/m².      Vitals signs reviewed.   Constitutional:       General: Not in acute distress.     Appearance: Underweight. Frail. Ill-appearing and chronically ill-appearing.   Eyes:      General:         Right eye: No discharge.         Left eye: No " discharge.      Conjunctiva/sclera: Conjunctivae normal.   HENT:      Head: Normocephalic and atraumatic.      Right Ear: External ear normal.      Left Ear: External ear normal.      Nose: Nose normal.   Neck:      Musculoskeletal: Normal range of motion and neck supple.      Thyroid: No thyromegaly.      Vascular: No JVD.      Trachea: No tracheal deviation.      Lymphadenopathy: No cervical adenopathy.   Pulmonary:      Effort: Pulmonary effort is normal. No respiratory distress.      Breath sounds: Normal breath sounds. No wheezing. No rales.   Chest:      Chest wall: Not tender to palpatation.   Cardiovascular:      Normal rate. Irregular rhythm.      No gallop.   Pulses:     Intact distal pulses.   Abdominal:      General: There is no distension.      Palpations: Abdomen is soft.      Tenderness: There is no abdominal tenderness.   Musculoskeletal: Normal range of motion.         General: No tenderness or deformity.   Skin:     General: Skin is warm and dry.      Findings: No erythema or rash.   Neurological:      Mental Status: Alert and oriented to person, place, and time.      Coordination: Coordination normal.   Psychiatric:         Behavior: Behavior normal. Behavior is cooperative.         Thought Content: Thought content normal.         Judgment: Judgment normal.               ECG 12 Lead    Date/Time: 12/17/2020 11:42 AM  Performed by: Alyssa Choi APRN  Authorized by: Alyssa Choi APRN   Comparison: compared with previous ECG from 12/12/2020  Similar to previous ECG  Rhythm: atrial fibrillation  Ectopy: multifocal PVCs  Rate: normal  Conduction: conduction normal  ST Segments: ST segments normal  T flattening: I  QRS axis: borderline left axis.    Clinical impression: abnormal EKG              Assessment:       Diagnosis Plan   1. Paroxysmal atrial fibrillation (CMS/HCC)     2. SVT (supraventricular tachycardia) (CMS/HCC)     3. Nonrheumatic mitral valve regurgitation     4. Acute diastolic CHF  (congestive heart failure) (CMS/[facility])            Plan:   1.  Atrial Fibrillation and Atrial Flutter  Assessment  • The patient has paroxysmal atrial fibrillation.  Rate not adequately controlled.  Intolerant to metoprolol  • The patient's CHADS2-VASc score is 2  • A [id number] score of 2 or more is considered a high risk for a thromboembolic event  • Rivaroxaban prescribed     Plan  • Attempt to maintain sinus rhythm  • Continue rivaroxaban for antithrombotic therapy, bleeding issues discussed  • Continue beta blocker for rhythm control     2.  SVT - no episodes recorded during Holter monitoring    3.  Chronic anticoagulation - on Xarelto - no bleeding    4.  HTN - she is WNL today without any BP medications.  Intolerant to valsartan due to severe dizziness and weakness    5.  Severe mitral and trace regurgitation echocardiogram in June was felt to show only mild to moderate MR.  Repeat echo 10/30/2020 reveal mild MV regurgitation with moderate to severe tricuspid valve regurgitation. She is dizzy and SOA    6.  Chronic diastolic congestive heart failure -  Still dizzy and SOA.  Currently on torsemide 10 mg daily and presents with 2+ mildly pitting LE edema, skin tight and shiny      Will discuss treatment options with JA.        As always, it has been a pleasure to participate in your patient's care. Thank you.       Sincerely,       EDEL Acosta      Current Outpatient Medications:   •  Brinzolamide-Brimonidine (SIMBRINZA) 1-0.2 % suspension, Apply  to eye., Disp: , Rfl:   •  Empagliflozin-linaGLIPtin (Glyxambi) 25-5 MG tablet, Take 1 tablet by mouth Daily., Disp: , Rfl:   •  LORazepam (ATIVAN) 1 MG tablet, Take 1 mg by mouth Daily., Disp: , Rfl:   •  omeprazole (priLOSEC) 20 MG capsule, Take 1 capsule by mouth Daily., Disp: 30 capsule, Rfl: 11  •  rivaroxaban (XARELTO) 20 MG tablet, Take 20 mg by mouth Daily., Disp: , Rfl:   •  timolol (BETIMOL) 0.25 % ophthalmic solution, 1-2 drops 2 (Two) Times a Day.,  Disp: , Rfl:   •  torsemide (DEMADEX) 10 MG tablet, Take 1 tablet by mouth Daily., Disp: 90 tablet, Rfl: 3    Dictated utilizing Dragon dictation

## 2020-12-21 RX ORDER — DIGOXIN 125 MCG
125 TABLET ORAL DAILY
Qty: 30 TABLET | Refills: 11 | Status: SHIPPED | OUTPATIENT
Start: 2020-12-21 | End: 2021-01-01

## 2021-01-01 ENCOUNTER — APPOINTMENT (OUTPATIENT)
Dept: CT IMAGING | Facility: HOSPITAL | Age: 71
End: 2021-01-01

## 2021-01-01 ENCOUNTER — APPOINTMENT (OUTPATIENT)
Dept: GENERAL RADIOLOGY | Facility: HOSPITAL | Age: 71
End: 2021-01-01

## 2021-01-01 ENCOUNTER — APPOINTMENT (OUTPATIENT)
Dept: MRI IMAGING | Facility: HOSPITAL | Age: 71
End: 2021-01-01

## 2021-01-01 ENCOUNTER — APPOINTMENT (OUTPATIENT)
Dept: ULTRASOUND IMAGING | Facility: HOSPITAL | Age: 71
End: 2021-01-01

## 2021-01-01 ENCOUNTER — HOSPITAL ENCOUNTER (INPATIENT)
Facility: HOSPITAL | Age: 71
LOS: 4 days | Discharge: NURSING, REHAB OR LONG-TERM CARE | End: 2021-05-07
Attending: EMERGENCY MEDICINE | Admitting: HOSPITALIST

## 2021-01-01 ENCOUNTER — APPOINTMENT (OUTPATIENT)
Dept: CARDIOLOGY | Facility: HOSPITAL | Age: 71
End: 2021-01-01

## 2021-01-01 ENCOUNTER — OFFICE VISIT (OUTPATIENT)
Dept: CARDIOLOGY | Facility: CLINIC | Age: 71
End: 2021-01-01

## 2021-01-01 ENCOUNTER — HOSPITAL ENCOUNTER (INPATIENT)
Facility: HOSPITAL | Age: 71
LOS: 13 days | Discharge: NURSING, REHAB OR LONG-TERM CARE | End: 2021-05-26
Attending: FAMILY MEDICINE | Admitting: FAMILY MEDICINE

## 2021-01-01 ENCOUNTER — HOSPITAL ENCOUNTER (INPATIENT)
Facility: HOSPITAL | Age: 71
LOS: 7 days | Discharge: NURSING, REHAB OR LONG-TERM CARE | End: 2021-12-09
Attending: EMERGENCY MEDICINE | Admitting: FAMILY MEDICINE

## 2021-01-01 ENCOUNTER — ANESTHESIA EVENT (OUTPATIENT)
Dept: PERIOP | Facility: HOSPITAL | Age: 71
End: 2021-01-01

## 2021-01-01 ENCOUNTER — APPOINTMENT (OUTPATIENT)
Dept: PULMONOLOGY | Facility: HOSPITAL | Age: 71
End: 2021-01-01

## 2021-01-01 ENCOUNTER — HOSPITAL ENCOUNTER (EMERGENCY)
Facility: HOSPITAL | Age: 71
Discharge: NURSING, REHAB OR LONG-TERM CARE | End: 2021-03-20
Attending: EMERGENCY MEDICINE | Admitting: EMERGENCY MEDICINE

## 2021-01-01 ENCOUNTER — HOSPITAL ENCOUNTER (INPATIENT)
Facility: HOSPITAL | Age: 71
LOS: 18 days | Discharge: NURSING, REHAB OR LONG-TERM CARE | End: 2021-04-30
Attending: EMERGENCY MEDICINE | Admitting: FAMILY MEDICINE

## 2021-01-01 ENCOUNTER — HOSPITAL ENCOUNTER (EMERGENCY)
Facility: HOSPITAL | Age: 71
Discharge: HOME | End: 2021-11-23
Attending: EMERGENCY MEDICINE | Admitting: EMERGENCY MEDICINE

## 2021-01-01 ENCOUNTER — HOSPITAL ENCOUNTER (INPATIENT)
Facility: HOSPITAL | Age: 71
LOS: 3 days | End: 2021-05-13
Attending: EMERGENCY MEDICINE | Admitting: FAMILY MEDICINE

## 2021-01-01 ENCOUNTER — HOSPITAL ENCOUNTER (EMERGENCY)
Facility: HOSPITAL | Age: 71
Discharge: HOME | End: 2021-11-26
Attending: EMERGENCY MEDICINE | Admitting: EMERGENCY MEDICINE

## 2021-01-01 ENCOUNTER — ANESTHESIA (OUTPATIENT)
Dept: PERIOP | Facility: HOSPITAL | Age: 71
End: 2021-01-01

## 2021-01-01 ENCOUNTER — TELEPHONE (OUTPATIENT)
Dept: SPEECH THERAPY | Facility: HOSPITAL | Age: 71
End: 2021-01-01

## 2021-01-01 ENCOUNTER — TELEPHONE (OUTPATIENT)
Dept: UROLOGY | Facility: HOSPITAL | Age: 71
End: 2021-01-01

## 2021-01-01 ENCOUNTER — READMISSION MANAGEMENT (OUTPATIENT)
Dept: CALL CENTER | Facility: HOSPITAL | Age: 71
End: 2021-01-01

## 2021-01-01 VITALS
WEIGHT: 93.9 LBS | BODY MASS INDEX: 16.64 KG/M2 | SYSTOLIC BLOOD PRESSURE: 94 MMHG | TEMPERATURE: 97.5 F | HEIGHT: 63 IN | DIASTOLIC BLOOD PRESSURE: 71 MMHG | OXYGEN SATURATION: 94 % | HEART RATE: 70 BPM | RESPIRATION RATE: 20 BRPM

## 2021-01-01 VITALS
WEIGHT: 135 LBS | HEART RATE: 52 BPM | SYSTOLIC BLOOD PRESSURE: 121 MMHG | HEIGHT: 72 IN | DIASTOLIC BLOOD PRESSURE: 68 MMHG | RESPIRATION RATE: 20 BRPM | BODY MASS INDEX: 18.28 KG/M2 | TEMPERATURE: 95.7 F | OXYGEN SATURATION: 97 %

## 2021-01-01 VITALS
HEART RATE: 74 BPM | RESPIRATION RATE: 18 BRPM | HEIGHT: 60 IN | SYSTOLIC BLOOD PRESSURE: 122 MMHG | OXYGEN SATURATION: 97 % | BODY MASS INDEX: 24.58 KG/M2 | TEMPERATURE: 96.6 F | WEIGHT: 125.2 LBS | DIASTOLIC BLOOD PRESSURE: 91 MMHG

## 2021-01-01 VITALS
OXYGEN SATURATION: 99 % | HEART RATE: 51 BPM | TEMPERATURE: 97.3 F | BODY MASS INDEX: 14.14 KG/M2 | HEIGHT: 66 IN | WEIGHT: 88 LBS | SYSTOLIC BLOOD PRESSURE: 114 MMHG | DIASTOLIC BLOOD PRESSURE: 75 MMHG | RESPIRATION RATE: 16 BRPM

## 2021-01-01 VITALS
SYSTOLIC BLOOD PRESSURE: 103 MMHG | DIASTOLIC BLOOD PRESSURE: 71 MMHG | OXYGEN SATURATION: 99 % | TEMPERATURE: 97.9 F | HEART RATE: 53 BPM | WEIGHT: 88 LBS | RESPIRATION RATE: 18 BRPM | BODY MASS INDEX: 14.14 KG/M2 | HEIGHT: 66 IN

## 2021-01-01 VITALS
DIASTOLIC BLOOD PRESSURE: 97 MMHG | SYSTOLIC BLOOD PRESSURE: 136 MMHG | OXYGEN SATURATION: 98 % | HEIGHT: 56 IN | BODY MASS INDEX: 28.82 KG/M2 | WEIGHT: 128.1 LBS | TEMPERATURE: 97.3 F | RESPIRATION RATE: 16 BRPM | HEART RATE: 86 BPM

## 2021-01-01 VITALS
OXYGEN SATURATION: 99 % | HEART RATE: 61 BPM | SYSTOLIC BLOOD PRESSURE: 108 MMHG | HEIGHT: 66 IN | RESPIRATION RATE: 16 BRPM | BODY MASS INDEX: 14.46 KG/M2 | WEIGHT: 90 LBS | DIASTOLIC BLOOD PRESSURE: 80 MMHG

## 2021-01-01 VITALS
RESPIRATION RATE: 18 BRPM | TEMPERATURE: 97.9 F | BODY MASS INDEX: 29.51 KG/M2 | DIASTOLIC BLOOD PRESSURE: 97 MMHG | HEART RATE: 67 BPM | SYSTOLIC BLOOD PRESSURE: 152 MMHG | WEIGHT: 131.2 LBS | OXYGEN SATURATION: 96 % | HEIGHT: 56 IN

## 2021-01-01 VITALS
WEIGHT: 84 LBS | BODY MASS INDEX: 14.88 KG/M2 | HEART RATE: 61 BPM | HEIGHT: 63 IN | DIASTOLIC BLOOD PRESSURE: 62 MMHG | SYSTOLIC BLOOD PRESSURE: 102 MMHG

## 2021-01-01 VITALS
RESPIRATION RATE: 20 BRPM | TEMPERATURE: 98.3 F | HEIGHT: 66 IN | OXYGEN SATURATION: 94 % | DIASTOLIC BLOOD PRESSURE: 80 MMHG | HEART RATE: 73 BPM | BODY MASS INDEX: 16.91 KG/M2 | WEIGHT: 105.2 LBS | SYSTOLIC BLOOD PRESSURE: 124 MMHG

## 2021-01-01 DIAGNOSIS — R41.841 COGNITIVE COMMUNICATION DEFICIT: ICD-10-CM

## 2021-01-01 DIAGNOSIS — T68.XXXA HYPOTHERMIA, INITIAL ENCOUNTER: ICD-10-CM

## 2021-01-01 DIAGNOSIS — R10.84 GENERALIZED ABDOMINAL PAIN: Primary | ICD-10-CM

## 2021-01-01 DIAGNOSIS — R13.12 OROPHARYNGEAL DYSPHAGIA: ICD-10-CM

## 2021-01-01 DIAGNOSIS — Z79.01 CHRONIC ANTICOAGULATION: Chronic | ICD-10-CM

## 2021-01-01 DIAGNOSIS — R42 DIZZINESS, NONSPECIFIC: ICD-10-CM

## 2021-01-01 DIAGNOSIS — A41.9 SEPSIS WITH ACUTE RENAL FAILURE WITHOUT SEPTIC SHOCK, DUE TO UNSPECIFIED ORGANISM, UNSPECIFIED ACUTE RENAL FAILURE TYPE (HCC): ICD-10-CM

## 2021-01-01 DIAGNOSIS — T68.XXXA HYPOTHERMIA, INITIAL ENCOUNTER: Primary | ICD-10-CM

## 2021-01-01 DIAGNOSIS — J69.0 ASPIRATION PNEUMONIA OF RIGHT LOWER LOBE, UNSPECIFIED ASPIRATION PNEUMONIA TYPE (HCC): Primary | ICD-10-CM

## 2021-01-01 DIAGNOSIS — I38 VALVULAR HEART DISEASE: ICD-10-CM

## 2021-01-01 DIAGNOSIS — R65.20 SEPSIS WITH ACUTE RENAL FAILURE WITHOUT SEPTIC SHOCK, DUE TO UNSPECIFIED ORGANISM, UNSPECIFIED ACUTE RENAL FAILURE TYPE (HCC): ICD-10-CM

## 2021-01-01 DIAGNOSIS — R41.82 ALTERED MENTAL STATUS, UNSPECIFIED ALTERED MENTAL STATUS TYPE: Primary | ICD-10-CM

## 2021-01-01 DIAGNOSIS — R13.11 ORAL PHASE DYSPHAGIA: ICD-10-CM

## 2021-01-01 DIAGNOSIS — I48.11 LONGSTANDING PERSISTENT ATRIAL FIBRILLATION (HCC): Primary | ICD-10-CM

## 2021-01-01 DIAGNOSIS — I48.91 ATRIAL FIBRILLATION WITH SLOW VENTRICULAR RESPONSE (HCC): Primary | ICD-10-CM

## 2021-01-01 DIAGNOSIS — N17.9 SEPSIS WITH ACUTE RENAL FAILURE WITHOUT SEPTIC SHOCK, DUE TO UNSPECIFIED ORGANISM, UNSPECIFIED ACUTE RENAL FAILURE TYPE (HCC): ICD-10-CM

## 2021-01-01 DIAGNOSIS — R78.89 ELEVATED DIGOXIN LEVEL: Primary | ICD-10-CM

## 2021-01-01 DIAGNOSIS — R40.1 STUPOR: ICD-10-CM

## 2021-01-01 DIAGNOSIS — N13.1 HYDRONEPHROSIS DUE TO OBSTRUCTION OF URETER: Primary | ICD-10-CM

## 2021-01-01 DIAGNOSIS — F41.8 ANXIETY ABOUT HEALTH: ICD-10-CM

## 2021-01-01 DIAGNOSIS — R60.1 ANASARCA: ICD-10-CM

## 2021-01-01 LAB
ABO GROUP BLD: NORMAL
ABO GROUP BLD: NORMAL
ACANTHOCYTES BLD QL SMEAR: NORMAL
ACANTHOCYTES BLD QL SMEAR: NORMAL
ALBUMIN SERPL-MCNC: 1.8 G/DL (ref 3.5–5.2)
ALBUMIN SERPL-MCNC: 1.8 G/DL (ref 3.5–5.2)
ALBUMIN SERPL-MCNC: 1.9 G/DL (ref 3.5–5.2)
ALBUMIN SERPL-MCNC: 2 G/DL (ref 3.5–5.2)
ALBUMIN SERPL-MCNC: 2 G/DL (ref 3.5–5.2)
ALBUMIN SERPL-MCNC: 2.1 G/DL (ref 3.5–5.2)
ALBUMIN SERPL-MCNC: 2.2 G/DL (ref 3.5–5.2)
ALBUMIN SERPL-MCNC: 2.3 G/DL (ref 3.5–5.2)
ALBUMIN SERPL-MCNC: 2.4 G/DL (ref 3.5–5.2)
ALBUMIN SERPL-MCNC: 2.8 G/DL (ref 3.5–5.2)
ALBUMIN SERPL-MCNC: 3.2 G/DL (ref 3.5–5.2)
ALBUMIN SERPL-MCNC: 3.5 G/DL (ref 3.5–5.2)
ALBUMIN SERPL-MCNC: 3.7 G/DL (ref 3.5–5.2)
ALBUMIN SERPL-MCNC: 3.9 G/DL (ref 3.5–5.2)
ALBUMIN/GLOB SERPL: 0.6 G/DL
ALBUMIN/GLOB SERPL: 0.7 G/DL
ALBUMIN/GLOB SERPL: 0.8 G/DL
ALBUMIN/GLOB SERPL: 1 G/DL
ALP SERPL-CCNC: 228 U/L (ref 39–117)
ALP SERPL-CCNC: 239 U/L (ref 39–117)
ALP SERPL-CCNC: 249 U/L (ref 39–117)
ALP SERPL-CCNC: 268 U/L (ref 39–117)
ALP SERPL-CCNC: 316 U/L (ref 39–117)
ALP SERPL-CCNC: 336 U/L (ref 39–117)
ALP SERPL-CCNC: 346 U/L (ref 39–117)
ALP SERPL-CCNC: 347 U/L (ref 39–117)
ALP SERPL-CCNC: 385 U/L (ref 39–117)
ALP SERPL-CCNC: 421 U/L (ref 39–117)
ALP SERPL-CCNC: 461 U/L (ref 39–117)
ALP SERPL-CCNC: 482 U/L (ref 39–117)
ALP SERPL-CCNC: 497 U/L (ref 39–117)
ALP SERPL-CCNC: 519 U/L (ref 39–117)
ALP SERPL-CCNC: 550 U/L (ref 39–117)
ALP SERPL-CCNC: 623 U/L (ref 39–117)
ALP SERPL-CCNC: 644 U/L (ref 39–117)
ALP SERPL-CCNC: 650 U/L (ref 39–117)
ALP SERPL-CCNC: 656 U/L (ref 39–117)
ALP SERPL-CCNC: 665 U/L (ref 39–117)
ALP SERPL-CCNC: 753 U/L (ref 39–117)
ALT SERPL W P-5'-P-CCNC: 101 U/L (ref 1–33)
ALT SERPL W P-5'-P-CCNC: 14 U/L (ref 1–33)
ALT SERPL W P-5'-P-CCNC: 140 U/L (ref 1–33)
ALT SERPL W P-5'-P-CCNC: 150 U/L (ref 1–33)
ALT SERPL W P-5'-P-CCNC: 177 U/L (ref 1–33)
ALT SERPL W P-5'-P-CCNC: 19 U/L (ref 1–33)
ALT SERPL W P-5'-P-CCNC: 204 U/L (ref 1–33)
ALT SERPL W P-5'-P-CCNC: 235 U/L (ref 1–33)
ALT SERPL W P-5'-P-CCNC: 257 U/L (ref 1–33)
ALT SERPL W P-5'-P-CCNC: 27 U/L (ref 1–33)
ALT SERPL W P-5'-P-CCNC: 275 U/L (ref 1–33)
ALT SERPL W P-5'-P-CCNC: 36 U/L (ref 1–33)
ALT SERPL W P-5'-P-CCNC: 42 U/L (ref 1–33)
ALT SERPL W P-5'-P-CCNC: 45 U/L (ref 1–33)
ALT SERPL W P-5'-P-CCNC: 46 U/L (ref 1–33)
ALT SERPL W P-5'-P-CCNC: 48 U/L (ref 1–33)
ALT SERPL W P-5'-P-CCNC: 49 U/L (ref 1–33)
ALT SERPL W P-5'-P-CCNC: 66 U/L (ref 1–33)
ALT SERPL W P-5'-P-CCNC: 68 U/L (ref 1–33)
ALT SERPL W P-5'-P-CCNC: 74 U/L (ref 1–33)
ALT SERPL W P-5'-P-CCNC: 82 U/L (ref 1–33)
ANION GAP SERPL CALCULATED.3IONS-SCNC: 10 MMOL/L (ref 5–15)
ANION GAP SERPL CALCULATED.3IONS-SCNC: 10.3 MMOL/L (ref 5–15)
ANION GAP SERPL CALCULATED.3IONS-SCNC: 10.6 MMOL/L (ref 5–15)
ANION GAP SERPL CALCULATED.3IONS-SCNC: 10.8 MMOL/L (ref 5–15)
ANION GAP SERPL CALCULATED.3IONS-SCNC: 11.5 MMOL/L (ref 5–15)
ANION GAP SERPL CALCULATED.3IONS-SCNC: 11.6 MMOL/L (ref 5–15)
ANION GAP SERPL CALCULATED.3IONS-SCNC: 11.8 MMOL/L (ref 5–15)
ANION GAP SERPL CALCULATED.3IONS-SCNC: 11.9 MMOL/L (ref 5–15)
ANION GAP SERPL CALCULATED.3IONS-SCNC: 12.1 MMOL/L (ref 5–15)
ANION GAP SERPL CALCULATED.3IONS-SCNC: 12.2 MMOL/L (ref 5–15)
ANION GAP SERPL CALCULATED.3IONS-SCNC: 12.4 MMOL/L (ref 5–15)
ANION GAP SERPL CALCULATED.3IONS-SCNC: 12.5 MMOL/L (ref 5–15)
ANION GAP SERPL CALCULATED.3IONS-SCNC: 12.9 MMOL/L (ref 5–15)
ANION GAP SERPL CALCULATED.3IONS-SCNC: 13.2 MMOL/L (ref 5–15)
ANION GAP SERPL CALCULATED.3IONS-SCNC: 13.7 MMOL/L (ref 5–15)
ANION GAP SERPL CALCULATED.3IONS-SCNC: 14.1 MMOL/L (ref 5–15)
ANION GAP SERPL CALCULATED.3IONS-SCNC: 15.3 MMOL/L (ref 5–15)
ANION GAP SERPL CALCULATED.3IONS-SCNC: 15.7 MMOL/L (ref 5–15)
ANION GAP SERPL CALCULATED.3IONS-SCNC: 17.1 MMOL/L (ref 5–15)
ANION GAP SERPL CALCULATED.3IONS-SCNC: 3 MMOL/L (ref 5–15)
ANION GAP SERPL CALCULATED.3IONS-SCNC: 3.8 MMOL/L (ref 5–15)
ANION GAP SERPL CALCULATED.3IONS-SCNC: 3.9 MMOL/L (ref 5–15)
ANION GAP SERPL CALCULATED.3IONS-SCNC: 4.5 MMOL/L (ref 5–15)
ANION GAP SERPL CALCULATED.3IONS-SCNC: 4.6 MMOL/L (ref 5–15)
ANION GAP SERPL CALCULATED.3IONS-SCNC: 4.8 MMOL/L (ref 5–15)
ANION GAP SERPL CALCULATED.3IONS-SCNC: 5 MMOL/L (ref 5–15)
ANION GAP SERPL CALCULATED.3IONS-SCNC: 5.2 MMOL/L (ref 5–15)
ANION GAP SERPL CALCULATED.3IONS-SCNC: 5.7 MMOL/L (ref 5–15)
ANION GAP SERPL CALCULATED.3IONS-SCNC: 6.2 MMOL/L (ref 5–15)
ANION GAP SERPL CALCULATED.3IONS-SCNC: 6.5 MMOL/L (ref 5–15)
ANION GAP SERPL CALCULATED.3IONS-SCNC: 6.8 MMOL/L (ref 5–15)
ANION GAP SERPL CALCULATED.3IONS-SCNC: 6.9 MMOL/L (ref 5–15)
ANION GAP SERPL CALCULATED.3IONS-SCNC: 7 MMOL/L (ref 5–15)
ANION GAP SERPL CALCULATED.3IONS-SCNC: 7.3 MMOL/L (ref 5–15)
ANION GAP SERPL CALCULATED.3IONS-SCNC: 7.7 MMOL/L (ref 5–15)
ANION GAP SERPL CALCULATED.3IONS-SCNC: 7.7 MMOL/L (ref 5–15)
ANION GAP SERPL CALCULATED.3IONS-SCNC: 8.9 MMOL/L (ref 5–15)
ANION GAP SERPL CALCULATED.3IONS-SCNC: 9.2 MMOL/L (ref 5–15)
ANISOCYTOSIS BLD QL: ABNORMAL
ANISOCYTOSIS BLD QL: NORMAL
ANISOCYTOSIS BLD QL: NORMAL
AORTIC DIMENSIONLESS INDEX: 0.7 (DI)
APTT PPP: 53.9 SECONDS (ref 24.3–38.1)
ARTERIAL PATENCY WRIST A: ABNORMAL
ARTERIAL PATENCY WRIST A: POSITIVE
AST SERPL-CCNC: 144 U/L (ref 1–32)
AST SERPL-CCNC: 20 U/L (ref 1–32)
AST SERPL-CCNC: 20 U/L (ref 1–32)
AST SERPL-CCNC: 235 U/L (ref 1–32)
AST SERPL-CCNC: 276 U/L (ref 1–32)
AST SERPL-CCNC: 30 U/L (ref 1–32)
AST SERPL-CCNC: 31 U/L (ref 1–32)
AST SERPL-CCNC: 339 U/L (ref 1–32)
AST SERPL-CCNC: 35 U/L (ref 1–32)
AST SERPL-CCNC: 36 U/L (ref 1–32)
AST SERPL-CCNC: 366 U/L (ref 1–32)
AST SERPL-CCNC: 42 U/L (ref 1–32)
AST SERPL-CCNC: 47 U/L (ref 1–32)
AST SERPL-CCNC: 47 U/L (ref 1–32)
AST SERPL-CCNC: 495 U/L (ref 1–32)
AST SERPL-CCNC: 502 U/L (ref 1–32)
AST SERPL-CCNC: 51 U/L (ref 1–32)
AST SERPL-CCNC: 55 U/L (ref 1–32)
AST SERPL-CCNC: 67 U/L (ref 1–32)
AST SERPL-CCNC: 68 U/L (ref 1–32)
AST SERPL-CCNC: 68 U/L (ref 1–32)
ATMOSPHERIC PRESS: 736 MMHG
ATMOSPHERIC PRESS: 741 MMHG
BACTERIA BLD CULT: ABNORMAL
BACTERIA BLD CULT: ABNORMAL
BACTERIA SPEC AEROBE CULT: ABNORMAL
BACTERIA SPEC AEROBE CULT: NO GROWTH
BACTERIA SPEC AEROBE CULT: NORMAL
BACTERIA UR QL AUTO: ABNORMAL /HPF
BASE EXCESS BLDA CALC-SCNC: -0.5 MMOL/L (ref 0–2)
BASE EXCESS BLDA CALC-SCNC: -1.5 MMOL/L (ref 0–2)
BASOPHILS # BLD AUTO: 0 10*3/MM3 (ref 0–0.2)
BASOPHILS # BLD AUTO: 0 10*3/MM3 (ref 0–0.2)
BASOPHILS # BLD AUTO: 0.01 10*3/MM3 (ref 0–0.2)
BASOPHILS # BLD AUTO: 0.01 10*3/MM3 (ref 0–0.2)
BASOPHILS # BLD AUTO: 0.02 10*3/MM3 (ref 0–0.2)
BASOPHILS # BLD AUTO: 0.02 10*3/MM3 (ref 0–0.2)
BASOPHILS # BLD AUTO: 0.03 10*3/MM3 (ref 0–0.2)
BASOPHILS # BLD AUTO: 0.08 10*3/MM3 (ref 0–0.2)
BASOPHILS NFR BLD AUTO: 0 % (ref 0–1.5)
BASOPHILS NFR BLD AUTO: 0 % (ref 0–1.5)
BASOPHILS NFR BLD AUTO: 0.4 % (ref 0–1.5)
BASOPHILS NFR BLD AUTO: 0.5 % (ref 0–1.5)
BASOPHILS NFR BLD AUTO: 0.6 % (ref 0–1.5)
BASOPHILS NFR BLD AUTO: 0.6 % (ref 0–1.5)
BASOPHILS NFR BLD AUTO: 0.9 % (ref 0–1.5)
BASOPHILS NFR BLD AUTO: 1.5 % (ref 0–1.5)
BDY SITE: ABNORMAL
BDY SITE: ABNORMAL
BH BB BLOOD EXPIRATION DATE: NORMAL
BH BB BLOOD TYPE BARCODE: 5100
BH BB DISPENSE STATUS: NORMAL
BH BB PRODUCT CODE: NORMAL
BH BB UNIT NUMBER: NORMAL
BH CV ECHO MEAS - AO MAX PG: 3 MMHG
BH CV ECHO MEAS - AO MEAN PG (FULL): 1 MMHG
BH CV ECHO MEAS - AO MEAN PG: 2 MMHG
BH CV ECHO MEAS - AO ROOT AREA (BSA CORRECTED): 1.8
BH CV ECHO MEAS - AO ROOT AREA: 5.7 CM^2
BH CV ECHO MEAS - AO ROOT DIAM: 2.7 CM
BH CV ECHO MEAS - AO V2 MAX: 87.6 CM/SEC
BH CV ECHO MEAS - AO V2 MEAN: 59.1 CM/SEC
BH CV ECHO MEAS - AO V2 VTI: 14.5 CM
BH CV ECHO MEAS - AVA(I,A): 2.6 CM^2
BH CV ECHO MEAS - AVA(I,D): 2.6 CM^2
BH CV ECHO MEAS - BSA(HAYCOCK): 1.5 M^2
BH CV ECHO MEAS - BSA: 1.5 M^2
BH CV ECHO MEAS - BZI_BMI: 28.8 KILOGRAMS/M^2
BH CV ECHO MEAS - BZI_METRIC_HEIGHT: 142 CM
BH CV ECHO MEAS - BZI_METRIC_WEIGHT: 58 KG
BH CV ECHO MEAS - EDV(CUBED): 55.3 ML
BH CV ECHO MEAS - EDV(MOD-SP2): 73.2 ML
BH CV ECHO MEAS - EDV(MOD-SP4): 47.8 ML
BH CV ECHO MEAS - EDV(TEICH): 62.3 ML
BH CV ECHO MEAS - EF(CUBED): 51.2 %
BH CV ECHO MEAS - EF(MOD-BP): 55.6 %
BH CV ECHO MEAS - EF(MOD-SP2): 60.4 %
BH CV ECHO MEAS - EF(MOD-SP4): 48.7 %
BH CV ECHO MEAS - EF(TEICH): 43.9 %
BH CV ECHO MEAS - ESV(CUBED): 27 ML
BH CV ECHO MEAS - ESV(MOD-SP2): 29 ML
BH CV ECHO MEAS - ESV(MOD-SP4): 24.5 ML
BH CV ECHO MEAS - ESV(TEICH): 35 ML
BH CV ECHO MEAS - FS: 21.3 %
BH CV ECHO MEAS - IVS/LVPW: 1
BH CV ECHO MEAS - IVSD: 0.96 CM
BH CV ECHO MEAS - LV DIASTOLIC VOL/BSA (35-75): 32.6 ML/M^2
BH CV ECHO MEAS - LV MASS(C)D: 108.7 GRAMS
BH CV ECHO MEAS - LV MASS(C)DI: 74.1 GRAMS/M^2
BH CV ECHO MEAS - LV MAX PG: 2.1 MMHG
BH CV ECHO MEAS - LV MEAN PG: 1 MMHG
BH CV ECHO MEAS - LV SYSTOLIC VOL/BSA (12-30): 16.7 ML/M^2
BH CV ECHO MEAS - LV V1 MAX: 73 CM/SEC
BH CV ECHO MEAS - LV V1 MEAN: 47.1 CM/SEC
BH CV ECHO MEAS - LV V1 VTI: 13.1 CM
BH CV ECHO MEAS - LVIDD: 3.8 CM
BH CV ECHO MEAS - LVIDS: 3 CM
BH CV ECHO MEAS - LVLD AP2: 6.9 CM
BH CV ECHO MEAS - LVLD AP4: 7.3 CM
BH CV ECHO MEAS - LVLS AP2: 6 CM
BH CV ECHO MEAS - LVLS AP4: 6.2 CM
BH CV ECHO MEAS - LVOT AREA (M): 2.8 CM^2
BH CV ECHO MEAS - LVOT AREA: 2.8 CM^2
BH CV ECHO MEAS - LVOT DIAM: 1.9 CM
BH CV ECHO MEAS - LVPWD: 0.93 CM
BH CV ECHO MEAS - MR MAX PG: 149.8 MMHG
BH CV ECHO MEAS - MR MAX VEL: 612 CM/SEC
BH CV ECHO MEAS - MR MEAN PG: 93 MMHG
BH CV ECHO MEAS - MR MEAN VEL: 450 CM/SEC
BH CV ECHO MEAS - MR VTI: 205 CM
BH CV ECHO MEAS - MV DEC SLOPE: 490 CM/SEC^2
BH CV ECHO MEAS - MV MEAN PG: 1 MMHG
BH CV ECHO MEAS - MV P1/2T MAX VEL: 106 CM/SEC
BH CV ECHO MEAS - MV P1/2T: 63.4 MSEC
BH CV ECHO MEAS - MV V2 MEAN: 49.7 CM/SEC
BH CV ECHO MEAS - MV V2 VTI: 24.8 CM
BH CV ECHO MEAS - MVA P1/2T LCG: 2.1 CM^2
BH CV ECHO MEAS - MVA(P1/2T): 3.5 CM^2
BH CV ECHO MEAS - MVA(VTI): 1.5 CM^2
BH CV ECHO MEAS - PA ACC TIME: 0.08 SEC
BH CV ECHO MEAS - PA PR(ACCEL): 42.1 MMHG
BH CV ECHO MEAS - QP/QS: 0.81
BH CV ECHO MEAS - RAP SYSTOLE: 10 MMHG
BH CV ECHO MEAS - RV MEAN PG: 0 MMHG
BH CV ECHO MEAS - RV V1 MEAN: 31.9 CM/SEC
BH CV ECHO MEAS - RV V1 VTI: 7.9 CM
BH CV ECHO MEAS - RVOT AREA: 3.8 CM^2
BH CV ECHO MEAS - RVOT DIAM: 2.2 CM
BH CV ECHO MEAS - RVSP: 49 MMHG
BH CV ECHO MEAS - SI(AO): 56.6 ML/M^2
BH CV ECHO MEAS - SI(CUBED): 19.3 ML/M^2
BH CV ECHO MEAS - SI(LVOT): 25.3 ML/M^2
BH CV ECHO MEAS - SI(MOD-SP2): 30.1 ML/M^2
BH CV ECHO MEAS - SI(MOD-SP4): 15.9 ML/M^2
BH CV ECHO MEAS - SI(TEICH): 18.6 ML/M^2
BH CV ECHO MEAS - SV(AO): 83 ML
BH CV ECHO MEAS - SV(CUBED): 28.3 ML
BH CV ECHO MEAS - SV(LVOT): 37.1 ML
BH CV ECHO MEAS - SV(MOD-SP2): 44.2 ML
BH CV ECHO MEAS - SV(MOD-SP4): 23.3 ML
BH CV ECHO MEAS - SV(RVOT): 30 ML
BH CV ECHO MEAS - SV(TEICH): 27.3 ML
BH CV ECHO MEAS - TAPSE (>1.6): 2.2 CM
BH CV ECHO MEAS - TR MAX PG: 39 MMHG
BH CV ECHO MEAS - TR MAX VEL: 303 CM/SEC
BH CV XLRA - RV BASE: 5.1 CM
BH CV XLRA - RV LENGTH: 7 CM
BH CV XLRA - RV MID: 3.4 CM
BH CV XLRA - TDI S': 13.6 CM/SEC
BILIRUB SERPL-MCNC: 0.3 MG/DL (ref 0–1.2)
BILIRUB SERPL-MCNC: 0.4 MG/DL (ref 0–1.2)
BILIRUB SERPL-MCNC: 0.4 MG/DL (ref 0–1.2)
BILIRUB SERPL-MCNC: 0.5 MG/DL (ref 0–1.2)
BILIRUB SERPL-MCNC: 0.6 MG/DL (ref 0–1.2)
BILIRUB SERPL-MCNC: 0.7 MG/DL (ref 0–1.2)
BILIRUB SERPL-MCNC: 0.9 MG/DL (ref 0–1.2)
BILIRUB SERPL-MCNC: 1 MG/DL (ref 0–1.2)
BILIRUB SERPL-MCNC: 1.1 MG/DL (ref 0–1.2)
BILIRUB SERPL-MCNC: 1.1 MG/DL (ref 0–1.2)
BILIRUB SERPL-MCNC: 1.2 MG/DL (ref 0–1.2)
BILIRUB SERPL-MCNC: 1.8 MG/DL (ref 0–1.2)
BILIRUB UR QL STRIP: NEGATIVE
BLD GP AB SCN SERPL QL: NEGATIVE
BLD GP AB SCN SERPL QL: NEGATIVE
BODY TEMPERATURE: 37 C
BODY TEMPERATURE: 37 C
BOTTLE TYPE: ABNORMAL
BOTTLE TYPE: ABNORMAL
BUN SERPL-MCNC: 10 MG/DL (ref 8–23)
BUN SERPL-MCNC: 10 MG/DL (ref 8–23)
BUN SERPL-MCNC: 11 MG/DL (ref 8–23)
BUN SERPL-MCNC: 12 MG/DL (ref 8–23)
BUN SERPL-MCNC: 13 MG/DL (ref 8–23)
BUN SERPL-MCNC: 14 MG/DL (ref 8–23)
BUN SERPL-MCNC: 15 MG/DL (ref 8–23)
BUN SERPL-MCNC: 16 MG/DL (ref 8–23)
BUN SERPL-MCNC: 17 MG/DL (ref 8–23)
BUN SERPL-MCNC: 18 MG/DL (ref 8–23)
BUN SERPL-MCNC: 18 MG/DL (ref 8–23)
BUN SERPL-MCNC: 19 MG/DL (ref 8–23)
BUN SERPL-MCNC: 19 MG/DL (ref 8–23)
BUN SERPL-MCNC: 20 MG/DL (ref 8–23)
BUN SERPL-MCNC: 23 MG/DL (ref 8–23)
BUN SERPL-MCNC: 24 MG/DL (ref 8–23)
BUN SERPL-MCNC: 26 MG/DL (ref 8–23)
BUN SERPL-MCNC: 27 MG/DL (ref 8–23)
BUN SERPL-MCNC: 28 MG/DL (ref 8–23)
BUN SERPL-MCNC: 31 MG/DL (ref 8–23)
BUN SERPL-MCNC: 32 MG/DL (ref 8–23)
BUN SERPL-MCNC: 33 MG/DL (ref 8–23)
BUN SERPL-MCNC: 34 MG/DL (ref 8–23)
BUN SERPL-MCNC: 38 MG/DL (ref 8–23)
BUN SERPL-MCNC: 39 MG/DL (ref 8–23)
BUN SERPL-MCNC: 41 MG/DL (ref 8–23)
BUN SERPL-MCNC: 42 MG/DL (ref 8–23)
BUN SERPL-MCNC: 45 MG/DL (ref 8–23)
BUN SERPL-MCNC: 5 MG/DL (ref 8–23)
BUN SERPL-MCNC: 6 MG/DL (ref 8–23)
BUN SERPL-MCNC: 8 MG/DL (ref 8–23)
BUN SERPL-MCNC: 9 MG/DL (ref 8–23)
BUN SERPL-MCNC: 9 MG/DL (ref 8–23)
BUN/CREAT SERPL: 10.4 (ref 7–25)
BUN/CREAT SERPL: 11.6 (ref 7–25)
BUN/CREAT SERPL: 12.4 (ref 7–25)
BUN/CREAT SERPL: 12.8 (ref 7–25)
BUN/CREAT SERPL: 13 (ref 7–25)
BUN/CREAT SERPL: 13.4 (ref 7–25)
BUN/CREAT SERPL: 13.6 (ref 7–25)
BUN/CREAT SERPL: 14.3 (ref 7–25)
BUN/CREAT SERPL: 14.5 (ref 7–25)
BUN/CREAT SERPL: 14.6 (ref 7–25)
BUN/CREAT SERPL: 14.8 (ref 7–25)
BUN/CREAT SERPL: 14.8 (ref 7–25)
BUN/CREAT SERPL: 15.7 (ref 7–25)
BUN/CREAT SERPL: 15.8 (ref 7–25)
BUN/CREAT SERPL: 15.9 (ref 7–25)
BUN/CREAT SERPL: 16.3 (ref 7–25)
BUN/CREAT SERPL: 16.7 (ref 7–25)
BUN/CREAT SERPL: 18.9 (ref 7–25)
BUN/CREAT SERPL: 19.3 (ref 7–25)
BUN/CREAT SERPL: 20.1 (ref 7–25)
BUN/CREAT SERPL: 20.2 (ref 7–25)
BUN/CREAT SERPL: 20.7 (ref 7–25)
BUN/CREAT SERPL: 20.8 (ref 7–25)
BUN/CREAT SERPL: 21.4 (ref 7–25)
BUN/CREAT SERPL: 21.8 (ref 7–25)
BUN/CREAT SERPL: 22.4 (ref 7–25)
BUN/CREAT SERPL: 22.8 (ref 7–25)
BUN/CREAT SERPL: 23.3 (ref 7–25)
BUN/CREAT SERPL: 23.6 (ref 7–25)
BUN/CREAT SERPL: 23.7 (ref 7–25)
BUN/CREAT SERPL: 23.9 (ref 7–25)
BUN/CREAT SERPL: 24.6 (ref 7–25)
BUN/CREAT SERPL: 25.3 (ref 7–25)
BUN/CREAT SERPL: 28.6 (ref 7–25)
BUN/CREAT SERPL: 29.2 (ref 7–25)
BUN/CREAT SERPL: 37.1 (ref 7–25)
BUN/CREAT SERPL: 38 (ref 7–25)
BUN/CREAT SERPL: 39.7 (ref 7–25)
BUN/CREAT SERPL: 7.6 (ref 7–25)
BUN/CREAT SERPL: 9 (ref 7–25)
CALCIUM SPEC-SCNC: 10 MG/DL (ref 8.6–10.5)
CALCIUM SPEC-SCNC: 10.1 MG/DL (ref 8.6–10.5)
CALCIUM SPEC-SCNC: 10.4 MG/DL (ref 8.6–10.5)
CALCIUM SPEC-SCNC: 10.4 MG/DL (ref 8.6–10.5)
CALCIUM SPEC-SCNC: 7.1 MG/DL (ref 8.6–10.5)
CALCIUM SPEC-SCNC: 7.4 MG/DL (ref 8.6–10.5)
CALCIUM SPEC-SCNC: 7.5 MG/DL (ref 8.6–10.5)
CALCIUM SPEC-SCNC: 7.6 MG/DL (ref 8.6–10.5)
CALCIUM SPEC-SCNC: 7.7 MG/DL (ref 8.6–10.5)
CALCIUM SPEC-SCNC: 7.8 MG/DL (ref 8.6–10.5)
CALCIUM SPEC-SCNC: 7.9 MG/DL (ref 8.6–10.5)
CALCIUM SPEC-SCNC: 7.9 MG/DL (ref 8.6–10.5)
CALCIUM SPEC-SCNC: 8 MG/DL (ref 8.6–10.5)
CALCIUM SPEC-SCNC: 8 MG/DL (ref 8.6–10.5)
CALCIUM SPEC-SCNC: 8.1 MG/DL (ref 8.6–10.5)
CALCIUM SPEC-SCNC: 8.2 MG/DL (ref 8.6–10.5)
CALCIUM SPEC-SCNC: 8.2 MG/DL (ref 8.6–10.5)
CALCIUM SPEC-SCNC: 8.3 MG/DL (ref 8.6–10.5)
CALCIUM SPEC-SCNC: 8.3 MG/DL (ref 8.6–10.5)
CALCIUM SPEC-SCNC: 8.5 MG/DL (ref 8.6–10.5)
CALCIUM SPEC-SCNC: 8.6 MG/DL (ref 8.6–10.5)
CALCIUM SPEC-SCNC: 8.7 MG/DL (ref 8.6–10.5)
CALCIUM SPEC-SCNC: 8.8 MG/DL (ref 8.6–10.5)
CALCIUM SPEC-SCNC: 8.9 MG/DL (ref 8.6–10.5)
CALCIUM SPEC-SCNC: 8.9 MG/DL (ref 8.6–10.5)
CALCIUM SPEC-SCNC: 9 MG/DL (ref 8.6–10.5)
CALCIUM SPEC-SCNC: 9 MG/DL (ref 8.6–10.5)
CALCIUM SPEC-SCNC: 9.2 MG/DL (ref 8.6–10.5)
CALCIUM SPEC-SCNC: 9.9 MG/DL (ref 8.6–10.5)
CATHETER CULTURE: ABNORMAL
CHLORIDE SERPL-SCNC: 100 MMOL/L (ref 98–107)
CHLORIDE SERPL-SCNC: 101 MMOL/L (ref 98–107)
CHLORIDE SERPL-SCNC: 102 MMOL/L (ref 98–107)
CHLORIDE SERPL-SCNC: 103 MMOL/L (ref 98–107)
CHLORIDE SERPL-SCNC: 103 MMOL/L (ref 98–107)
CHLORIDE SERPL-SCNC: 104 MMOL/L (ref 98–107)
CHLORIDE SERPL-SCNC: 104 MMOL/L (ref 98–107)
CHLORIDE SERPL-SCNC: 105 MMOL/L (ref 98–107)
CHLORIDE SERPL-SCNC: 105 MMOL/L (ref 98–107)
CHLORIDE SERPL-SCNC: 106 MMOL/L (ref 98–107)
CHLORIDE SERPL-SCNC: 107 MMOL/L (ref 98–107)
CHLORIDE SERPL-SCNC: 108 MMOL/L (ref 98–107)
CHLORIDE SERPL-SCNC: 108 MMOL/L (ref 98–107)
CHLORIDE SERPL-SCNC: 109 MMOL/L (ref 98–107)
CHLORIDE SERPL-SCNC: 109 MMOL/L (ref 98–107)
CHLORIDE SERPL-SCNC: 110 MMOL/L (ref 98–107)
CHLORIDE SERPL-SCNC: 110 MMOL/L (ref 98–107)
CHLORIDE SERPL-SCNC: 111 MMOL/L (ref 98–107)
CHLORIDE SERPL-SCNC: 112 MMOL/L (ref 98–107)
CHLORIDE SERPL-SCNC: 113 MMOL/L (ref 98–107)
CHLORIDE SERPL-SCNC: 115 MMOL/L (ref 98–107)
CHLORIDE SERPL-SCNC: 116 MMOL/L (ref 98–107)
CHLORIDE SERPL-SCNC: 97 MMOL/L (ref 98–107)
CHLORIDE SERPL-SCNC: 98 MMOL/L (ref 98–107)
CHLORIDE SERPL-SCNC: 98 MMOL/L (ref 98–107)
CHLORIDE SERPL-SCNC: 99 MMOL/L (ref 98–107)
CLARITY UR: ABNORMAL
CLARITY UR: CLEAR
CO2 SERPL-SCNC: 16.9 MMOL/L (ref 22–29)
CO2 SERPL-SCNC: 17.8 MMOL/L (ref 22–29)
CO2 SERPL-SCNC: 18.7 MMOL/L (ref 22–29)
CO2 SERPL-SCNC: 18.9 MMOL/L (ref 22–29)
CO2 SERPL-SCNC: 19.2 MMOL/L (ref 22–29)
CO2 SERPL-SCNC: 20.4 MMOL/L (ref 22–29)
CO2 SERPL-SCNC: 20.5 MMOL/L (ref 22–29)
CO2 SERPL-SCNC: 20.6 MMOL/L (ref 22–29)
CO2 SERPL-SCNC: 20.9 MMOL/L (ref 22–29)
CO2 SERPL-SCNC: 21.1 MMOL/L (ref 22–29)
CO2 SERPL-SCNC: 22 MMOL/L (ref 22–29)
CO2 SERPL-SCNC: 22.1 MMOL/L (ref 22–29)
CO2 SERPL-SCNC: 22.2 MMOL/L (ref 22–29)
CO2 SERPL-SCNC: 22.3 MMOL/L (ref 22–29)
CO2 SERPL-SCNC: 22.5 MMOL/L (ref 22–29)
CO2 SERPL-SCNC: 22.8 MMOL/L (ref 22–29)
CO2 SERPL-SCNC: 23.3 MMOL/L (ref 22–29)
CO2 SERPL-SCNC: 23.4 MMOL/L (ref 22–29)
CO2 SERPL-SCNC: 23.8 MMOL/L (ref 22–29)
CO2 SERPL-SCNC: 24 MMOL/L (ref 22–29)
CO2 SERPL-SCNC: 24.1 MMOL/L (ref 22–29)
CO2 SERPL-SCNC: 24.3 MMOL/L (ref 22–29)
CO2 SERPL-SCNC: 24.7 MMOL/L (ref 22–29)
CO2 SERPL-SCNC: 25.5 MMOL/L (ref 22–29)
CO2 SERPL-SCNC: 25.7 MMOL/L (ref 22–29)
CO2 SERPL-SCNC: 26.8 MMOL/L (ref 22–29)
CO2 SERPL-SCNC: 27 MMOL/L (ref 22–29)
CO2 SERPL-SCNC: 27.3 MMOL/L (ref 22–29)
CO2 SERPL-SCNC: 29.1 MMOL/L (ref 22–29)
CO2 SERPL-SCNC: 29.2 MMOL/L (ref 22–29)
CO2 SERPL-SCNC: 29.3 MMOL/L (ref 22–29)
CO2 SERPL-SCNC: 29.3 MMOL/L (ref 22–29)
CO2 SERPL-SCNC: 29.4 MMOL/L (ref 22–29)
CO2 SERPL-SCNC: 30.3 MMOL/L (ref 22–29)
CO2 SERPL-SCNC: 31.2 MMOL/L (ref 22–29)
CO2 SERPL-SCNC: 32.1 MMOL/L (ref 22–29)
CO2 SERPL-SCNC: 33.2 MMOL/L (ref 22–29)
CO2 SERPL-SCNC: 33.5 MMOL/L (ref 22–29)
CO2 SERPL-SCNC: 34 MMOL/L (ref 22–29)
CO2 SERPL-SCNC: 35.8 MMOL/L (ref 22–29)
COLOR UR: YELLOW
CREAT SERPL-MCNC: 0.48 MG/DL (ref 0.57–1)
CREAT SERPL-MCNC: 0.49 MG/DL (ref 0.57–1)
CREAT SERPL-MCNC: 0.49 MG/DL (ref 0.57–1)
CREAT SERPL-MCNC: 0.5 MG/DL (ref 0.57–1)
CREAT SERPL-MCNC: 0.61 MG/DL (ref 0.57–1)
CREAT SERPL-MCNC: 0.62 MG/DL (ref 0.57–1)
CREAT SERPL-MCNC: 0.62 MG/DL (ref 0.57–1)
CREAT SERPL-MCNC: 0.66 MG/DL (ref 0.57–1)
CREAT SERPL-MCNC: 0.67 MG/DL (ref 0.57–1)
CREAT SERPL-MCNC: 0.68 MG/DL (ref 0.57–1)
CREAT SERPL-MCNC: 0.69 MG/DL (ref 0.57–1)
CREAT SERPL-MCNC: 0.7 MG/DL (ref 0.57–1)
CREAT SERPL-MCNC: 0.77 MG/DL (ref 0.57–1)
CREAT SERPL-MCNC: 0.77 MG/DL (ref 0.57–1)
CREAT SERPL-MCNC: 0.78 MG/DL (ref 0.57–1)
CREAT SERPL-MCNC: 0.82 MG/DL (ref 0.57–1)
CREAT SERPL-MCNC: 0.86 MG/DL (ref 0.57–1)
CREAT SERPL-MCNC: 0.89 MG/DL (ref 0.57–1)
CREAT SERPL-MCNC: 0.94 MG/DL (ref 0.57–1)
CREAT SERPL-MCNC: 0.95 MG/DL (ref 0.57–1)
CREAT SERPL-MCNC: 0.97 MG/DL (ref 0.57–1)
CREAT SERPL-MCNC: 1.07 MG/DL (ref 0.57–1)
CREAT SERPL-MCNC: 1.12 MG/DL (ref 0.57–1)
CREAT SERPL-MCNC: 1.14 MG/DL (ref 0.57–1)
CREAT SERPL-MCNC: 1.14 MG/DL (ref 0.57–1)
CREAT SERPL-MCNC: 1.16 MG/DL (ref 0.57–1)
CREAT SERPL-MCNC: 1.22 MG/DL (ref 0.57–1)
CREAT SERPL-MCNC: 1.32 MG/DL (ref 0.57–1)
CREAT SERPL-MCNC: 1.36 MG/DL (ref 0.57–1)
CREAT SERPL-MCNC: 1.37 MG/DL (ref 0.57–1)
CREAT SERPL-MCNC: 1.4 MG/DL (ref 0.57–1)
CREAT SERPL-MCNC: 1.43 MG/DL (ref 0.57–1)
CREAT SERPL-MCNC: 1.46 MG/DL (ref 0.57–1)
CREAT SERPL-MCNC: 1.54 MG/DL (ref 0.57–1)
CREAT SERPL-MCNC: 1.59 MG/DL (ref 0.57–1)
CREAT SERPL-MCNC: 1.63 MG/DL (ref 0.57–1)
CREAT SERPL-MCNC: 1.7 MG/DL (ref 0.57–1)
CREAT SERPL-MCNC: 1.74 MG/DL (ref 0.57–1)
CREAT SERPL-MCNC: 1.78 MG/DL (ref 0.57–1)
CREAT SERPL-MCNC: 1.93 MG/DL (ref 0.57–1)
CROSSMATCH INTERPRETATION: NORMAL
D-LACTATE SERPL-SCNC: 1.1 MMOL/L (ref 0.5–2)
D-LACTATE SERPL-SCNC: 1.3 MMOL/L (ref 0.5–2)
D-LACTATE SERPL-SCNC: 1.6 MMOL/L (ref 0.5–2)
D-LACTATE SERPL-SCNC: 2.1 MMOL/L (ref 0.5–2)
D-LACTATE SERPL-SCNC: 2.3 MMOL/L (ref 0.5–2)
D-LACTATE SERPL-SCNC: 3.1 MMOL/L (ref 0.5–2)
D-LACTATE SERPL-SCNC: 3.5 MMOL/L (ref 0.5–2)
DEPRECATED RDW RBC AUTO: 48.9 FL (ref 37–54)
DEPRECATED RDW RBC AUTO: 49.7 FL (ref 37–54)
DEPRECATED RDW RBC AUTO: 49.9 FL (ref 37–54)
DEPRECATED RDW RBC AUTO: 50.4 FL (ref 37–54)
DEPRECATED RDW RBC AUTO: 50.7 FL (ref 37–54)
DEPRECATED RDW RBC AUTO: 50.9 FL (ref 37–54)
DEPRECATED RDW RBC AUTO: 51.3 FL (ref 37–54)
DEPRECATED RDW RBC AUTO: 51.6 FL (ref 37–54)
DEPRECATED RDW RBC AUTO: 51.9 FL (ref 37–54)
DEPRECATED RDW RBC AUTO: 54.9 FL (ref 37–54)
DEPRECATED RDW RBC AUTO: 63.7 FL (ref 37–54)
DEPRECATED RDW RBC AUTO: 66 FL (ref 37–54)
DEPRECATED RDW RBC AUTO: 68.3 FL (ref 37–54)
DEPRECATED RDW RBC AUTO: 69.2 FL (ref 37–54)
DEPRECATED RDW RBC AUTO: 70.1 FL (ref 37–54)
DEPRECATED RDW RBC AUTO: 70.8 FL (ref 37–54)
DEPRECATED RDW RBC AUTO: 71.2 FL (ref 37–54)
DEPRECATED RDW RBC AUTO: 71.6 FL (ref 37–54)
DEPRECATED RDW RBC AUTO: 73.2 FL (ref 37–54)
DEPRECATED RDW RBC AUTO: 73.4 FL (ref 37–54)
DEPRECATED RDW RBC AUTO: 74.2 FL (ref 37–54)
DEPRECATED RDW RBC AUTO: 74.9 FL (ref 37–54)
DEPRECATED RDW RBC AUTO: 75 FL (ref 37–54)
DEPRECATED RDW RBC AUTO: 77.6 FL (ref 37–54)
DEPRECATED RDW RBC AUTO: 78.8 FL (ref 37–54)
DEPRECATED RDW RBC AUTO: 79.5 FL (ref 37–54)
DEPRECATED RDW RBC AUTO: 79.6 FL (ref 37–54)
DEPRECATED RDW RBC AUTO: 81 FL (ref 37–54)
DEPRECATED RDW RBC AUTO: 82 FL (ref 37–54)
DEPRECATED RDW RBC AUTO: 82.1 FL (ref 37–54)
DEPRECATED RDW RBC AUTO: 83.3 FL (ref 37–54)
DEPRECATED RDW RBC AUTO: 83.8 FL (ref 37–54)
DEPRECATED RDW RBC AUTO: 84.5 FL (ref 37–54)
DEPRECATED RDW RBC AUTO: 85.4 FL (ref 37–54)
DEPRECATED RDW RBC AUTO: 85.6 FL (ref 37–54)
DEPRECATED RDW RBC AUTO: 86.2 FL (ref 37–54)
DEPRECATED RDW RBC AUTO: 89.4 FL (ref 37–54)
DEPRECATED RDW RBC AUTO: 89.7 FL (ref 37–54)
DIGOXIN SERPL-MCNC: 0.53 NG/ML (ref 0.6–1.2)
DIGOXIN SERPL-MCNC: 1.01 NG/ML (ref 0.6–1.2)
DIGOXIN SERPL-MCNC: 1.06 NG/ML (ref 0.6–1.2)
DIGOXIN SERPL-MCNC: 1.16 NG/ML (ref 0.6–1.2)
DIGOXIN SERPL-MCNC: 1.47 NG/ML (ref 0.6–1.2)
DIGOXIN SERPL-MCNC: 1.9 NG/ML (ref 0.6–1.2)
DIGOXIN SERPL-MCNC: 1.92 NG/ML (ref 0.6–1.2)
DIGOXIN SERPL-MCNC: 2.04 NG/ML (ref 0.6–1.2)
DIGOXIN SERPL-MCNC: 2.13 NG/ML (ref 0.6–1.2)
ELLIPTOCYTES BLD QL SMEAR: ABNORMAL
EOSINOPHIL # BLD AUTO: 0.02 10*3/MM3 (ref 0–0.4)
EOSINOPHIL # BLD AUTO: 0.04 10*3/MM3 (ref 0–0.4)
EOSINOPHIL # BLD AUTO: 0.04 10*3/MM3 (ref 0–0.4)
EOSINOPHIL # BLD AUTO: 0.05 10*3/MM3 (ref 0–0.4)
EOSINOPHIL # BLD AUTO: 0.06 10*3/MM3 (ref 0–0.4)
EOSINOPHIL # BLD AUTO: 0.06 10*3/MM3 (ref 0–0.4)
EOSINOPHIL # BLD AUTO: 0.07 10*3/MM3 (ref 0–0.4)
EOSINOPHIL # BLD AUTO: 0.08 10*3/MM3 (ref 0–0.4)
EOSINOPHIL # BLD MANUAL: 0.14 10*3/MM3 (ref 0–0.4)
EOSINOPHIL NFR BLD AUTO: 0.4 % (ref 0.3–6.2)
EOSINOPHIL NFR BLD AUTO: 0.4 % (ref 0.3–6.2)
EOSINOPHIL NFR BLD AUTO: 0.5 % (ref 0.3–6.2)
EOSINOPHIL NFR BLD AUTO: 0.7 % (ref 0.3–6.2)
EOSINOPHIL NFR BLD AUTO: 1.1 % (ref 0.3–6.2)
EOSINOPHIL NFR BLD AUTO: 1.3 % (ref 0.3–6.2)
EOSINOPHIL NFR BLD AUTO: 1.7 % (ref 0.3–6.2)
EOSINOPHIL NFR BLD AUTO: 2.1 % (ref 0.3–6.2)
EOSINOPHIL NFR BLD AUTO: 2.7 % (ref 0.3–6.2)
EOSINOPHIL NFR BLD AUTO: 2.9 % (ref 0.3–6.2)
EOSINOPHIL NFR BLD MANUAL: 4 % (ref 0.3–6.2)
ERYTHROCYTE [DISTWIDTH] IN BLOOD BY AUTOMATED COUNT: 17.1 % (ref 12.3–15.4)
ERYTHROCYTE [DISTWIDTH] IN BLOOD BY AUTOMATED COUNT: 17.8 % (ref 12.3–15.4)
ERYTHROCYTE [DISTWIDTH] IN BLOOD BY AUTOMATED COUNT: 17.8 % (ref 12.3–15.4)
ERYTHROCYTE [DISTWIDTH] IN BLOOD BY AUTOMATED COUNT: 18.6 % (ref 12.3–15.4)
ERYTHROCYTE [DISTWIDTH] IN BLOOD BY AUTOMATED COUNT: 18.7 % (ref 12.3–15.4)
ERYTHROCYTE [DISTWIDTH] IN BLOOD BY AUTOMATED COUNT: 18.8 % (ref 12.3–15.4)
ERYTHROCYTE [DISTWIDTH] IN BLOOD BY AUTOMATED COUNT: 18.8 % (ref 12.3–15.4)
ERYTHROCYTE [DISTWIDTH] IN BLOOD BY AUTOMATED COUNT: 18.9 % (ref 12.3–15.4)
ERYTHROCYTE [DISTWIDTH] IN BLOOD BY AUTOMATED COUNT: 18.9 % (ref 12.3–15.4)
ERYTHROCYTE [DISTWIDTH] IN BLOOD BY AUTOMATED COUNT: 19.4 % (ref 12.3–15.4)
ERYTHROCYTE [DISTWIDTH] IN BLOOD BY AUTOMATED COUNT: 19.5 % (ref 12.3–15.4)
ERYTHROCYTE [DISTWIDTH] IN BLOOD BY AUTOMATED COUNT: 19.6 % (ref 12.3–15.4)
ERYTHROCYTE [DISTWIDTH] IN BLOOD BY AUTOMATED COUNT: 20.3 % (ref 12.3–15.4)
ERYTHROCYTE [DISTWIDTH] IN BLOOD BY AUTOMATED COUNT: 20.4 % (ref 12.3–15.4)
ERYTHROCYTE [DISTWIDTH] IN BLOOD BY AUTOMATED COUNT: 20.5 % (ref 12.3–15.4)
ERYTHROCYTE [DISTWIDTH] IN BLOOD BY AUTOMATED COUNT: 20.6 % (ref 12.3–15.4)
ERYTHROCYTE [DISTWIDTH] IN BLOOD BY AUTOMATED COUNT: 20.7 % (ref 12.3–15.4)
ERYTHROCYTE [DISTWIDTH] IN BLOOD BY AUTOMATED COUNT: 20.8 % (ref 12.3–15.4)
ERYTHROCYTE [DISTWIDTH] IN BLOOD BY AUTOMATED COUNT: 20.9 % (ref 12.3–15.4)
ERYTHROCYTE [DISTWIDTH] IN BLOOD BY AUTOMATED COUNT: 21.2 % (ref 12.3–15.4)
ERYTHROCYTE [DISTWIDTH] IN BLOOD BY AUTOMATED COUNT: 21.2 % (ref 12.3–15.4)
ERYTHROCYTE [DISTWIDTH] IN BLOOD BY AUTOMATED COUNT: 21.6 % (ref 12.3–15.4)
ERYTHROCYTE [DISTWIDTH] IN BLOOD BY AUTOMATED COUNT: 21.7 % (ref 12.3–15.4)
ERYTHROCYTE [DISTWIDTH] IN BLOOD BY AUTOMATED COUNT: 22.4 % (ref 12.3–15.4)
ERYTHROCYTE [DISTWIDTH] IN BLOOD BY AUTOMATED COUNT: 22.8 % (ref 12.3–15.4)
ERYTHROCYTE [DISTWIDTH] IN BLOOD BY AUTOMATED COUNT: 23.1 % (ref 12.3–15.4)
ERYTHROCYTE [DISTWIDTH] IN BLOOD BY AUTOMATED COUNT: 23.5 % (ref 12.3–15.4)
ERYTHROCYTE [DISTWIDTH] IN BLOOD BY AUTOMATED COUNT: 23.9 % (ref 12.3–15.4)
ERYTHROCYTE [DISTWIDTH] IN BLOOD BY AUTOMATED COUNT: 24.5 % (ref 12.3–15.4)
ERYTHROCYTE [DISTWIDTH] IN BLOOD BY AUTOMATED COUNT: 25.7 % (ref 12.3–15.4)
ERYTHROCYTE [DISTWIDTH] IN BLOOD BY AUTOMATED COUNT: 25.8 % (ref 12.3–15.4)
ERYTHROCYTE [DISTWIDTH] IN BLOOD BY AUTOMATED COUNT: 26 % (ref 12.3–15.4)
ERYTHROCYTE [DISTWIDTH] IN BLOOD BY AUTOMATED COUNT: 26.2 % (ref 12.3–15.4)
ERYTHROCYTE [DISTWIDTH] IN BLOOD BY AUTOMATED COUNT: 26.5 % (ref 12.3–15.4)
ERYTHROCYTE [DISTWIDTH] IN BLOOD BY AUTOMATED COUNT: 26.6 % (ref 12.3–15.4)
ERYTHROCYTE [DISTWIDTH] IN BLOOD BY AUTOMATED COUNT: 26.7 % (ref 12.3–15.4)
ERYTHROCYTE [DISTWIDTH] IN BLOOD BY AUTOMATED COUNT: 26.9 % (ref 12.3–15.4)
ERYTHROCYTE [DISTWIDTH] IN BLOOD BY AUTOMATED COUNT: 26.9 % (ref 12.3–15.4)
ERYTHROCYTE [SEDIMENTATION RATE] IN BLOOD: 53 MM/HR (ref 0–20)
FLUAV RNA RESP QL NAA+PROBE: NOT DETECTED
FLUAV RNA RESP QL NAA+PROBE: NOT DETECTED
FLUBV RNA RESP QL NAA+PROBE: NOT DETECTED
FLUBV RNA RESP QL NAA+PROBE: NOT DETECTED
FOLATE SERPL-MCNC: 19 NG/ML (ref 4.78–24.2)
GAS FLOW AIRWAY: 2.5 LPM
GFR SERPL CREATININE-BSD FRML MDRD: 100 ML/MIN/1.73
GFR SERPL CREATININE-BSD FRML MDRD: 101 ML/MIN/1.73
GFR SERPL CREATININE-BSD FRML MDRD: 103 ML/MIN/1.73
GFR SERPL CREATININE-BSD FRML MDRD: 105 ML/MIN/1.73
GFR SERPL CREATININE-BSD FRML MDRD: 107 ML/MIN/1.73
GFR SERPL CREATININE-BSD FRML MDRD: 115 ML/MIN/1.73
GFR SERPL CREATININE-BSD FRML MDRD: 115 ML/MIN/1.73
GFR SERPL CREATININE-BSD FRML MDRD: 117 ML/MIN/1.73
GFR SERPL CREATININE-BSD FRML MDRD: 147 ML/MIN/1.73
GFR SERPL CREATININE-BSD FRML MDRD: 150 ML/MIN/1.73
GFR SERPL CREATININE-BSD FRML MDRD: 150 ML/MIN/1.73
GFR SERPL CREATININE-BSD FRML MDRD: 31 ML/MIN/1.73
GFR SERPL CREATININE-BSD FRML MDRD: 34 ML/MIN/1.73
GFR SERPL CREATININE-BSD FRML MDRD: 35 ML/MIN/1.73
GFR SERPL CREATININE-BSD FRML MDRD: 36 ML/MIN/1.73
GFR SERPL CREATININE-BSD FRML MDRD: 38 ML/MIN/1.73
GFR SERPL CREATININE-BSD FRML MDRD: 39 ML/MIN/1.73
GFR SERPL CREATININE-BSD FRML MDRD: 40 ML/MIN/1.73
GFR SERPL CREATININE-BSD FRML MDRD: 43 ML/MIN/1.73
GFR SERPL CREATININE-BSD FRML MDRD: 44 ML/MIN/1.73
GFR SERPL CREATININE-BSD FRML MDRD: 45 ML/MIN/1.73
GFR SERPL CREATININE-BSD FRML MDRD: 46 ML/MIN/1.73
GFR SERPL CREATININE-BSD FRML MDRD: 46 ML/MIN/1.73
GFR SERPL CREATININE-BSD FRML MDRD: 48 ML/MIN/1.73
GFR SERPL CREATININE-BSD FRML MDRD: 53 ML/MIN/1.73
GFR SERPL CREATININE-BSD FRML MDRD: 56 ML/MIN/1.73
GFR SERPL CREATININE-BSD FRML MDRD: 57 ML/MIN/1.73
GFR SERPL CREATININE-BSD FRML MDRD: 57 ML/MIN/1.73
GFR SERPL CREATININE-BSD FRML MDRD: 58 ML/MIN/1.73
GFR SERPL CREATININE-BSD FRML MDRD: 61 ML/MIN/1.73
GFR SERPL CREATININE-BSD FRML MDRD: 68 ML/MIN/1.73
GFR SERPL CREATININE-BSD FRML MDRD: 70 ML/MIN/1.73
GFR SERPL CREATININE-BSD FRML MDRD: 71 ML/MIN/1.73
GFR SERPL CREATININE-BSD FRML MDRD: 76 ML/MIN/1.73
GFR SERPL CREATININE-BSD FRML MDRD: 79 ML/MIN/1.73
GFR SERPL CREATININE-BSD FRML MDRD: 83 ML/MIN/1.73
GFR SERPL CREATININE-BSD FRML MDRD: 88 ML/MIN/1.73
GFR SERPL CREATININE-BSD FRML MDRD: 89 ML/MIN/1.73
GFR SERPL CREATININE-BSD FRML MDRD: 89 ML/MIN/1.73
GFR SERPL CREATININE-BSD FRML MDRD: >150 ML/MIN/1.73
GLOBULIN UR ELPH-MCNC: 2.8 GM/DL
GLOBULIN UR ELPH-MCNC: 2.8 GM/DL
GLOBULIN UR ELPH-MCNC: 2.9 GM/DL
GLOBULIN UR ELPH-MCNC: 3 GM/DL
GLOBULIN UR ELPH-MCNC: 3.1 GM/DL
GLOBULIN UR ELPH-MCNC: 3.2 GM/DL
GLOBULIN UR ELPH-MCNC: 3.3 GM/DL
GLOBULIN UR ELPH-MCNC: 3.3 GM/DL
GLOBULIN UR ELPH-MCNC: 3.4 GM/DL
GLOBULIN UR ELPH-MCNC: 3.4 GM/DL
GLOBULIN UR ELPH-MCNC: 3.5 GM/DL
GLOBULIN UR ELPH-MCNC: 3.6 GM/DL
GLOBULIN UR ELPH-MCNC: 3.8 GM/DL
GLOBULIN UR ELPH-MCNC: 3.8 GM/DL
GLUCOSE BLDC GLUCOMTR-MCNC: 101 MG/DL (ref 70–130)
GLUCOSE BLDC GLUCOMTR-MCNC: 102 MG/DL (ref 70–130)
GLUCOSE BLDC GLUCOMTR-MCNC: 102 MG/DL (ref 70–130)
GLUCOSE BLDC GLUCOMTR-MCNC: 104 MG/DL (ref 70–130)
GLUCOSE BLDC GLUCOMTR-MCNC: 105 MG/DL (ref 70–130)
GLUCOSE BLDC GLUCOMTR-MCNC: 105 MG/DL (ref 70–130)
GLUCOSE BLDC GLUCOMTR-MCNC: 107 MG/DL (ref 70–130)
GLUCOSE BLDC GLUCOMTR-MCNC: 108 MG/DL (ref 70–130)
GLUCOSE BLDC GLUCOMTR-MCNC: 108 MG/DL (ref 70–130)
GLUCOSE BLDC GLUCOMTR-MCNC: 110 MG/DL (ref 70–130)
GLUCOSE BLDC GLUCOMTR-MCNC: 110 MG/DL (ref 70–130)
GLUCOSE BLDC GLUCOMTR-MCNC: 111 MG/DL (ref 70–130)
GLUCOSE BLDC GLUCOMTR-MCNC: 111 MG/DL (ref 70–130)
GLUCOSE BLDC GLUCOMTR-MCNC: 112 MG/DL (ref 70–130)
GLUCOSE BLDC GLUCOMTR-MCNC: 114 MG/DL (ref 70–130)
GLUCOSE BLDC GLUCOMTR-MCNC: 115 MG/DL (ref 70–130)
GLUCOSE BLDC GLUCOMTR-MCNC: 116 MG/DL (ref 70–130)
GLUCOSE BLDC GLUCOMTR-MCNC: 118 MG/DL (ref 70–130)
GLUCOSE BLDC GLUCOMTR-MCNC: 122 MG/DL (ref 70–130)
GLUCOSE BLDC GLUCOMTR-MCNC: 124 MG/DL (ref 70–130)
GLUCOSE BLDC GLUCOMTR-MCNC: 124 MG/DL (ref 70–130)
GLUCOSE BLDC GLUCOMTR-MCNC: 126 MG/DL (ref 70–130)
GLUCOSE BLDC GLUCOMTR-MCNC: 126 MG/DL (ref 70–130)
GLUCOSE BLDC GLUCOMTR-MCNC: 127 MG/DL (ref 70–130)
GLUCOSE BLDC GLUCOMTR-MCNC: 128 MG/DL (ref 70–130)
GLUCOSE BLDC GLUCOMTR-MCNC: 130 MG/DL (ref 70–130)
GLUCOSE BLDC GLUCOMTR-MCNC: 131 MG/DL (ref 70–130)
GLUCOSE BLDC GLUCOMTR-MCNC: 133 MG/DL (ref 70–130)
GLUCOSE BLDC GLUCOMTR-MCNC: 134 MG/DL (ref 70–130)
GLUCOSE BLDC GLUCOMTR-MCNC: 135 MG/DL (ref 70–130)
GLUCOSE BLDC GLUCOMTR-MCNC: 137 MG/DL (ref 70–130)
GLUCOSE BLDC GLUCOMTR-MCNC: 137 MG/DL (ref 70–130)
GLUCOSE BLDC GLUCOMTR-MCNC: 139 MG/DL (ref 70–130)
GLUCOSE BLDC GLUCOMTR-MCNC: 139 MG/DL (ref 70–130)
GLUCOSE BLDC GLUCOMTR-MCNC: 140 MG/DL (ref 70–130)
GLUCOSE BLDC GLUCOMTR-MCNC: 140 MG/DL (ref 70–130)
GLUCOSE BLDC GLUCOMTR-MCNC: 147 MG/DL (ref 70–130)
GLUCOSE BLDC GLUCOMTR-MCNC: 149 MG/DL (ref 70–130)
GLUCOSE BLDC GLUCOMTR-MCNC: 149 MG/DL (ref 70–130)
GLUCOSE BLDC GLUCOMTR-MCNC: 151 MG/DL (ref 70–130)
GLUCOSE BLDC GLUCOMTR-MCNC: 153 MG/DL (ref 70–130)
GLUCOSE BLDC GLUCOMTR-MCNC: 155 MG/DL (ref 70–130)
GLUCOSE BLDC GLUCOMTR-MCNC: 156 MG/DL (ref 70–130)
GLUCOSE BLDC GLUCOMTR-MCNC: 159 MG/DL (ref 70–130)
GLUCOSE BLDC GLUCOMTR-MCNC: 162 MG/DL (ref 70–130)
GLUCOSE BLDC GLUCOMTR-MCNC: 163 MG/DL (ref 70–130)
GLUCOSE BLDC GLUCOMTR-MCNC: 163 MG/DL (ref 70–130)
GLUCOSE BLDC GLUCOMTR-MCNC: 165 MG/DL (ref 70–130)
GLUCOSE BLDC GLUCOMTR-MCNC: 166 MG/DL (ref 70–130)
GLUCOSE BLDC GLUCOMTR-MCNC: 167 MG/DL (ref 70–130)
GLUCOSE BLDC GLUCOMTR-MCNC: 176 MG/DL (ref 70–130)
GLUCOSE BLDC GLUCOMTR-MCNC: 177 MG/DL (ref 70–130)
GLUCOSE BLDC GLUCOMTR-MCNC: 178 MG/DL (ref 70–130)
GLUCOSE BLDC GLUCOMTR-MCNC: 180 MG/DL (ref 70–130)
GLUCOSE BLDC GLUCOMTR-MCNC: 181 MG/DL (ref 70–130)
GLUCOSE BLDC GLUCOMTR-MCNC: 182 MG/DL (ref 70–130)
GLUCOSE BLDC GLUCOMTR-MCNC: 182 MG/DL (ref 70–130)
GLUCOSE BLDC GLUCOMTR-MCNC: 184 MG/DL (ref 70–130)
GLUCOSE BLDC GLUCOMTR-MCNC: 188 MG/DL (ref 70–130)
GLUCOSE BLDC GLUCOMTR-MCNC: 188 MG/DL (ref 70–130)
GLUCOSE BLDC GLUCOMTR-MCNC: 190 MG/DL (ref 70–130)
GLUCOSE BLDC GLUCOMTR-MCNC: 192 MG/DL (ref 70–130)
GLUCOSE BLDC GLUCOMTR-MCNC: 193 MG/DL (ref 70–130)
GLUCOSE BLDC GLUCOMTR-MCNC: 196 MG/DL (ref 70–130)
GLUCOSE BLDC GLUCOMTR-MCNC: 197 MG/DL (ref 70–130)
GLUCOSE BLDC GLUCOMTR-MCNC: 202 MG/DL (ref 70–130)
GLUCOSE BLDC GLUCOMTR-MCNC: 206 MG/DL (ref 70–130)
GLUCOSE BLDC GLUCOMTR-MCNC: 210 MG/DL (ref 70–130)
GLUCOSE BLDC GLUCOMTR-MCNC: 232 MG/DL (ref 70–130)
GLUCOSE BLDC GLUCOMTR-MCNC: 238 MG/DL (ref 70–130)
GLUCOSE BLDC GLUCOMTR-MCNC: 26 MG/DL (ref 70–130)
GLUCOSE BLDC GLUCOMTR-MCNC: 33 MG/DL (ref 70–130)
GLUCOSE BLDC GLUCOMTR-MCNC: 35 MG/DL (ref 70–130)
GLUCOSE BLDC GLUCOMTR-MCNC: 42 MG/DL (ref 70–130)
GLUCOSE BLDC GLUCOMTR-MCNC: 42 MG/DL (ref 70–130)
GLUCOSE BLDC GLUCOMTR-MCNC: 43 MG/DL (ref 70–130)
GLUCOSE BLDC GLUCOMTR-MCNC: 44 MG/DL (ref 70–130)
GLUCOSE BLDC GLUCOMTR-MCNC: 44 MG/DL (ref 70–130)
GLUCOSE BLDC GLUCOMTR-MCNC: 45 MG/DL (ref 70–130)
GLUCOSE BLDC GLUCOMTR-MCNC: 46 MG/DL (ref 70–130)
GLUCOSE BLDC GLUCOMTR-MCNC: 47 MG/DL (ref 70–130)
GLUCOSE BLDC GLUCOMTR-MCNC: 49 MG/DL (ref 70–130)
GLUCOSE BLDC GLUCOMTR-MCNC: 51 MG/DL (ref 70–130)
GLUCOSE BLDC GLUCOMTR-MCNC: 53 MG/DL (ref 70–130)
GLUCOSE BLDC GLUCOMTR-MCNC: 54 MG/DL (ref 70–130)
GLUCOSE BLDC GLUCOMTR-MCNC: 59 MG/DL (ref 70–130)
GLUCOSE BLDC GLUCOMTR-MCNC: 62 MG/DL (ref 70–130)
GLUCOSE BLDC GLUCOMTR-MCNC: 62 MG/DL (ref 70–130)
GLUCOSE BLDC GLUCOMTR-MCNC: 66 MG/DL (ref 70–130)
GLUCOSE BLDC GLUCOMTR-MCNC: 68 MG/DL (ref 70–130)
GLUCOSE BLDC GLUCOMTR-MCNC: 69 MG/DL (ref 70–130)
GLUCOSE BLDC GLUCOMTR-MCNC: 70 MG/DL (ref 70–130)
GLUCOSE BLDC GLUCOMTR-MCNC: 70 MG/DL (ref 70–130)
GLUCOSE BLDC GLUCOMTR-MCNC: 71 MG/DL (ref 70–130)
GLUCOSE BLDC GLUCOMTR-MCNC: 71 MG/DL (ref 70–130)
GLUCOSE BLDC GLUCOMTR-MCNC: 72 MG/DL (ref 70–130)
GLUCOSE BLDC GLUCOMTR-MCNC: 73 MG/DL (ref 70–130)
GLUCOSE BLDC GLUCOMTR-MCNC: 74 MG/DL (ref 70–130)
GLUCOSE BLDC GLUCOMTR-MCNC: 76 MG/DL (ref 70–130)
GLUCOSE BLDC GLUCOMTR-MCNC: 76 MG/DL (ref 70–130)
GLUCOSE BLDC GLUCOMTR-MCNC: 78 MG/DL (ref 70–130)
GLUCOSE BLDC GLUCOMTR-MCNC: 78 MG/DL (ref 70–130)
GLUCOSE BLDC GLUCOMTR-MCNC: 79 MG/DL (ref 70–130)
GLUCOSE BLDC GLUCOMTR-MCNC: 80 MG/DL (ref 70–130)
GLUCOSE BLDC GLUCOMTR-MCNC: 83 MG/DL (ref 70–130)
GLUCOSE BLDC GLUCOMTR-MCNC: 83 MG/DL (ref 70–130)
GLUCOSE BLDC GLUCOMTR-MCNC: 85 MG/DL (ref 70–130)
GLUCOSE BLDC GLUCOMTR-MCNC: 85 MG/DL (ref 70–130)
GLUCOSE BLDC GLUCOMTR-MCNC: 86 MG/DL (ref 70–130)
GLUCOSE BLDC GLUCOMTR-MCNC: 87 MG/DL (ref 70–130)
GLUCOSE BLDC GLUCOMTR-MCNC: 87 MG/DL (ref 70–130)
GLUCOSE BLDC GLUCOMTR-MCNC: 88 MG/DL (ref 70–130)
GLUCOSE BLDC GLUCOMTR-MCNC: 88 MG/DL (ref 70–130)
GLUCOSE BLDC GLUCOMTR-MCNC: 92 MG/DL (ref 70–130)
GLUCOSE BLDC GLUCOMTR-MCNC: 94 MG/DL (ref 70–130)
GLUCOSE BLDC GLUCOMTR-MCNC: 95 MG/DL (ref 70–130)
GLUCOSE BLDC GLUCOMTR-MCNC: 96 MG/DL (ref 70–130)
GLUCOSE BLDC GLUCOMTR-MCNC: 97 MG/DL (ref 70–130)
GLUCOSE BLDC GLUCOMTR-MCNC: 97 MG/DL (ref 70–130)
GLUCOSE BLDC GLUCOMTR-MCNC: 98 MG/DL (ref 70–130)
GLUCOSE BLDC GLUCOMTR-MCNC: 99 MG/DL (ref 70–130)
GLUCOSE SERPL-MCNC: 103 MG/DL (ref 65–99)
GLUCOSE SERPL-MCNC: 104 MG/DL (ref 65–99)
GLUCOSE SERPL-MCNC: 104 MG/DL (ref 65–99)
GLUCOSE SERPL-MCNC: 106 MG/DL (ref 65–99)
GLUCOSE SERPL-MCNC: 107 MG/DL (ref 65–99)
GLUCOSE SERPL-MCNC: 107 MG/DL (ref 65–99)
GLUCOSE SERPL-MCNC: 110 MG/DL (ref 65–99)
GLUCOSE SERPL-MCNC: 113 MG/DL (ref 65–99)
GLUCOSE SERPL-MCNC: 113 MG/DL (ref 65–99)
GLUCOSE SERPL-MCNC: 118 MG/DL (ref 65–99)
GLUCOSE SERPL-MCNC: 120 MG/DL (ref 65–99)
GLUCOSE SERPL-MCNC: 124 MG/DL (ref 65–99)
GLUCOSE SERPL-MCNC: 124 MG/DL (ref 65–99)
GLUCOSE SERPL-MCNC: 126 MG/DL (ref 65–99)
GLUCOSE SERPL-MCNC: 127 MG/DL (ref 65–99)
GLUCOSE SERPL-MCNC: 128 MG/DL (ref 65–99)
GLUCOSE SERPL-MCNC: 131 MG/DL (ref 65–99)
GLUCOSE SERPL-MCNC: 132 MG/DL (ref 65–99)
GLUCOSE SERPL-MCNC: 135 MG/DL (ref 65–99)
GLUCOSE SERPL-MCNC: 141 MG/DL (ref 65–99)
GLUCOSE SERPL-MCNC: 142 MG/DL (ref 65–99)
GLUCOSE SERPL-MCNC: 163 MG/DL (ref 65–99)
GLUCOSE SERPL-MCNC: 184 MG/DL (ref 65–99)
GLUCOSE SERPL-MCNC: 209 MG/DL (ref 65–99)
GLUCOSE SERPL-MCNC: 213 MG/DL (ref 65–99)
GLUCOSE SERPL-MCNC: 246 MG/DL (ref 65–99)
GLUCOSE SERPL-MCNC: 261 MG/DL (ref 65–99)
GLUCOSE SERPL-MCNC: 48 MG/DL (ref 65–99)
GLUCOSE SERPL-MCNC: 63 MG/DL (ref 65–99)
GLUCOSE SERPL-MCNC: 68 MG/DL (ref 65–99)
GLUCOSE SERPL-MCNC: 69 MG/DL (ref 65–99)
GLUCOSE SERPL-MCNC: 70 MG/DL (ref 65–99)
GLUCOSE SERPL-MCNC: 75 MG/DL (ref 65–99)
GLUCOSE SERPL-MCNC: 79 MG/DL (ref 65–99)
GLUCOSE SERPL-MCNC: 82 MG/DL (ref 65–99)
GLUCOSE SERPL-MCNC: 83 MG/DL (ref 65–99)
GLUCOSE SERPL-MCNC: 83 MG/DL (ref 65–99)
GLUCOSE SERPL-MCNC: 84 MG/DL (ref 65–99)
GLUCOSE SERPL-MCNC: 85 MG/DL (ref 65–99)
GLUCOSE SERPL-MCNC: 89 MG/DL (ref 65–99)
GLUCOSE UR STRIP-MCNC: ABNORMAL MG/DL
GLUCOSE UR STRIP-MCNC: NEGATIVE MG/DL
GRAM STN SPEC: ABNORMAL
HCO3 BLDA-SCNC: 21.1 MMOL/L (ref 20–26)
HCO3 BLDA-SCNC: 22.5 MMOL/L (ref 20–26)
HCT VFR BLD AUTO: 25.5 % (ref 34–46.6)
HCT VFR BLD AUTO: 25.7 % (ref 34–46.6)
HCT VFR BLD AUTO: 25.8 % (ref 34–46.6)
HCT VFR BLD AUTO: 26.5 % (ref 34–46.6)
HCT VFR BLD AUTO: 26.8 % (ref 34–46.6)
HCT VFR BLD AUTO: 27.3 % (ref 34–46.6)
HCT VFR BLD AUTO: 27.9 % (ref 34–46.6)
HCT VFR BLD AUTO: 28.1 % (ref 34–46.6)
HCT VFR BLD AUTO: 28.2 % (ref 34–46.6)
HCT VFR BLD AUTO: 28.2 % (ref 34–46.6)
HCT VFR BLD AUTO: 28.4 % (ref 34–46.6)
HCT VFR BLD AUTO: 28.8 % (ref 34–46.6)
HCT VFR BLD AUTO: 28.8 % (ref 34–46.6)
HCT VFR BLD AUTO: 29.7 % (ref 34–46.6)
HCT VFR BLD AUTO: 29.8 % (ref 34–46.6)
HCT VFR BLD AUTO: 30 % (ref 34–46.6)
HCT VFR BLD AUTO: 30.1 % (ref 34–46.6)
HCT VFR BLD AUTO: 30.2 % (ref 34–46.6)
HCT VFR BLD AUTO: 30.3 % (ref 34–46.6)
HCT VFR BLD AUTO: 30.4 % (ref 34–46.6)
HCT VFR BLD AUTO: 30.5 % (ref 34–46.6)
HCT VFR BLD AUTO: 30.6 % (ref 34–46.6)
HCT VFR BLD AUTO: 30.7 % (ref 34–46.6)
HCT VFR BLD AUTO: 31.4 % (ref 34–46.6)
HCT VFR BLD AUTO: 31.4 % (ref 34–46.6)
HCT VFR BLD AUTO: 31.5 % (ref 34–46.6)
HCT VFR BLD AUTO: 31.6 % (ref 34–46.6)
HCT VFR BLD AUTO: 32.3 % (ref 34–46.6)
HCT VFR BLD AUTO: 32.4 % (ref 34–46.6)
HCT VFR BLD AUTO: 32.5 % (ref 34–46.6)
HCT VFR BLD AUTO: 32.8 % (ref 34–46.6)
HCT VFR BLD AUTO: 32.8 % (ref 34–46.6)
HCT VFR BLD AUTO: 33.1 % (ref 34–46.6)
HCT VFR BLD AUTO: 33.3 % (ref 34–46.6)
HCT VFR BLD AUTO: 33.4 % (ref 34–46.6)
HCT VFR BLD AUTO: 37 % (ref 34–46.6)
HCT VFR BLD AUTO: 38.6 % (ref 34–46.6)
HCT VFR BLD AUTO: 39.3 % (ref 34–46.6)
HGB BLD-MCNC: 10 G/DL (ref 12–15.9)
HGB BLD-MCNC: 10.1 G/DL (ref 12–15.9)
HGB BLD-MCNC: 10.1 G/DL (ref 12–15.9)
HGB BLD-MCNC: 10.2 G/DL (ref 12–15.9)
HGB BLD-MCNC: 10.4 G/DL (ref 12–15.9)
HGB BLD-MCNC: 10.5 G/DL (ref 12–15.9)
HGB BLD-MCNC: 10.5 G/DL (ref 12–15.9)
HGB BLD-MCNC: 10.6 G/DL (ref 12–15.9)
HGB BLD-MCNC: 11.9 G/DL (ref 12–15.9)
HGB BLD-MCNC: 12 G/DL (ref 12–15.9)
HGB BLD-MCNC: 12.3 G/DL (ref 12–15.9)
HGB BLD-MCNC: 7.9 G/DL (ref 12–15.9)
HGB BLD-MCNC: 8 G/DL (ref 12–15.9)
HGB BLD-MCNC: 8.2 G/DL (ref 12–15.9)
HGB BLD-MCNC: 8.5 G/DL (ref 12–15.9)
HGB BLD-MCNC: 8.5 G/DL (ref 12–15.9)
HGB BLD-MCNC: 8.6 G/DL (ref 12–15.9)
HGB BLD-MCNC: 8.8 G/DL (ref 12–15.9)
HGB BLD-MCNC: 8.9 G/DL (ref 12–15.9)
HGB BLD-MCNC: 9 G/DL (ref 12–15.9)
HGB BLD-MCNC: 9 G/DL (ref 12–15.9)
HGB BLD-MCNC: 9.1 G/DL (ref 12–15.9)
HGB BLD-MCNC: 9.2 G/DL (ref 12–15.9)
HGB BLD-MCNC: 9.3 G/DL (ref 12–15.9)
HGB BLD-MCNC: 9.4 G/DL (ref 12–15.9)
HGB BLD-MCNC: 9.4 G/DL (ref 12–15.9)
HGB BLD-MCNC: 9.5 G/DL (ref 12–15.9)
HGB BLD-MCNC: 9.6 G/DL (ref 12–15.9)
HGB BLD-MCNC: 9.7 G/DL (ref 12–15.9)
HGB BLD-MCNC: 9.8 G/DL (ref 12–15.9)
HGB BLD-MCNC: 9.8 G/DL (ref 12–15.9)
HGB BLD-MCNC: 9.9 G/DL (ref 12–15.9)
HGB BLD-MCNC: 9.9 G/DL (ref 12–15.9)
HGB BLDA-MCNC: 8.5 G/DL (ref 13.5–17.5)
HGB BLDA-MCNC: 8.8 G/DL (ref 13.5–17.5)
HGB UR QL STRIP.AUTO: ABNORMAL
HGB UR QL STRIP.AUTO: NEGATIVE
HOLD SPECIMEN: NORMAL
HOLD SPECIMEN: NORMAL
HYALINE CASTS UR QL AUTO: ABNORMAL /LPF
IMM GRANULOCYTES # BLD AUTO: 0 10*3/MM3 (ref 0–0.05)
IMM GRANULOCYTES # BLD AUTO: 0 10*3/MM3 (ref 0–0.05)
IMM GRANULOCYTES # BLD AUTO: 0.01 10*3/MM3 (ref 0–0.05)
IMM GRANULOCYTES # BLD AUTO: 0.01 10*3/MM3 (ref 0–0.05)
IMM GRANULOCYTES # BLD AUTO: 0.02 10*3/MM3 (ref 0–0.05)
IMM GRANULOCYTES # BLD AUTO: 0.05 10*3/MM3 (ref 0–0.05)
IMM GRANULOCYTES NFR BLD AUTO: 0 % (ref 0–0.5)
IMM GRANULOCYTES NFR BLD AUTO: 0 % (ref 0–0.5)
IMM GRANULOCYTES NFR BLD AUTO: 0.2 % (ref 0–0.5)
IMM GRANULOCYTES NFR BLD AUTO: 0.3 % (ref 0–0.5)
IMM GRANULOCYTES NFR BLD AUTO: 0.3 % (ref 0–0.5)
IMM GRANULOCYTES NFR BLD AUTO: 0.4 % (ref 0–0.5)
IMM GRANULOCYTES NFR BLD AUTO: 0.6 % (ref 0–0.5)
IMM GRANULOCYTES NFR BLD AUTO: 0.6 % (ref 0–0.5)
IMM GRANULOCYTES NFR BLD AUTO: 1 % (ref 0–0.5)
INHALED O2 CONCENTRATION: 50 %
INR PPP: 1.73 (ref 0.9–1.1)
INR PPP: 2.11 (ref 0.9–1.1)
IRON 24H UR-MRATE: 47 MCG/DL (ref 37–145)
IRON SATN MFR SERPL: 16 % (ref 20–50)
ISOLATED FROM: ABNORMAL
KETONES UR QL STRIP: ABNORMAL
KETONES UR QL STRIP: ABNORMAL
KETONES UR QL STRIP: NEGATIVE
LARGE PLATELETS: ABNORMAL
LARGE PLATELETS: NORMAL
LEFT ATRIUM VOLUME INDEX: 76 ML/M2
LEUKOCYTE ESTERASE UR QL STRIP.AUTO: ABNORMAL
LEUKOCYTE ESTERASE UR QL STRIP.AUTO: NEGATIVE
LIPASE SERPL-CCNC: 41 U/L (ref 13–60)
LIPASE SERPL-CCNC: 49 U/L (ref 13–60)
LV EF 2D ECHO EST: 55 %
LYMPHOCYTES # BLD AUTO: 0.37 10*3/MM3 (ref 0.7–3.1)
LYMPHOCYTES # BLD AUTO: 0.51 10*3/MM3 (ref 0.7–3.1)
LYMPHOCYTES # BLD AUTO: 0.52 10*3/MM3 (ref 0.7–3.1)
LYMPHOCYTES # BLD AUTO: 0.54 10*3/MM3 (ref 0.7–3.1)
LYMPHOCYTES # BLD AUTO: 0.57 10*3/MM3 (ref 0.7–3.1)
LYMPHOCYTES # BLD AUTO: 0.73 10*3/MM3 (ref 0.7–3.1)
LYMPHOCYTES # BLD AUTO: 0.94 10*3/MM3 (ref 0.7–3.1)
LYMPHOCYTES # BLD AUTO: 0.97 10*3/MM3 (ref 0.7–3.1)
LYMPHOCYTES # BLD AUTO: 0.99 10*3/MM3 (ref 0.7–3.1)
LYMPHOCYTES # BLD AUTO: 1 10*3/MM3 (ref 0.7–3.1)
LYMPHOCYTES # BLD MANUAL: 0.52 10*3/MM3 (ref 0.7–3.1)
LYMPHOCYTES # BLD MANUAL: 0.56 10*3/MM3 (ref 0.7–3.1)
LYMPHOCYTES NFR BLD AUTO: 14.9 % (ref 19.6–45.3)
LYMPHOCYTES NFR BLD AUTO: 16.1 % (ref 19.6–45.3)
LYMPHOCYTES NFR BLD AUTO: 17 % (ref 19.6–45.3)
LYMPHOCYTES NFR BLD AUTO: 17.6 % (ref 19.6–45.3)
LYMPHOCYTES NFR BLD AUTO: 17.6 % (ref 19.6–45.3)
LYMPHOCYTES NFR BLD AUTO: 19.7 % (ref 19.6–45.3)
LYMPHOCYTES NFR BLD AUTO: 21.2 % (ref 19.6–45.3)
LYMPHOCYTES NFR BLD AUTO: 22.5 % (ref 19.6–45.3)
LYMPHOCYTES NFR BLD AUTO: 23.9 % (ref 19.6–45.3)
LYMPHOCYTES NFR BLD AUTO: 9.7 % (ref 19.6–45.3)
LYMPHOCYTES NFR BLD MANUAL: 10 % (ref 5–12)
LYMPHOCYTES NFR BLD MANUAL: 16 % (ref 5–12)
Lab: ABNORMAL
Lab: ABNORMAL
MACROCYTES BLD QL SMEAR: NORMAL
MAGNESIUM SERPL-MCNC: 1.4 MG/DL (ref 1.6–2.4)
MAXIMAL PREDICTED HEART RATE: 148 BPM
MCH RBC QN AUTO: 25.2 PG (ref 26.6–33)
MCH RBC QN AUTO: 25.4 PG (ref 26.6–33)
MCH RBC QN AUTO: 25.4 PG (ref 26.6–33)
MCH RBC QN AUTO: 25.5 PG (ref 26.6–33)
MCH RBC QN AUTO: 25.5 PG (ref 26.6–33)
MCH RBC QN AUTO: 25.6 PG (ref 26.6–33)
MCH RBC QN AUTO: 25.6 PG (ref 26.6–33)
MCH RBC QN AUTO: 25.7 PG (ref 26.6–33)
MCH RBC QN AUTO: 25.8 PG (ref 26.6–33)
MCH RBC QN AUTO: 25.8 PG (ref 26.6–33)
MCH RBC QN AUTO: 28 PG (ref 26.6–33)
MCH RBC QN AUTO: 28.2 PG (ref 26.6–33)
MCH RBC QN AUTO: 28.4 PG (ref 26.6–33)
MCH RBC QN AUTO: 28.4 PG (ref 26.6–33)
MCH RBC QN AUTO: 28.6 PG (ref 26.6–33)
MCH RBC QN AUTO: 28.7 PG (ref 26.6–33)
MCH RBC QN AUTO: 28.8 PG (ref 26.6–33)
MCH RBC QN AUTO: 28.9 PG (ref 26.6–33)
MCH RBC QN AUTO: 28.9 PG (ref 26.6–33)
MCH RBC QN AUTO: 29 PG (ref 26.6–33)
MCH RBC QN AUTO: 29.3 PG (ref 26.6–33)
MCH RBC QN AUTO: 29.4 PG (ref 26.6–33)
MCH RBC QN AUTO: 29.5 PG (ref 26.6–33)
MCH RBC QN AUTO: 29.6 PG (ref 26.6–33)
MCH RBC QN AUTO: 29.7 PG (ref 26.6–33)
MCH RBC QN AUTO: 29.8 PG (ref 26.6–33)
MCH RBC QN AUTO: 29.9 PG (ref 26.6–33)
MCH RBC QN AUTO: 30 PG (ref 26.6–33)
MCH RBC QN AUTO: 30.1 PG (ref 26.6–33)
MCH RBC QN AUTO: 30.4 PG (ref 26.6–33)
MCHC RBC AUTO-ENTMCNC: 30 G/DL (ref 31.5–35.7)
MCHC RBC AUTO-ENTMCNC: 30.2 G/DL (ref 31.5–35.7)
MCHC RBC AUTO-ENTMCNC: 30.2 G/DL (ref 31.5–35.7)
MCHC RBC AUTO-ENTMCNC: 30.6 G/DL (ref 31.5–35.7)
MCHC RBC AUTO-ENTMCNC: 30.7 G/DL (ref 31.5–35.7)
MCHC RBC AUTO-ENTMCNC: 31 G/DL (ref 31.5–35.7)
MCHC RBC AUTO-ENTMCNC: 31.1 G/DL (ref 31.5–35.7)
MCHC RBC AUTO-ENTMCNC: 31.2 G/DL (ref 31.5–35.7)
MCHC RBC AUTO-ENTMCNC: 31.2 G/DL (ref 31.5–35.7)
MCHC RBC AUTO-ENTMCNC: 31.3 G/DL (ref 31.5–35.7)
MCHC RBC AUTO-ENTMCNC: 31.5 G/DL (ref 31.5–35.7)
MCHC RBC AUTO-ENTMCNC: 31.6 G/DL (ref 31.5–35.7)
MCHC RBC AUTO-ENTMCNC: 31.7 G/DL (ref 31.5–35.7)
MCHC RBC AUTO-ENTMCNC: 31.8 G/DL (ref 31.5–35.7)
MCHC RBC AUTO-ENTMCNC: 31.8 G/DL (ref 31.5–35.7)
MCHC RBC AUTO-ENTMCNC: 31.9 G/DL (ref 31.5–35.7)
MCHC RBC AUTO-ENTMCNC: 32 G/DL (ref 31.5–35.7)
MCHC RBC AUTO-ENTMCNC: 32.2 G/DL (ref 31.5–35.7)
MCHC RBC AUTO-ENTMCNC: 32.2 G/DL (ref 31.5–35.7)
MCHC RBC AUTO-ENTMCNC: 32.3 G/DL (ref 31.5–35.7)
MCHC RBC AUTO-ENTMCNC: 32.3 G/DL (ref 31.5–35.7)
MCHC RBC AUTO-ENTMCNC: 32.5 G/DL (ref 31.5–35.7)
MCHC RBC AUTO-ENTMCNC: 32.7 G/DL (ref 31.5–35.7)
MCHC RBC AUTO-ENTMCNC: 32.8 G/DL (ref 31.5–35.7)
MCHC RBC AUTO-ENTMCNC: 32.9 G/DL (ref 31.5–35.7)
MCHC RBC AUTO-ENTMCNC: 33 G/DL (ref 31.5–35.7)
MCV RBC AUTO: 77.2 FL (ref 79–97)
MCV RBC AUTO: 78 FL (ref 79–97)
MCV RBC AUTO: 78.2 FL (ref 79–97)
MCV RBC AUTO: 78.5 FL (ref 79–97)
MCV RBC AUTO: 78.9 FL (ref 79–97)
MCV RBC AUTO: 79.3 FL (ref 79–97)
MCV RBC AUTO: 80.1 FL (ref 79–97)
MCV RBC AUTO: 80.9 FL (ref 79–97)
MCV RBC AUTO: 81.4 FL (ref 79–97)
MCV RBC AUTO: 82.3 FL (ref 79–97)
MCV RBC AUTO: 87.5 FL (ref 79–97)
MCV RBC AUTO: 89 FL (ref 79–97)
MCV RBC AUTO: 89.7 FL (ref 79–97)
MCV RBC AUTO: 90.1 FL (ref 79–97)
MCV RBC AUTO: 90.4 FL (ref 79–97)
MCV RBC AUTO: 91.7 FL (ref 79–97)
MCV RBC AUTO: 92.5 FL (ref 79–97)
MCV RBC AUTO: 93.1 FL (ref 79–97)
MCV RBC AUTO: 93.2 FL (ref 79–97)
MCV RBC AUTO: 93.4 FL (ref 79–97)
MCV RBC AUTO: 93.5 FL (ref 79–97)
MCV RBC AUTO: 93.5 FL (ref 79–97)
MCV RBC AUTO: 93.7 FL (ref 79–97)
MCV RBC AUTO: 94.1 FL (ref 79–97)
MCV RBC AUTO: 94.3 FL (ref 79–97)
MCV RBC AUTO: 94.3 FL (ref 79–97)
MCV RBC AUTO: 94.4 FL (ref 79–97)
MCV RBC AUTO: 94.5 FL (ref 79–97)
MCV RBC AUTO: 94.6 FL (ref 79–97)
MCV RBC AUTO: 94.9 FL (ref 79–97)
MCV RBC AUTO: 95.3 FL (ref 79–97)
MCV RBC AUTO: 95.4 FL (ref 79–97)
MCV RBC AUTO: 95.4 FL (ref 79–97)
MCV RBC AUTO: 98.5 FL (ref 79–97)
MODALITY: ABNORMAL
MODALITY: ABNORMAL
MONOCYTES # BLD AUTO: 0.2 10*3/MM3 (ref 0.1–0.9)
MONOCYTES # BLD AUTO: 0.21 10*3/MM3 (ref 0.1–0.9)
MONOCYTES # BLD AUTO: 0.28 10*3/MM3 (ref 0.1–0.9)
MONOCYTES # BLD AUTO: 0.33 10*3/MM3 (ref 0.1–0.9)
MONOCYTES # BLD AUTO: 0.34 10*3/MM3 (ref 0.1–0.9)
MONOCYTES # BLD AUTO: 0.34 10*3/MM3 (ref 0.1–0.9)
MONOCYTES # BLD AUTO: 0.37 10*3/MM3 (ref 0.1–0.9)
MONOCYTES # BLD AUTO: 0.41 10*3/MM3 (ref 0.1–0.9)
MONOCYTES # BLD AUTO: 0.48 10*3/MM3 (ref 0.1–0.9)
MONOCYTES # BLD AUTO: 0.52 10*3/MM3 (ref 0.1–0.9)
MONOCYTES # BLD: 0.35 10*3/MM3 (ref 0.1–0.9)
MONOCYTES # BLD: 0.38 10*3/MM3 (ref 0.1–0.9)
MONOCYTES NFR BLD AUTO: 11.7 % (ref 5–12)
MONOCYTES NFR BLD AUTO: 13.9 % (ref 5–12)
MONOCYTES NFR BLD AUTO: 5.2 % (ref 5–12)
MONOCYTES NFR BLD AUTO: 6.2 % (ref 5–12)
MONOCYTES NFR BLD AUTO: 7.8 % (ref 5–12)
MONOCYTES NFR BLD AUTO: 8.4 % (ref 5–12)
MONOCYTES NFR BLD AUTO: 8.5 % (ref 5–12)
MONOCYTES NFR BLD AUTO: 8.8 % (ref 5–12)
MONOCYTES NFR BLD AUTO: 9.3 % (ref 5–12)
MONOCYTES NFR BLD AUTO: 9.9 % (ref 5–12)
NEUTROPHILS # BLD AUTO: 1.48 10*3/MM3 (ref 1.7–7)
NEUTROPHILS # BLD AUTO: 2.46 10*3/MM3 (ref 1.7–7)
NEUTROPHILS NFR BLD AUTO: 1.4 10*3/MM3 (ref 1.7–7)
NEUTROPHILS NFR BLD AUTO: 1.59 10*3/MM3 (ref 1.7–7)
NEUTROPHILS NFR BLD AUTO: 2.13 10*3/MM3 (ref 1.7–7)
NEUTROPHILS NFR BLD AUTO: 2.26 10*3/MM3 (ref 1.7–7)
NEUTROPHILS NFR BLD AUTO: 2.49 10*3/MM3 (ref 1.7–7)
NEUTROPHILS NFR BLD AUTO: 3.23 10*3/MM3 (ref 1.7–7)
NEUTROPHILS NFR BLD AUTO: 3.32 10*3/MM3 (ref 1.7–7)
NEUTROPHILS NFR BLD AUTO: 4.08 10*3/MM3 (ref 1.7–7)
NEUTROPHILS NFR BLD AUTO: 4.15 10*3/MM3 (ref 1.7–7)
NEUTROPHILS NFR BLD AUTO: 4.55 10*3/MM3 (ref 1.7–7)
NEUTROPHILS NFR BLD AUTO: 58.9 % (ref 42.7–76)
NEUTROPHILS NFR BLD AUTO: 65.7 % (ref 42.7–76)
NEUTROPHILS NFR BLD AUTO: 66.2 % (ref 42.7–76)
NEUTROPHILS NFR BLD AUTO: 69.6 % (ref 42.7–76)
NEUTROPHILS NFR BLD AUTO: 71 % (ref 42.7–76)
NEUTROPHILS NFR BLD AUTO: 71.1 % (ref 42.7–76)
NEUTROPHILS NFR BLD AUTO: 72.8 % (ref 42.7–76)
NEUTROPHILS NFR BLD AUTO: 73.9 % (ref 42.7–76)
NEUTROPHILS NFR BLD AUTO: 74.1 % (ref 42.7–76)
NEUTROPHILS NFR BLD AUTO: 84.3 % (ref 42.7–76)
NEUTROPHILS NFR BLD MANUAL: 62 % (ref 42.7–76)
NEUTROPHILS NFR BLD MANUAL: 70 % (ref 42.7–76)
NITRITE UR QL STRIP: NEGATIVE
NRBC BLD AUTO-RTO: 0 /100 WBC (ref 0–0.2)
NRBC BLD AUTO-RTO: 0.6 /100 WBC (ref 0–0.2)
NRBC BLD AUTO-RTO: 0.7 /100 WBC (ref 0–0.2)
NRBC BLD AUTO-RTO: 1.3 /100 WBC (ref 0–0.2)
NRBC SPEC MANUAL: 4 /100 WBC (ref 0–0.2)
NT-PROBNP SERPL-MCNC: 1500 PG/ML (ref 0–900)
NT-PROBNP SERPL-MCNC: 1904 PG/ML (ref 0–900)
NT-PROBNP SERPL-MCNC: 3575 PG/ML (ref 0–900)
PAW @ PEAK INSP FLOW SETTING VENT: 26 CMH2O
PCO2 BLDA: 23.9 MM HG (ref 35–45)
PCO2 BLDA: 33.7 MM HG (ref 35–45)
PCO2 TEMP ADJ BLD: 23.9 MM HG (ref 35–45)
PCO2 TEMP ADJ BLD: 33.7 MM HG (ref 35–45)
PEEP RESPIRATORY: 5 CM[H2O]
PH BLDA: 7.43 PH UNITS (ref 7.35–7.45)
PH BLDA: 7.55 PH UNITS (ref 7.35–7.45)
PH UR STRIP.AUTO: 6 [PH] (ref 4.5–8)
PH UR STRIP.AUTO: 6 [PH] (ref 4.5–8)
PH UR STRIP.AUTO: 8 [PH] (ref 4.5–8)
PH UR STRIP.AUTO: <=5 [PH] (ref 4.5–8)
PH, TEMP CORRECTED: 7.43 PH UNITS (ref 7.35–7.45)
PH, TEMP CORRECTED: 7.55 PH UNITS (ref 7.35–7.45)
PLAT MORPH BLD: NORMAL
PLATELET # BLD AUTO: 100 10*3/MM3 (ref 140–450)
PLATELET # BLD AUTO: 101 10*3/MM3 (ref 140–450)
PLATELET # BLD AUTO: 103 10*3/MM3 (ref 140–450)
PLATELET # BLD AUTO: 107 10*3/MM3 (ref 140–450)
PLATELET # BLD AUTO: 107 10*3/MM3 (ref 140–450)
PLATELET # BLD AUTO: 108 10*3/MM3 (ref 140–450)
PLATELET # BLD AUTO: 108 10*3/MM3 (ref 140–450)
PLATELET # BLD AUTO: 113 10*3/MM3 (ref 140–450)
PLATELET # BLD AUTO: 118 10*3/MM3 (ref 140–450)
PLATELET # BLD AUTO: 120 10*3/MM3 (ref 140–450)
PLATELET # BLD AUTO: 124 10*3/MM3 (ref 140–450)
PLATELET # BLD AUTO: 125 10*3/MM3 (ref 140–450)
PLATELET # BLD AUTO: 142 10*3/MM3 (ref 140–450)
PLATELET # BLD AUTO: 144 10*3/MM3 (ref 140–450)
PLATELET # BLD AUTO: 145 10*3/MM3 (ref 140–450)
PLATELET # BLD AUTO: 147 10*3/MM3 (ref 140–450)
PLATELET # BLD AUTO: 152 10*3/MM3 (ref 140–450)
PLATELET # BLD AUTO: 153 10*3/MM3 (ref 140–450)
PLATELET # BLD AUTO: 159 10*3/MM3 (ref 140–450)
PLATELET # BLD AUTO: 162 10*3/MM3 (ref 140–450)
PLATELET # BLD AUTO: 164 10*3/MM3 (ref 140–450)
PLATELET # BLD AUTO: 164 10*3/MM3 (ref 140–450)
PLATELET # BLD AUTO: 167 10*3/MM3 (ref 140–450)
PLATELET # BLD AUTO: 173 10*3/MM3 (ref 140–450)
PLATELET # BLD AUTO: 178 10*3/MM3 (ref 140–450)
PLATELET # BLD AUTO: 179 10*3/MM3 (ref 140–450)
PLATELET # BLD AUTO: 190 10*3/MM3 (ref 140–450)
PLATELET # BLD AUTO: 203 10*3/MM3 (ref 140–450)
PLATELET # BLD AUTO: 203 10*3/MM3 (ref 140–450)
PLATELET # BLD AUTO: 208 10*3/MM3 (ref 140–450)
PLATELET # BLD AUTO: 213 10*3/MM3 (ref 140–450)
PLATELET # BLD AUTO: 220 10*3/MM3 (ref 140–450)
PLATELET # BLD AUTO: 223 10*3/MM3 (ref 140–450)
PLATELET # BLD AUTO: 74 10*3/MM3 (ref 140–450)
PLATELET # BLD AUTO: 88 10*3/MM3 (ref 140–450)
PLATELET # BLD AUTO: 94 10*3/MM3 (ref 140–450)
PLATELET # BLD AUTO: 97 10*3/MM3 (ref 140–450)
PLATELET # BLD AUTO: 98 10*3/MM3 (ref 140–450)
PMV BLD AUTO: 10.5 FL (ref 6–12)
PMV BLD AUTO: 10.8 FL (ref 6–12)
PMV BLD AUTO: 10.9 FL (ref 6–12)
PMV BLD AUTO: 11.1 FL (ref 6–12)
PMV BLD AUTO: 11.6 FL (ref 6–12)
PMV BLD AUTO: 11.6 FL (ref 6–12)
PMV BLD AUTO: 11.7 FL (ref 6–12)
PMV BLD AUTO: 11.9 FL (ref 6–12)
PMV BLD AUTO: 11.9 FL (ref 6–12)
PMV BLD AUTO: 12 FL (ref 6–12)
PMV BLD AUTO: 12.2 FL (ref 6–12)
PMV BLD AUTO: 12.2 FL (ref 6–12)
PMV BLD AUTO: 12.8 FL (ref 6–12)
PMV BLD AUTO: 12.9 FL (ref 6–12)
PMV BLD AUTO: 13 FL (ref 6–12)
PMV BLD AUTO: 13.7 FL (ref 6–12)
PMV BLD AUTO: ABNORMAL FL
PO2 BLDA: 212 MM HG (ref 83–108)
PO2 BLDA: 74.7 MM HG (ref 83–108)
PO2 TEMP ADJ BLD: 212 MM HG (ref 83–108)
PO2 TEMP ADJ BLD: 74.7 MM HG (ref 83–108)
POIKILOCYTOSIS BLD QL SMEAR: ABNORMAL
POLYCHROMASIA BLD QL SMEAR: NORMAL
POTASSIUM SERPL-SCNC: 3 MMOL/L (ref 3.5–5.2)
POTASSIUM SERPL-SCNC: 3.1 MMOL/L (ref 3.5–5.2)
POTASSIUM SERPL-SCNC: 3.2 MMOL/L (ref 3.5–5.2)
POTASSIUM SERPL-SCNC: 3.3 MMOL/L (ref 3.5–5.2)
POTASSIUM SERPL-SCNC: 3.4 MMOL/L (ref 3.5–5.2)
POTASSIUM SERPL-SCNC: 3.5 MMOL/L (ref 3.5–5.2)
POTASSIUM SERPL-SCNC: 3.5 MMOL/L (ref 3.5–5.2)
POTASSIUM SERPL-SCNC: 3.7 MMOL/L (ref 3.5–5.2)
POTASSIUM SERPL-SCNC: 3.8 MMOL/L (ref 3.5–5.2)
POTASSIUM SERPL-SCNC: 3.9 MMOL/L (ref 3.5–5.2)
POTASSIUM SERPL-SCNC: 3.9 MMOL/L (ref 3.5–5.2)
POTASSIUM SERPL-SCNC: 4 MMOL/L (ref 3.5–5.2)
POTASSIUM SERPL-SCNC: 4.1 MMOL/L (ref 3.5–5.2)
POTASSIUM SERPL-SCNC: 4.2 MMOL/L (ref 3.5–5.2)
POTASSIUM SERPL-SCNC: 4.3 MMOL/L (ref 3.5–5.2)
POTASSIUM SERPL-SCNC: 4.4 MMOL/L (ref 3.5–5.2)
POTASSIUM SERPL-SCNC: 4.5 MMOL/L (ref 3.5–5.2)
POTASSIUM SERPL-SCNC: 4.6 MMOL/L (ref 3.5–5.2)
POTASSIUM SERPL-SCNC: 4.6 MMOL/L (ref 3.5–5.2)
POTASSIUM SERPL-SCNC: 4.7 MMOL/L (ref 3.5–5.2)
POTASSIUM SERPL-SCNC: 4.8 MMOL/L (ref 3.5–5.2)
POTASSIUM SERPL-SCNC: 4.9 MMOL/L (ref 3.5–5.2)
POTASSIUM SERPL-SCNC: 5 MMOL/L (ref 3.5–5.2)
POTASSIUM SERPL-SCNC: 5 MMOL/L (ref 3.5–5.2)
POTASSIUM SERPL-SCNC: 5.2 MMOL/L (ref 3.5–5.2)
POTASSIUM SERPL-SCNC: 5.3 MMOL/L (ref 3.5–5.2)
PREALB SERPL-MCNC: 17.8 MG/DL (ref 20–40)
PROCALCITONIN SERPL-MCNC: 0.04 NG/ML (ref 0–0.25)
PROCALCITONIN SERPL-MCNC: 0.06 NG/ML (ref 0–0.25)
PROCALCITONIN SERPL-MCNC: 0.09 NG/ML (ref 0–0.25)
PROCALCITONIN SERPL-MCNC: 0.09 NG/ML (ref 0–0.25)
PROCALCITONIN SERPL-MCNC: 0.1 NG/ML (ref 0–0.25)
PROCALCITONIN SERPL-MCNC: 0.11 NG/ML (ref 0–0.25)
PROCALCITONIN SERPL-MCNC: 0.13 NG/ML (ref 0–0.25)
PROCALCITONIN SERPL-MCNC: 0.28 NG/ML (ref 0–0.25)
PROCALCITONIN SERPL-MCNC: 0.32 NG/ML (ref 0–0.25)
PROT SERPL-MCNC: 4.6 G/DL (ref 6–8.5)
PROT SERPL-MCNC: 4.8 G/DL (ref 6–8.5)
PROT SERPL-MCNC: 4.8 G/DL (ref 6–8.5)
PROT SERPL-MCNC: 4.9 G/DL (ref 6–8.5)
PROT SERPL-MCNC: 5 G/DL (ref 6–8.5)
PROT SERPL-MCNC: 5.1 G/DL (ref 6–8.5)
PROT SERPL-MCNC: 5.1 G/DL (ref 6–8.5)
PROT SERPL-MCNC: 5.2 G/DL (ref 6–8.5)
PROT SERPL-MCNC: 5.3 G/DL (ref 6–8.5)
PROT SERPL-MCNC: 5.4 G/DL (ref 6–8.5)
PROT SERPL-MCNC: 5.4 G/DL (ref 6–8.5)
PROT SERPL-MCNC: 5.5 G/DL (ref 6–8.5)
PROT SERPL-MCNC: 5.8 G/DL (ref 6–8.5)
PROT SERPL-MCNC: 6.4 G/DL (ref 6–8.5)
PROT SERPL-MCNC: 6.4 G/DL (ref 6–8.5)
PROT SERPL-MCNC: 7 G/DL (ref 6–8.5)
PROT SERPL-MCNC: 7.5 G/DL (ref 6–8.5)
PROT SERPL-MCNC: 7.7 G/DL (ref 6–8.5)
PROT UR QL STRIP: ABNORMAL
PROT UR QL STRIP: NEGATIVE
PROTHROMBIN TIME: 19.6 SECONDS (ref 12.1–15)
PROTHROMBIN TIME: 22.8 SECONDS (ref 12.1–15)
QT INTERVAL: 274 MS
QT INTERVAL: 324 MS
QT INTERVAL: 412 MS
QT INTERVAL: 430 MS
QT INTERVAL: 472 MS
QT INTERVAL: 495 MS
RBC # BLD AUTO: 2.76 10*6/MM3 (ref 3.77–5.28)
RBC # BLD AUTO: 2.76 10*6/MM3 (ref 3.77–5.28)
RBC # BLD AUTO: 2.83 10*6/MM3 (ref 3.77–5.28)
RBC # BLD AUTO: 2.93 10*6/MM3 (ref 3.77–5.28)
RBC # BLD AUTO: 2.96 10*6/MM3 (ref 3.77–5.28)
RBC # BLD AUTO: 2.98 10*6/MM3 (ref 3.77–5.28)
RBC # BLD AUTO: 2.99 10*6/MM3 (ref 3.77–5.28)
RBC # BLD AUTO: 3.01 10*6/MM3 (ref 3.77–5.28)
RBC # BLD AUTO: 3.02 10*6/MM3 (ref 3.77–5.28)
RBC # BLD AUTO: 3.02 10*6/MM3 (ref 3.77–5.28)
RBC # BLD AUTO: 3.03 10*6/MM3 (ref 3.77–5.28)
RBC # BLD AUTO: 3.11 10*6/MM3 (ref 3.77–5.28)
RBC # BLD AUTO: 3.14 10*6/MM3 (ref 3.77–5.28)
RBC # BLD AUTO: 3.14 10*6/MM3 (ref 3.77–5.28)
RBC # BLD AUTO: 3.16 10*6/MM3 (ref 3.77–5.28)
RBC # BLD AUTO: 3.2 10*6/MM3 (ref 3.77–5.28)
RBC # BLD AUTO: 3.2 10*6/MM3 (ref 3.77–5.28)
RBC # BLD AUTO: 3.22 10*6/MM3 (ref 3.77–5.28)
RBC # BLD AUTO: 3.25 10*6/MM3 (ref 3.77–5.28)
RBC # BLD AUTO: 3.29 10*6/MM3 (ref 3.77–5.28)
RBC # BLD AUTO: 3.35 10*6/MM3 (ref 3.77–5.28)
RBC # BLD AUTO: 3.38 10*6/MM3 (ref 3.77–5.28)
RBC # BLD AUTO: 3.4 10*6/MM3 (ref 3.77–5.28)
RBC # BLD AUTO: 3.41 10*6/MM3 (ref 3.77–5.28)
RBC # BLD AUTO: 3.42 10*6/MM3 (ref 3.77–5.28)
RBC # BLD AUTO: 3.44 10*6/MM3 (ref 3.77–5.28)
RBC # BLD AUTO: 3.54 10*6/MM3 (ref 3.77–5.28)
RBC # BLD AUTO: 3.61 10*6/MM3 (ref 3.77–5.28)
RBC # BLD AUTO: 3.84 10*6/MM3 (ref 3.77–5.28)
RBC # BLD AUTO: 3.84 10*6/MM3 (ref 3.77–5.28)
RBC # BLD AUTO: 3.86 10*6/MM3 (ref 3.77–5.28)
RBC # BLD AUTO: 3.9 10*6/MM3 (ref 3.77–5.28)
RBC # BLD AUTO: 4.04 10*6/MM3 (ref 3.77–5.28)
RBC # BLD AUTO: 4.1 10*6/MM3 (ref 3.77–5.28)
RBC # BLD AUTO: 4.18 10*6/MM3 (ref 3.77–5.28)
RBC # BLD AUTO: 4.62 10*6/MM3 (ref 3.77–5.28)
RBC # BLD AUTO: 4.77 10*6/MM3 (ref 3.77–5.28)
RBC # BLD AUTO: 4.83 10*6/MM3 (ref 3.77–5.28)
RBC # UR STRIP: ABNORMAL /HPF
RBC # UR: ABNORMAL /HPF
RBC MORPH BLD: NORMAL
REF LAB TEST METHOD: ABNORMAL
RH BLD: POSITIVE
RH BLD: POSITIVE
SAO2 % BLDCOA: 96.9 % (ref 94–99)
SAO2 % BLDCOA: >100 % (ref 94–99)
SARS-COV-2 RNA PNL SPEC NAA+PROBE: NOT DETECTED
SARS-COV-2 RNA RESP QL NAA+PROBE: NOT DETECTED
SARS-COV-2 RNA RESP QL NAA+PROBE: NOT DETECTED
SCAN SLIDE: NORMAL
SCHISTOCYTES BLD QL SMEAR: ABNORMAL
SET MECH RESP RATE: 14
SMALL PLATELETS BLD QL SMEAR: ABNORMAL
SMALL PLATELETS BLD QL SMEAR: ADEQUATE
SODIUM SERPL-SCNC: 133 MMOL/L (ref 136–145)
SODIUM SERPL-SCNC: 134 MMOL/L (ref 136–145)
SODIUM SERPL-SCNC: 135 MMOL/L (ref 136–145)
SODIUM SERPL-SCNC: 136 MMOL/L (ref 136–145)
SODIUM SERPL-SCNC: 137 MMOL/L (ref 136–145)
SODIUM SERPL-SCNC: 138 MMOL/L (ref 136–145)
SODIUM SERPL-SCNC: 139 MMOL/L (ref 136–145)
SODIUM SERPL-SCNC: 139 MMOL/L (ref 136–145)
SODIUM SERPL-SCNC: 140 MMOL/L (ref 136–145)
SODIUM SERPL-SCNC: 140 MMOL/L (ref 136–145)
SODIUM SERPL-SCNC: 141 MMOL/L (ref 136–145)
SODIUM SERPL-SCNC: 142 MMOL/L (ref 136–145)
SODIUM SERPL-SCNC: 143 MMOL/L (ref 136–145)
SODIUM SERPL-SCNC: 144 MMOL/L (ref 136–145)
SODIUM SERPL-SCNC: 145 MMOL/L (ref 136–145)
SODIUM SERPL-SCNC: 145 MMOL/L (ref 136–145)
SODIUM SERPL-SCNC: 146 MMOL/L (ref 136–145)
SODIUM SERPL-SCNC: 146 MMOL/L (ref 136–145)
SODIUM SERPL-SCNC: 147 MMOL/L (ref 136–145)
SODIUM SERPL-SCNC: 148 MMOL/L (ref 136–145)
SP GR UR STRIP: 1.02 (ref 1–1.03)
SP GR UR STRIP: 1.03 (ref 1–1.03)
SQUAMOUS #/AREA URNS HPF: ABNORMAL /HPF
STRESS TARGET HR: 126 BPM
T&S EXPIRATION DATE: NORMAL
T&S EXPIRATION DATE: NORMAL
T4 FREE SERPL-MCNC: 2.34 NG/DL (ref 0.93–1.7)
TARGETS BLD QL SMEAR: NORMAL
TARGETS BLD QL SMEAR: NORMAL
TIBC SERPL-MCNC: 288 MCG/DL (ref 298–536)
TROPONIN T SERPL-MCNC: <0.01 NG/ML (ref 0–0.03)
TROPONIN T SERPL-MCNC: <0.01 NG/ML (ref 0–0.03)
TSH SERPL DL<=0.05 MIU/L-ACNC: 0.33 UIU/ML (ref 0.27–4.2)
TSH SERPL DL<=0.05 MIU/L-ACNC: 4.08 UIU/ML (ref 0.27–4.2)
TSH SERPL DL<=0.05 MIU/L-ACNC: 8.95 UIU/ML (ref 0.27–4.2)
UIBC SERPL-MCNC: 241 MCG/DL (ref 112–346)
UNIT  ABO: NORMAL
UNIT  RH: NORMAL
UROBILINOGEN UR QL STRIP: ABNORMAL
UROBILINOGEN UR QL STRIP: NORMAL
VANCOMYCIN SERPL-MCNC: 11.7 MCG/ML (ref 5–40)
VANCOMYCIN SERPL-MCNC: 13.4 MCG/ML (ref 5–40)
VANCOMYCIN SERPL-MCNC: 14.7 MCG/ML (ref 5–40)
VANCOMYCIN SERPL-MCNC: 15.2 MCG/ML (ref 5–40)
VANCOMYCIN SERPL-MCNC: 15.8 MCG/ML (ref 5–40)
VANCOMYCIN SERPL-MCNC: 16.6 MCG/ML (ref 5–40)
VANCOMYCIN SERPL-MCNC: 26.9 MCG/ML (ref 5–40)
VANCOMYCIN SERPL-MCNC: 31.5 MCG/ML (ref 5–40)
VARIANT LYMPHS NFR BLD MANUAL: 16 % (ref 19.6–45.3)
VARIANT LYMPHS NFR BLD MANUAL: 22 % (ref 19.6–45.3)
VENTILATOR MODE: ABNORMAL
VENTILATOR MODE: AC
VIT B12 BLD-MCNC: >2000 PG/ML (ref 211–946)
VT ON VENT VENT: 500 ML
WBC # BLD AUTO: 10.98 10*3/MM3 (ref 3.4–10.8)
WBC # BLD AUTO: 2.82 10*3/MM3 (ref 3.4–10.8)
WBC # BLD AUTO: 3.03 10*3/MM3 (ref 3.4–10.8)
WBC # BLD AUTO: 3.44 10*3/MM3 (ref 3.4–10.8)
WBC # BLD AUTO: 3.5 10*3/MM3 (ref 3.4–10.8)
WBC # BLD AUTO: 3.53 10*3/MM3 (ref 3.4–10.8)
WBC # BLD AUTO: 3.71 10*3/MM3 (ref 3.4–10.8)
WBC # BLD AUTO: 3.83 10*3/MM3 (ref 3.4–10.8)
WBC # BLD AUTO: 4.14 10*3/MM3 (ref 3.4–10.8)
WBC # BLD AUTO: 4.37 10*3/MM3 (ref 3.4–10.8)
WBC # BLD AUTO: 4.53 10*3/MM3 (ref 3.4–10.8)
WBC # BLD AUTO: 4.53 10*3/MM3 (ref 3.4–10.8)
WBC # BLD AUTO: 4.77 10*3/MM3 (ref 3.4–10.8)
WBC # BLD AUTO: 5.22 10*3/MM3 (ref 3.4–10.8)
WBC # BLD AUTO: 5.48 10*3/MM3 (ref 3.4–10.8)
WBC # BLD AUTO: 5.5 10*3/MM3 (ref 3.4–10.8)
WBC # BLD AUTO: 5.69 10*3/MM3 (ref 3.4–10.8)
WBC # BLD AUTO: 5.91 10*3/MM3 (ref 3.4–10.8)
WBC # BLD AUTO: 6.15 10*3/MM3 (ref 3.4–10.8)
WBC # BLD AUTO: 6.15 10*3/MM3 (ref 3.4–10.8)
WBC # BLD AUTO: 6.38 10*3/MM3 (ref 3.4–10.8)
WBC # BLD AUTO: 6.54 10*3/MM3 (ref 3.4–10.8)
WBC # BLD AUTO: 7.37 10*3/MM3 (ref 3.4–10.8)
WBC # BLD AUTO: 7.57 10*3/MM3 (ref 3.4–10.8)
WBC # BLD AUTO: 8.12 10*3/MM3 (ref 3.4–10.8)
WBC # BLD AUTO: 8.68 10*3/MM3 (ref 3.4–10.8)
WBC # BLD AUTO: 9.3 10*3/MM3 (ref 3.4–10.8)
WBC # BLD AUTO: 9.87 10*3/MM3 (ref 3.4–10.8)
WBC # UR STRIP: ABNORMAL /HPF
WBC MORPH BLD: NORMAL
WBC NRBC COR # BLD: 2.38 10*3/MM3 (ref 3.4–10.8)
WBC NRBC COR # BLD: 2.4 10*3/MM3 (ref 3.4–10.8)
WBC NRBC COR # BLD: 3 10*3/MM3 (ref 3.4–10.8)
WBC NRBC COR # BLD: 3.52 10*3/MM3 (ref 3.4–10.8)
WBC NRBC COR # BLD: 3.87 10*3/MM3 (ref 3.4–10.8)
WBC NRBC COR # BLD: 4.71 10*3/MM3 (ref 3.4–10.8)
WBC NRBC COR # BLD: 4.79 10*3/MM3 (ref 3.4–10.8)
WBC NRBC COR # BLD: 5.71 10*3/MM3 (ref 3.4–10.8)
WBC NRBC COR # BLD: 7.28 10*3/MM3 (ref 3.4–10.8)
WBC NRBC COR # BLD: 7.58 10*3/MM3 (ref 3.4–10.8)
WBC UR QL AUTO: ABNORMAL /HPF
WHOLE BLOOD HOLD SPECIMEN: NORMAL
WHOLE BLOOD HOLD SPECIMEN: NORMAL

## 2021-01-01 PROCEDURE — 97164 PT RE-EVAL EST PLAN CARE: CPT

## 2021-01-01 PROCEDURE — 82962 GLUCOSE BLOOD TEST: CPT

## 2021-01-01 PROCEDURE — 94799 UNLISTED PULMONARY SVC/PX: CPT

## 2021-01-01 PROCEDURE — 99238 HOSP IP/OBS DSCHRG MGMT 30/<: CPT | Performed by: HOSPITALIST

## 2021-01-01 PROCEDURE — 80053 COMPREHEN METABOLIC PANEL: CPT | Performed by: FAMILY MEDICINE

## 2021-01-01 PROCEDURE — 80202 ASSAY OF VANCOMYCIN: CPT | Performed by: FAMILY MEDICINE

## 2021-01-01 PROCEDURE — 63710000001 DIPHENHYDRAMINE PER 50 MG

## 2021-01-01 PROCEDURE — 25010000002 FENTANYL CITRATE (PF) 100 MCG/2ML SOLUTION: Performed by: FAMILY MEDICINE

## 2021-01-01 PROCEDURE — 36600 WITHDRAWAL OF ARTERIAL BLOOD: CPT

## 2021-01-01 PROCEDURE — 97535 SELF CARE MNGMENT TRAINING: CPT

## 2021-01-01 PROCEDURE — 25010000002 ONDANSETRON PER 1 MG: Performed by: UROLOGY

## 2021-01-01 PROCEDURE — 25010000002 LORAZEPAM PER 2 MG: Performed by: FAMILY MEDICINE

## 2021-01-01 PROCEDURE — 85027 COMPLETE CBC AUTOMATED: CPT | Performed by: FAMILY MEDICINE

## 2021-01-01 PROCEDURE — 99285 EMERGENCY DEPT VISIT HI MDM: CPT | Performed by: EMERGENCY MEDICINE

## 2021-01-01 PROCEDURE — 97110 THERAPEUTIC EXERCISES: CPT

## 2021-01-01 PROCEDURE — 84439 ASSAY OF FREE THYROXINE: CPT | Performed by: EMERGENCY MEDICINE

## 2021-01-01 PROCEDURE — 25010000002 VANCOMYCIN 750 MG RECONSTITUTED SOLUTION 1 EACH VIAL: Performed by: FAMILY MEDICINE

## 2021-01-01 PROCEDURE — 63710000001 DIPHENHYDRAMINE PER 50 MG: Performed by: FAMILY MEDICINE

## 2021-01-01 PROCEDURE — 87040 BLOOD CULTURE FOR BACTERIA: CPT | Performed by: EMERGENCY MEDICINE

## 2021-01-01 PROCEDURE — 0T768DZ DILATION OF RIGHT URETER WITH INTRALUMINAL DEVICE, VIA NATURAL OR ARTIFICIAL OPENING ENDOSCOPIC: ICD-10-PCS | Performed by: UROLOGY

## 2021-01-01 PROCEDURE — 25010000002 LORAZEPAM PER 2 MG: Performed by: UROLOGY

## 2021-01-01 PROCEDURE — 93005 ELECTROCARDIOGRAM TRACING: CPT | Performed by: EMERGENCY MEDICINE

## 2021-01-01 PROCEDURE — 80048 BASIC METABOLIC PNL TOTAL CA: CPT | Performed by: FAMILY MEDICINE

## 2021-01-01 PROCEDURE — 95720 EEG PHY/QHP EA INCR W/VEEG: CPT | Performed by: PSYCHIATRY & NEUROLOGY

## 2021-01-01 PROCEDURE — 99231 SBSQ HOSP IP/OBS SF/LOW 25: CPT | Performed by: INTERNAL MEDICINE

## 2021-01-01 PROCEDURE — 84443 ASSAY THYROID STIM HORMONE: CPT | Performed by: FAMILY MEDICINE

## 2021-01-01 PROCEDURE — 25010000003 LEVETIRACETAM IN NACL 0.75% 1000 MG/100ML SOLUTION: Performed by: PSYCHIATRY & NEUROLOGY

## 2021-01-01 PROCEDURE — 80053 COMPREHEN METABOLIC PANEL: CPT | Performed by: UROLOGY

## 2021-01-01 PROCEDURE — 25010000002 ENOXAPARIN PER 10 MG: Performed by: UROLOGY

## 2021-01-01 PROCEDURE — 85025 COMPLETE CBC W/AUTO DIFF WBC: CPT | Performed by: EMERGENCY MEDICINE

## 2021-01-01 PROCEDURE — 85025 COMPLETE CBC W/AUTO DIFF WBC: CPT

## 2021-01-01 PROCEDURE — 25010000002 DIGOXIN PER 500 MCG: Performed by: FAMILY MEDICINE

## 2021-01-01 PROCEDURE — 83880 ASSAY OF NATRIURETIC PEPTIDE: CPT | Performed by: EMERGENCY MEDICINE

## 2021-01-01 PROCEDURE — 92526 ORAL FUNCTION THERAPY: CPT

## 2021-01-01 PROCEDURE — 99285 EMERGENCY DEPT VISIT HI MDM: CPT

## 2021-01-01 PROCEDURE — 87635 SARS-COV-2 COVID-19 AMP PRB: CPT | Performed by: EMERGENCY MEDICINE

## 2021-01-01 PROCEDURE — 25010000002 VANCOMYCIN 1 G RECONSTITUTED SOLUTION: Performed by: FAMILY MEDICINE

## 2021-01-01 PROCEDURE — 82803 BLOOD GASES ANY COMBINATION: CPT

## 2021-01-01 PROCEDURE — 25010000002 ONDANSETRON PER 1 MG: Performed by: FAMILY MEDICINE

## 2021-01-01 PROCEDURE — 83605 ASSAY OF LACTIC ACID: CPT | Performed by: EMERGENCY MEDICINE

## 2021-01-01 PROCEDURE — 81001 URINALYSIS AUTO W/SCOPE: CPT

## 2021-01-01 PROCEDURE — 80053 COMPREHEN METABOLIC PANEL: CPT | Performed by: EMERGENCY MEDICINE

## 2021-01-01 PROCEDURE — 85007 BL SMEAR W/DIFF WBC COUNT: CPT | Performed by: EMERGENCY MEDICINE

## 2021-01-01 PROCEDURE — 87070 CULTURE OTHR SPECIMN AEROBIC: CPT | Performed by: INTERNAL MEDICINE

## 2021-01-01 PROCEDURE — 74230 X-RAY XM SWLNG FUNCJ C+: CPT

## 2021-01-01 PROCEDURE — 0TP98DZ REMOVAL OF INTRALUMINAL DEVICE FROM URETER, VIA NATURAL OR ARTIFICIAL OPENING ENDOSCOPIC: ICD-10-PCS | Performed by: UROLOGY

## 2021-01-01 PROCEDURE — 99222 1ST HOSP IP/OBS MODERATE 55: CPT | Performed by: INTERNAL MEDICINE

## 2021-01-01 PROCEDURE — 36430 TRANSFUSION BLD/BLD COMPNT: CPT

## 2021-01-01 PROCEDURE — 86850 RBC ANTIBODY SCREEN: CPT | Performed by: EMERGENCY MEDICINE

## 2021-01-01 PROCEDURE — 87186 SC STD MICRODIL/AGAR DIL: CPT | Performed by: FAMILY MEDICINE

## 2021-01-01 PROCEDURE — 25010000002 VANCOMYCIN 750 MG RECONSTITUTED SOLUTION: Performed by: FAMILY MEDICINE

## 2021-01-01 PROCEDURE — 97165 OT EVAL LOW COMPLEX 30 MIN: CPT

## 2021-01-01 PROCEDURE — 25010000002 FLUCONAZOLE PER 200 MG: Performed by: FAMILY MEDICINE

## 2021-01-01 PROCEDURE — 92610 EVALUATE SWALLOWING FUNCTION: CPT

## 2021-01-01 PROCEDURE — 85652 RBC SED RATE AUTOMATED: CPT | Performed by: HOSPITALIST

## 2021-01-01 PROCEDURE — 70450 CT HEAD/BRAIN W/O DYE: CPT

## 2021-01-01 PROCEDURE — 25010000002 CEFTRIAXONE SODIUM-DEXTROSE 2-2.22 GM-%(50ML) RECONSTITUTED SOLUTION: Performed by: UROLOGY

## 2021-01-01 PROCEDURE — 99231 SBSQ HOSP IP/OBS SF/LOW 25: CPT | Performed by: PSYCHIATRY & NEUROLOGY

## 2021-01-01 PROCEDURE — 85610 PROTHROMBIN TIME: CPT | Performed by: NURSE ANESTHETIST, CERTIFIED REGISTERED

## 2021-01-01 PROCEDURE — 71045 X-RAY EXAM CHEST 1 VIEW: CPT

## 2021-01-01 PROCEDURE — 25010000002 VANCOMYCIN PER 500 MG: Performed by: FAMILY MEDICINE

## 2021-01-01 PROCEDURE — 84443 ASSAY THYROID STIM HORMONE: CPT | Performed by: EMERGENCY MEDICINE

## 2021-01-01 PROCEDURE — 25010000002 VANCOMYCIN 750 MG RECONSTITUTED SOLUTION: Performed by: EMERGENCY MEDICINE

## 2021-01-01 PROCEDURE — 99284 EMERGENCY DEPT VISIT MOD MDM: CPT

## 2021-01-01 PROCEDURE — 25010000002 VANCOMYCIN 750 MG RECONSTITUTED SOLUTION 1 EACH VIAL: Performed by: UROLOGY

## 2021-01-01 PROCEDURE — 84484 ASSAY OF TROPONIN QUANT: CPT | Performed by: EMERGENCY MEDICINE

## 2021-01-01 PROCEDURE — 99222 1ST HOSP IP/OBS MODERATE 55: CPT | Performed by: FAMILY MEDICINE

## 2021-01-01 PROCEDURE — 87147 CULTURE TYPE IMMUNOLOGIC: CPT | Performed by: EMERGENCY MEDICINE

## 2021-01-01 PROCEDURE — 81003 URINALYSIS AUTO W/O SCOPE: CPT | Performed by: EMERGENCY MEDICINE

## 2021-01-01 PROCEDURE — 84145 PROCALCITONIN (PCT): CPT | Performed by: FAMILY MEDICINE

## 2021-01-01 PROCEDURE — 93005 ELECTROCARDIOGRAM TRACING: CPT | Performed by: NURSE PRACTITIONER

## 2021-01-01 PROCEDURE — 74018 RADEX ABDOMEN 1 VIEW: CPT

## 2021-01-01 PROCEDURE — 82746 ASSAY OF FOLIC ACID SERUM: CPT | Performed by: FAMILY MEDICINE

## 2021-01-01 PROCEDURE — 36415 COLL VENOUS BLD VENIPUNCTURE: CPT

## 2021-01-01 PROCEDURE — 25010000002 CEFTRIAXONE SODIUM-DEXTROSE 1-3.74 GM-%(50ML) RECONSTITUTED SOLUTION: Performed by: FAMILY MEDICINE

## 2021-01-01 PROCEDURE — 93000 ELECTROCARDIOGRAM COMPLETE: CPT | Performed by: NURSE PRACTITIONER

## 2021-01-01 PROCEDURE — 92507 TX SP LANG VOICE COMM INDIV: CPT

## 2021-01-01 PROCEDURE — 97530 THERAPEUTIC ACTIVITIES: CPT

## 2021-01-01 PROCEDURE — 25010000002 VANCOMYCIN 1 G RECONSTITUTED SOLUTION 1 EACH VIAL: Performed by: EMERGENCY MEDICINE

## 2021-01-01 PROCEDURE — 80162 ASSAY OF DIGOXIN TOTAL: CPT | Performed by: EMERGENCY MEDICINE

## 2021-01-01 PROCEDURE — 80162 ASSAY OF DIGOXIN TOTAL: CPT | Performed by: NURSE PRACTITIONER

## 2021-01-01 PROCEDURE — 80053 COMPREHEN METABOLIC PANEL: CPT

## 2021-01-01 PROCEDURE — 80162 ASSAY OF DIGOXIN TOTAL: CPT | Performed by: FAMILY MEDICINE

## 2021-01-01 PROCEDURE — 99232 SBSQ HOSP IP/OBS MODERATE 35: CPT | Performed by: INTERNAL MEDICINE

## 2021-01-01 PROCEDURE — C1751 CATH, INF, PER/CENT/MIDLINE: HCPCS

## 2021-01-01 PROCEDURE — 95716 VEEG EA 12-26HR CONT MNTR: CPT

## 2021-01-01 PROCEDURE — 86900 BLOOD TYPING SEROLOGIC ABO: CPT

## 2021-01-01 PROCEDURE — 87636 SARSCOV2 & INF A&B AMP PRB: CPT

## 2021-01-01 PROCEDURE — 25010000002 VANCOMYCIN 1 G RECONSTITUTED SOLUTION: Performed by: HOSPITALIST

## 2021-01-01 PROCEDURE — 94761 N-INVAS EAR/PLS OXIMETRY MLT: CPT

## 2021-01-01 PROCEDURE — 74176 CT ABD & PELVIS W/O CONTRAST: CPT

## 2021-01-01 PROCEDURE — 25010000002 LORAZEPAM PER 2 MG: Performed by: PSYCHIATRY & NEUROLOGY

## 2021-01-01 PROCEDURE — 80162 ASSAY OF DIGOXIN TOTAL: CPT

## 2021-01-01 PROCEDURE — 93306 TTE W/DOPPLER COMPLETE: CPT

## 2021-01-01 PROCEDURE — 87636 SARSCOV2 & INF A&B AMP PRB: CPT | Performed by: EMERGENCY MEDICINE

## 2021-01-01 PROCEDURE — 93010 ELECTROCARDIOGRAM REPORT: CPT | Performed by: INTERNAL MEDICINE

## 2021-01-01 PROCEDURE — 25010000002 CEFTRIAXONE SODIUM-DEXTROSE 2-2.22 GM-%(50ML) RECONSTITUTED SOLUTION: Performed by: FAMILY MEDICINE

## 2021-01-01 PROCEDURE — 25010000002 FLUCONAZOLE PER 200 MG: Performed by: HOSPITALIST

## 2021-01-01 PROCEDURE — 25010000002 ONDANSETRON PER 1 MG: Performed by: EMERGENCY MEDICINE

## 2021-01-01 PROCEDURE — 97110 THERAPEUTIC EXERCISES: CPT | Performed by: PHYSICAL THERAPIST

## 2021-01-01 PROCEDURE — 25010000002 FOSPHENYTOIN 500 MG PE/10ML SOLUTION 10 ML VIAL: Performed by: PSYCHIATRY & NEUROLOGY

## 2021-01-01 PROCEDURE — 25010000003 LEVETIRACETAM IN NACL 0.75% 1000 MG/100ML SOLUTION: Performed by: UROLOGY

## 2021-01-01 PROCEDURE — 85025 COMPLETE CBC W/AUTO DIFF WBC: CPT | Performed by: FAMILY MEDICINE

## 2021-01-01 PROCEDURE — 87186 SC STD MICRODIL/AGAR DIL: CPT | Performed by: EMERGENCY MEDICINE

## 2021-01-01 PROCEDURE — 99232 SBSQ HOSP IP/OBS MODERATE 35: CPT | Performed by: PSYCHIATRY & NEUROLOGY

## 2021-01-01 PROCEDURE — 99214 OFFICE O/P EST MOD 30 MIN: CPT | Performed by: NURSE PRACTITIONER

## 2021-01-01 PROCEDURE — 25010000002 ENOXAPARIN PER 10 MG: Performed by: FAMILY MEDICINE

## 2021-01-01 PROCEDURE — 25010000002 SUCCINYLCHOLINE PER 20 MG: Performed by: NURSE ANESTHETIST, CERTIFIED REGISTERED

## 2021-01-01 PROCEDURE — 25010000002 HYDRALAZINE PER 20 MG: Performed by: FAMILY MEDICINE

## 2021-01-01 PROCEDURE — 97162 PT EVAL MOD COMPLEX 30 MIN: CPT

## 2021-01-01 PROCEDURE — 25010000002 CEFEPIME-DEXTROSE 1-5 GM-%(50ML) RECONSTITUTED SOLUTION: Performed by: FAMILY MEDICINE

## 2021-01-01 PROCEDURE — 25010000002 CEFTRIAXONE SODIUM-DEXTROSE 2-2.22 GM-%(50ML) RECONSTITUTED SOLUTION: Performed by: EMERGENCY MEDICINE

## 2021-01-01 PROCEDURE — 84145 PROCALCITONIN (PCT): CPT | Performed by: EMERGENCY MEDICINE

## 2021-01-01 PROCEDURE — 99283 EMERGENCY DEPT VISIT LOW MDM: CPT | Performed by: EMERGENCY MEDICINE

## 2021-01-01 PROCEDURE — 99283 EMERGENCY DEPT VISIT LOW MDM: CPT

## 2021-01-01 PROCEDURE — 25010000002 MIDAZOLAM PER 1MG: Performed by: NURSE ANESTHETIST, CERTIFIED REGISTERED

## 2021-01-01 PROCEDURE — 80048 BASIC METABOLIC PNL TOTAL CA: CPT | Performed by: HOSPITALIST

## 2021-01-01 PROCEDURE — 99221 1ST HOSP IP/OBS SF/LOW 40: CPT | Performed by: INTERNAL MEDICINE

## 2021-01-01 PROCEDURE — 25010000002 FUROSEMIDE PER 20 MG: Performed by: EMERGENCY MEDICINE

## 2021-01-01 PROCEDURE — 82607 VITAMIN B-12: CPT | Performed by: FAMILY MEDICINE

## 2021-01-01 PROCEDURE — 93005 ELECTROCARDIOGRAM TRACING: CPT | Performed by: FAMILY MEDICINE

## 2021-01-01 PROCEDURE — 74420 UROGRAPHY RTRGR +-KUB: CPT

## 2021-01-01 PROCEDURE — 99232 SBSQ HOSP IP/OBS MODERATE 35: CPT | Performed by: HOSPITALIST

## 2021-01-01 PROCEDURE — 86901 BLOOD TYPING SEROLOGIC RH(D): CPT | Performed by: FAMILY MEDICINE

## 2021-01-01 PROCEDURE — 70551 MRI BRAIN STEM W/O DYE: CPT

## 2021-01-01 PROCEDURE — 25010000002 VANCOMYCIN 750 MG RECONSTITUTED SOLUTION 1 EACH VIAL: Performed by: HOSPITALIST

## 2021-01-01 PROCEDURE — 87186 SC STD MICRODIL/AGAR DIL: CPT | Performed by: INTERNAL MEDICINE

## 2021-01-01 PROCEDURE — 25010000003 LEVETIRACETAM IN NACL 0.75% 1000 MG/100ML SOLUTION: Performed by: FAMILY MEDICINE

## 2021-01-01 PROCEDURE — 87040 BLOOD CULTURE FOR BACTERIA: CPT | Performed by: FAMILY MEDICINE

## 2021-01-01 PROCEDURE — 25010000002 ONDANSETRON PER 1 MG

## 2021-01-01 PROCEDURE — P9612 CATHETERIZE FOR URINE SPEC: HCPCS

## 2021-01-01 PROCEDURE — C1758 CATHETER, URETERAL: HCPCS | Performed by: UROLOGY

## 2021-01-01 PROCEDURE — 87040 BLOOD CULTURE FOR BACTERIA: CPT | Performed by: NURSE PRACTITIONER

## 2021-01-01 PROCEDURE — 25010000002 FUROSEMIDE PER 20 MG: Performed by: FAMILY MEDICINE

## 2021-01-01 PROCEDURE — 83735 ASSAY OF MAGNESIUM: CPT | Performed by: PSYCHIATRY & NEUROLOGY

## 2021-01-01 PROCEDURE — 83550 IRON BINDING TEST: CPT | Performed by: FAMILY MEDICINE

## 2021-01-01 PROCEDURE — 25010000002 AZITHROMYCIN PER 500 MG: Performed by: FAMILY MEDICINE

## 2021-01-01 PROCEDURE — 85027 COMPLETE CBC AUTOMATED: CPT | Performed by: UROLOGY

## 2021-01-01 PROCEDURE — 87147 CULTURE TYPE IMMUNOLOGIC: CPT | Performed by: FAMILY MEDICINE

## 2021-01-01 PROCEDURE — 80048 BASIC METABOLIC PNL TOTAL CA: CPT | Performed by: UROLOGY

## 2021-01-01 PROCEDURE — 81001 URINALYSIS AUTO W/SCOPE: CPT | Performed by: EMERGENCY MEDICINE

## 2021-01-01 PROCEDURE — 99223 1ST HOSP IP/OBS HIGH 75: CPT | Performed by: PSYCHIATRY & NEUROLOGY

## 2021-01-01 PROCEDURE — 25010000002 VANCOMYCIN PER 500 MG: Performed by: HOSPITALIST

## 2021-01-01 PROCEDURE — 25010000002 LEVETRIRACETAM PER 10 MG

## 2021-01-01 PROCEDURE — 83605 ASSAY OF LACTIC ACID: CPT | Performed by: FAMILY MEDICINE

## 2021-01-01 PROCEDURE — 86900 BLOOD TYPING SEROLOGIC ABO: CPT | Performed by: FAMILY MEDICINE

## 2021-01-01 PROCEDURE — 97168 OT RE-EVAL EST PLAN CARE: CPT

## 2021-01-01 PROCEDURE — 63710000001 INSULIN DETEMIR PER 5 UNITS: Performed by: FAMILY MEDICINE

## 2021-01-01 PROCEDURE — 94760 N-INVAS EAR/PLS OXIMETRY 1: CPT

## 2021-01-01 PROCEDURE — 86900 BLOOD TYPING SEROLOGIC ABO: CPT | Performed by: EMERGENCY MEDICINE

## 2021-01-01 PROCEDURE — 25010000002 LORAZEPAM PER 2 MG

## 2021-01-01 PROCEDURE — 93306 TTE W/DOPPLER COMPLETE: CPT | Performed by: INTERNAL MEDICINE

## 2021-01-01 PROCEDURE — 71250 CT THORAX DX C-: CPT

## 2021-01-01 PROCEDURE — 71260 CT THORAX DX C+: CPT

## 2021-01-01 PROCEDURE — 94003 VENT MGMT INPAT SUBQ DAY: CPT

## 2021-01-01 PROCEDURE — 25010000002 MAGNESIUM SULFATE 2 GM/50ML SOLUTION: Performed by: PSYCHIATRY & NEUROLOGY

## 2021-01-01 PROCEDURE — 25010000002 FENTANYL CITRATE (PF) 100 MCG/2ML SOLUTION

## 2021-01-01 PROCEDURE — 86850 RBC ANTIBODY SCREEN: CPT | Performed by: FAMILY MEDICINE

## 2021-01-01 PROCEDURE — 87635 SARS-COV-2 COVID-19 AMP PRB: CPT | Performed by: FAMILY MEDICINE

## 2021-01-01 PROCEDURE — 81001 URINALYSIS AUTO W/SCOPE: CPT | Performed by: FAMILY MEDICINE

## 2021-01-01 PROCEDURE — 83690 ASSAY OF LIPASE: CPT

## 2021-01-01 PROCEDURE — 86923 COMPATIBILITY TEST ELECTRIC: CPT

## 2021-01-01 PROCEDURE — C2617 STENT, NON-COR, TEM W/O DEL: HCPCS | Performed by: UROLOGY

## 2021-01-01 PROCEDURE — 25010000002 PHENYLEPHRINE PER 1 ML: Performed by: NURSE ANESTHETIST, CERTIFIED REGISTERED

## 2021-01-01 PROCEDURE — 84145 PROCALCITONIN (PCT): CPT | Performed by: HOSPITALIST

## 2021-01-01 PROCEDURE — 86901 BLOOD TYPING SEROLOGIC RH(D): CPT | Performed by: EMERGENCY MEDICINE

## 2021-01-01 PROCEDURE — 94640 AIRWAY INHALATION TREATMENT: CPT

## 2021-01-01 PROCEDURE — 87070 CULTURE OTHR SPECIMN AEROBIC: CPT | Performed by: FAMILY MEDICINE

## 2021-01-01 PROCEDURE — 25010000003 POTASSIUM CHLORIDE PER 2 MEQ: Performed by: FAMILY MEDICINE

## 2021-01-01 PROCEDURE — 0 IOPAMIDOL PER 1 ML: Performed by: FAMILY MEDICINE

## 2021-01-01 PROCEDURE — 97116 GAIT TRAINING THERAPY: CPT

## 2021-01-01 PROCEDURE — 97166 OT EVAL MOD COMPLEX 45 MIN: CPT

## 2021-01-01 PROCEDURE — 87086 URINE CULTURE/COLONY COUNT: CPT | Performed by: UROLOGY

## 2021-01-01 PROCEDURE — 25010000002 CEFEPIME-DEXTROSE 2-5 GM-%(50ML) RECONSTITUTED SOLUTION: Performed by: EMERGENCY MEDICINE

## 2021-01-01 PROCEDURE — 94002 VENT MGMT INPAT INIT DAY: CPT

## 2021-01-01 PROCEDURE — 92611 MOTION FLUOROSCOPY/SWALLOW: CPT

## 2021-01-01 PROCEDURE — 02HV33Z INSERTION OF INFUSION DEVICE INTO SUPERIOR VENA CAVA, PERCUTANEOUS APPROACH: ICD-10-PCS | Performed by: HOSPITALIST

## 2021-01-01 PROCEDURE — 87077 CULTURE AEROBIC IDENTIFY: CPT | Performed by: EMERGENCY MEDICINE

## 2021-01-01 PROCEDURE — 85007 BL SMEAR W/DIFF WBC COUNT: CPT

## 2021-01-01 PROCEDURE — 25010000002 LEVETRIRACETAM PER 10 MG: Performed by: FAMILY MEDICINE

## 2021-01-01 PROCEDURE — P9016 RBC LEUKOCYTES REDUCED: HCPCS

## 2021-01-01 PROCEDURE — 87205 SMEAR GRAM STAIN: CPT | Performed by: FAMILY MEDICINE

## 2021-01-01 PROCEDURE — 80048 BASIC METABOLIC PNL TOTAL CA: CPT | Performed by: EMERGENCY MEDICINE

## 2021-01-01 PROCEDURE — 85610 PROTHROMBIN TIME: CPT | Performed by: EMERGENCY MEDICINE

## 2021-01-01 PROCEDURE — 85730 THROMBOPLASTIN TIME PARTIAL: CPT | Performed by: NURSE ANESTHETIST, CERTIFIED REGISTERED

## 2021-01-01 PROCEDURE — 83690 ASSAY OF LIPASE: CPT | Performed by: EMERGENCY MEDICINE

## 2021-01-01 PROCEDURE — 87150 DNA/RNA AMPLIFIED PROBE: CPT | Performed by: EMERGENCY MEDICINE

## 2021-01-01 PROCEDURE — 96374 THER/PROPH/DIAG INJ IV PUSH: CPT

## 2021-01-01 PROCEDURE — 85025 COMPLETE CBC W/AUTO DIFF WBC: CPT | Performed by: HOSPITALIST

## 2021-01-01 PROCEDURE — 83540 ASSAY OF IRON: CPT | Performed by: FAMILY MEDICINE

## 2021-01-01 PROCEDURE — 87147 CULTURE TYPE IMMUNOLOGIC: CPT | Performed by: INTERNAL MEDICINE

## 2021-01-01 PROCEDURE — 76705 ECHO EXAM OF ABDOMEN: CPT

## 2021-01-01 PROCEDURE — 84134 ASSAY OF PREALBUMIN: CPT | Performed by: HOSPITALIST

## 2021-01-01 PROCEDURE — C1769 GUIDE WIRE: HCPCS | Performed by: UROLOGY

## 2021-01-01 PROCEDURE — 25010000002 DIPHENHYDRAMINE PER 50 MG: Performed by: FAMILY MEDICINE

## 2021-01-01 PROCEDURE — 99284 EMERGENCY DEPT VISIT MOD MDM: CPT | Performed by: NURSE PRACTITIONER

## 2021-01-01 PROCEDURE — 25010000002 IOPAMIDOL 61 % SOLUTION: Performed by: UROLOGY

## 2021-01-01 PROCEDURE — 96125 COGNITIVE TEST BY HC PRO: CPT

## 2021-01-01 PROCEDURE — 85007 BL SMEAR W/DIFF WBC COUNT: CPT | Performed by: FAMILY MEDICINE

## 2021-01-01 PROCEDURE — 25010000002 DIGOXIN PER 500 MCG: Performed by: EMERGENCY MEDICINE

## 2021-01-01 DEVICE — URETERAL STENT
Type: IMPLANTABLE DEVICE | Site: URETER | Status: FUNCTIONAL
Brand: CONTOUR™

## 2021-01-01 RX ORDER — AMLODIPINE BESYLATE 5 MG/1
5 TABLET ORAL DAILY
Start: 2021-01-01 | End: 2022-01-01 | Stop reason: HOSPADM

## 2021-01-01 RX ORDER — IPRATROPIUM BROMIDE AND ALBUTEROL SULFATE 2.5; .5 MG/3ML; MG/3ML
3 SOLUTION RESPIRATORY (INHALATION) EVERY 4 HOURS PRN
Status: CANCELLED | OUTPATIENT
Start: 2021-01-01

## 2021-01-01 RX ORDER — PANTOPRAZOLE SODIUM 40 MG/1
40 TABLET, DELAYED RELEASE ORAL DAILY
COMMUNITY
End: 2022-01-01 | Stop reason: HOSPADM

## 2021-01-01 RX ORDER — DEXTROSE AND SODIUM CHLORIDE 5; .45 G/100ML; G/100ML
100 INJECTION, SOLUTION INTRAVENOUS CONTINUOUS
Status: DISCONTINUED | OUTPATIENT
Start: 2021-01-01 | End: 2021-01-01

## 2021-01-01 RX ORDER — DEXTROSE MONOHYDRATE 25 G/50ML
50 INJECTION, SOLUTION INTRAVENOUS ONCE
Status: COMPLETED | OUTPATIENT
Start: 2021-01-01 | End: 2021-01-01

## 2021-01-01 RX ORDER — NITROGLYCERIN 0.4 MG/1
0.4 TABLET SUBLINGUAL
Status: DISCONTINUED | OUTPATIENT
Start: 2021-01-01 | End: 2021-01-01 | Stop reason: HOSPADM

## 2021-01-01 RX ORDER — TRAMADOL HYDROCHLORIDE 50 MG/1
50 TABLET ORAL EVERY 6 HOURS PRN
Status: DISCONTINUED | OUTPATIENT
Start: 2021-01-01 | End: 2021-01-01 | Stop reason: HOSPADM

## 2021-01-01 RX ORDER — LEVETIRACETAM 1000 MG/1
1000 TABLET ORAL 2 TIMES DAILY
Start: 2021-01-01 | End: 2022-01-01 | Stop reason: HOSPADM

## 2021-01-01 RX ORDER — SODIUM CHLORIDE 0.9 % (FLUSH) 0.9 %
20 SYRINGE (ML) INJECTION AS NEEDED
Status: DISCONTINUED | OUTPATIENT
Start: 2021-01-01 | End: 2021-01-01

## 2021-01-01 RX ORDER — SODIUM CHLORIDE 0.9 % (FLUSH) 0.9 %
1-10 SYRINGE (ML) INJECTION AS NEEDED
Status: DISCONTINUED | OUTPATIENT
Start: 2021-01-01 | End: 2021-01-01 | Stop reason: HOSPADM

## 2021-01-01 RX ORDER — FLUCONAZOLE 2 MG/ML
400 INJECTION, SOLUTION INTRAVENOUS DAILY
Qty: 2400 ML | Refills: 0
Start: 2021-01-01 | End: 2021-01-01 | Stop reason: HOSPADM

## 2021-01-01 RX ORDER — IRON POLYSACCHARIDE COMPLEX 150 MG
150 CAPSULE ORAL 2 TIMES DAILY
Status: DISCONTINUED | OUTPATIENT
Start: 2021-01-01 | End: 2021-01-01 | Stop reason: HOSPADM

## 2021-01-01 RX ORDER — SODIUM CHLORIDE 0.9 % (FLUSH) 0.9 %
10 SYRINGE (ML) INJECTION EVERY 12 HOURS SCHEDULED
Status: DISCONTINUED | OUTPATIENT
Start: 2021-01-01 | End: 2021-01-01

## 2021-01-01 RX ORDER — LEVETIRACETAM 500 MG/1
1000 TABLET ORAL 2 TIMES DAILY
Status: DISCONTINUED | OUTPATIENT
Start: 2021-01-01 | End: 2021-01-01

## 2021-01-01 RX ORDER — POTASSIUM CHLORIDE 1.5 G/1.77G
20 POWDER, FOR SOLUTION ORAL DAILY
COMMUNITY
End: 2021-01-01

## 2021-01-01 RX ORDER — LIDOCAINE HYDROCHLORIDE AND EPINEPHRINE 10; 10 MG/ML; UG/ML
1 INJECTION, SOLUTION INFILTRATION; PERINEURAL ONCE
Status: COMPLETED | OUTPATIENT
Start: 2021-01-01 | End: 2021-01-01

## 2021-01-01 RX ORDER — DIGOXIN 0.25 MG/ML
250 INJECTION INTRAMUSCULAR; INTRAVENOUS ONCE
Status: COMPLETED | OUTPATIENT
Start: 2021-01-01 | End: 2021-01-01

## 2021-01-01 RX ORDER — HYDRALAZINE HYDROCHLORIDE 20 MG/ML
10 INJECTION INTRAMUSCULAR; INTRAVENOUS EVERY 6 HOURS PRN
Status: DISCONTINUED | OUTPATIENT
Start: 2021-01-01 | End: 2021-01-01 | Stop reason: HOSPADM

## 2021-01-01 RX ORDER — FLUCONAZOLE 2 MG/ML
400 INJECTION, SOLUTION INTRAVENOUS
Status: DISCONTINUED | OUTPATIENT
Start: 2021-01-01 | End: 2021-01-01

## 2021-01-01 RX ORDER — POLYETHYLENE GLYCOL 3350 17 G/17G
17 POWDER, FOR SOLUTION ORAL DAILY
Status: CANCELLED | OUTPATIENT
Start: 2021-01-01

## 2021-01-01 RX ORDER — DOCUSATE SODIUM 100 MG/1
100 CAPSULE, LIQUID FILLED ORAL 2 TIMES DAILY
Status: DISCONTINUED | OUTPATIENT
Start: 2021-01-01 | End: 2021-01-01 | Stop reason: HOSPADM

## 2021-01-01 RX ORDER — PANTOPRAZOLE SODIUM 40 MG/10ML
40 INJECTION, POWDER, LYOPHILIZED, FOR SOLUTION INTRAVENOUS
Status: DISCONTINUED | OUTPATIENT
Start: 2021-01-01 | End: 2021-01-01 | Stop reason: HOSPADM

## 2021-01-01 RX ORDER — DOCUSATE SODIUM 100 MG/1
100 CAPSULE, LIQUID FILLED ORAL 2 TIMES DAILY PRN
Status: CANCELLED | OUTPATIENT
Start: 2021-01-01

## 2021-01-01 RX ORDER — MULTIPLE VITAMINS W/ MINERALS TAB 9MG-400MCG
1 TAB ORAL DAILY
Status: DISCONTINUED | OUTPATIENT
Start: 2021-01-01 | End: 2021-01-01

## 2021-01-01 RX ORDER — POLYETHYLENE GLYCOL 3350 17 G/17G
17 POWDER, FOR SOLUTION ORAL DAILY
Status: DISCONTINUED | OUTPATIENT
Start: 2021-01-01 | End: 2021-01-01 | Stop reason: HOSPADM

## 2021-01-01 RX ORDER — ACETAMINOPHEN 160 MG/5ML
650 SOLUTION ORAL EVERY 4 HOURS PRN
Status: CANCELLED | OUTPATIENT
Start: 2021-01-01

## 2021-01-01 RX ORDER — SODIUM CHLORIDE 9 MG/ML
40 INJECTION, SOLUTION INTRAVENOUS AS NEEDED
Status: DISCONTINUED | OUTPATIENT
Start: 2021-01-01 | End: 2021-01-01 | Stop reason: HOSPADM

## 2021-01-01 RX ORDER — BISACODYL 10 MG
10 SUPPOSITORY, RECTAL RECTAL DAILY PRN
Status: DISCONTINUED | OUTPATIENT
Start: 2021-01-01 | End: 2021-01-01 | Stop reason: HOSPADM

## 2021-01-01 RX ORDER — ACETAMINOPHEN 650 MG/1
650 SUPPOSITORY RECTAL EVERY 4 HOURS PRN
Status: DISCONTINUED | OUTPATIENT
Start: 2021-01-01 | End: 2021-01-01 | Stop reason: HOSPADM

## 2021-01-01 RX ORDER — LEVETIRACETAM 500 MG/1
1000 TABLET ORAL 2 TIMES DAILY
Status: DISCONTINUED | OUTPATIENT
Start: 2021-01-01 | End: 2021-01-01 | Stop reason: HOSPADM

## 2021-01-01 RX ORDER — NICOTINE POLACRILEX 4 MG
15 LOZENGE BUCCAL
Status: DISCONTINUED | OUTPATIENT
Start: 2021-01-01 | End: 2021-01-01 | Stop reason: HOSPADM

## 2021-01-01 RX ORDER — ONDANSETRON 2 MG/ML
INJECTION INTRAMUSCULAR; INTRAVENOUS
Status: COMPLETED
Start: 2021-01-01 | End: 2021-01-01

## 2021-01-01 RX ORDER — LORAZEPAM 2 MG/ML
INJECTION INTRAMUSCULAR
Status: COMPLETED
Start: 2021-01-01 | End: 2021-01-01

## 2021-01-01 RX ORDER — SODIUM CHLORIDE 9 MG/ML
40 INJECTION, SOLUTION INTRAVENOUS AS NEEDED
Status: DISCONTINUED | OUTPATIENT
Start: 2021-01-01 | End: 2021-01-01

## 2021-01-01 RX ORDER — AMOXICILLIN AND CLAVULANATE POTASSIUM 875; 125 MG/1; MG/1
1 TABLET, FILM COATED ORAL EVERY 12 HOURS SCHEDULED
Status: DISCONTINUED | OUTPATIENT
Start: 2021-01-01 | End: 2021-01-01 | Stop reason: HOSPADM

## 2021-01-01 RX ORDER — FENTANYL CITRATE 50 UG/ML
INJECTION, SOLUTION INTRAMUSCULAR; INTRAVENOUS
Status: COMPLETED
Start: 2021-01-01 | End: 2021-01-01

## 2021-01-01 RX ORDER — SODIUM CHLORIDE 9 MG/ML
250 INJECTION, SOLUTION INTRAVENOUS CONTINUOUS
Status: DISCONTINUED | OUTPATIENT
Start: 2021-01-01 | End: 2021-01-01

## 2021-01-01 RX ORDER — AMOXICILLIN 250 MG
2 CAPSULE ORAL 2 TIMES DAILY PRN
Status: CANCELLED | OUTPATIENT
Start: 2021-01-01

## 2021-01-01 RX ORDER — ACETAMINOPHEN 160 MG/5ML
650 SOLUTION ORAL EVERY 4 HOURS PRN
Status: DISCONTINUED | OUTPATIENT
Start: 2021-01-01 | End: 2021-01-01 | Stop reason: HOSPADM

## 2021-01-01 RX ORDER — POLYETHYLENE GLYCOL 3350 17 G/17G
17 POWDER, FOR SOLUTION ORAL DAILY
COMMUNITY
End: 2021-01-01

## 2021-01-01 RX ORDER — AMLODIPINE BESYLATE 5 MG/1
5 TABLET ORAL DAILY
Status: DISCONTINUED | OUTPATIENT
Start: 2021-01-01 | End: 2021-01-01 | Stop reason: HOSPADM

## 2021-01-01 RX ORDER — IPRATROPIUM BROMIDE AND ALBUTEROL SULFATE 2.5; .5 MG/3ML; MG/3ML
3 SOLUTION RESPIRATORY (INHALATION)
Status: DISCONTINUED | OUTPATIENT
Start: 2021-01-01 | End: 2021-01-01

## 2021-01-01 RX ORDER — ETOMIDATE 2 MG/ML
INJECTION INTRAVENOUS AS NEEDED
Status: DISCONTINUED | OUTPATIENT
Start: 2021-01-01 | End: 2021-01-01 | Stop reason: SURG

## 2021-01-01 RX ORDER — L.ACID,PARA/B.BIFIDUM/S.THERM 8B CELL
1 CAPSULE ORAL 2 TIMES DAILY
Status: CANCELLED | OUTPATIENT
Start: 2021-01-01

## 2021-01-01 RX ORDER — FENTANYL CITRATE 50 UG/ML
25 INJECTION, SOLUTION INTRAMUSCULAR; INTRAVENOUS
Status: DISCONTINUED | OUTPATIENT
Start: 2021-01-01 | End: 2021-01-01

## 2021-01-01 RX ORDER — BISACODYL 5 MG/1
5 TABLET, DELAYED RELEASE ORAL DAILY PRN
Status: DISCONTINUED | OUTPATIENT
Start: 2021-01-01 | End: 2021-01-01 | Stop reason: HOSPADM

## 2021-01-01 RX ORDER — TRAMADOL HYDROCHLORIDE 50 MG/1
50 TABLET ORAL EVERY 6 HOURS PRN
COMMUNITY
End: 2021-01-01

## 2021-01-01 RX ORDER — NOREPINEPHRINE BITARTRATE 1 MG/ML
INJECTION, SOLUTION INTRAVENOUS
Status: DISPENSED
Start: 2021-01-01 | End: 2021-01-01

## 2021-01-01 RX ORDER — PANTOPRAZOLE SODIUM 40 MG/10ML
40 INJECTION, POWDER, LYOPHILIZED, FOR SOLUTION INTRAVENOUS
Status: DISCONTINUED | OUTPATIENT
Start: 2021-01-01 | End: 2021-01-01

## 2021-01-01 RX ORDER — ONDANSETRON 2 MG/ML
4 INJECTION INTRAMUSCULAR; INTRAVENOUS EVERY 6 HOURS PRN
Status: DISCONTINUED | OUTPATIENT
Start: 2021-01-01 | End: 2021-01-01 | Stop reason: HOSPADM

## 2021-01-01 RX ORDER — ONDANSETRON 4 MG/1
4 TABLET, FILM COATED ORAL EVERY 6 HOURS PRN
Status: CANCELLED | OUTPATIENT
Start: 2021-01-01

## 2021-01-01 RX ORDER — DEXTROSE MONOHYDRATE 25 G/50ML
25 INJECTION, SOLUTION INTRAVENOUS
Status: DISCONTINUED | OUTPATIENT
Start: 2021-01-01 | End: 2021-01-01 | Stop reason: HOSPADM

## 2021-01-01 RX ORDER — FUROSEMIDE 10 MG/ML
40 INJECTION INTRAMUSCULAR; INTRAVENOUS ONCE
Status: COMPLETED | OUTPATIENT
Start: 2021-01-01 | End: 2021-01-01

## 2021-01-01 RX ORDER — LORAZEPAM 0.5 MG/1
0.5 TABLET ORAL EVERY 12 HOURS PRN
Status: DISCONTINUED | OUTPATIENT
Start: 2021-01-01 | End: 2021-01-01 | Stop reason: HOSPADM

## 2021-01-01 RX ORDER — SODIUM CHLORIDE 9 MG/ML
100 INJECTION, SOLUTION INTRAVENOUS CONTINUOUS
Status: DISCONTINUED | OUTPATIENT
Start: 2021-01-01 | End: 2021-01-01

## 2021-01-01 RX ORDER — CHOLECALCIFEROL (VITAMIN D3) 125 MCG
5 CAPSULE ORAL NIGHTLY PRN
Status: DISCONTINUED | OUTPATIENT
Start: 2021-01-01 | End: 2021-01-01 | Stop reason: HOSPADM

## 2021-01-01 RX ORDER — ASPIRIN 81 MG/1
81 TABLET, CHEWABLE ORAL DAILY
Status: DISCONTINUED | OUTPATIENT
Start: 2021-01-01 | End: 2021-01-01 | Stop reason: HOSPADM

## 2021-01-01 RX ORDER — NITROGLYCERIN 0.4 MG/1
0.4 TABLET SUBLINGUAL
COMMUNITY
End: 2022-01-01 | Stop reason: HOSPADM

## 2021-01-01 RX ORDER — SODIUM CHLORIDE 9 MG/ML
75 INJECTION, SOLUTION INTRAVENOUS CONTINUOUS
Status: DISCONTINUED | OUTPATIENT
Start: 2021-01-01 | End: 2021-01-01

## 2021-01-01 RX ORDER — BUMETANIDE 0.25 MG/ML
2 INJECTION INTRAMUSCULAR; INTRAVENOUS ONCE
Status: COMPLETED | OUTPATIENT
Start: 2021-01-01 | End: 2021-01-01

## 2021-01-01 RX ORDER — DIGOXIN 0.25 MG/ML
125 INJECTION INTRAMUSCULAR; INTRAVENOUS EVERY 24 HOURS
Status: DISCONTINUED | OUTPATIENT
Start: 2021-01-01 | End: 2021-01-01

## 2021-01-01 RX ORDER — BISACODYL 10 MG
10 SUPPOSITORY, RECTAL RECTAL ONCE
Status: COMPLETED | OUTPATIENT
Start: 2021-01-01 | End: 2021-01-01

## 2021-01-01 RX ORDER — FLUCONAZOLE 2 MG/ML
400 INJECTION, SOLUTION INTRAVENOUS DAILY
Status: DISCONTINUED | OUTPATIENT
Start: 2021-01-01 | End: 2021-01-01 | Stop reason: HOSPADM

## 2021-01-01 RX ORDER — AMOXICILLIN AND CLAVULANATE POTASSIUM 875; 125 MG/1; MG/1
1 TABLET, FILM COATED ORAL EVERY 12 HOURS SCHEDULED
Qty: 7 TABLET | Refills: 0
Start: 2021-01-01 | End: 2021-01-01

## 2021-01-01 RX ORDER — HYDROXYZINE HYDROCHLORIDE 10 MG/1
25 TABLET, FILM COATED ORAL 3 TIMES DAILY PRN
Status: DISCONTINUED | OUTPATIENT
Start: 2021-01-01 | End: 2021-01-01

## 2021-01-01 RX ORDER — ONDANSETRON 4 MG/1
4 TABLET, FILM COATED ORAL EVERY 6 HOURS PRN
Status: DISCONTINUED | OUTPATIENT
Start: 2021-01-01 | End: 2021-01-01 | Stop reason: HOSPADM

## 2021-01-01 RX ORDER — PETROLATUM 42 G/100G
OINTMENT TOPICAL
Status: DISCONTINUED | OUTPATIENT
Start: 2021-01-01 | End: 2021-01-01 | Stop reason: HOSPADM

## 2021-01-01 RX ORDER — TRAMADOL HYDROCHLORIDE 50 MG/1
50 TABLET ORAL EVERY 6 HOURS PRN
Status: CANCELLED | OUTPATIENT
Start: 2021-01-01

## 2021-01-01 RX ORDER — OLANZAPINE 5 MG/1
2.5 TABLET ORAL 2 TIMES DAILY
Status: DISCONTINUED | OUTPATIENT
Start: 2021-01-01 | End: 2021-01-01

## 2021-01-01 RX ORDER — SODIUM CHLORIDE 0.9 % (FLUSH) 0.9 %
10 SYRINGE (ML) INJECTION EVERY 12 HOURS SCHEDULED
Status: DISCONTINUED | OUTPATIENT
Start: 2021-01-01 | End: 2021-01-01 | Stop reason: HOSPADM

## 2021-01-01 RX ORDER — LIDOCAINE HYDROCHLORIDE 20 MG/ML
INJECTION, SOLUTION INFILTRATION; PERINEURAL AS NEEDED
Status: DISCONTINUED | OUTPATIENT
Start: 2021-01-01 | End: 2021-01-01 | Stop reason: SURG

## 2021-01-01 RX ORDER — L.ACID,PARA/B.BIFIDUM/S.THERM 8B CELL
1 CAPSULE ORAL 2 TIMES DAILY
Status: DISCONTINUED | OUTPATIENT
Start: 2021-01-01 | End: 2021-01-01 | Stop reason: HOSPADM

## 2021-01-01 RX ORDER — SODIUM CHLORIDE 0.9 % (FLUSH) 0.9 %
10 SYRINGE (ML) INJECTION AS NEEDED
Status: DISCONTINUED | OUTPATIENT
Start: 2021-01-01 | End: 2021-01-01

## 2021-01-01 RX ORDER — DEXTROSE MONOHYDRATE 50 MG/ML
50 INJECTION, SOLUTION INTRAVENOUS CONTINUOUS
Status: DISCONTINUED | OUTPATIENT
Start: 2021-01-01 | End: 2021-01-01

## 2021-01-01 RX ORDER — ACETAMINOPHEN 325 MG/1
650 TABLET ORAL EVERY 4 HOURS PRN
Status: DISCONTINUED | OUTPATIENT
Start: 2021-01-01 | End: 2021-01-01 | Stop reason: HOSPADM

## 2021-01-01 RX ORDER — BUMETANIDE 0.25 MG/ML
2 INJECTION INTRAMUSCULAR; INTRAVENOUS EVERY 12 HOURS
Status: COMPLETED | OUTPATIENT
Start: 2021-01-01 | End: 2021-01-01

## 2021-01-01 RX ORDER — SODIUM CHLORIDE 0.9 % (FLUSH) 0.9 %
10 SYRINGE (ML) INJECTION AS NEEDED
Status: DISCONTINUED | OUTPATIENT
Start: 2021-01-01 | End: 2021-01-01 | Stop reason: HOSPADM

## 2021-01-01 RX ORDER — FUROSEMIDE 10 MG/ML
20 INJECTION INTRAMUSCULAR; INTRAVENOUS ONCE
Status: COMPLETED | OUTPATIENT
Start: 2021-01-01 | End: 2021-01-01

## 2021-01-01 RX ORDER — CEFTRIAXONE 2 G/50ML
2 INJECTION, SOLUTION INTRAVENOUS ONCE
Status: COMPLETED | OUTPATIENT
Start: 2021-01-01 | End: 2021-01-01

## 2021-01-01 RX ORDER — SUCCINYLCHOLINE CHLORIDE 20 MG/ML
INJECTION INTRAMUSCULAR; INTRAVENOUS AS NEEDED
Status: DISCONTINUED | OUTPATIENT
Start: 2021-01-01 | End: 2021-01-01 | Stop reason: SURG

## 2021-01-01 RX ORDER — L.ACID,PARA/B.BIFIDUM/S.THERM 8B CELL
1 CAPSULE ORAL 2 TIMES DAILY
Start: 2021-01-01 | End: 2022-01-01 | Stop reason: HOSPADM

## 2021-01-01 RX ORDER — CEFTRIAXONE 2 G/50ML
2 INJECTION, SOLUTION INTRAVENOUS EVERY 24 HOURS
Status: COMPLETED | OUTPATIENT
Start: 2021-01-01 | End: 2021-01-01

## 2021-01-01 RX ORDER — SODIUM CHLORIDE 9 MG/ML
50 INJECTION, SOLUTION INTRAVENOUS CONTINUOUS
Status: DISCONTINUED | OUTPATIENT
Start: 2021-01-01 | End: 2021-01-01

## 2021-01-01 RX ORDER — FAMOTIDINE 20 MG/1
40 TABLET, FILM COATED ORAL DAILY
Status: DISCONTINUED | OUTPATIENT
Start: 2021-01-01 | End: 2021-01-01 | Stop reason: HOSPADM

## 2021-01-01 RX ORDER — FAMOTIDINE 20 MG/1
40 TABLET, FILM COATED ORAL NIGHTLY
Status: DISCONTINUED | OUTPATIENT
Start: 2021-01-01 | End: 2021-01-01 | Stop reason: HOSPADM

## 2021-01-01 RX ORDER — FAMOTIDINE 20 MG/1
40 TABLET, FILM COATED ORAL NIGHTLY
Status: DISCONTINUED | OUTPATIENT
Start: 2021-01-01 | End: 2021-01-01

## 2021-01-01 RX ORDER — LORAZEPAM 2 MG/ML
0.25 INJECTION INTRAMUSCULAR EVERY 4 HOURS PRN
Status: DISCONTINUED | OUTPATIENT
Start: 2021-01-01 | End: 2021-01-01

## 2021-01-01 RX ORDER — MIDAZOLAM HYDROCHLORIDE 2 MG/2ML
INJECTION, SOLUTION INTRAMUSCULAR; INTRAVENOUS AS NEEDED
Status: DISCONTINUED | OUTPATIENT
Start: 2021-01-01 | End: 2021-01-01 | Stop reason: SURG

## 2021-01-01 RX ORDER — SODIUM CHLORIDE 0.9 % (FLUSH) 0.9 %
10 SYRINGE (ML) INJECTION EVERY 12 HOURS SCHEDULED
Status: CANCELLED | OUTPATIENT
Start: 2021-01-01

## 2021-01-01 RX ORDER — SODIUM CHLORIDE 0.9 % (FLUSH) 0.9 %
20 SYRINGE (ML) INJECTION AS NEEDED
Status: DISCONTINUED | OUTPATIENT
Start: 2021-01-01 | End: 2021-01-01 | Stop reason: HOSPADM

## 2021-01-01 RX ORDER — IPRATROPIUM BROMIDE AND ALBUTEROL SULFATE 2.5; .5 MG/3ML; MG/3ML
3 SOLUTION RESPIRATORY (INHALATION) EVERY 4 HOURS PRN
Status: DISCONTINUED | OUTPATIENT
Start: 2021-01-01 | End: 2021-01-01 | Stop reason: HOSPADM

## 2021-01-01 RX ORDER — LEVETIRACETAM 10 MG/ML
1000 INJECTION INTRAVASCULAR EVERY 6 HOURS
Status: DISCONTINUED | OUTPATIENT
Start: 2021-01-01 | End: 2021-01-01

## 2021-01-01 RX ORDER — SODIUM CHLORIDE 9 MG/ML
INJECTION, SOLUTION INTRAVENOUS
Status: COMPLETED
Start: 2021-01-01 | End: 2021-01-01

## 2021-01-01 RX ORDER — DIPHENHYDRAMINE HCL 25 MG
CAPSULE ORAL
Status: COMPLETED
Start: 2021-01-01 | End: 2021-01-01

## 2021-01-01 RX ORDER — SODIUM CHLORIDE 0.9 % (FLUSH) 0.9 %
1-10 SYRINGE (ML) INJECTION AS NEEDED
Status: CANCELLED | OUTPATIENT
Start: 2021-01-01

## 2021-01-01 RX ORDER — LEVETIRACETAM 100 MG/ML
1000 SOLUTION ORAL EVERY 12 HOURS SCHEDULED
Status: DISCONTINUED | OUTPATIENT
Start: 2021-01-01 | End: 2021-01-01 | Stop reason: HOSPADM

## 2021-01-01 RX ORDER — ONDANSETRON 2 MG/ML
4 INJECTION INTRAMUSCULAR; INTRAVENOUS ONCE
Status: COMPLETED | OUTPATIENT
Start: 2021-01-01 | End: 2021-01-01

## 2021-01-01 RX ORDER — BISACODYL 5 MG/1
5 TABLET, DELAYED RELEASE ORAL DAILY PRN
Status: CANCELLED | OUTPATIENT
Start: 2021-01-01

## 2021-01-01 RX ORDER — CEFEPIME HYDROCHLORIDE 2 G/50ML
2 INJECTION, SOLUTION INTRAVENOUS ONCE
Status: COMPLETED | OUTPATIENT
Start: 2021-01-01 | End: 2021-01-01

## 2021-01-01 RX ORDER — LORAZEPAM 2 MG/ML
1 INJECTION INTRAMUSCULAR
Status: DISCONTINUED | OUTPATIENT
Start: 2021-01-01 | End: 2021-01-01

## 2021-01-01 RX ORDER — FENTANYL CITRATE 0.05 MG/ML
25 INJECTION, SOLUTION INTRAMUSCULAR; INTRAVENOUS EVERY 4 HOURS PRN
Status: DISCONTINUED | OUTPATIENT
Start: 2021-01-01 | End: 2021-01-01

## 2021-01-01 RX ORDER — FLUCONAZOLE 200 MG/1
200 TABLET ORAL EVERY 24 HOURS
Qty: 5 TABLET | Refills: 0 | Status: SHIPPED | OUTPATIENT
Start: 2021-01-01 | End: 2021-01-01

## 2021-01-01 RX ORDER — POTASSIUM CHLORIDE 1.5 G/1.77G
20 POWDER, FOR SOLUTION ORAL DAILY
Status: DISCONTINUED | OUTPATIENT
Start: 2021-01-01 | End: 2021-01-01 | Stop reason: HOSPADM

## 2021-01-01 RX ORDER — LEVETIRACETAM 10 MG/ML
1000 INJECTION INTRAVASCULAR EVERY 12 HOURS SCHEDULED
Status: DISCONTINUED | OUTPATIENT
Start: 2021-01-01 | End: 2021-01-01

## 2021-01-01 RX ORDER — DEXTROSE MONOHYDRATE 25 G/50ML
INJECTION, SOLUTION INTRAVENOUS
Status: COMPLETED
Start: 2021-01-01 | End: 2021-01-01

## 2021-01-01 RX ORDER — CEFTRIAXONE 2 G/50ML
INJECTION, SOLUTION INTRAVENOUS
Status: DISPENSED
Start: 2021-01-01 | End: 2021-01-01

## 2021-01-01 RX ORDER — FAMOTIDINE 20 MG/1
40 TABLET, FILM COATED ORAL DAILY
Status: CANCELLED | OUTPATIENT
Start: 2021-01-01

## 2021-01-01 RX ORDER — SODIUM CHLORIDE 9 MG/ML
INJECTION, SOLUTION INTRAVENOUS
Status: DISPENSED
Start: 2021-01-01 | End: 2021-01-01

## 2021-01-01 RX ORDER — MAGNESIUM HYDROXIDE 1200 MG/15ML
LIQUID ORAL AS NEEDED
Status: DISCONTINUED | OUTPATIENT
Start: 2021-01-01 | End: 2021-01-01 | Stop reason: HOSPADM

## 2021-01-01 RX ORDER — ACETAMINOPHEN 650 MG/1
650 SUPPOSITORY RECTAL EVERY 4 HOURS PRN
Status: CANCELLED | OUTPATIENT
Start: 2021-01-01

## 2021-01-01 RX ORDER — ECHINACEA PURPUREA EXTRACT 125 MG
1 TABLET ORAL 3 TIMES DAILY PRN
COMMUNITY
End: 2021-01-01 | Stop reason: HOSPADM

## 2021-01-01 RX ORDER — DIGOXIN 125 MCG
125 TABLET ORAL EVERY OTHER DAY
Status: DISCONTINUED | OUTPATIENT
Start: 2021-01-01 | End: 2021-01-01 | Stop reason: HOSPADM

## 2021-01-01 RX ORDER — L.ACID,PARA/B.BIFIDUM/S.THERM 8B CELL
1 CAPSULE ORAL 2 TIMES DAILY
Start: 2021-01-01 | End: 2021-01-01

## 2021-01-01 RX ORDER — DEXTROSE MONOHYDRATE 25 G/50ML
25 INJECTION, SOLUTION INTRAVENOUS
Status: CANCELLED | OUTPATIENT
Start: 2021-01-01

## 2021-01-01 RX ORDER — DIGOXIN 125 MCG
125 TABLET ORAL
Status: CANCELLED | OUTPATIENT
Start: 2021-01-01

## 2021-01-01 RX ORDER — DIPHENHYDRAMINE HYDROCHLORIDE 50 MG/ML
INJECTION INTRAMUSCULAR; INTRAVENOUS
Status: DISPENSED
Start: 2021-01-01 | End: 2021-01-01

## 2021-01-01 RX ORDER — ATROPINE SULFATE 1 MG/ML
0.5 INJECTION, SOLUTION INTRAMUSCULAR; INTRAVENOUS; SUBCUTANEOUS ONCE AS NEEDED
Status: DISCONTINUED | OUTPATIENT
Start: 2021-01-01 | End: 2021-01-01 | Stop reason: HOSPADM

## 2021-01-01 RX ORDER — POTASSIUM CHLORIDE 20MEQ/15ML
40 LIQUID (ML) ORAL ONCE
Status: DISCONTINUED | OUTPATIENT
Start: 2021-01-01 | End: 2021-01-01 | Stop reason: CLARIF

## 2021-01-01 RX ORDER — SODIUM CHLORIDE 0.9 % (FLUSH) 0.9 %
10 SYRINGE (ML) INJECTION AS NEEDED
Status: CANCELLED | OUTPATIENT
Start: 2021-01-01

## 2021-01-01 RX ORDER — POTASSIUM CHLORIDE 20 MEQ/1
20 TABLET, EXTENDED RELEASE ORAL DAILY
Status: DISCONTINUED | OUTPATIENT
Start: 2021-01-01 | End: 2021-01-01 | Stop reason: HOSPADM

## 2021-01-01 RX ORDER — MAGNESIUM HYDROXIDE 1200 MG/15ML
1000 LIQUID ORAL AS NEEDED
Status: DISCONTINUED | OUTPATIENT
Start: 2021-01-01 | End: 2021-01-01 | Stop reason: HOSPADM

## 2021-01-01 RX ORDER — LEVETIRACETAM 100 MG/ML
1000 SOLUTION ORAL EVERY 6 HOURS
Status: DISCONTINUED | OUTPATIENT
Start: 2021-01-01 | End: 2021-01-01

## 2021-01-01 RX ORDER — LACOSAMIDE 10 MG/ML
INJECTION, SOLUTION INTRAVENOUS
Status: COMPLETED
Start: 2021-01-01 | End: 2021-01-01

## 2021-01-01 RX ORDER — POLYETHYLENE GLYCOL 3350 17 G/17G
17 POWDER, FOR SOLUTION ORAL DAILY
Start: 2021-01-01 | End: 2022-01-01 | Stop reason: HOSPADM

## 2021-01-01 RX ORDER — LIDOCAINE HYDROCHLORIDE AND EPINEPHRINE BITARTRATE 20; .01 MG/ML; MG/ML
10 INJECTION, SOLUTION SUBCUTANEOUS ONCE
Status: DISCONTINUED | OUTPATIENT
Start: 2021-01-01 | End: 2021-01-01

## 2021-01-01 RX ORDER — POTASSIUM CHLORIDE 1.5 G/1.77G
40 POWDER, FOR SOLUTION ORAL ONCE
Status: COMPLETED | OUTPATIENT
Start: 2021-01-01 | End: 2021-01-01

## 2021-01-01 RX ORDER — FAMOTIDINE 10 MG/ML
20 INJECTION, SOLUTION INTRAVENOUS DAILY
Status: DISCONTINUED | OUTPATIENT
Start: 2021-01-01 | End: 2021-01-01

## 2021-01-01 RX ORDER — CEFEPIME HYDROCHLORIDE 1 G/50ML
1 INJECTION, SOLUTION INTRAVENOUS EVERY 24 HOURS
Status: DISCONTINUED | OUTPATIENT
Start: 2021-01-01 | End: 2021-01-01

## 2021-01-01 RX ORDER — DIGOXIN 125 MCG
125 TABLET ORAL DAILY
Status: DISCONTINUED | OUTPATIENT
Start: 2021-01-01 | End: 2021-01-01 | Stop reason: CLARIF

## 2021-01-01 RX ORDER — MULTIPLE VITAMINS W/ MINERALS TAB 9MG-400MCG
1 TAB ORAL DAILY
Status: DISCONTINUED | OUTPATIENT
Start: 2021-01-01 | End: 2021-01-01 | Stop reason: HOSPADM

## 2021-01-01 RX ORDER — CHOLECALCIFEROL (VITAMIN D3) 125 MCG
5 CAPSULE ORAL NIGHTLY PRN
Status: CANCELLED | OUTPATIENT
Start: 2021-01-01

## 2021-01-01 RX ORDER — POTASSIUM CHLORIDE 400 MEQ/1000ML
20 INJECTION, SOLUTION INTRAVENOUS
Status: COMPLETED | OUTPATIENT
Start: 2021-01-01 | End: 2021-01-01

## 2021-01-01 RX ORDER — SODIUM CHLORIDE 9 MG/ML
40 INJECTION, SOLUTION INTRAVENOUS AS NEEDED
Status: CANCELLED | OUTPATIENT
Start: 2021-01-01

## 2021-01-01 RX ORDER — ACETAMINOPHEN 325 MG/1
650 TABLET ORAL EVERY 4 HOURS PRN
Start: 2021-01-01 | End: 2021-01-01

## 2021-01-01 RX ORDER — LORAZEPAM 0.5 MG/1
0.5 TABLET ORAL EVERY 12 HOURS PRN
COMMUNITY
End: 2022-01-01 | Stop reason: HOSPADM

## 2021-01-01 RX ORDER — DEXMEDETOMIDINE HYDROCHLORIDE 4 UG/ML
.2-1.5 INJECTION, SOLUTION INTRAVENOUS
Status: DISCONTINUED | OUTPATIENT
Start: 2021-01-01 | End: 2021-01-01

## 2021-01-01 RX ORDER — CEFTRIAXONE 2 G/50ML
2 INJECTION, SOLUTION INTRAVENOUS EVERY 24 HOURS
Status: DISCONTINUED | OUTPATIENT
Start: 2021-01-01 | End: 2021-01-01

## 2021-01-01 RX ORDER — MAGNESIUM SULFATE HEPTAHYDRATE 40 MG/ML
2 INJECTION, SOLUTION INTRAVENOUS ONCE
Status: COMPLETED | OUTPATIENT
Start: 2021-01-01 | End: 2021-01-01

## 2021-01-01 RX ORDER — CALCIUM CARBONATE 200(500)MG
1 TABLET,CHEWABLE ORAL 2 TIMES DAILY PRN
Status: DISCONTINUED | OUTPATIENT
Start: 2021-01-01 | End: 2021-01-01 | Stop reason: HOSPADM

## 2021-01-01 RX ORDER — PANTOPRAZOLE SODIUM 40 MG/1
40 TABLET, DELAYED RELEASE ORAL DAILY
Status: DISCONTINUED | OUTPATIENT
Start: 2021-01-01 | End: 2021-01-01

## 2021-01-01 RX ORDER — ONDANSETRON 2 MG/ML
4 INJECTION INTRAMUSCULAR; INTRAVENOUS ONCE AS NEEDED
Status: DISCONTINUED | OUTPATIENT
Start: 2021-01-01 | End: 2021-01-01 | Stop reason: HOSPADM

## 2021-01-01 RX ORDER — LORAZEPAM 0.5 MG/1
0.5 TABLET ORAL EVERY 12 HOURS PRN
Status: DISCONTINUED | OUTPATIENT
Start: 2021-01-01 | End: 2021-01-01

## 2021-01-01 RX ORDER — DIGOXIN 0.25 MG/ML
100 INJECTION INTRAMUSCULAR; INTRAVENOUS EVERY 24 HOURS
Status: DISCONTINUED | OUTPATIENT
Start: 2021-01-01 | End: 2021-01-01

## 2021-01-01 RX ORDER — DIGOXIN 125 MCG
125 TABLET ORAL
Status: DISCONTINUED | OUTPATIENT
Start: 2021-01-01 | End: 2021-01-01 | Stop reason: HOSPADM

## 2021-01-01 RX ORDER — LACOSAMIDE 10 MG/ML
INJECTION, SOLUTION INTRAVENOUS
Status: DISPENSED
Start: 2021-01-01 | End: 2021-01-01

## 2021-01-01 RX ORDER — SODIUM CHLORIDE 9 MG/ML
INJECTION, SOLUTION INTRAVENOUS
Status: DISCONTINUED
Start: 2021-01-01 | End: 2021-01-01 | Stop reason: WASHOUT

## 2021-01-01 RX ORDER — DIPHENHYDRAMINE HCL 25 MG
25 CAPSULE ORAL EVERY 6 HOURS PRN
Status: DISCONTINUED | OUTPATIENT
Start: 2021-01-01 | End: 2021-01-01 | Stop reason: HOSPADM

## 2021-01-01 RX ORDER — LEVOTHYROXINE SODIUM 0.07 MG/1
75 TABLET ORAL DAILY
Status: DISCONTINUED | OUTPATIENT
Start: 2021-01-01 | End: 2021-01-01

## 2021-01-01 RX ORDER — DEXTROSE MONOHYDRATE 25 G/50ML
25 INJECTION, SOLUTION INTRAVENOUS ONCE
Status: DISCONTINUED | OUTPATIENT
Start: 2021-01-01 | End: 2021-01-01

## 2021-01-01 RX ORDER — GLIMEPIRIDE 2 MG/1
1 TABLET ORAL DAILY
Status: DISCONTINUED | OUTPATIENT
Start: 2021-01-01 | End: 2021-01-01 | Stop reason: HOSPADM

## 2021-01-01 RX ORDER — DEXTROSE AND SODIUM CHLORIDE 5; .45 G/100ML; G/100ML
50 INJECTION, SOLUTION INTRAVENOUS CONTINUOUS
Status: DISCONTINUED | OUTPATIENT
Start: 2021-01-01 | End: 2021-01-01

## 2021-01-01 RX ORDER — LEVOTHYROXINE SODIUM 0.07 MG/1
75 TABLET ORAL
Status: DISCONTINUED | OUTPATIENT
Start: 2021-01-01 | End: 2021-01-01 | Stop reason: HOSPADM

## 2021-01-01 RX ORDER — BISACODYL 10 MG
10 SUPPOSITORY, RECTAL RECTAL DAILY PRN
Status: CANCELLED | OUTPATIENT
Start: 2021-01-01

## 2021-01-01 RX ORDER — HYDROXYZINE HYDROCHLORIDE 25 MG/1
25 TABLET, FILM COATED ORAL 3 TIMES DAILY PRN
COMMUNITY
End: 2021-01-01 | Stop reason: HOSPADM

## 2021-01-01 RX ORDER — FLUCONAZOLE 2 MG/ML
400 INJECTION, SOLUTION INTRAVENOUS
Status: DISCONTINUED | OUTPATIENT
Start: 2021-01-01 | End: 2021-01-01 | Stop reason: HOSPADM

## 2021-01-01 RX ORDER — MAGNESIUM HYDROXIDE 1200 MG/15ML
LIQUID ORAL
Status: DISPENSED
Start: 2021-01-01 | End: 2021-01-01

## 2021-01-01 RX ORDER — DIGOXIN 125 MCG
125 TABLET ORAL
Status: DISCONTINUED | OUTPATIENT
Start: 2021-01-01 | End: 2021-01-01

## 2021-01-01 RX ORDER — NITROGLYCERIN 0.4 MG/1
0.4 TABLET SUBLINGUAL
Status: CANCELLED | OUTPATIENT
Start: 2021-01-01

## 2021-01-01 RX ORDER — LORAZEPAM 2 MG/ML
0.5 INJECTION INTRAMUSCULAR EVERY 4 HOURS PRN
Status: DISCONTINUED | OUTPATIENT
Start: 2021-01-01 | End: 2021-01-01 | Stop reason: HOSPADM

## 2021-01-01 RX ORDER — SODIUM CHLORIDE 9 MG/ML
125 INJECTION, SOLUTION INTRAVENOUS CONTINUOUS
Status: DISCONTINUED | OUTPATIENT
Start: 2021-01-01 | End: 2021-01-01

## 2021-01-01 RX ORDER — AMOXICILLIN 250 MG
1 CAPSULE ORAL NIGHTLY
Status: DISCONTINUED | OUTPATIENT
Start: 2021-01-01 | End: 2021-01-01 | Stop reason: HOSPADM

## 2021-01-01 RX ORDER — LEVOTHYROXINE SODIUM ANHYDROUS 100 UG/5ML
75 INJECTION, POWDER, LYOPHILIZED, FOR SOLUTION INTRAVENOUS
Status: DISCONTINUED | OUTPATIENT
Start: 2021-01-01 | End: 2021-01-01

## 2021-01-01 RX ORDER — LEVETIRACETAM 10 MG/ML
1000 INJECTION INTRAVASCULAR EVERY 12 HOURS
Status: DISCONTINUED | OUTPATIENT
Start: 2021-01-01 | End: 2021-01-01 | Stop reason: HOSPADM

## 2021-01-01 RX ORDER — DIGOXIN 0.25 MG/ML
125 INJECTION INTRAMUSCULAR; INTRAVENOUS
Status: DISCONTINUED | OUTPATIENT
Start: 2021-01-01 | End: 2021-01-01

## 2021-01-01 RX ORDER — POTASSIUM CHLORIDE 20 MEQ/1
20 TABLET, EXTENDED RELEASE ORAL DAILY
Start: 2021-01-01 | End: 2021-01-01 | Stop reason: HOSPADM

## 2021-01-01 RX ORDER — NICOTINE POLACRILEX 4 MG
15 LOZENGE BUCCAL
Status: CANCELLED | OUTPATIENT
Start: 2021-01-01

## 2021-01-01 RX ORDER — FLUCONAZOLE 100 MG/1
200 TABLET ORAL EVERY 24 HOURS
Status: DISCONTINUED | OUTPATIENT
Start: 2021-01-01 | End: 2021-01-01 | Stop reason: HOSPADM

## 2021-01-01 RX ORDER — BISACODYL 10 MG
10 SUPPOSITORY, RECTAL RECTAL DAILY PRN
Start: 2021-01-01 | End: 2021-01-01

## 2021-01-01 RX ORDER — MULTIPLE VITAMINS W/ MINERALS TAB 9MG-400MCG
1 TAB ORAL DAILY
Status: CANCELLED | OUTPATIENT
Start: 2021-01-01

## 2021-01-01 RX ORDER — DIGOXIN 0.25 MG/ML
125 INJECTION INTRAMUSCULAR; INTRAVENOUS ONCE
Status: COMPLETED | OUTPATIENT
Start: 2021-01-01 | End: 2021-01-01

## 2021-01-01 RX ORDER — DOCUSATE SODIUM 100 MG/1
100 CAPSULE, LIQUID FILLED ORAL 2 TIMES DAILY
Status: CANCELLED | OUTPATIENT
Start: 2021-01-01

## 2021-01-01 RX ORDER — DIPHENHYDRAMINE HYDROCHLORIDE 50 MG/ML
12.5 INJECTION INTRAMUSCULAR; INTRAVENOUS ONCE
Status: COMPLETED | OUTPATIENT
Start: 2021-01-01 | End: 2021-01-01

## 2021-01-01 RX ORDER — CHOLECALCIFEROL (VITAMIN D3) 125 MCG
5 CAPSULE ORAL NIGHTLY PRN
Start: 2021-01-01 | End: 2021-01-01

## 2021-01-01 RX ORDER — ALUMINA, MAGNESIA, AND SIMETHICONE 2400; 2400; 240 MG/30ML; MG/30ML; MG/30ML
15 SUSPENSION ORAL EVERY 6 HOURS PRN
Status: DISCONTINUED | OUTPATIENT
Start: 2021-01-01 | End: 2021-01-01 | Stop reason: HOSPADM

## 2021-01-01 RX ORDER — IPRATROPIUM BROMIDE AND ALBUTEROL SULFATE 2.5; .5 MG/3ML; MG/3ML
3 SOLUTION RESPIRATORY (INHALATION) EVERY 4 HOURS PRN
Qty: 360 ML
Start: 2021-01-01 | End: 2021-01-01

## 2021-01-01 RX ORDER — ACETAMINOPHEN 500 MG
500 TABLET ORAL EVERY 6 HOURS PRN
Status: DISCONTINUED | OUTPATIENT
Start: 2021-01-01 | End: 2021-01-01 | Stop reason: HOSPADM

## 2021-01-01 RX ORDER — SODIUM CHLORIDE 0.9 % (FLUSH) 0.9 %
1-10 SYRINGE (ML) INJECTION AS NEEDED
Status: DISCONTINUED | OUTPATIENT
Start: 2021-01-01 | End: 2021-01-01

## 2021-01-01 RX ORDER — DOCUSATE SODIUM 100 MG/1
100 CAPSULE, LIQUID FILLED ORAL 2 TIMES DAILY PRN
Status: DISCONTINUED | OUTPATIENT
Start: 2021-01-01 | End: 2021-01-01 | Stop reason: HOSPADM

## 2021-01-01 RX ORDER — FLUCONAZOLE 2 MG/ML
400 INJECTION, SOLUTION INTRAVENOUS
Status: CANCELLED | OUTPATIENT
Start: 2021-01-01 | End: 2021-01-01

## 2021-01-01 RX ORDER — LEVETIRACETAM 500 MG/5ML
INJECTION, SOLUTION, CONCENTRATE INTRAVENOUS
Status: COMPLETED
Start: 2021-01-01 | End: 2021-01-01

## 2021-01-01 RX ORDER — LEVOTHYROXINE SODIUM 0.07 MG/1
75 TABLET ORAL
Status: CANCELLED | OUTPATIENT
Start: 2021-01-01

## 2021-01-01 RX ORDER — IRON POLYSACCHARIDE COMPLEX 150 MG
150 CAPSULE ORAL 2 TIMES DAILY
Status: DISCONTINUED | OUTPATIENT
Start: 2021-01-01 | End: 2021-01-01

## 2021-01-01 RX ORDER — ACETAMINOPHEN 160 MG/5ML
650 SOLUTION ORAL EVERY 4 HOURS PRN
Start: 2021-01-01 | End: 2021-01-01

## 2021-01-01 RX ORDER — LEVETIRACETAM 100 MG/ML
500 SOLUTION ORAL EVERY 12 HOURS SCHEDULED
Status: DISCONTINUED | OUTPATIENT
Start: 2021-01-01 | End: 2021-01-01

## 2021-01-01 RX ORDER — ACETAMINOPHEN 325 MG/1
650 TABLET ORAL EVERY 4 HOURS PRN
Status: CANCELLED | OUTPATIENT
Start: 2021-01-01

## 2021-01-01 RX ORDER — AMOXICILLIN 250 MG
2 CAPSULE ORAL 2 TIMES DAILY PRN
Status: DISCONTINUED | OUTPATIENT
Start: 2021-01-01 | End: 2021-01-01 | Stop reason: HOSPADM

## 2021-01-01 RX ORDER — SODIUM CHLORIDE, SODIUM LACTATE, POTASSIUM CHLORIDE, CALCIUM CHLORIDE 600; 310; 30; 20 MG/100ML; MG/100ML; MG/100ML; MG/100ML
INJECTION, SOLUTION INTRAVENOUS CONTINUOUS PRN
Status: DISCONTINUED | OUTPATIENT
Start: 2021-01-01 | End: 2021-01-01 | Stop reason: SURG

## 2021-01-01 RX ORDER — NITROGLYCERIN 0.4 MG/1
0.4 TABLET SUBLINGUAL
Status: DISCONTINUED | OUTPATIENT
Start: 2021-01-01 | End: 2021-01-01

## 2021-01-01 RX ORDER — CEFTRIAXONE 1 G/50ML
1 INJECTION, SOLUTION INTRAVENOUS EVERY 24 HOURS
Status: CANCELLED | OUTPATIENT
Start: 2021-01-01 | End: 2021-01-01

## 2021-01-01 RX ORDER — SODIUM PHOSPHATE, DIBASIC AND SODIUM PHOSPHATE, MONOBASIC 7; 19 G/133ML; G/133ML
1 ENEMA RECTAL ONCE
Status: COMPLETED | OUTPATIENT
Start: 2021-01-01 | End: 2021-01-01

## 2021-01-01 RX ORDER — AMOXICILLIN 250 MG
1 CAPSULE ORAL NIGHTLY
Start: 2021-01-01 | End: 2022-01-01 | Stop reason: HOSPADM

## 2021-01-01 RX ORDER — CEFTRIAXONE 1 G/50ML
1 INJECTION, SOLUTION INTRAVENOUS EVERY 24 HOURS
Status: DISCONTINUED | OUTPATIENT
Start: 2021-01-01 | End: 2021-01-01

## 2021-01-01 RX ORDER — ONDANSETRON 2 MG/ML
4 INJECTION INTRAMUSCULAR; INTRAVENOUS EVERY 6 HOURS PRN
Status: CANCELLED | OUTPATIENT
Start: 2021-01-01

## 2021-01-01 RX ORDER — VANCOMYCIN HYDROCHLORIDE 750 MG/15ML
INJECTION, POWDER, LYOPHILIZED, FOR SOLUTION INTRAVENOUS
Status: DISCONTINUED
Start: 2021-01-01 | End: 2021-01-01 | Stop reason: WASHOUT

## 2021-01-01 RX ORDER — LORAZEPAM 0.5 MG/1
0.5 TABLET ORAL EVERY 12 HOURS PRN
Status: CANCELLED | OUTPATIENT
Start: 2021-01-01

## 2021-01-01 RX ORDER — LEVOTHYROXINE SODIUM 0.05 MG/1
50 TABLET ORAL
Status: DISCONTINUED | OUTPATIENT
Start: 2021-01-01 | End: 2021-01-01 | Stop reason: HOSPADM

## 2021-01-01 RX ORDER — LEVOTHYROXINE SODIUM ANHYDROUS 100 UG/5ML
100 INJECTION, POWDER, LYOPHILIZED, FOR SOLUTION INTRAVENOUS
Status: DISCONTINUED | OUTPATIENT
Start: 2021-01-01 | End: 2021-01-01

## 2021-01-01 RX ORDER — IPRATROPIUM BROMIDE AND ALBUTEROL SULFATE 2.5; .5 MG/3ML; MG/3ML
3 SOLUTION RESPIRATORY (INHALATION) ONCE
Status: COMPLETED | OUTPATIENT
Start: 2021-01-01 | End: 2021-01-01

## 2021-01-01 RX ORDER — AMLODIPINE BESYLATE 5 MG/1
5 TABLET ORAL
Status: DISCONTINUED | OUTPATIENT
Start: 2021-01-01 | End: 2021-01-01 | Stop reason: HOSPADM

## 2021-01-01 RX ORDER — MULTIPLE VITAMINS W/ MINERALS TAB 9MG-400MCG
1 TAB ORAL DAILY
COMMUNITY
End: 2021-01-01

## 2021-01-01 RX ORDER — LEVETIRACETAM 500 MG/1
1000 TABLET ORAL 2 TIMES DAILY
Status: CANCELLED | OUTPATIENT
Start: 2021-01-01

## 2021-01-01 RX ORDER — POTASSIUM CHLORIDE 1.5 G/1.77G
20 POWDER, FOR SOLUTION ORAL DAILY
Status: CANCELLED | OUTPATIENT
Start: 2021-01-01

## 2021-01-01 RX ORDER — FUROSEMIDE 40 MG/1
40 TABLET ORAL DAILY
COMMUNITY
End: 2021-01-01 | Stop reason: HOSPADM

## 2021-01-01 RX ORDER — LACTULOSE 10 G/15ML
10 SOLUTION ORAL DAILY PRN
COMMUNITY
End: 2021-01-01

## 2021-01-01 RX ORDER — ONDANSETRON 4 MG/1
4 TABLET, ORALLY DISINTEGRATING ORAL EVERY 8 HOURS PRN
Qty: 12 TABLET | Refills: 0 | Status: SHIPPED | OUTPATIENT
Start: 2021-01-01 | End: 2021-01-01

## 2021-01-01 RX ORDER — DIGOXIN 125 MCG
125 TABLET ORAL
Status: DISCONTINUED | OUTPATIENT
Start: 2021-01-01 | End: 2021-01-01 | Stop reason: SDUPTHER

## 2021-01-01 RX ORDER — LORAZEPAM 0.5 MG/1
0.5 TABLET ORAL EVERY 6 HOURS PRN
Status: DISCONTINUED | OUTPATIENT
Start: 2021-01-01 | End: 2021-01-01 | Stop reason: HOSPADM

## 2021-01-01 RX ORDER — DIGOXIN 0.25 MG/ML
100 INJECTION INTRAMUSCULAR; INTRAVENOUS
Status: DISCONTINUED | OUTPATIENT
Start: 2021-01-01 | End: 2021-01-01

## 2021-01-01 RX ORDER — FAMOTIDINE 40 MG/1
40 TABLET, FILM COATED ORAL NIGHTLY
COMMUNITY
End: 2021-01-01 | Stop reason: HOSPADM

## 2021-01-01 RX ORDER — FLUCONAZOLE 2 MG/ML
400 INJECTION, SOLUTION INTRAVENOUS DAILY
Status: DISCONTINUED | OUTPATIENT
Start: 2021-01-01 | End: 2021-01-01

## 2021-01-01 RX ORDER — CEFTRIAXONE 1 G/50ML
1 INJECTION, SOLUTION INTRAVENOUS ONCE
Status: DISCONTINUED | OUTPATIENT
Start: 2021-01-01 | End: 2021-01-01

## 2021-01-01 RX ORDER — POTASSIUM CHLORIDE 1.5 G/1.77G
40 POWDER, FOR SOLUTION ORAL
Status: COMPLETED | OUTPATIENT
Start: 2021-01-01 | End: 2021-01-01

## 2021-01-01 RX ORDER — SODIUM CHLORIDE, SODIUM LACTATE, POTASSIUM CHLORIDE, CALCIUM CHLORIDE 600; 310; 30; 20 MG/100ML; MG/100ML; MG/100ML; MG/100ML
100 INJECTION, SOLUTION INTRAVENOUS CONTINUOUS
Status: DISCONTINUED | OUTPATIENT
Start: 2021-01-01 | End: 2021-01-01

## 2021-01-01 RX ORDER — ASPIRIN 81 MG/1
81 TABLET, CHEWABLE ORAL DAILY
Status: CANCELLED | OUTPATIENT
Start: 2021-01-01

## 2021-01-01 RX ORDER — IRON POLYSACCHARIDE COMPLEX 150 MG
150 CAPSULE ORAL 2 TIMES DAILY
Status: CANCELLED | OUTPATIENT
Start: 2021-01-01

## 2021-01-01 RX ORDER — HYDROCORTISONE 25 MG/G
1 CREAM TOPICAL EVERY 8 HOURS PRN
COMMUNITY
End: 2021-01-01 | Stop reason: HOSPADM

## 2021-01-01 RX ORDER — LORAZEPAM 2 MG/ML
1 INJECTION INTRAMUSCULAR ONCE
Status: COMPLETED | OUTPATIENT
Start: 2021-01-01 | End: 2021-01-01

## 2021-01-01 RX ADMIN — ASPIRIN 81 MG CHEWABLE TABLET 81 MG: 81 TABLET CHEWABLE at 10:09

## 2021-01-01 RX ADMIN — DIGOXIN 125 MCG: 125 TABLET ORAL at 08:24

## 2021-01-01 RX ADMIN — ONDANSETRON 4 MG: 2 INJECTION INTRAMUSCULAR; INTRAVENOUS at 13:08

## 2021-01-01 RX ADMIN — PANTOPRAZOLE SODIUM 40 MG: 40 INJECTION, POWDER, FOR SOLUTION INTRAVENOUS at 09:12

## 2021-01-01 RX ADMIN — DIPHENHYDRAMINE HYDROCHLORIDE 25 MG: 25 CAPSULE ORAL at 03:03

## 2021-01-01 RX ADMIN — VANCOMYCIN HYDROCHLORIDE 1 G: 1 INJECTION, POWDER, LYOPHILIZED, FOR SOLUTION INTRAVENOUS at 17:03

## 2021-01-01 RX ADMIN — Medication 1 APPLICATION: at 08:31

## 2021-01-01 RX ADMIN — ENOXAPARIN SODIUM 30 MG: 30 INJECTION SUBCUTANEOUS at 18:31

## 2021-01-01 RX ADMIN — ASPIRIN 81 MG CHEWABLE TABLET 81 MG: 81 TABLET CHEWABLE at 08:55

## 2021-01-01 RX ADMIN — COLLAGENASE SANTYL: 250 OINTMENT TOPICAL at 08:31

## 2021-01-01 RX ADMIN — LEVETIRACETAM 1000 MG: 10 INJECTION, SOLUTION INTRAVENOUS at 11:39

## 2021-01-01 RX ADMIN — RIVAROXABAN 20 MG: 10 TABLET, FILM COATED ORAL at 09:33

## 2021-01-01 RX ADMIN — Medication 1 CAPSULE: at 20:46

## 2021-01-01 RX ADMIN — SODIUM CHLORIDE, PRESERVATIVE FREE 10 ML: 5 INJECTION INTRAVENOUS at 20:33

## 2021-01-01 RX ADMIN — Medication 1 CAPSULE: at 08:31

## 2021-01-01 RX ADMIN — LEVETIRACETAM 1000 MG: 10 INJECTION, SOLUTION INTRAVENOUS at 00:40

## 2021-01-01 RX ADMIN — Medication 150 MG: at 20:24

## 2021-01-01 RX ADMIN — ASPIRIN 81 MG CHEWABLE TABLET 81 MG: 81 TABLET CHEWABLE at 08:48

## 2021-01-01 RX ADMIN — APIXABAN 5 MG: 2.5 TABLET, FILM COATED ORAL at 08:52

## 2021-01-01 RX ADMIN — LORAZEPAM 0.5 MG: 2 INJECTION INTRAMUSCULAR; INTRAVENOUS at 20:07

## 2021-01-01 RX ADMIN — Medication 1 CAPSULE: at 21:33

## 2021-01-01 RX ADMIN — Medication 150 MG: at 20:25

## 2021-01-01 RX ADMIN — LORAZEPAM 0.5 MG: 0.5 TABLET ORAL at 06:32

## 2021-01-01 RX ADMIN — WHITE PETROLATUM 41 % TOPICAL OINTMENT: OINTMENT at 08:57

## 2021-01-01 RX ADMIN — DIGOXIN 125 MCG: 125 TABLET ORAL at 08:52

## 2021-01-01 RX ADMIN — SODIUM CHLORIDE, PRESERVATIVE FREE 10 ML: 5 INJECTION INTRAVENOUS at 08:27

## 2021-01-01 RX ADMIN — SODIUM CHLORIDE, PRESERVATIVE FREE 10 ML: 5 INJECTION INTRAVENOUS at 08:34

## 2021-01-01 RX ADMIN — POTASSIUM CHLORIDE 20 MEQ: 1.5 POWDER, FOR SOLUTION ORAL at 09:17

## 2021-01-01 RX ADMIN — DIGOXIN 125 MCG: 125 TABLET ORAL at 13:08

## 2021-01-01 RX ADMIN — WHITE PETROLATUM 41 % TOPICAL OINTMENT: OINTMENT at 08:50

## 2021-01-01 RX ADMIN — PANTOPRAZOLE SODIUM 40 MG: 40 INJECTION, POWDER, FOR SOLUTION INTRAVENOUS at 05:52

## 2021-01-01 RX ADMIN — SODIUM CHLORIDE 250 ML: 9 INJECTION, SOLUTION INTRAVENOUS at 19:40

## 2021-01-01 RX ADMIN — SODIUM CHLORIDE 75 ML/HR: 9 INJECTION, SOLUTION INTRAVENOUS at 04:50

## 2021-01-01 RX ADMIN — LEVETIRACETAM 1000 MG: 500 SOLUTION ORAL at 08:29

## 2021-01-01 RX ADMIN — LORAZEPAM 0.25 MG: 2 INJECTION INTRAMUSCULAR; INTRAVENOUS at 23:23

## 2021-01-01 RX ADMIN — ACETAMINOPHEN 500 MG: 500 TABLET ORAL at 11:31

## 2021-01-01 RX ADMIN — Medication 1 CAPSULE: at 08:41

## 2021-01-01 RX ADMIN — RIVAROXABAN 20 MG: 10 TABLET, FILM COATED ORAL at 08:52

## 2021-01-01 RX ADMIN — Medication 150 MG: at 09:14

## 2021-01-01 RX ADMIN — ACETAMINOPHEN 500 MG: 500 TABLET ORAL at 20:00

## 2021-01-01 RX ADMIN — Medication 1 TABLET: at 09:53

## 2021-01-01 RX ADMIN — LEVETIRACETAM 1000 MG: 100 SOLUTION ORAL at 23:15

## 2021-01-01 RX ADMIN — IPRATROPIUM BROMIDE AND ALBUTEROL SULFATE 3 ML: .5; 3 SOLUTION RESPIRATORY (INHALATION) at 15:50

## 2021-01-01 RX ADMIN — DOCUSATE SODIUM 100 MG: 100 CAPSULE, LIQUID FILLED ORAL at 09:25

## 2021-01-01 RX ADMIN — ENOXAPARIN SODIUM 60 MG: 60 INJECTION SUBCUTANEOUS at 18:32

## 2021-01-01 RX ADMIN — LEVOTHYROXINE SODIUM 75 MCG: 75 TABLET ORAL at 06:15

## 2021-01-01 RX ADMIN — LEVETIRACETAM 1000 MG: 500 SOLUTION ORAL at 21:16

## 2021-01-01 RX ADMIN — DOCUSATE SODIUM 100 MG: 100 CAPSULE, LIQUID FILLED ORAL at 09:06

## 2021-01-01 RX ADMIN — Medication 1 CAPSULE: at 08:51

## 2021-01-01 RX ADMIN — SODIUM CHLORIDE, PRESERVATIVE FREE 10 ML: 5 INJECTION INTRAVENOUS at 08:50

## 2021-01-01 RX ADMIN — AMOXICILLIN AND CLAVULANATE POTASSIUM 1 TABLET: 875; 125 TABLET, FILM COATED ORAL at 21:25

## 2021-01-01 RX ADMIN — MAGNESIUM SULFATE HEPTAHYDRATE 2 G: 40 INJECTION, SOLUTION INTRAVENOUS at 11:47

## 2021-01-01 RX ADMIN — IPRATROPIUM BROMIDE AND ALBUTEROL SULFATE 3 ML: .5; 3 SOLUTION RESPIRATORY (INHALATION) at 20:07

## 2021-01-01 RX ADMIN — SODIUM CHLORIDE, PRESERVATIVE FREE 10 ML: 5 INJECTION INTRAVENOUS at 21:16

## 2021-01-01 RX ADMIN — VANCOMYCIN HYDROCHLORIDE 1250 MG: 500 INJECTION, POWDER, LYOPHILIZED, FOR SOLUTION INTRAVENOUS at 16:01

## 2021-01-01 RX ADMIN — NOREPINEPHRINE BITARTRATE 0.04 MCG/KG/MIN: 1 INJECTION, SOLUTION, CONCENTRATE INTRAVENOUS at 03:30

## 2021-01-01 RX ADMIN — AMLODIPINE BESYLATE 5 MG: 5 TABLET ORAL at 08:30

## 2021-01-01 RX ADMIN — TRAMADOL HYDROCHLORIDE 50 MG: 50 TABLET, COATED ORAL at 00:17

## 2021-01-01 RX ADMIN — SODIUM CHLORIDE, PRESERVATIVE FREE 10 ML: 5 INJECTION INTRAVENOUS at 10:13

## 2021-01-01 RX ADMIN — ACETAMINOPHEN 650 MG: 325 TABLET, FILM COATED ORAL at 05:27

## 2021-01-01 RX ADMIN — ASPIRIN 81 MG CHEWABLE TABLET 81 MG: 81 TABLET CHEWABLE at 09:34

## 2021-01-01 RX ADMIN — TRAMADOL HYDROCHLORIDE 50 MG: 50 TABLET, COATED ORAL at 05:07

## 2021-01-01 RX ADMIN — SODIUM CHLORIDE, PRESERVATIVE FREE 10 ML: 5 INJECTION INTRAVENOUS at 23:56

## 2021-01-01 RX ADMIN — SODIUM CHLORIDE, PRESERVATIVE FREE 10 ML: 5 INJECTION INTRAVENOUS at 10:20

## 2021-01-01 RX ADMIN — ACETAMINOPHEN ORAL SOLUTION 650 MG: 325 SOLUTION ORAL at 18:06

## 2021-01-01 RX ADMIN — DIPHENHYDRAMINE HYDROCHLORIDE 25 MG: 25 CAPSULE ORAL at 06:19

## 2021-01-01 RX ADMIN — POLYETHYLENE GLYCOL 3350 17 G: 17 POWDER, FOR SOLUTION ORAL at 08:49

## 2021-01-01 RX ADMIN — BUMETANIDE 2 MG: 0.25 INJECTION INTRAMUSCULAR; INTRAVENOUS at 14:26

## 2021-01-01 RX ADMIN — LEVETIRACETAM 1000 MG: 10 INJECTION, SOLUTION INTRAVENOUS at 00:41

## 2021-01-01 RX ADMIN — APIXABAN 5 MG: 2.5 TABLET, FILM COATED ORAL at 20:46

## 2021-01-01 RX ADMIN — FLUCONAZOLE 400 MG: 400 INJECTION, SOLUTION INTRAVENOUS at 10:08

## 2021-01-01 RX ADMIN — AMOXICILLIN AND CLAVULANATE POTASSIUM 1 TABLET: 875; 125 TABLET, FILM COATED ORAL at 09:21

## 2021-01-01 RX ADMIN — WHITE PETROLATUM 41 % TOPICAL OINTMENT: OINTMENT at 09:17

## 2021-01-01 RX ADMIN — LORAZEPAM 0.25 MG: 2 INJECTION INTRAMUSCULAR; INTRAVENOUS at 06:22

## 2021-01-01 RX ADMIN — DEXTROSE MONOHYDRATE 75 ML/HR: 50 INJECTION, SOLUTION INTRAVENOUS at 13:15

## 2021-01-01 RX ADMIN — SODIUM CHLORIDE, PRESERVATIVE FREE 10 ML: 5 INJECTION INTRAVENOUS at 10:40

## 2021-01-01 RX ADMIN — DOCUSATE SODIUM 100 MG: 100 CAPSULE, LIQUID FILLED ORAL at 10:00

## 2021-01-01 RX ADMIN — DIPHENHYDRAMINE HYDROCHLORIDE 25 MG: 25 CAPSULE ORAL at 06:23

## 2021-01-01 RX ADMIN — DEXTROSE MONOHYDRATE 25 G: 25 INJECTION, SOLUTION INTRAVENOUS at 22:55

## 2021-01-01 RX ADMIN — DEXTROSE AND SODIUM CHLORIDE 100 ML/HR: 5; 450 INJECTION, SOLUTION INTRAVENOUS at 20:37

## 2021-01-01 RX ADMIN — POTASSIUM CHLORIDE 20 MEQ: 1.5 POWDER, FOR SOLUTION ORAL at 08:31

## 2021-01-01 RX ADMIN — CAMPHOR AND MENTHOL: 5; 5 LOTION TOPICAL at 17:54

## 2021-01-01 RX ADMIN — POLYETHYLENE GLYCOL 3350 17 G: 17 POWDER, FOR SOLUTION ORAL at 08:36

## 2021-01-01 RX ADMIN — SUCCINYLCHOLINE CHLORIDE 200 MG: 20 INJECTION, SOLUTION INTRAMUSCULAR; INTRAVENOUS at 12:15

## 2021-01-01 RX ADMIN — RIVAROXABAN 20 MG: 10 TABLET, FILM COATED ORAL at 09:24

## 2021-01-01 RX ADMIN — SODIUM CHLORIDE, PRESERVATIVE FREE 10 ML: 5 INJECTION INTRAVENOUS at 20:27

## 2021-01-01 RX ADMIN — LEVOTHYROXINE SODIUM 50 MCG: 50 TABLET ORAL at 09:22

## 2021-01-01 RX ADMIN — AMLODIPINE BESYLATE 5 MG: 5 TABLET ORAL at 09:12

## 2021-01-01 RX ADMIN — FENTANYL CITRATE 25 MCG: 50 INJECTION, SOLUTION INTRAMUSCULAR; INTRAVENOUS at 20:39

## 2021-01-01 RX ADMIN — DEXTROSE MONOHYDRATE 75 ML/HR: 50 INJECTION, SOLUTION INTRAVENOUS at 23:13

## 2021-01-01 RX ADMIN — SODIUM CHLORIDE 50 ML/HR: 9 INJECTION, SOLUTION INTRAVENOUS at 04:32

## 2021-01-01 RX ADMIN — IPRATROPIUM BROMIDE AND ALBUTEROL SULFATE 3 ML: .5; 3 SOLUTION RESPIRATORY (INHALATION) at 16:19

## 2021-01-01 RX ADMIN — ENOXAPARIN SODIUM 60 MG: 60 INJECTION SUBCUTANEOUS at 05:29

## 2021-01-01 RX ADMIN — LEVETIRACETAM 1000 MG: 10 INJECTION, SOLUTION INTRAVENOUS at 12:51

## 2021-01-01 RX ADMIN — LEVETIRACETAM 1000 MG: 500 TABLET ORAL at 21:12

## 2021-01-01 RX ADMIN — RIVAROXABAN 20 MG: 10 TABLET, FILM COATED ORAL at 09:05

## 2021-01-01 RX ADMIN — Medication 150 MG: at 20:02

## 2021-01-01 RX ADMIN — DEXTROSE MONOHYDRATE 25 G: 25 INJECTION, SOLUTION INTRAVENOUS at 17:18

## 2021-01-01 RX ADMIN — LEVETIRACETAM 1000 MG: 10 INJECTION, SOLUTION INTRAVENOUS at 17:05

## 2021-01-01 RX ADMIN — BISACODYL 10 MG: 10 SUPPOSITORY RECTAL at 08:22

## 2021-01-01 RX ADMIN — LEVETIRACETAM 1000 MG: 10 INJECTION, SOLUTION INTRAVENOUS at 17:30

## 2021-01-01 RX ADMIN — LEVETIRACETAM 1000 MG: 100 SOLUTION ORAL at 06:15

## 2021-01-01 RX ADMIN — WHITE PETROLATUM 41 % TOPICAL OINTMENT: OINTMENT at 09:07

## 2021-01-01 RX ADMIN — CEFTRIAXONE 2 G: 2 INJECTION, SOLUTION INTRAVENOUS at 06:22

## 2021-01-01 RX ADMIN — CAMPHOR AND MENTHOL: 5; 5 LOTION TOPICAL at 16:00

## 2021-01-01 RX ADMIN — POTASSIUM CHLORIDE 20 MEQ: 1500 TABLET, EXTENDED RELEASE ORAL at 08:18

## 2021-01-01 RX ADMIN — POTASSIUM CHLORIDE 20 MEQ: 1.5 POWDER, FOR SOLUTION ORAL at 18:32

## 2021-01-01 RX ADMIN — DIGOXIN 125 MCG: 125 TABLET ORAL at 13:15

## 2021-01-01 RX ADMIN — Medication 1 CAPSULE: at 09:09

## 2021-01-01 RX ADMIN — LORAZEPAM 0.25 MG: 2 INJECTION INTRAMUSCULAR; INTRAVENOUS at 05:17

## 2021-01-01 RX ADMIN — RIVAROXABAN 20 MG: 10 TABLET, FILM COATED ORAL at 09:53

## 2021-01-01 RX ADMIN — ASPIRIN 81 MG CHEWABLE TABLET 81 MG: 81 TABLET CHEWABLE at 15:10

## 2021-01-01 RX ADMIN — SODIUM CHLORIDE 1000 MG: 900 INJECTION, SOLUTION INTRAVENOUS at 15:37

## 2021-01-01 RX ADMIN — FAMOTIDINE 40 MG: 20 TABLET, FILM COATED ORAL at 08:33

## 2021-01-01 RX ADMIN — SODIUM CHLORIDE 500 ML: 9 INJECTION, SOLUTION INTRAVENOUS at 21:10

## 2021-01-01 RX ADMIN — ACETAMINOPHEN 650 MG: 325 TABLET, FILM COATED ORAL at 17:43

## 2021-01-01 RX ADMIN — PANTOPRAZOLE SODIUM 40 MG: 40 INJECTION, POWDER, FOR SOLUTION INTRAVENOUS at 06:11

## 2021-01-01 RX ADMIN — PHENYLEPHRINE HYDROCHLORIDE 200 MCG: 10 INJECTION, SOLUTION INTRAMUSCULAR; INTRAVENOUS; SUBCUTANEOUS at 12:29

## 2021-01-01 RX ADMIN — PANTOPRAZOLE SODIUM 40 MG: 40 INJECTION, POWDER, FOR SOLUTION INTRAVENOUS at 05:06

## 2021-01-01 RX ADMIN — FENTANYL CITRATE 25 MCG: 50 INJECTION INTRAMUSCULAR; INTRAVENOUS at 03:44

## 2021-01-01 RX ADMIN — SODIUM CHLORIDE, PRESERVATIVE FREE 10 ML: 5 INJECTION INTRAVENOUS at 08:52

## 2021-01-01 RX ADMIN — VANCOMYCIN HYDROCHLORIDE 750 MG: 750 INJECTION, POWDER, LYOPHILIZED, FOR SOLUTION INTRAVENOUS at 20:10

## 2021-01-01 RX ADMIN — COLLAGENASE SANTYL: 250 OINTMENT TOPICAL at 08:26

## 2021-01-01 RX ADMIN — Medication 1 CAPSULE: at 08:46

## 2021-01-01 RX ADMIN — POLYETHYLENE GLYCOL 3350 17 G: 17 POWDER, FOR SOLUTION ORAL at 09:21

## 2021-01-01 RX ADMIN — LEVOTHYROXINE SODIUM 75 MCG: 75 TABLET ORAL at 05:46

## 2021-01-01 RX ADMIN — DOCUSATE SODIUM 50MG AND SENNOSIDES 8.6MG 2 TABLET: 8.6; 5 TABLET, FILM COATED ORAL at 21:12

## 2021-01-01 RX ADMIN — POTASSIUM CHLORIDE 20 MEQ: 1.5 POWDER, FOR SOLUTION ORAL at 08:22

## 2021-01-01 RX ADMIN — INSULIN DETEMIR 10 UNITS: 100 INJECTION, SOLUTION SUBCUTANEOUS at 06:33

## 2021-01-01 RX ADMIN — Medication 1 CAPSULE: at 09:24

## 2021-01-01 RX ADMIN — DIGOXIN 125 MCG: 125 TABLET ORAL at 12:11

## 2021-01-01 RX ADMIN — FENTANYL CITRATE 25 MCG: 50 INJECTION INTRAMUSCULAR; INTRAVENOUS at 23:52

## 2021-01-01 RX ADMIN — PANTOPRAZOLE SODIUM 40 MG: 40 INJECTION, POWDER, FOR SOLUTION INTRAVENOUS at 05:20

## 2021-01-01 RX ADMIN — Medication 150 MG: at 20:26

## 2021-01-01 RX ADMIN — FAMOTIDINE 40 MG: 20 TABLET, FILM COATED ORAL at 21:22

## 2021-01-01 RX ADMIN — IPRATROPIUM BROMIDE AND ALBUTEROL SULFATE 3 ML: .5; 3 SOLUTION RESPIRATORY (INHALATION) at 11:41

## 2021-01-01 RX ADMIN — SODIUM CHLORIDE, PRESERVATIVE FREE 10 ML: 5 INJECTION INTRAVENOUS at 20:07

## 2021-01-01 RX ADMIN — DEXTROSE MONOHYDRATE 25 G: 25 INJECTION, SOLUTION INTRAVENOUS at 12:54

## 2021-01-01 RX ADMIN — DOCUSATE SODIUM 100 MG: 100 CAPSULE, LIQUID FILLED ORAL at 21:12

## 2021-01-01 RX ADMIN — SODIUM CHLORIDE, PRESERVATIVE FREE 10 ML: 5 INJECTION INTRAVENOUS at 21:23

## 2021-01-01 RX ADMIN — CEFTRIAXONE 2 G: 2 INJECTION, SOLUTION INTRAVENOUS at 06:32

## 2021-01-01 RX ADMIN — WHITE PETROLATUM 41 % TOPICAL OINTMENT: OINTMENT at 08:29

## 2021-01-01 RX ADMIN — LACOSAMIDE 100 MG: 10 INJECTION INTRAVENOUS at 15:01

## 2021-01-01 RX ADMIN — TRAMADOL HYDROCHLORIDE 50 MG: 50 TABLET, COATED ORAL at 17:18

## 2021-01-01 RX ADMIN — SODIUM CHLORIDE 75 ML/HR: 9 INJECTION, SOLUTION INTRAVENOUS at 17:23

## 2021-01-01 RX ADMIN — ASPIRIN 81 MG CHEWABLE TABLET 81 MG: 81 TABLET CHEWABLE at 08:33

## 2021-01-01 RX ADMIN — ACETAMINOPHEN 650 MG: 325 TABLET, FILM COATED ORAL at 06:32

## 2021-01-01 RX ADMIN — DEXTROSE MONOHYDRATE 50 ML: 25 INJECTION, SOLUTION INTRAVENOUS at 05:49

## 2021-01-01 RX ADMIN — Medication 1 APPLICATION: at 09:19

## 2021-01-01 RX ADMIN — FENTANYL CITRATE 25 MCG: 50 INJECTION INTRAMUSCULAR; INTRAVENOUS at 00:17

## 2021-01-01 RX ADMIN — LEVOTHYROXINE SODIUM ANHYDROUS 100 MCG: 100 INJECTION, POWDER, LYOPHILIZED, FOR SOLUTION INTRAVENOUS at 05:25

## 2021-01-01 RX ADMIN — PANTOPRAZOLE SODIUM 40 MG: 40 INJECTION, POWDER, FOR SOLUTION INTRAVENOUS at 05:29

## 2021-01-01 RX ADMIN — VANCOMYCIN HYDROCHLORIDE 1500 MG: 1 INJECTION, POWDER, LYOPHILIZED, FOR SOLUTION INTRAVENOUS at 18:26

## 2021-01-01 RX ADMIN — POTASSIUM CHLORIDE 20 MEQ: 1.5 POWDER, FOR SOLUTION ORAL at 09:33

## 2021-01-01 RX ADMIN — Medication 1 TABLET: at 10:19

## 2021-01-01 RX ADMIN — CAMPHOR AND MENTHOL: 5; 5 LOTION TOPICAL at 20:10

## 2021-01-01 RX ADMIN — FLUCONAZOLE 200 MG: 100 TABLET ORAL at 09:18

## 2021-01-01 RX ADMIN — BUMETANIDE 2 MG: 0.25 INJECTION INTRAMUSCULAR; INTRAVENOUS at 08:48

## 2021-01-01 RX ADMIN — SODIUM CHLORIDE 125 ML/HR: 9 INJECTION, SOLUTION INTRAVENOUS at 13:05

## 2021-01-01 RX ADMIN — TRAMADOL HYDROCHLORIDE 50 MG: 50 TABLET, COATED ORAL at 01:20

## 2021-01-01 RX ADMIN — SODIUM CHLORIDE, PRESERVATIVE FREE 10 ML: 5 INJECTION INTRAVENOUS at 20:09

## 2021-01-01 RX ADMIN — ASPIRIN 81 MG CHEWABLE TABLET 81 MG: 81 TABLET CHEWABLE at 09:29

## 2021-01-01 RX ADMIN — FUROSEMIDE 20 MG: 10 INJECTION, SOLUTION INTRAVENOUS at 09:29

## 2021-01-01 RX ADMIN — Medication 1 CAPSULE: at 20:08

## 2021-01-01 RX ADMIN — FLUCONAZOLE 200 MG: 100 TABLET ORAL at 09:04

## 2021-01-01 RX ADMIN — VANCOMYCIN HYDROCHLORIDE 750 MG: 750 INJECTION, POWDER, LYOPHILIZED, FOR SOLUTION INTRAVENOUS at 20:35

## 2021-01-01 RX ADMIN — Medication 1 CAPSULE: at 08:52

## 2021-01-01 RX ADMIN — TRAMADOL HYDROCHLORIDE 50 MG: 50 TABLET, COATED ORAL at 14:21

## 2021-01-01 RX ADMIN — DIPHENHYDRAMINE HYDROCHLORIDE 25 MG: 25 CAPSULE ORAL at 21:30

## 2021-01-01 RX ADMIN — FLUCONAZOLE 800 MG: 400 INJECTION, SOLUTION INTRAVENOUS at 09:23

## 2021-01-01 RX ADMIN — SODIUM CHLORIDE, PRESERVATIVE FREE 10 ML: 5 INJECTION INTRAVENOUS at 21:13

## 2021-01-01 RX ADMIN — LEVETIRACETAM 1000 MG: 500 TABLET ORAL at 09:22

## 2021-01-01 RX ADMIN — ACETAMINOPHEN 650 MG: 325 TABLET, FILM COATED ORAL at 12:04

## 2021-01-01 RX ADMIN — DIPHENHYDRAMINE HYDROCHLORIDE 25 MG: 25 CAPSULE ORAL at 17:40

## 2021-01-01 RX ADMIN — DEXTROSE AND SODIUM CHLORIDE 100 ML/HR: 5; 450 INJECTION, SOLUTION INTRAVENOUS at 06:08

## 2021-01-01 RX ADMIN — DOCUSATE SODIUM 100 MG: 100 CAPSULE, LIQUID FILLED ORAL at 20:13

## 2021-01-01 RX ADMIN — LORAZEPAM 1 MG: 2 INJECTION INTRAMUSCULAR; INTRAVENOUS at 11:28

## 2021-01-01 RX ADMIN — Medication 1 CAPSULE: at 20:50

## 2021-01-01 RX ADMIN — SODIUM CHLORIDE 750 MG: 900 INJECTION, SOLUTION INTRAVENOUS at 14:26

## 2021-01-01 RX ADMIN — MELATONIN TAB 5 MG 5 MG: 5 TAB at 20:26

## 2021-01-01 RX ADMIN — DIGOXIN 125 MCG: 125 TABLET ORAL at 11:04

## 2021-01-01 RX ADMIN — AMOXICILLIN AND CLAVULANATE POTASSIUM 1 TABLET: 875; 125 TABLET, FILM COATED ORAL at 08:46

## 2021-01-01 RX ADMIN — LORAZEPAM 1 MG: 2 INJECTION INTRAMUSCULAR; INTRAVENOUS at 15:03

## 2021-01-01 RX ADMIN — LEVOTHYROXINE SODIUM 75 MCG: 75 TABLET ORAL at 05:10

## 2021-01-01 RX ADMIN — LEVETIRACETAM 1000 MG: 100 SOLUTION ORAL at 23:33

## 2021-01-01 RX ADMIN — ENOXAPARIN SODIUM 30 MG: 30 INJECTION SUBCUTANEOUS at 20:27

## 2021-01-01 RX ADMIN — ACETAMINOPHEN 650 MG: 325 TABLET, FILM COATED ORAL at 14:59

## 2021-01-01 RX ADMIN — SODIUM CHLORIDE, PRESERVATIVE FREE 10 ML: 5 INJECTION INTRAVENOUS at 20:03

## 2021-01-01 RX ADMIN — WHITE PETROLATUM 41 % TOPICAL OINTMENT: OINTMENT at 08:06

## 2021-01-01 RX ADMIN — CAMPHOR AND MENTHOL: 5; 5 LOTION TOPICAL at 15:38

## 2021-01-01 RX ADMIN — Medication 1 CAPSULE: at 20:03

## 2021-01-01 RX ADMIN — SODIUM CHLORIDE 1000 MG: 900 INJECTION, SOLUTION INTRAVENOUS at 15:07

## 2021-01-01 RX ADMIN — POLYETHYLENE GLYCOL 3350 17 G: 17 POWDER, FOR SOLUTION ORAL at 09:23

## 2021-01-01 RX ADMIN — SODIUM CHLORIDE, PRESERVATIVE FREE 10 ML: 5 INJECTION INTRAVENOUS at 09:22

## 2021-01-01 RX ADMIN — SODIUM CHLORIDE 50 ML: 9 INJECTION, SOLUTION INTRAVENOUS at 00:21

## 2021-01-01 RX ADMIN — LEVOTHYROXINE SODIUM ANHYDROUS 75 MCG: 100 INJECTION, POWDER, LYOPHILIZED, FOR SOLUTION INTRAVENOUS at 11:42

## 2021-01-01 RX ADMIN — LORAZEPAM 1 MG: 2 INJECTION INTRAMUSCULAR; INTRAVENOUS at 05:09

## 2021-01-01 RX ADMIN — IPRATROPIUM BROMIDE AND ALBUTEROL SULFATE 3 ML: .5; 3 SOLUTION RESPIRATORY (INHALATION) at 10:24

## 2021-01-01 RX ADMIN — DIGOXIN 125 MCG: 125 TABLET ORAL at 12:13

## 2021-01-01 RX ADMIN — DIGOXIN 125 MCG: 125 TABLET ORAL at 11:57

## 2021-01-01 RX ADMIN — ENOXAPARIN SODIUM 60 MG: 60 INJECTION SUBCUTANEOUS at 05:53

## 2021-01-01 RX ADMIN — LORAZEPAM 0.5 MG: 0.5 TABLET ORAL at 05:27

## 2021-01-01 RX ADMIN — CAMPHOR AND MENTHOL: 5; 5 LOTION TOPICAL at 10:06

## 2021-01-01 RX ADMIN — FLUCONAZOLE 200 MG: 100 TABLET ORAL at 09:09

## 2021-01-01 RX ADMIN — POTASSIUM CHLORIDE 20 MEQ: 1500 TABLET, EXTENDED RELEASE ORAL at 08:52

## 2021-01-01 RX ADMIN — AMOXICILLIN AND CLAVULANATE POTASSIUM 1 TABLET: 875; 125 TABLET, FILM COATED ORAL at 20:50

## 2021-01-01 RX ADMIN — TRAMADOL HYDROCHLORIDE 50 MG: 50 TABLET, COATED ORAL at 03:03

## 2021-01-01 RX ADMIN — COLLAGENASE SANTYL: 250 OINTMENT TOPICAL at 09:08

## 2021-01-01 RX ADMIN — IPRATROPIUM BROMIDE AND ALBUTEROL SULFATE 3 ML: .5; 3 SOLUTION RESPIRATORY (INHALATION) at 07:20

## 2021-01-01 RX ADMIN — ASPIRIN 81 MG CHEWABLE TABLET 81 MG: 81 TABLET CHEWABLE at 08:24

## 2021-01-01 RX ADMIN — POTASSIUM CHLORIDE 20 MEQ: 1.5 POWDER, FOR SOLUTION ORAL at 09:05

## 2021-01-01 RX ADMIN — APIXABAN 5 MG: 2.5 TABLET, FILM COATED ORAL at 20:33

## 2021-01-01 RX ADMIN — ASPIRIN 81 MG CHEWABLE TABLET 81 MG: 81 TABLET CHEWABLE at 09:17

## 2021-01-01 RX ADMIN — TRAMADOL HYDROCHLORIDE 50 MG: 50 TABLET, COATED ORAL at 03:17

## 2021-01-01 RX ADMIN — TRAMADOL HYDROCHLORIDE 50 MG: 50 TABLET, COATED ORAL at 05:46

## 2021-01-01 RX ADMIN — COLLAGENASE SANTYL: 250 OINTMENT TOPICAL at 10:28

## 2021-01-01 RX ADMIN — TRAMADOL HYDROCHLORIDE 50 MG: 50 TABLET, COATED ORAL at 17:12

## 2021-01-01 RX ADMIN — SODIUM CHLORIDE, PRESERVATIVE FREE 10 ML: 5 INJECTION INTRAVENOUS at 09:23

## 2021-01-01 RX ADMIN — Medication 1 CAPSULE: at 10:27

## 2021-01-01 RX ADMIN — LORAZEPAM 0.25 MG: 2 INJECTION INTRAMUSCULAR; INTRAVENOUS at 00:10

## 2021-01-01 RX ADMIN — TRAMADOL HYDROCHLORIDE 50 MG: 50 TABLET, COATED ORAL at 20:02

## 2021-01-01 RX ADMIN — DOCUSATE SODIUM 100 MG: 100 CAPSULE, LIQUID FILLED ORAL at 09:31

## 2021-01-01 RX ADMIN — SODIUM CHLORIDE, PRESERVATIVE FREE 10 ML: 5 INJECTION INTRAVENOUS at 20:08

## 2021-01-01 RX ADMIN — Medication 1 CAPSULE: at 21:09

## 2021-01-01 RX ADMIN — LEVETIRACETAM 1000 MG: 500 TABLET ORAL at 20:08

## 2021-01-01 RX ADMIN — LEVETIRACETAM 1000 MG: 100 SOLUTION ORAL at 15:09

## 2021-01-01 RX ADMIN — COLLAGENASE SANTYL: 250 OINTMENT TOPICAL at 09:18

## 2021-01-01 RX ADMIN — SODIUM CHLORIDE, PRESERVATIVE FREE 10 ML: 5 INJECTION INTRAVENOUS at 08:45

## 2021-01-01 RX ADMIN — SODIUM CHLORIDE 750 MG: 900 INJECTION, SOLUTION INTRAVENOUS at 14:27

## 2021-01-01 RX ADMIN — DEXTROSE AND SODIUM CHLORIDE 50 ML/HR: 5; 450 INJECTION, SOLUTION INTRAVENOUS at 00:33

## 2021-01-01 RX ADMIN — LEVOTHYROXINE SODIUM 75 MCG: 75 TABLET ORAL at 06:23

## 2021-01-01 RX ADMIN — FLUCONAZOLE 400 MG: 400 INJECTION, SOLUTION INTRAVENOUS at 12:40

## 2021-01-01 RX ADMIN — SODIUM CHLORIDE 100 ML/HR: 9 INJECTION, SOLUTION INTRAVENOUS at 23:58

## 2021-01-01 RX ADMIN — DIPHENHYDRAMINE HYDROCHLORIDE 12.5 MG: 50 INJECTION INTRAMUSCULAR; INTRAVENOUS at 08:48

## 2021-01-01 RX ADMIN — Medication 1 CAPSULE: at 20:37

## 2021-01-01 RX ADMIN — SODIUM CHLORIDE, PRESERVATIVE FREE 10 ML: 5 INJECTION INTRAVENOUS at 10:57

## 2021-01-01 RX ADMIN — AMLODIPINE BESYLATE 5 MG: 5 TABLET ORAL at 09:17

## 2021-01-01 RX ADMIN — SODIUM CHLORIDE 150 ML/HR: 9 INJECTION, SOLUTION INTRAVENOUS at 02:23

## 2021-01-01 RX ADMIN — SODIUM CHLORIDE, POTASSIUM CHLORIDE, SODIUM LACTATE AND CALCIUM CHLORIDE 500 ML: 600; 310; 30; 20 INJECTION, SOLUTION INTRAVENOUS at 13:08

## 2021-01-01 RX ADMIN — LEVETIRACETAM 1000 MG: 10 INJECTION, SOLUTION INTRAVENOUS at 23:30

## 2021-01-01 RX ADMIN — SODIUM CHLORIDE 750 MG: 900 INJECTION, SOLUTION INTRAVENOUS at 16:13

## 2021-01-01 RX ADMIN — LEVETIRACETAM 1000 MG: 500 TABLET ORAL at 20:01

## 2021-01-01 RX ADMIN — SODIUM CHLORIDE, PRESERVATIVE FREE 10 ML: 5 INJECTION INTRAVENOUS at 06:09

## 2021-01-01 RX ADMIN — LEVETIRACETAM 1000 MG: 500 SOLUTION ORAL at 08:42

## 2021-01-01 RX ADMIN — LIDOCAINE HYDROCHLORIDE AND EPINEPHRINE 1 ML: 10; 10 INJECTION, SOLUTION INFILTRATION; PERINEURAL at 18:55

## 2021-01-01 RX ADMIN — FENTANYL CITRATE 25 MCG: 50 INJECTION INTRAMUSCULAR; INTRAVENOUS at 09:21

## 2021-01-01 RX ADMIN — DIPHENHYDRAMINE HYDROCHLORIDE 25 MG: 25 CAPSULE ORAL at 09:23

## 2021-01-01 RX ADMIN — Medication 1 CAPSULE: at 09:17

## 2021-01-01 RX ADMIN — WHITE PETROLATUM 41 % TOPICAL OINTMENT 1 APPLICATION: OINTMENT at 08:39

## 2021-01-01 RX ADMIN — SODIUM CHLORIDE, PRESERVATIVE FREE 10 ML: 5 INJECTION INTRAVENOUS at 20:34

## 2021-01-01 RX ADMIN — DIGOXIN 125 MCG: 125 TABLET ORAL at 11:18

## 2021-01-01 RX ADMIN — LORAZEPAM 0.25 MG: 2 INJECTION INTRAMUSCULAR; INTRAVENOUS at 12:57

## 2021-01-01 RX ADMIN — LEVOTHYROXINE SODIUM 75 MCG: 75 TABLET ORAL at 06:40

## 2021-01-01 RX ADMIN — ASPIRIN 81 MG CHEWABLE TABLET 81 MG: 81 TABLET CHEWABLE at 08:38

## 2021-01-01 RX ADMIN — PANTOPRAZOLE SODIUM 40 MG: 40 INJECTION, POWDER, FOR SOLUTION INTRAVENOUS at 05:08

## 2021-01-01 RX ADMIN — WHITE PETROLATUM 41 % TOPICAL OINTMENT: OINTMENT at 08:42

## 2021-01-01 RX ADMIN — SODIUM CHLORIDE 750 MG: 900 INJECTION, SOLUTION INTRAVENOUS at 12:38

## 2021-01-01 RX ADMIN — PANTOPRAZOLE SODIUM 40 MG: 40 INJECTION, POWDER, FOR SOLUTION INTRAVENOUS at 06:16

## 2021-01-01 RX ADMIN — ASPIRIN 81 MG CHEWABLE TABLET 81 MG: 81 TABLET CHEWABLE at 08:18

## 2021-01-01 RX ADMIN — FENTANYL CITRATE 25 MCG: 50 INJECTION INTRAMUSCULAR; INTRAVENOUS at 12:44

## 2021-01-01 RX ADMIN — LEVOTHYROXINE SODIUM 75 MCG: 75 TABLET ORAL at 05:04

## 2021-01-01 RX ADMIN — DIGOXIN 125 MCG: 125 TABLET ORAL at 13:48

## 2021-01-01 RX ADMIN — LEVETIRACETAM 1000 MG: 500 SOLUTION ORAL at 21:12

## 2021-01-01 RX ADMIN — POLYETHYLENE GLYCOL 3350 17 G: 17 POWDER, FOR SOLUTION ORAL at 09:08

## 2021-01-01 RX ADMIN — SODIUM CHLORIDE, PRESERVATIVE FREE 10 ML: 5 INJECTION INTRAVENOUS at 20:21

## 2021-01-01 RX ADMIN — DEXTROSE AND SODIUM CHLORIDE 50 ML/HR: 5; 450 INJECTION, SOLUTION INTRAVENOUS at 02:39

## 2021-01-01 RX ADMIN — METOPROLOL TARTRATE 5 MG: 5 INJECTION INTRAVENOUS at 05:28

## 2021-01-01 RX ADMIN — SODIUM CHLORIDE, PRESERVATIVE FREE 10 ML: 5 INJECTION INTRAVENOUS at 20:23

## 2021-01-01 RX ADMIN — FAMOTIDINE 40 MG: 20 TABLET, FILM COATED ORAL at 09:24

## 2021-01-01 RX ADMIN — LORAZEPAM 0.25 MG: 2 INJECTION INTRAMUSCULAR; INTRAVENOUS at 02:40

## 2021-01-01 RX ADMIN — COLLAGENASE SANTYL: 250 OINTMENT TOPICAL at 08:50

## 2021-01-01 RX ADMIN — APIXABAN 5 MG: 2.5 TABLET, FILM COATED ORAL at 20:32

## 2021-01-01 RX ADMIN — ACETAMINOPHEN 650 MG: 650 SUPPOSITORY RECTAL at 23:23

## 2021-01-01 RX ADMIN — TRAMADOL HYDROCHLORIDE 50 MG: 50 TABLET, COATED ORAL at 11:18

## 2021-01-01 RX ADMIN — Medication 1 CAPSULE: at 15:10

## 2021-01-01 RX ADMIN — ACETAMINOPHEN 650 MG: 325 TABLET, FILM COATED ORAL at 09:17

## 2021-01-01 RX ADMIN — LEVETIRACETAM 1000 MG: 10 INJECTION, SOLUTION INTRAVENOUS at 23:53

## 2021-01-01 RX ADMIN — LORAZEPAM 1 MG: 2 INJECTION INTRAMUSCULAR; INTRAVENOUS at 06:22

## 2021-01-01 RX ADMIN — DIPHENHYDRAMINE HYDROCHLORIDE 25 MG: 25 CAPSULE ORAL at 09:54

## 2021-01-01 RX ADMIN — COLLAGENASE SANTYL 1 APPLICATION: 250 OINTMENT TOPICAL at 08:52

## 2021-01-01 RX ADMIN — APIXABAN 5 MG: 2.5 TABLET, FILM COATED ORAL at 20:21

## 2021-01-01 RX ADMIN — APIXABAN 5 MG: 2.5 TABLET, FILM COATED ORAL at 08:50

## 2021-01-01 RX ADMIN — FAMOTIDINE 40 MG: 20 TABLET, FILM COATED ORAL at 10:22

## 2021-01-01 RX ADMIN — RIVAROXABAN 20 MG: 10 TABLET, FILM COATED ORAL at 08:30

## 2021-01-01 RX ADMIN — LEVETIRACETAM 750 MG: 100 INJECTION, SOLUTION INTRAVENOUS at 23:33

## 2021-01-01 RX ADMIN — DEXTROSE AND SODIUM CHLORIDE 100 ML/HR: 5; 450 INJECTION, SOLUTION INTRAVENOUS at 10:37

## 2021-01-01 RX ADMIN — DOCUSATE SODIUM 100 MG: 100 CAPSULE, LIQUID FILLED ORAL at 20:01

## 2021-01-01 RX ADMIN — SODIUM CHLORIDE 250 ML/HR: 9 INJECTION, SOLUTION INTRAVENOUS at 16:04

## 2021-01-01 RX ADMIN — DOCUSATE SODIUM 100 MG: 100 CAPSULE, LIQUID FILLED ORAL at 21:15

## 2021-01-01 RX ADMIN — TRAMADOL HYDROCHLORIDE 50 MG: 50 TABLET, COATED ORAL at 21:28

## 2021-01-01 RX ADMIN — DEXTROSE MONOHYDRATE 25 G: 25 INJECTION, SOLUTION INTRAVENOUS at 04:03

## 2021-01-01 RX ADMIN — ASPIRIN 81 MG CHEWABLE TABLET 81 MG: 81 TABLET CHEWABLE at 08:52

## 2021-01-01 RX ADMIN — IPRATROPIUM BROMIDE AND ALBUTEROL SULFATE 3 ML: .5; 3 SOLUTION RESPIRATORY (INHALATION) at 07:37

## 2021-01-01 RX ADMIN — SODIUM CHLORIDE 50 ML: 9 INJECTION, SOLUTION INTRAVENOUS at 15:03

## 2021-01-01 RX ADMIN — TRAMADOL HYDROCHLORIDE 50 MG: 50 TABLET, COATED ORAL at 09:54

## 2021-01-01 RX ADMIN — DIGOXIN 100 MCG: 250 INJECTION, SOLUTION INTRAMUSCULAR; INTRAVENOUS; PARENTERAL at 18:46

## 2021-01-01 RX ADMIN — DIPHENHYDRAMINE HYDROCHLORIDE 25 MG: 25 CAPSULE ORAL at 05:27

## 2021-01-01 RX ADMIN — FLUCONAZOLE 200 MG: 100 TABLET ORAL at 10:43

## 2021-01-01 RX ADMIN — MELATONIN TAB 5 MG 5 MG: 5 TAB at 01:35

## 2021-01-01 RX ADMIN — AMLODIPINE BESYLATE 5 MG: 5 TABLET ORAL at 10:19

## 2021-01-01 RX ADMIN — PANTOPRAZOLE SODIUM 40 MG: 40 INJECTION, POWDER, FOR SOLUTION INTRAVENOUS at 05:16

## 2021-01-01 RX ADMIN — LEVETIRACETAM 1000 MG: 10 INJECTION, SOLUTION INTRAVENOUS at 06:10

## 2021-01-01 RX ADMIN — DEXTROSE 25 G: 50 INJECTION, SOLUTION INTRAVENOUS at 21:35

## 2021-01-01 RX ADMIN — COLLAGENASE SANTYL: 250 OINTMENT TOPICAL at 08:39

## 2021-01-01 RX ADMIN — LORAZEPAM 0.25 MG: 2 INJECTION INTRAMUSCULAR; INTRAVENOUS at 04:09

## 2021-01-01 RX ADMIN — Medication 1 TABLET: at 08:36

## 2021-01-01 RX ADMIN — SODIUM CHLORIDE, PRESERVATIVE FREE 10 ML: 5 INJECTION INTRAVENOUS at 09:08

## 2021-01-01 RX ADMIN — SODIUM CHLORIDE 500 ML: 9 INJECTION, SOLUTION INTRAVENOUS at 10:11

## 2021-01-01 RX ADMIN — LORAZEPAM 0.5 MG: 0.5 TABLET ORAL at 19:47

## 2021-01-01 RX ADMIN — LEVETIRACETAM 1000 MG: 100 SOLUTION ORAL at 13:03

## 2021-01-01 RX ADMIN — SODIUM CHLORIDE, PRESERVATIVE FREE 10 ML: 5 INJECTION INTRAVENOUS at 20:28

## 2021-01-01 RX ADMIN — SODIUM CHLORIDE, PRESERVATIVE FREE 10 ML: 5 INJECTION INTRAVENOUS at 21:25

## 2021-01-01 RX ADMIN — POLYETHYLENE GLYCOL 3350 17 G: 17 POWDER, FOR SOLUTION ORAL at 09:31

## 2021-01-01 RX ADMIN — DOCUSATE SODIUM 100 MG: 100 CAPSULE, LIQUID FILLED ORAL at 10:08

## 2021-01-01 RX ADMIN — CAMPHOR AND MENTHOL: 5; 5 LOTION TOPICAL at 21:17

## 2021-01-01 RX ADMIN — DIPHENHYDRAMINE HYDROCHLORIDE 25 MG: 25 CAPSULE ORAL at 23:44

## 2021-01-01 RX ADMIN — LEVETIRACETAM 1000 MG: 10 INJECTION, SOLUTION INTRAVENOUS at 17:32

## 2021-01-01 RX ADMIN — SODIUM CHLORIDE, PRESERVATIVE FREE 10 ML: 5 INJECTION INTRAVENOUS at 08:31

## 2021-01-01 RX ADMIN — MULTIPLE VITAMINS W/ MINERALS TAB 1 TABLET: TAB at 09:21

## 2021-01-01 RX ADMIN — LEVETIRACETAM 1000 MG: 10 INJECTION, SOLUTION INTRAVENOUS at 12:59

## 2021-01-01 RX ADMIN — SENNOSIDES AND DOCUSATE SODIUM 1 TABLET: 50; 8.6 TABLET ORAL at 20:50

## 2021-01-01 RX ADMIN — VANCOMYCIN HYDROCHLORIDE 750 MG: 750 INJECTION, POWDER, LYOPHILIZED, FOR SOLUTION INTRAVENOUS at 19:54

## 2021-01-01 RX ADMIN — ENOXAPARIN SODIUM 30 MG: 30 INJECTION SUBCUTANEOUS at 17:32

## 2021-01-01 RX ADMIN — ASPIRIN 81 MG CHEWABLE TABLET 81 MG: 81 TABLET CHEWABLE at 09:13

## 2021-01-01 RX ADMIN — LEVOTHYROXINE SODIUM 75 MCG: 75 TABLET ORAL at 05:07

## 2021-01-01 RX ADMIN — Medication 1 CAPSULE: at 10:56

## 2021-01-01 RX ADMIN — ACETAMINOPHEN 650 MG: 325 TABLET, FILM COATED ORAL at 09:01

## 2021-01-01 RX ADMIN — FUROSEMIDE 40 MG: 10 INJECTION, SOLUTION INTRAMUSCULAR; INTRAVENOUS at 21:00

## 2021-01-01 RX ADMIN — SODIUM CHLORIDE, PRESERVATIVE FREE 10 ML: 5 INJECTION INTRAVENOUS at 10:00

## 2021-01-01 RX ADMIN — LEVETIRACETAM 1000 MG: 500 SOLUTION ORAL at 21:48

## 2021-01-01 RX ADMIN — LEVETIRACETAM 1000 MG: 10 INJECTION, SOLUTION INTRAVENOUS at 00:28

## 2021-01-01 RX ADMIN — RIVAROXABAN 20 MG: 10 TABLET, FILM COATED ORAL at 08:55

## 2021-01-01 RX ADMIN — BUMETANIDE 2 MG: 0.25 INJECTION INTRAMUSCULAR; INTRAVENOUS at 08:37

## 2021-01-01 RX ADMIN — CEFTRIAXONE 2 G: 2 INJECTION, SOLUTION INTRAVENOUS at 07:48

## 2021-01-01 RX ADMIN — LEVOTHYROXINE SODIUM 75 MCG: 75 TABLET ORAL at 05:45

## 2021-01-01 RX ADMIN — POTASSIUM CHLORIDE 20 MEQ: 1.5 POWDER, FOR SOLUTION ORAL at 08:35

## 2021-01-01 RX ADMIN — LEVETIRACETAM 1000 MG: 500 TABLET ORAL at 21:09

## 2021-01-01 RX ADMIN — LEVETIRACETAM 1000 MG: 500 TABLET ORAL at 21:33

## 2021-01-01 RX ADMIN — IPRATROPIUM BROMIDE AND ALBUTEROL SULFATE 3 ML: .5; 3 SOLUTION RESPIRATORY (INHALATION) at 19:42

## 2021-01-01 RX ADMIN — SODIUM CHLORIDE 1000 MG: 900 INJECTION, SOLUTION INTRAVENOUS at 14:53

## 2021-01-01 RX ADMIN — DEXTROSE 25 G: 50 INJECTION, SOLUTION INTRAVENOUS at 12:29

## 2021-01-01 RX ADMIN — POTASSIUM CHLORIDE 20 MEQ: 1.5 POWDER, FOR SOLUTION ORAL at 08:52

## 2021-01-01 RX ADMIN — MELATONIN TAB 5 MG 5 MG: 5 TAB at 21:33

## 2021-01-01 RX ADMIN — CEFTRIAXONE 2 G: 2 INJECTION, SOLUTION INTRAVENOUS at 10:11

## 2021-01-01 RX ADMIN — LEVETIRACETAM 1000 MG: 100 SOLUTION ORAL at 18:03

## 2021-01-01 RX ADMIN — SODIUM CHLORIDE, PRESERVATIVE FREE 10 ML: 5 INJECTION INTRAVENOUS at 23:32

## 2021-01-01 RX ADMIN — FAMOTIDINE 40 MG: 20 TABLET, FILM COATED ORAL at 09:28

## 2021-01-01 RX ADMIN — WHITE PETROLATUM 41 % TOPICAL OINTMENT 1 APPLICATION: OINTMENT at 10:40

## 2021-01-01 RX ADMIN — IPRATROPIUM BROMIDE AND ALBUTEROL SULFATE 3 ML: .5; 3 SOLUTION RESPIRATORY (INHALATION) at 11:44

## 2021-01-01 RX ADMIN — WHITE PETROLATUM 41 % TOPICAL OINTMENT: OINTMENT at 08:19

## 2021-01-01 RX ADMIN — ASPIRIN 81 MG CHEWABLE TABLET 81 MG: 81 TABLET CHEWABLE at 08:51

## 2021-01-01 RX ADMIN — DIPHENHYDRAMINE HYDROCHLORIDE 25 MG: 25 CAPSULE ORAL at 17:43

## 2021-01-01 RX ADMIN — LORAZEPAM 0.5 MG: 0.5 TABLET ORAL at 20:24

## 2021-01-01 RX ADMIN — LORAZEPAM 0.25 MG: 2 INJECTION INTRAMUSCULAR; INTRAVENOUS at 12:44

## 2021-01-01 RX ADMIN — SODIUM CHLORIDE, PRESERVATIVE FREE 10 ML: 5 INJECTION INTRAVENOUS at 20:15

## 2021-01-01 RX ADMIN — DIGOXIN 125 MCG: 0.25 INJECTION INTRAMUSCULAR; INTRAVENOUS at 13:07

## 2021-01-01 RX ADMIN — LEVOTHYROXINE SODIUM 75 MCG: 75 TABLET ORAL at 05:27

## 2021-01-01 RX ADMIN — MULTIPLE VITAMINS W/ MINERALS TAB 1 TABLET: TAB at 08:25

## 2021-01-01 RX ADMIN — DOCUSATE SODIUM 100 MG: 100 CAPSULE, LIQUID FILLED ORAL at 08:36

## 2021-01-01 RX ADMIN — ACETAMINOPHEN 650 MG: 325 TABLET, FILM COATED ORAL at 05:45

## 2021-01-01 RX ADMIN — SODIUM CHLORIDE 750 MG: 900 INJECTION, SOLUTION INTRAVENOUS at 11:30

## 2021-01-01 RX ADMIN — FOSPHENYTOIN SODIUM 1000 MG PE: 50 INJECTION, SOLUTION INTRAMUSCULAR; INTRAVENOUS at 11:31

## 2021-01-01 RX ADMIN — Medication 150 MG: at 21:28

## 2021-01-01 RX ADMIN — LEVOTHYROXINE SODIUM 75 MCG: 75 TABLET ORAL at 05:53

## 2021-01-01 RX ADMIN — NOREPINEPHRINE BITARTRATE 0.22 MCG/KG/MIN: 1 INJECTION, SOLUTION, CONCENTRATE INTRAVENOUS at 22:59

## 2021-01-01 RX ADMIN — LEVETIRACETAM 1000 MG: 500 TABLET ORAL at 09:33

## 2021-01-01 RX ADMIN — ENOXAPARIN SODIUM 30 MG: 30 INJECTION SUBCUTANEOUS at 17:04

## 2021-01-01 RX ADMIN — HYDRALAZINE HYDROCHLORIDE 10 MG: 20 INJECTION INTRAMUSCULAR; INTRAVENOUS at 17:26

## 2021-01-01 RX ADMIN — ASPIRIN 81 MG CHEWABLE TABLET 81 MG: 81 TABLET CHEWABLE at 08:22

## 2021-01-01 RX ADMIN — LEVETIRACETAM 1000 MG: 500 TABLET ORAL at 20:02

## 2021-01-01 RX ADMIN — FLUCONAZOLE 400 MG: 400 INJECTION, SOLUTION INTRAVENOUS at 08:39

## 2021-01-01 RX ADMIN — CEFTRIAXONE 2 G: 2 INJECTION, SOLUTION INTRAVENOUS at 15:03

## 2021-01-01 RX ADMIN — Medication 150 MG: at 08:53

## 2021-01-01 RX ADMIN — SODIUM CHLORIDE 75 ML/HR: 9 INJECTION, SOLUTION INTRAVENOUS at 16:37

## 2021-01-01 RX ADMIN — FLUCONAZOLE 200 MG: 100 TABLET ORAL at 09:54

## 2021-01-01 RX ADMIN — Medication 150 MG: at 21:12

## 2021-01-01 RX ADMIN — LORAZEPAM 0.5 MG: 2 INJECTION INTRAMUSCULAR; INTRAVENOUS at 19:41

## 2021-01-01 RX ADMIN — TRAMADOL HYDROCHLORIDE 50 MG: 50 TABLET, COATED ORAL at 20:26

## 2021-01-01 RX ADMIN — LORAZEPAM 0.5 MG: 0.5 TABLET ORAL at 20:26

## 2021-01-01 RX ADMIN — LEVETIRACETAM 1000 MG: 500 SOLUTION ORAL at 20:02

## 2021-01-01 RX ADMIN — VANCOMYCIN HYDROCHLORIDE 750 MG: 750 INJECTION, POWDER, LYOPHILIZED, FOR SOLUTION INTRAVENOUS at 20:06

## 2021-01-01 RX ADMIN — Medication 1 CAPSULE: at 21:25

## 2021-01-01 RX ADMIN — LEVETIRACETAM 1000 MG: 500 SOLUTION ORAL at 09:16

## 2021-01-01 RX ADMIN — LORAZEPAM 0.5 MG: 0.5 TABLET ORAL at 20:08

## 2021-01-01 RX ADMIN — LEVETIRACETAM 1000 MG: 500 SOLUTION ORAL at 09:21

## 2021-01-01 RX ADMIN — Medication 1 APPLICATION: at 09:00

## 2021-01-01 RX ADMIN — FENTANYL CITRATE 25 MCG: 50 INJECTION INTRAMUSCULAR; INTRAVENOUS at 08:38

## 2021-01-01 RX ADMIN — SODIUM CHLORIDE, PRESERVATIVE FREE 10 ML: 5 INJECTION INTRAVENOUS at 21:29

## 2021-01-01 RX ADMIN — COLLAGENASE SANTYL: 250 OINTMENT TOPICAL at 08:19

## 2021-01-01 RX ADMIN — LORAZEPAM 0.25 MG: 2 INJECTION INTRAMUSCULAR; INTRAVENOUS at 20:36

## 2021-01-01 RX ADMIN — LEVETIRACETAM 1000 MG: 500 TABLET ORAL at 10:23

## 2021-01-01 RX ADMIN — POTASSIUM CHLORIDE 20 MEQ: 29.8 INJECTION, SOLUTION INTRAVENOUS at 11:47

## 2021-01-01 RX ADMIN — LORAZEPAM 0.5 MG: 0.5 TABLET ORAL at 08:56

## 2021-01-01 RX ADMIN — LEVETIRACETAM 1000 MG: 500 SOLUTION ORAL at 20:26

## 2021-01-01 RX ADMIN — LEVETIRACETAM 1000 MG: 500 SOLUTION ORAL at 09:09

## 2021-01-01 RX ADMIN — LEVETIRACETAM 1000 MG: 500 SOLUTION ORAL at 08:46

## 2021-01-01 RX ADMIN — ETOMIDATE 20 MG: 2 INJECTION, SOLUTION INTRAVENOUS at 12:15

## 2021-01-01 RX ADMIN — DOCUSATE SODIUM 100 MG: 100 CAPSULE, LIQUID FILLED ORAL at 21:09

## 2021-01-01 RX ADMIN — LEVETIRACETAM 1000 MG: 100 SOLUTION ORAL at 12:43

## 2021-01-01 RX ADMIN — Medication 1 CAPSULE: at 21:48

## 2021-01-01 RX ADMIN — LEVOTHYROXINE SODIUM 75 MCG: 75 TABLET ORAL at 05:29

## 2021-01-01 RX ADMIN — AMLODIPINE BESYLATE 5 MG: 5 TABLET ORAL at 08:51

## 2021-01-01 RX ADMIN — Medication 1 APPLICATION: at 09:06

## 2021-01-01 RX ADMIN — BUMETANIDE 2 MG: 0.25 INJECTION INTRAMUSCULAR; INTRAVENOUS at 20:10

## 2021-01-01 RX ADMIN — LEVETIRACETAM 1000 MG: 10 INJECTION, SOLUTION INTRAVENOUS at 12:03

## 2021-01-01 RX ADMIN — DOCUSATE SODIUM 100 MG: 100 CAPSULE, LIQUID FILLED ORAL at 09:40

## 2021-01-01 RX ADMIN — SODIUM CHLORIDE, PRESERVATIVE FREE 10 ML: 5 INJECTION INTRAVENOUS at 21:48

## 2021-01-01 RX ADMIN — TRAMADOL HYDROCHLORIDE 50 MG: 50 TABLET, COATED ORAL at 08:30

## 2021-01-01 RX ADMIN — POLYETHYLENE GLYCOL 3350 17 G: 17 POWDER, FOR SOLUTION ORAL at 09:09

## 2021-01-01 RX ADMIN — CAMPHOR AND MENTHOL: 5; 5 LOTION TOPICAL at 20:27

## 2021-01-01 RX ADMIN — APIXABAN 5 MG: 2.5 TABLET, FILM COATED ORAL at 08:51

## 2021-01-01 RX ADMIN — TRAMADOL HYDROCHLORIDE 50 MG: 50 TABLET, COATED ORAL at 17:40

## 2021-01-01 RX ADMIN — SODIUM CHLORIDE 75 ML/HR: 9 INJECTION, SOLUTION INTRAVENOUS at 06:13

## 2021-01-01 RX ADMIN — DOCUSATE SODIUM 100 MG: 100 CAPSULE, LIQUID FILLED ORAL at 08:51

## 2021-01-01 RX ADMIN — LEVETIRACETAM 1000 MG: 500 TABLET ORAL at 20:31

## 2021-01-01 RX ADMIN — SODIUM CHLORIDE 1000 MG: 900 INJECTION, SOLUTION INTRAVENOUS at 14:51

## 2021-01-01 RX ADMIN — CAMPHOR AND MENTHOL: 5; 5 LOTION TOPICAL at 16:01

## 2021-01-01 RX ADMIN — SODIUM CHLORIDE, PRESERVATIVE FREE 10 ML: 5 INJECTION INTRAVENOUS at 11:38

## 2021-01-01 RX ADMIN — APIXABAN 5 MG: 2.5 TABLET, FILM COATED ORAL at 10:27

## 2021-01-01 RX ADMIN — CAMPHOR AND MENTHOL: 5; 5 LOTION TOPICAL at 09:28

## 2021-01-01 RX ADMIN — POTASSIUM CHLORIDE 20 MEQ: 1.5 POWDER, FOR SOLUTION ORAL at 10:22

## 2021-01-01 RX ADMIN — PHENYLEPHRINE HYDROCHLORIDE 100 MCG: 10 INJECTION, SOLUTION INTRAMUSCULAR; INTRAVENOUS; SUBCUTANEOUS at 13:02

## 2021-01-01 RX ADMIN — POLYETHYLENE GLYCOL 3350 17 G: 17 POWDER, FOR SOLUTION ORAL at 09:27

## 2021-01-01 RX ADMIN — FENTANYL CITRATE 25 MCG: 50 INJECTION INTRAMUSCULAR; INTRAVENOUS at 16:22

## 2021-01-01 RX ADMIN — CEFEPIME HYDROCHLORIDE 1 G: 1 INJECTION, SOLUTION INTRAVENOUS at 17:31

## 2021-01-01 RX ADMIN — DIGOXIN 125 MCG: 125 TABLET ORAL at 13:42

## 2021-01-01 RX ADMIN — SODIUM CHLORIDE, PRESERVATIVE FREE 10 ML: 5 INJECTION INTRAVENOUS at 21:10

## 2021-01-01 RX ADMIN — DOCUSATE SODIUM 50MG AND SENNOSIDES 8.6MG 2 TABLET: 8.6; 5 TABLET, FILM COATED ORAL at 18:23

## 2021-01-01 RX ADMIN — DIPHENHYDRAMINE HYDROCHLORIDE 25 MG: 25 CAPSULE ORAL at 06:05

## 2021-01-01 RX ADMIN — SODIUM CHLORIDE, PRESERVATIVE FREE 10 ML: 5 INJECTION INTRAVENOUS at 08:29

## 2021-01-01 RX ADMIN — ASPIRIN 81 MG CHEWABLE TABLET 81 MG: 81 TABLET CHEWABLE at 18:32

## 2021-01-01 RX ADMIN — SODIUM CHLORIDE, PRESERVATIVE FREE 10 ML: 5 INJECTION INTRAVENOUS at 21:45

## 2021-01-01 RX ADMIN — MULTIPLE VITAMINS W/ MINERALS TAB 1 TABLET: TAB at 10:57

## 2021-01-01 RX ADMIN — RIVAROXABAN 20 MG: 20 TABLET, FILM COATED ORAL at 09:31

## 2021-01-01 RX ADMIN — PHENYLEPHRINE HYDROCHLORIDE 100 MCG: 10 INJECTION, SOLUTION INTRAMUSCULAR; INTRAVENOUS; SUBCUTANEOUS at 13:06

## 2021-01-01 RX ADMIN — SODIUM CHLORIDE, PRESERVATIVE FREE 10 ML: 5 INJECTION INTRAVENOUS at 09:35

## 2021-01-01 RX ADMIN — DIGOXIN 125 MCG: 0.25 INJECTION INTRAMUSCULAR; INTRAVENOUS at 20:08

## 2021-01-01 RX ADMIN — Medication 1 CAPSULE: at 09:53

## 2021-01-01 RX ADMIN — LEVETIRACETAM 1000 MG: 500 TABLET ORAL at 09:40

## 2021-01-01 RX ADMIN — MULTIPLE VITAMINS W/ MINERALS TAB 1 TABLET: TAB at 08:46

## 2021-01-01 RX ADMIN — APIXABAN 5 MG: 2.5 TABLET, FILM COATED ORAL at 20:10

## 2021-01-01 RX ADMIN — BISACODYL 10 MG: 10 SUPPOSITORY RECTAL at 10:26

## 2021-01-01 RX ADMIN — Medication 1 CAPSULE: at 20:32

## 2021-01-01 RX ADMIN — VANCOMYCIN HYDROCHLORIDE 750 MG: 750 INJECTION, POWDER, LYOPHILIZED, FOR SOLUTION INTRAVENOUS at 20:50

## 2021-01-01 RX ADMIN — SODIUM CHLORIDE, PRESERVATIVE FREE 10 ML: 5 INJECTION INTRAVENOUS at 09:27

## 2021-01-01 RX ADMIN — ACETAMINOPHEN 650 MG: 325 TABLET, FILM COATED ORAL at 21:27

## 2021-01-01 RX ADMIN — SODIUM CHLORIDE, PRESERVATIVE FREE 10 ML: 5 INJECTION INTRAVENOUS at 20:01

## 2021-01-01 RX ADMIN — LORAZEPAM 0.5 MG: 2 INJECTION INTRAMUSCULAR; INTRAVENOUS at 20:46

## 2021-01-01 RX ADMIN — MELATONIN TAB 5 MG 5 MG: 5 TAB at 21:09

## 2021-01-01 RX ADMIN — CAMPHOR AND MENTHOL: 5; 5 LOTION TOPICAL at 09:35

## 2021-01-01 RX ADMIN — DOCUSATE SODIUM 100 MG: 100 CAPSULE, LIQUID FILLED ORAL at 09:28

## 2021-01-01 RX ADMIN — AMLODIPINE BESYLATE 5 MG: 5 TABLET ORAL at 09:24

## 2021-01-01 RX ADMIN — RIVAROXABAN 20 MG: 20 TABLET, FILM COATED ORAL at 09:22

## 2021-01-01 RX ADMIN — DOCUSATE SODIUM 100 MG: 100 CAPSULE, LIQUID FILLED ORAL at 09:09

## 2021-01-01 RX ADMIN — PANTOPRAZOLE SODIUM 40 MG: 40 INJECTION, POWDER, FOR SOLUTION INTRAVENOUS at 06:22

## 2021-01-01 RX ADMIN — COLLAGENASE SANTYL: 250 OINTMENT TOPICAL at 08:28

## 2021-01-01 RX ADMIN — RIVAROXABAN 20 MG: 10 TABLET, FILM COATED ORAL at 08:33

## 2021-01-01 RX ADMIN — Medication 1 APPLICATION: at 09:35

## 2021-01-01 RX ADMIN — LORAZEPAM 0.5 MG: 0.5 TABLET ORAL at 21:28

## 2021-01-01 RX ADMIN — CEFTRIAXONE 2 G: 2 INJECTION, SOLUTION INTRAVENOUS at 09:53

## 2021-01-01 RX ADMIN — DIPHENHYDRAMINE HYDROCHLORIDE 25 MG: 25 CAPSULE ORAL at 09:18

## 2021-01-01 RX ADMIN — POTASSIUM CHLORIDE 40 MEQ: 1.5 POWDER, FOR SOLUTION ORAL at 08:06

## 2021-01-01 RX ADMIN — SODIUM CHLORIDE, PRESERVATIVE FREE 10 ML: 5 INJECTION INTRAVENOUS at 08:49

## 2021-01-01 RX ADMIN — RIVAROXABAN 20 MG: 20 TABLET, FILM COATED ORAL at 09:21

## 2021-01-01 RX ADMIN — APIXABAN 5 MG: 2.5 TABLET, FILM COATED ORAL at 21:20

## 2021-01-01 RX ADMIN — LEVETIRACETAM 750 MG: 100 INJECTION, SOLUTION INTRAVENOUS at 11:54

## 2021-01-01 RX ADMIN — DIGOXIN 125 MCG: 125 TABLET ORAL at 12:58

## 2021-01-01 RX ADMIN — CAMPHOR AND MENTHOL: 5; 5 LOTION TOPICAL at 21:35

## 2021-01-01 RX ADMIN — POLYETHYLENE GLYCOL 3350 17 G: 17 POWDER, FOR SOLUTION ORAL at 09:54

## 2021-01-01 RX ADMIN — SODIUM CHLORIDE, PRESERVATIVE FREE 10 ML: 5 INJECTION INTRAVENOUS at 09:05

## 2021-01-01 RX ADMIN — RIVAROXABAN 20 MG: 20 TABLET, FILM COATED ORAL at 08:41

## 2021-01-01 RX ADMIN — FAMOTIDINE 40 MG: 20 TABLET, FILM COATED ORAL at 08:31

## 2021-01-01 RX ADMIN — LEVOTHYROXINE SODIUM 50 MCG: 50 TABLET ORAL at 06:20

## 2021-01-01 RX ADMIN — ASPIRIN 81 MG CHEWABLE TABLET 81 MG: 81 TABLET CHEWABLE at 08:31

## 2021-01-01 RX ADMIN — FENTANYL CITRATE 25 MCG: 50 INJECTION INTRAMUSCULAR; INTRAVENOUS at 00:30

## 2021-01-01 RX ADMIN — ACETAMINOPHEN 650 MG: 325 TABLET, FILM COATED ORAL at 05:03

## 2021-01-01 RX ADMIN — FAMOTIDINE 40 MG: 20 TABLET, FILM COATED ORAL at 09:17

## 2021-01-01 RX ADMIN — Medication 1 CAPSULE: at 08:55

## 2021-01-01 RX ADMIN — LORAZEPAM 0.25 MG: 2 INJECTION INTRAMUSCULAR; INTRAVENOUS at 04:59

## 2021-01-01 RX ADMIN — SODIUM CHLORIDE, PRESERVATIVE FREE 10 ML: 5 INJECTION INTRAVENOUS at 08:23

## 2021-01-01 RX ADMIN — Medication 150 MG: at 21:33

## 2021-01-01 RX ADMIN — FAMOTIDINE 40 MG: 20 TABLET, FILM COATED ORAL at 09:33

## 2021-01-01 RX ADMIN — MELATONIN TAB 5 MG 5 MG: 5 TAB at 20:37

## 2021-01-01 RX ADMIN — TRAMADOL HYDROCHLORIDE 50 MG: 50 TABLET, COATED ORAL at 01:36

## 2021-01-01 RX ADMIN — LEVETIRACETAM 1000 MG: 500 SOLUTION ORAL at 21:25

## 2021-01-01 RX ADMIN — COLLAGENASE SANTYL: 250 OINTMENT TOPICAL at 08:42

## 2021-01-01 RX ADMIN — LEVETIRACETAM 1000 MG: 10 INJECTION INTRAVENOUS at 01:33

## 2021-01-01 RX ADMIN — APIXABAN 5 MG: 2.5 TABLET, FILM COATED ORAL at 20:09

## 2021-01-01 RX ADMIN — ASPIRIN 81 MG CHEWABLE TABLET 81 MG: 81 TABLET CHEWABLE at 08:28

## 2021-01-01 RX ADMIN — TIMOLOL MALEATE 1 DROP: 2.5 SOLUTION OPHTHALMIC at 10:09

## 2021-01-01 RX ADMIN — Medication 1 TABLET: at 09:33

## 2021-01-01 RX ADMIN — Medication 1 CAPSULE: at 09:28

## 2021-01-01 RX ADMIN — Medication 1 TABLET: at 08:56

## 2021-01-01 RX ADMIN — IPRATROPIUM BROMIDE AND ALBUTEROL SULFATE 3 ML: .5; 3 SOLUTION RESPIRATORY (INHALATION) at 08:10

## 2021-01-01 RX ADMIN — Medication 1 APPLICATION: at 08:34

## 2021-01-01 RX ADMIN — Medication 150 MG: at 09:53

## 2021-01-01 RX ADMIN — Medication 1 TABLET: at 09:17

## 2021-01-01 RX ADMIN — LEVETIRACETAM 1000 MG: 500 SOLUTION ORAL at 09:22

## 2021-01-01 RX ADMIN — TRAMADOL HYDROCHLORIDE 50 MG: 50 TABLET, COATED ORAL at 06:23

## 2021-01-01 RX ADMIN — WHITE PETROLATUM 41 % TOPICAL OINTMENT: OINTMENT at 08:26

## 2021-01-01 RX ADMIN — Medication 150 MG: at 08:34

## 2021-01-01 RX ADMIN — VANCOMYCIN HYDROCHLORIDE 750 MG: 750 INJECTION, POWDER, LYOPHILIZED, FOR SOLUTION INTRAVENOUS at 21:32

## 2021-01-01 RX ADMIN — SODIUM CHLORIDE 500 ML: 9 INJECTION, SOLUTION INTRAVENOUS at 17:35

## 2021-01-01 RX ADMIN — Medication 1 CAPSULE: at 09:21

## 2021-01-01 RX ADMIN — FAMOTIDINE 40 MG: 20 TABLET, FILM COATED ORAL at 09:09

## 2021-01-01 RX ADMIN — TRAMADOL HYDROCHLORIDE 50 MG: 50 TABLET, COATED ORAL at 01:11

## 2021-01-01 RX ADMIN — SODIUM CHLORIDE, PRESERVATIVE FREE 10 ML: 5 INJECTION INTRAVENOUS at 12:44

## 2021-01-01 RX ADMIN — Medication 1 TABLET: at 08:31

## 2021-01-01 RX ADMIN — APIXABAN 5 MG: 2.5 TABLET, FILM COATED ORAL at 21:28

## 2021-01-01 RX ADMIN — WHITE PETROLATUM 41 % TOPICAL OINTMENT: OINTMENT at 10:28

## 2021-01-01 RX ADMIN — LEVETIRACETAM 1000 MG: 10 INJECTION, SOLUTION INTRAVENOUS at 23:36

## 2021-01-01 RX ADMIN — CEFTRIAXONE 2 G: 2 INJECTION, SOLUTION INTRAVENOUS at 11:16

## 2021-01-01 RX ADMIN — PHENYLEPHRINE HYDROCHLORIDE 200 MCG: 10 INJECTION, SOLUTION INTRAMUSCULAR; INTRAVENOUS; SUBCUTANEOUS at 12:31

## 2021-01-01 RX ADMIN — ACETAMINOPHEN 650 MG: 650 SUPPOSITORY RECTAL at 07:22

## 2021-01-01 RX ADMIN — DEXTROSE 25 G: 50 INJECTION, SOLUTION INTRAVENOUS at 09:57

## 2021-01-01 RX ADMIN — LEVETIRACETAM 1000 MG: 500 SOLUTION ORAL at 09:53

## 2021-01-01 RX ADMIN — Medication 1 CAPSULE: at 08:24

## 2021-01-01 RX ADMIN — VANCOMYCIN HYDROCHLORIDE 750 MG: 750 INJECTION, POWDER, LYOPHILIZED, FOR SOLUTION INTRAVENOUS at 20:52

## 2021-01-01 RX ADMIN — DIGOXIN 125 MCG: 125 TABLET ORAL at 09:41

## 2021-01-01 RX ADMIN — CEFTRIAXONE 2 G: 2 INJECTION, SOLUTION INTRAVENOUS at 08:28

## 2021-01-01 RX ADMIN — DIGOXIN 125 MCG: 125 TABLET ORAL at 11:41

## 2021-01-01 RX ADMIN — ACETAMINOPHEN 650 MG: 325 TABLET, FILM COATED ORAL at 21:24

## 2021-01-01 RX ADMIN — FAMOTIDINE 20 MG: 10 INJECTION, SOLUTION INTRAVENOUS at 10:40

## 2021-01-01 RX ADMIN — DIPHENHYDRAMINE HYDROCHLORIDE 25 MG: 25 CAPSULE ORAL at 00:17

## 2021-01-01 RX ADMIN — LEVOTHYROXINE SODIUM 50 MCG: 50 TABLET ORAL at 09:13

## 2021-01-01 RX ADMIN — CEFEPIME HYDROCHLORIDE 2 G: 2 INJECTION, SOLUTION INTRAVENOUS at 11:05

## 2021-01-01 RX ADMIN — Medication 1 TABLET: at 09:09

## 2021-01-01 RX ADMIN — LORAZEPAM 0.25 MG: 2 INJECTION INTRAMUSCULAR; INTRAVENOUS at 21:29

## 2021-01-01 RX ADMIN — Medication 1 TABLET: at 08:33

## 2021-01-01 RX ADMIN — RIVAROXABAN 20 MG: 20 TABLET, FILM COATED ORAL at 10:56

## 2021-01-01 RX ADMIN — TRAMADOL HYDROCHLORIDE 50 MG: 50 TABLET, COATED ORAL at 23:45

## 2021-01-01 RX ADMIN — LEVETIRACETAM 1000 MG: 10 INJECTION, SOLUTION INTRAVENOUS at 23:02

## 2021-01-01 RX ADMIN — Medication 150 MG: at 21:16

## 2021-01-01 RX ADMIN — MULTIPLE VITAMINS W/ MINERALS TAB 1 TABLET: TAB at 09:30

## 2021-01-01 RX ADMIN — Medication 1 CAPSULE: at 10:22

## 2021-01-01 RX ADMIN — Medication: at 10:07

## 2021-01-01 RX ADMIN — LORAZEPAM 0.5 MG: 0.5 TABLET ORAL at 21:16

## 2021-01-01 RX ADMIN — LEVETIRACETAM 1000 MG: 500 TABLET ORAL at 09:05

## 2021-01-01 RX ADMIN — APIXABAN 5 MG: 2.5 TABLET, FILM COATED ORAL at 08:18

## 2021-01-01 RX ADMIN — VANCOMYCIN HYDROCHLORIDE 750 MG: 750 INJECTION, POWDER, LYOPHILIZED, FOR SOLUTION INTRAVENOUS at 19:47

## 2021-01-01 RX ADMIN — SODIUM CHLORIDE, PRESERVATIVE FREE 10 ML: 5 INJECTION INTRAVENOUS at 09:00

## 2021-01-01 RX ADMIN — LEVETIRACETAM 1000 MG: 10 INJECTION, SOLUTION INTRAVENOUS at 05:51

## 2021-01-01 RX ADMIN — LORAZEPAM 0.5 MG: 2 INJECTION INTRAMUSCULAR; INTRAVENOUS at 08:51

## 2021-01-01 RX ADMIN — Medication 1 APPLICATION: at 09:36

## 2021-01-01 RX ADMIN — LEVOTHYROXINE SODIUM ANHYDROUS 75 MCG: 100 INJECTION, POWDER, LYOPHILIZED, FOR SOLUTION INTRAVENOUS at 10:19

## 2021-01-01 RX ADMIN — SODIUM CHLORIDE, PRESERVATIVE FREE 10 ML: 5 INJECTION INTRAVENOUS at 20:51

## 2021-01-01 RX ADMIN — DEXTROSE AND SODIUM CHLORIDE 50 ML/HR: 5; 450 INJECTION, SOLUTION INTRAVENOUS at 17:12

## 2021-01-01 RX ADMIN — CAMPHOR AND MENTHOL: 5; 5 LOTION TOPICAL at 17:13

## 2021-01-01 RX ADMIN — LEVETIRACETAM 1000 MG: 10 INJECTION, SOLUTION INTRAVENOUS at 12:00

## 2021-01-01 RX ADMIN — Medication 150 MG: at 21:10

## 2021-01-01 RX ADMIN — ASPIRIN 81 MG CHEWABLE TABLET 81 MG: 81 TABLET CHEWABLE at 10:27

## 2021-01-01 RX ADMIN — DIPHENHYDRAMINE HYDROCHLORIDE 25 MG: 25 CAPSULE ORAL at 00:13

## 2021-01-01 RX ADMIN — FLUCONAZOLE 200 MG: 100 TABLET ORAL at 09:31

## 2021-01-01 RX ADMIN — LEVOTHYROXINE SODIUM 50 MCG: 50 TABLET ORAL at 06:22

## 2021-01-01 RX ADMIN — LEVETIRACETAM 1000 MG: 500 TABLET ORAL at 08:55

## 2021-01-01 RX ADMIN — AMOXICILLIN AND CLAVULANATE POTASSIUM 1 TABLET: 875; 125 TABLET, FILM COATED ORAL at 09:30

## 2021-01-01 RX ADMIN — DIGOXIN 125 MCG: 125 TABLET ORAL at 09:23

## 2021-01-01 RX ADMIN — CAMPHOR AND MENTHOL: 5; 5 LOTION TOPICAL at 09:12

## 2021-01-01 RX ADMIN — ACETAMINOPHEN ORAL SOLUTION 650 MG: 325 SOLUTION ORAL at 20:10

## 2021-01-01 RX ADMIN — POTASSIUM CHLORIDE 40 MEQ: 1.5 POWDER, FOR SOLUTION ORAL at 14:14

## 2021-01-01 RX ADMIN — TIMOLOL MALEATE 1 DROP: 2.5 SOLUTION OPHTHALMIC at 09:24

## 2021-01-01 RX ADMIN — Medication 1 CAPSULE: at 20:26

## 2021-01-01 RX ADMIN — IPRATROPIUM BROMIDE AND ALBUTEROL SULFATE 3 ML: .5; 3 SOLUTION RESPIRATORY (INHALATION) at 19:48

## 2021-01-01 RX ADMIN — LEVETIRACETAM 1000 MG: 10 INJECTION, SOLUTION INTRAVENOUS at 04:50

## 2021-01-01 RX ADMIN — SODIUM CHLORIDE 750 MG: 900 INJECTION, SOLUTION INTRAVENOUS at 12:10

## 2021-01-01 RX ADMIN — CAMPHOR AND MENTHOL: 5; 5 LOTION TOPICAL at 08:31

## 2021-01-01 RX ADMIN — SODIUM CHLORIDE 100 ML: 9 INJECTION, SOLUTION INTRAVENOUS at 23:34

## 2021-01-01 RX ADMIN — FENTANYL CITRATE 25 MCG: 50 INJECTION INTRAMUSCULAR; INTRAVENOUS at 09:19

## 2021-01-01 RX ADMIN — LEVETIRACETAM 1000 MG: 500 TABLET ORAL at 08:22

## 2021-01-01 RX ADMIN — MELATONIN TAB 5 MG 5 MG: 5 TAB at 20:24

## 2021-01-01 RX ADMIN — SODIUM CHLORIDE 75 ML/HR: 9 INJECTION, SOLUTION INTRAVENOUS at 18:33

## 2021-01-01 RX ADMIN — Medication 1 APPLICATION: at 09:27

## 2021-01-01 RX ADMIN — AMLODIPINE BESYLATE 5 MG: 5 TABLET ORAL at 09:04

## 2021-01-01 RX ADMIN — POTASSIUM CHLORIDE 40 MEQ: 1.5 POWDER, FOR SOLUTION ORAL at 10:31

## 2021-01-01 RX ADMIN — ASPIRIN 81 MG CHEWABLE TABLET 81 MG: 81 TABLET CHEWABLE at 09:06

## 2021-01-01 RX ADMIN — Medication 150 MG: at 08:50

## 2021-01-01 RX ADMIN — ASPIRIN 81 MG CHEWABLE TABLET 81 MG: 81 TABLET CHEWABLE at 08:50

## 2021-01-01 RX ADMIN — CAMPHOR AND MENTHOL: 5; 5 LOTION TOPICAL at 09:05

## 2021-01-01 RX ADMIN — DOCUSATE SODIUM 100 MG: 100 CAPSULE, LIQUID FILLED ORAL at 15:05

## 2021-01-01 RX ADMIN — FENTANYL CITRATE 25 MCG: 50 INJECTION INTRAMUSCULAR; INTRAVENOUS at 00:33

## 2021-01-01 RX ADMIN — DEXTROSE AND SODIUM CHLORIDE 75 ML/HR: 5; 450 INJECTION, SOLUTION INTRAVENOUS at 08:26

## 2021-01-01 RX ADMIN — FAMOTIDINE 40 MG: 20 TABLET, FILM COATED ORAL at 09:05

## 2021-01-01 RX ADMIN — WHITE PETROLATUM 41 % TOPICAL OINTMENT 1 APPLICATION: OINTMENT at 18:33

## 2021-01-01 RX ADMIN — WHITE PETROLATUM 41 % TOPICAL OINTMENT: OINTMENT at 08:24

## 2021-01-01 RX ADMIN — PHENYLEPHRINE HYDROCHLORIDE 100 MCG: 10 INJECTION, SOLUTION INTRAMUSCULAR; INTRAVENOUS; SUBCUTANEOUS at 13:04

## 2021-01-01 RX ADMIN — POTASSIUM CHLORIDE 40 MEQ: 1.5 POWDER, FOR SOLUTION ORAL at 12:19

## 2021-01-01 RX ADMIN — DOCUSATE SODIUM 100 MG: 100 CAPSULE, LIQUID FILLED ORAL at 21:28

## 2021-01-01 RX ADMIN — LIDOCAINE HYDROCHLORIDE 100 MG: 20 INJECTION, SOLUTION INFILTRATION; PERINEURAL at 12:11

## 2021-01-01 RX ADMIN — POLYETHYLENE GLYCOL 3350 17 G: 17 POWDER, FOR SOLUTION ORAL at 08:35

## 2021-01-01 RX ADMIN — DOCUSATE SODIUM 100 MG: 100 CAPSULE, LIQUID FILLED ORAL at 08:41

## 2021-01-01 RX ADMIN — SODIUM CHLORIDE 100 MG: 9 INJECTION, SOLUTION INTRAVENOUS at 13:56

## 2021-01-01 RX ADMIN — AMLODIPINE BESYLATE 5 MG: 5 TABLET ORAL at 09:30

## 2021-01-01 RX ADMIN — VANCOMYCIN HYDROCHLORIDE 750 MG: 750 INJECTION, POWDER, LYOPHILIZED, FOR SOLUTION INTRAVENOUS at 20:26

## 2021-01-01 RX ADMIN — SODIUM CHLORIDE, PRESERVATIVE FREE 10 ML: 5 INJECTION INTRAVENOUS at 10:04

## 2021-01-01 RX ADMIN — DIGOXIN 125 MCG: 125 TABLET ORAL at 11:26

## 2021-01-01 RX ADMIN — ASPIRIN 81 MG CHEWABLE TABLET 81 MG: 81 TABLET CHEWABLE at 09:09

## 2021-01-01 RX ADMIN — SODIUM CHLORIDE, PRESERVATIVE FREE 10 ML: 5 INJECTION INTRAVENOUS at 09:42

## 2021-01-01 RX ADMIN — NOREPINEPHRINE BITARTRATE 0.02 MCG/KG/MIN: 1 INJECTION, SOLUTION, CONCENTRATE INTRAVENOUS at 22:00

## 2021-01-01 RX ADMIN — AMLODIPINE BESYLATE 5 MG: 5 TABLET ORAL at 08:33

## 2021-01-01 RX ADMIN — SODIUM CHLORIDE 40 ML: 9 INJECTION, SOLUTION INTRAVENOUS at 05:30

## 2021-01-01 RX ADMIN — Medication 1 CAPSULE: at 20:02

## 2021-01-01 RX ADMIN — AMLODIPINE BESYLATE 5 MG: 5 TABLET ORAL at 09:28

## 2021-01-01 RX ADMIN — SODIUM CHLORIDE, PRESERVATIVE FREE 10 ML: 5 INJECTION INTRAVENOUS at 05:11

## 2021-01-01 RX ADMIN — DOCUSATE SODIUM 100 MG: 100 CAPSULE, LIQUID FILLED ORAL at 20:26

## 2021-01-01 RX ADMIN — ASPIRIN 81 MG CHEWABLE TABLET 81 MG: 81 TABLET CHEWABLE at 09:23

## 2021-01-01 RX ADMIN — ASPIRIN 81 MG CHEWABLE TABLET 81 MG: 81 TABLET CHEWABLE at 08:42

## 2021-01-01 RX ADMIN — FLUCONAZOLE 200 MG: 100 TABLET ORAL at 10:09

## 2021-01-01 RX ADMIN — FENTANYL CITRATE 25 MCG: 50 INJECTION INTRAMUSCULAR; INTRAVENOUS at 20:13

## 2021-01-01 RX ADMIN — LEVETIRACETAM 1000 MG: 500 SOLUTION ORAL at 20:24

## 2021-01-01 RX ADMIN — DIPHENHYDRAMINE HYDROCHLORIDE 25 MG: 25 CAPSULE ORAL at 06:12

## 2021-01-01 RX ADMIN — DEXMEDETOMIDINE HYDROCHLORIDE 0.6 MCG/KG/HR: 400 INJECTION INTRAVENOUS at 19:46

## 2021-01-01 RX ADMIN — FLUCONAZOLE 200 MG: 100 TABLET ORAL at 09:28

## 2021-01-01 RX ADMIN — DEXTROSE 25 G: 50 INJECTION, SOLUTION INTRAVENOUS at 15:18

## 2021-01-01 RX ADMIN — LEVETIRACETAM 1000 MG: 500 SOLUTION ORAL at 20:16

## 2021-01-01 RX ADMIN — LEVETIRACETAM 1000 MG: 500 SOLUTION ORAL at 09:15

## 2021-01-01 RX ADMIN — FAMOTIDINE 40 MG: 20 TABLET ORAL at 08:22

## 2021-01-01 RX ADMIN — AMLODIPINE BESYLATE 5 MG: 5 TABLET ORAL at 09:09

## 2021-01-01 RX ADMIN — LEVETIRACETAM 1000 MG: 500 TABLET ORAL at 21:28

## 2021-01-01 RX ADMIN — ENOXAPARIN SODIUM 30 MG: 30 INJECTION SUBCUTANEOUS at 17:10

## 2021-01-01 RX ADMIN — DEXTROSE MONOHYDRATE 50 ML: 25 INJECTION, SOLUTION INTRAVENOUS at 15:31

## 2021-01-01 RX ADMIN — TUBERCULIN PURIFIED PROTEIN DERIVATIVE 5 UNITS: 5 INJECTION INTRADERMAL at 13:27

## 2021-01-01 RX ADMIN — LEVETIRACETAM 1000 MG: 500 SOLUTION ORAL at 20:07

## 2021-01-01 RX ADMIN — SODIUM CHLORIDE, PRESERVATIVE FREE 10 ML: 5 INJECTION INTRAVENOUS at 20:02

## 2021-01-01 RX ADMIN — COLLAGENASE SANTYL: 250 OINTMENT TOPICAL at 08:24

## 2021-01-01 RX ADMIN — WHITE PETROLATUM 41 % TOPICAL OINTMENT 1 APPLICATION: OINTMENT at 08:52

## 2021-01-01 RX ADMIN — FLUCONAZOLE 400 MG: 400 INJECTION, SOLUTION INTRAVENOUS at 09:47

## 2021-01-01 RX ADMIN — ACETAMINOPHEN 650 MG: 325 TABLET, FILM COATED ORAL at 10:18

## 2021-01-01 RX ADMIN — DIPHENHYDRAMINE HYDROCHLORIDE 25 MG: 25 CAPSULE ORAL at 18:25

## 2021-01-01 RX ADMIN — ONDANSETRON 4 MG: 2 INJECTION INTRAMUSCULAR; INTRAVENOUS at 09:51

## 2021-01-01 RX ADMIN — DOCUSATE SODIUM 50MG AND SENNOSIDES 8.6MG 2 TABLET: 8.6; 5 TABLET, FILM COATED ORAL at 09:04

## 2021-01-01 RX ADMIN — SODIUM CHLORIDE, PRESERVATIVE FREE 10 ML: 5 INJECTION INTRAVENOUS at 08:06

## 2021-01-01 RX ADMIN — PANTOPRAZOLE SODIUM 40 MG: 40 INJECTION, POWDER, FOR SOLUTION INTRAVENOUS at 06:10

## 2021-01-01 RX ADMIN — POLYETHYLENE GLYCOL 3350 17 G: 17 POWDER, FOR SOLUTION ORAL at 10:21

## 2021-01-01 RX ADMIN — LORAZEPAM 1 MG: 2 INJECTION INTRAMUSCULAR; INTRAVENOUS at 20:11

## 2021-01-01 RX ADMIN — SODIUM CHLORIDE, PRESERVATIVE FREE 10 ML: 5 INJECTION INTRAVENOUS at 08:41

## 2021-01-01 RX ADMIN — DEXTROSE MONOHYDRATE 25 G: 25 INJECTION, SOLUTION INTRAVENOUS at 13:49

## 2021-01-01 RX ADMIN — TRAMADOL HYDROCHLORIDE 50 MG: 50 TABLET, COATED ORAL at 00:13

## 2021-01-01 RX ADMIN — ACETAMINOPHEN 650 MG: 650 SUPPOSITORY RECTAL at 20:14

## 2021-01-01 RX ADMIN — Medication 1 CAPSULE: at 21:28

## 2021-01-01 RX ADMIN — Medication 150 MG: at 08:55

## 2021-01-01 RX ADMIN — LEVOTHYROXINE SODIUM 75 MCG: 75 TABLET ORAL at 06:11

## 2021-01-01 RX ADMIN — APIXABAN 5 MG: 2.5 TABLET, FILM COATED ORAL at 15:10

## 2021-01-01 RX ADMIN — SODIUM CHLORIDE 75 ML/HR: 9 INJECTION, SOLUTION INTRAVENOUS at 00:01

## 2021-01-01 RX ADMIN — FLUCONAZOLE 200 MG: 100 TABLET ORAL at 08:56

## 2021-01-01 RX ADMIN — DOCUSATE SODIUM 100 MG: 100 CAPSULE, LIQUID FILLED ORAL at 08:34

## 2021-01-01 RX ADMIN — DEXMEDETOMIDINE HYDROCHLORIDE 1.5 MCG/KG/HR: 400 INJECTION INTRAVENOUS at 01:35

## 2021-01-01 RX ADMIN — Medication 1 CAPSULE: at 09:30

## 2021-01-01 RX ADMIN — LEVETIRACETAM 1000 MG: 10 INJECTION, SOLUTION INTRAVENOUS at 05:01

## 2021-01-01 RX ADMIN — FLUCONAZOLE 400 MG: 400 INJECTION, SOLUTION INTRAVENOUS at 10:23

## 2021-01-01 RX ADMIN — SODIUM CHLORIDE, PRESERVATIVE FREE 10 ML: 5 INJECTION INTRAVENOUS at 20:53

## 2021-01-01 RX ADMIN — CEFTRIAXONE 2 G: 2 INJECTION, SOLUTION INTRAVENOUS at 06:26

## 2021-01-01 RX ADMIN — PANTOPRAZOLE SODIUM 40 MG: 40 INJECTION, POWDER, FOR SOLUTION INTRAVENOUS at 06:08

## 2021-01-01 RX ADMIN — WHITE PETROLATUM 41 % TOPICAL OINTMENT: OINTMENT at 08:28

## 2021-01-01 RX ADMIN — CEFTRIAXONE 2 G: 2 INJECTION, SOLUTION INTRAVENOUS at 12:22

## 2021-01-01 RX ADMIN — Medication 1 CAPSULE: at 08:22

## 2021-01-01 RX ADMIN — SODIUM CHLORIDE 750 MG: 900 INJECTION, SOLUTION INTRAVENOUS at 15:06

## 2021-01-01 RX ADMIN — APIXABAN 5 MG: 2.5 TABLET, FILM COATED ORAL at 21:33

## 2021-01-01 RX ADMIN — FLUCONAZOLE 200 MG: 100 TABLET ORAL at 08:33

## 2021-01-01 RX ADMIN — SODIUM CHLORIDE, PRESERVATIVE FREE 10 ML: 5 INJECTION INTRAVENOUS at 10:29

## 2021-01-01 RX ADMIN — APIXABAN 5 MG: 2.5 TABLET, FILM COATED ORAL at 08:23

## 2021-01-01 RX ADMIN — RIVAROXABAN 20 MG: 20 TABLET, FILM COATED ORAL at 10:00

## 2021-01-01 RX ADMIN — Medication 150 MG: at 10:22

## 2021-01-01 RX ADMIN — Medication 1 CAPSULE: at 21:15

## 2021-01-01 RX ADMIN — CAMPHOR AND MENTHOL: 5; 5 LOTION TOPICAL at 08:43

## 2021-01-01 RX ADMIN — DOCUSATE SODIUM 100 MG: 100 CAPSULE, LIQUID FILLED ORAL at 08:33

## 2021-01-01 RX ADMIN — DIPHENHYDRAMINE HYDROCHLORIDE 25 MG: 25 CAPSULE ORAL at 01:11

## 2021-01-01 RX ADMIN — ACETAMINOPHEN 650 MG: 325 TABLET, FILM COATED ORAL at 20:07

## 2021-01-01 RX ADMIN — LORAZEPAM 0.5 MG: 0.5 TABLET ORAL at 21:33

## 2021-01-01 RX ADMIN — LORAZEPAM 1 MG: 2 INJECTION INTRAMUSCULAR; INTRAVENOUS at 14:48

## 2021-01-01 RX ADMIN — ENOXAPARIN SODIUM 60 MG: 60 INJECTION SUBCUTANEOUS at 05:58

## 2021-01-01 RX ADMIN — Medication 150 MG: at 20:37

## 2021-01-01 RX ADMIN — DOCUSATE SODIUM 100 MG: 100 CAPSULE, LIQUID FILLED ORAL at 21:32

## 2021-01-01 RX ADMIN — VANCOMYCIN HYDROCHLORIDE 750 MG: 750 INJECTION, POWDER, LYOPHILIZED, FOR SOLUTION INTRAVENOUS at 21:00

## 2021-01-01 RX ADMIN — DIGOXIN 250 MCG: 0.25 INJECTION INTRAMUSCULAR; INTRAVENOUS at 20:32

## 2021-01-01 RX ADMIN — LORAZEPAM 1 MG: 2 INJECTION INTRAMUSCULAR; INTRAVENOUS at 21:34

## 2021-01-01 RX ADMIN — PHENYLEPHRINE HYDROCHLORIDE 200 MCG: 10 INJECTION, SOLUTION INTRAMUSCULAR; INTRAVENOUS; SUBCUTANEOUS at 12:25

## 2021-01-01 RX ADMIN — CEFTRIAXONE 1 G: 1 INJECTION, SOLUTION INTRAVENOUS at 12:07

## 2021-01-01 RX ADMIN — SODIUM CHLORIDE 100 MG: 9 INJECTION, SOLUTION INTRAVENOUS at 00:02

## 2021-01-01 RX ADMIN — LACOSAMIDE 200 MG: 10 INJECTION INTRAVENOUS at 13:56

## 2021-01-01 RX ADMIN — LORAZEPAM 0.25 MG: 2 INJECTION INTRAMUSCULAR; INTRAVENOUS at 18:05

## 2021-01-01 RX ADMIN — ASPIRIN 81 MG CHEWABLE TABLET 81 MG: 81 TABLET CHEWABLE at 08:21

## 2021-01-01 RX ADMIN — POLYETHYLENE GLYCOL 3350 17 G: 17 POWDER, FOR SOLUTION ORAL at 08:29

## 2021-01-01 RX ADMIN — HYDRALAZINE HYDROCHLORIDE 10 MG: 20 INJECTION INTRAMUSCULAR; INTRAVENOUS at 09:03

## 2021-01-01 RX ADMIN — SODIUM CHLORIDE, PRESERVATIVE FREE 10 ML: 5 INJECTION INTRAVENOUS at 22:49

## 2021-01-01 RX ADMIN — LEVOTHYROXINE SODIUM ANHYDROUS 75 MCG: 100 INJECTION, POWDER, LYOPHILIZED, FOR SOLUTION INTRAVENOUS at 09:46

## 2021-01-01 RX ADMIN — POTASSIUM CHLORIDE 20 MEQ: 1.5 POWDER, FOR SOLUTION ORAL at 08:33

## 2021-01-01 RX ADMIN — DEXTROSE AND SODIUM CHLORIDE 100 ML/HR: 5; 450 INJECTION, SOLUTION INTRAVENOUS at 20:33

## 2021-01-01 RX ADMIN — LEVOTHYROXINE SODIUM 50 MCG: 50 TABLET ORAL at 05:52

## 2021-01-01 RX ADMIN — POTASSIUM CHLORIDE 20 MEQ: 29.8 INJECTION, SOLUTION INTRAVENOUS at 09:04

## 2021-01-01 RX ADMIN — LEVETIRACETAM 1000 MG: 10 INJECTION, SOLUTION INTRAVENOUS at 17:47

## 2021-01-01 RX ADMIN — DIGOXIN 125 MCG: 125 TABLET ORAL at 12:40

## 2021-01-01 RX ADMIN — DEXTROSE 25 G: 50 INJECTION, SOLUTION INTRAVENOUS at 01:12

## 2021-01-01 RX ADMIN — DEXMEDETOMIDINE HYDROCHLORIDE 1.3 MCG/KG/HR: 400 INJECTION INTRAVENOUS at 06:40

## 2021-01-01 RX ADMIN — DOCUSATE SODIUM 100 MG: 100 CAPSULE, LIQUID FILLED ORAL at 21:47

## 2021-01-01 RX ADMIN — Medication: at 08:41

## 2021-01-01 RX ADMIN — ASPIRIN 81 MG CHEWABLE TABLET 81 MG: 81 TABLET CHEWABLE at 09:21

## 2021-01-01 RX ADMIN — LEVETIRACETAM 1000 MG: 500 TABLET ORAL at 08:41

## 2021-01-01 RX ADMIN — SODIUM CHLORIDE 1000 MG: 900 INJECTION, SOLUTION INTRAVENOUS at 09:23

## 2021-01-01 RX ADMIN — LEVETIRACETAM 1000 MG: 500 SOLUTION ORAL at 10:58

## 2021-01-01 RX ADMIN — FENTANYL CITRATE 25 MCG: 50 INJECTION INTRAMUSCULAR; INTRAVENOUS at 19:38

## 2021-01-01 RX ADMIN — SODIUM CHLORIDE 100 MG: 9 INJECTION, SOLUTION INTRAVENOUS at 00:16

## 2021-01-01 RX ADMIN — LEVETIRACETAM 1000 MG: 500 SOLUTION ORAL at 09:23

## 2021-01-01 RX ADMIN — CEFTRIAXONE 2 G: 2 INJECTION, SOLUTION INTRAVENOUS at 09:04

## 2021-01-01 RX ADMIN — FAMOTIDINE 40 MG: 20 TABLET, FILM COATED ORAL at 20:13

## 2021-01-01 RX ADMIN — PANTOPRAZOLE SODIUM 40 MG: 40 INJECTION, POWDER, FOR SOLUTION INTRAVENOUS at 05:39

## 2021-01-01 RX ADMIN — TRAMADOL HYDROCHLORIDE 50 MG: 50 TABLET, COATED ORAL at 18:24

## 2021-01-01 RX ADMIN — Medication: at 12:54

## 2021-01-01 RX ADMIN — Medication 1 CAPSULE: at 20:10

## 2021-01-01 RX ADMIN — LORAZEPAM 0.25 MG: 2 INJECTION INTRAMUSCULAR; INTRAVENOUS at 17:57

## 2021-01-01 RX ADMIN — Medication 150 MG: at 20:08

## 2021-01-01 RX ADMIN — LORAZEPAM 0.5 MG: 2 INJECTION INTRAMUSCULAR; INTRAVENOUS at 21:32

## 2021-01-01 RX ADMIN — FAMOTIDINE 40 MG: 20 TABLET, FILM COATED ORAL at 08:35

## 2021-01-01 RX ADMIN — LEVETIRACETAM 1000 MG: 500 TABLET ORAL at 08:33

## 2021-01-01 RX ADMIN — DOCUSATE SODIUM 100 MG: 100 CAPSULE, LIQUID FILLED ORAL at 21:22

## 2021-01-01 RX ADMIN — SODIUM CHLORIDE, PRESERVATIVE FREE 10 ML: 5 INJECTION INTRAVENOUS at 08:46

## 2021-01-01 RX ADMIN — BUMETANIDE 2 MG: 0.25 INJECTION INTRAMUSCULAR; INTRAVENOUS at 08:41

## 2021-01-01 RX ADMIN — WHITE PETROLATUM 41 % TOPICAL OINTMENT 1 APPLICATION: OINTMENT at 12:09

## 2021-01-01 RX ADMIN — Medication 1 APPLICATION: at 10:18

## 2021-01-01 RX ADMIN — TRAMADOL HYDROCHLORIDE 50 MG: 50 TABLET, COATED ORAL at 06:18

## 2021-01-01 RX ADMIN — LEVETIRACETAM 1000 MG: 500 SOLUTION ORAL at 09:06

## 2021-01-01 RX ADMIN — Medication 1 APPLICATION: at 09:15

## 2021-01-01 RX ADMIN — Medication 1 CAPSULE: at 21:20

## 2021-01-01 RX ADMIN — ONDANSETRON 4 MG: 2 INJECTION INTRAMUSCULAR; INTRAVENOUS at 04:23

## 2021-01-01 RX ADMIN — DIPHENHYDRAMINE HYDROCHLORIDE 25 MG: 25 CAPSULE ORAL at 01:36

## 2021-01-01 RX ADMIN — DIPHENHYDRAMINE HYDROCHLORIDE 25 MG: 25 CAPSULE ORAL at 21:47

## 2021-01-01 RX ADMIN — DIGOXIN 250 MCG: 0.25 INJECTION INTRAMUSCULAR; INTRAVENOUS at 23:39

## 2021-01-01 RX ADMIN — Medication 150 MG: at 20:31

## 2021-01-01 RX ADMIN — TRAMADOL HYDROCHLORIDE 50 MG: 50 TABLET, COATED ORAL at 13:01

## 2021-01-01 RX ADMIN — AMLODIPINE BESYLATE 5 MG: 5 TABLET ORAL at 08:46

## 2021-01-01 RX ADMIN — AMLODIPINE BESYLATE 5 MG: 5 TABLET ORAL at 08:35

## 2021-01-01 RX ADMIN — CAMPHOR AND MENTHOL: 5; 5 LOTION TOPICAL at 20:25

## 2021-01-01 RX ADMIN — AMOXICILLIN AND CLAVULANATE POTASSIUM 1 TABLET: 875; 125 TABLET, FILM COATED ORAL at 10:56

## 2021-01-01 RX ADMIN — LEVETIRACETAM 1000 MG: 10 INJECTION, SOLUTION INTRAVENOUS at 12:19

## 2021-01-01 RX ADMIN — DEXMEDETOMIDINE HYDROCHLORIDE 0.2 MCG/KG/HR: 400 INJECTION INTRAVENOUS at 16:50

## 2021-01-01 RX ADMIN — Medication 150 MG: at 20:03

## 2021-01-01 RX ADMIN — SODIUM CHLORIDE, PRESERVATIVE FREE 10 ML: 5 INJECTION INTRAVENOUS at 20:36

## 2021-01-01 RX ADMIN — FENTANYL CITRATE 25 MCG: 50 INJECTION INTRAMUSCULAR; INTRAVENOUS at 10:30

## 2021-01-01 RX ADMIN — SODIUM CHLORIDE, PRESERVATIVE FREE 10 ML: 5 INJECTION INTRAVENOUS at 09:15

## 2021-01-01 RX ADMIN — WHITE PETROLATUM 41 % TOPICAL OINTMENT: OINTMENT at 10:38

## 2021-01-01 RX ADMIN — CEFTRIAXONE 2 G: 2 INJECTION, SOLUTION INTRAVENOUS at 09:08

## 2021-01-01 RX ADMIN — DIPHENHYDRAMINE HYDROCHLORIDE 25 MG: 25 CAPSULE ORAL at 17:45

## 2021-01-01 RX ADMIN — SODIUM CHLORIDE 75 ML/HR: 9 INJECTION, SOLUTION INTRAVENOUS at 09:03

## 2021-01-01 RX ADMIN — POLYETHYLENE GLYCOL 3350 17 G: 17 POWDER, FOR SOLUTION ORAL at 09:17

## 2021-01-01 RX ADMIN — LEVOTHYROXINE SODIUM 75 MCG: 75 TABLET ORAL at 05:59

## 2021-01-01 RX ADMIN — VANCOMYCIN HYDROCHLORIDE 750 MG: 750 INJECTION, POWDER, LYOPHILIZED, FOR SOLUTION INTRAVENOUS at 20:33

## 2021-01-01 RX ADMIN — LEVETIRACETAM 1000 MG: 100 SOLUTION ORAL at 18:05

## 2021-01-01 RX ADMIN — SODIUM CHLORIDE 40 ML: 9 INJECTION, SOLUTION INTRAVENOUS at 09:32

## 2021-01-01 RX ADMIN — SODIUM CHLORIDE, PRESERVATIVE FREE 10 ML: 5 INJECTION INTRAVENOUS at 20:17

## 2021-01-01 RX ADMIN — FAMOTIDINE 40 MG: 20 TABLET, FILM COATED ORAL at 20:01

## 2021-01-01 RX ADMIN — RIVAROXABAN 20 MG: 20 TABLET, FILM COATED ORAL at 18:32

## 2021-01-01 RX ADMIN — Medication 150 MG: at 09:17

## 2021-01-01 RX ADMIN — LEVETIRACETAM 1000 MG: 10 INJECTION, SOLUTION INTRAVENOUS at 12:14

## 2021-01-01 RX ADMIN — POLYETHYLENE GLYCOL 3350 17 G: 17 POWDER, FOR SOLUTION ORAL at 08:34

## 2021-01-01 RX ADMIN — Medication 1 TABLET: at 08:50

## 2021-01-01 RX ADMIN — ONDANSETRON 4 MG: 2 INJECTION INTRAMUSCULAR; INTRAVENOUS at 17:35

## 2021-01-01 RX ADMIN — NOREPINEPHRINE BITARTRATE 0.02 MCG/KG/MIN: 1 INJECTION, SOLUTION, CONCENTRATE INTRAVENOUS at 12:22

## 2021-01-01 RX ADMIN — RIVAROXABAN 20 MG: 10 TABLET, FILM COATED ORAL at 09:09

## 2021-01-01 RX ADMIN — Medication 150 MG: at 09:04

## 2021-01-01 RX ADMIN — LORAZEPAM 0.25 MG: 2 INJECTION INTRAMUSCULAR; INTRAVENOUS at 10:32

## 2021-01-01 RX ADMIN — SODIUM CHLORIDE, PRESERVATIVE FREE 10 ML: 5 INJECTION INTRAVENOUS at 20:57

## 2021-01-01 RX ADMIN — Medication 1 CAPSULE: at 08:35

## 2021-01-01 RX ADMIN — MIDAZOLAM HYDROCHLORIDE 1 MG: 1 INJECTION, SOLUTION INTRAMUSCULAR; INTRAVENOUS at 12:30

## 2021-01-01 RX ADMIN — SODIUM CHLORIDE, PRESERVATIVE FREE 10 ML: 5 INJECTION INTRAVENOUS at 20:13

## 2021-01-01 RX ADMIN — LORAZEPAM 0.5 MG: 2 INJECTION INTRAMUSCULAR; INTRAVENOUS at 11:35

## 2021-01-01 RX ADMIN — FLUCONAZOLE 200 MG: 100 TABLET ORAL at 10:00

## 2021-01-01 RX ADMIN — DIGOXIN 125 MCG: 0.25 INJECTION INTRAMUSCULAR; INTRAVENOUS at 23:03

## 2021-01-01 RX ADMIN — Medication 1 CAPSULE: at 21:12

## 2021-01-01 RX ADMIN — LEVOTHYROXINE SODIUM ANHYDROUS 100 MCG: 100 INJECTION, POWDER, LYOPHILIZED, FOR SOLUTION INTRAVENOUS at 08:42

## 2021-01-01 RX ADMIN — NOREPINEPHRINE BITARTRATE 0.06 MCG/KG/MIN: 1 INJECTION, SOLUTION, CONCENTRATE INTRAVENOUS at 05:07

## 2021-01-01 RX ADMIN — AMLODIPINE BESYLATE 5 MG: 5 TABLET ORAL at 08:55

## 2021-01-01 RX ADMIN — RIVAROXABAN 20 MG: 10 TABLET, FILM COATED ORAL at 09:17

## 2021-01-01 RX ADMIN — ACETAMINOPHEN 650 MG: 325 TABLET, FILM COATED ORAL at 20:33

## 2021-01-01 RX ADMIN — ASPIRIN 81 MG CHEWABLE TABLET 81 MG: 81 TABLET CHEWABLE at 08:41

## 2021-01-01 RX ADMIN — ACETAMINOPHEN ORAL SOLUTION 650 MG: 325 SOLUTION ORAL at 06:04

## 2021-01-01 RX ADMIN — SODIUM CHLORIDE 250 ML: 9 INJECTION, SOLUTION INTRAVENOUS at 08:44

## 2021-01-01 RX ADMIN — FAMOTIDINE 40 MG: 20 TABLET, FILM COATED ORAL at 08:50

## 2021-01-01 RX ADMIN — SODIUM CHLORIDE 750 MG: 900 INJECTION, SOLUTION INTRAVENOUS at 11:15

## 2021-01-01 RX ADMIN — PANTOPRAZOLE SODIUM 40 MG: 40 INJECTION, POWDER, FOR SOLUTION INTRAVENOUS at 05:25

## 2021-01-01 RX ADMIN — VANCOMYCIN HYDROCHLORIDE 750 MG: 750 INJECTION, POWDER, LYOPHILIZED, FOR SOLUTION INTRAVENOUS at 20:47

## 2021-01-01 RX ADMIN — TRAMADOL HYDROCHLORIDE 50 MG: 50 TABLET, COATED ORAL at 18:23

## 2021-01-01 RX ADMIN — SODIUM CHLORIDE, PRESERVATIVE FREE 10 ML: 5 INJECTION INTRAVENOUS at 09:40

## 2021-01-01 RX ADMIN — MELATONIN TAB 5 MG 5 MG: 5 TAB at 20:08

## 2021-01-01 RX ADMIN — BUMETANIDE 2 MG: 0.25 INJECTION INTRAMUSCULAR; INTRAVENOUS at 15:09

## 2021-01-01 RX ADMIN — FAMOTIDINE 40 MG: 20 TABLET, FILM COATED ORAL at 08:55

## 2021-01-01 RX ADMIN — FENTANYL CITRATE 25 MCG: 50 INJECTION, SOLUTION INTRAMUSCULAR; INTRAVENOUS at 23:45

## 2021-01-01 RX ADMIN — POTASSIUM CHLORIDE 20 MEQ: 1500 TABLET, EXTENDED RELEASE ORAL at 08:22

## 2021-01-01 RX ADMIN — RIVAROXABAN 20 MG: 20 TABLET, FILM COATED ORAL at 08:38

## 2021-01-01 RX ADMIN — CAMPHOR AND MENTHOL: 5; 5 LOTION TOPICAL at 21:47

## 2021-01-01 RX ADMIN — LORAZEPAM 0.5 MG: 0.5 TABLET ORAL at 21:09

## 2021-01-01 RX ADMIN — ACETAMINOPHEN 650 MG: 325 TABLET, FILM COATED ORAL at 08:23

## 2021-01-01 RX ADMIN — SODIUM CHLORIDE 500 ML: 9 INJECTION, SOLUTION INTRAVENOUS at 12:17

## 2021-01-01 RX ADMIN — PHENYLEPHRINE HYDROCHLORIDE 100 MCG: 10 INJECTION, SOLUTION INTRAMUSCULAR; INTRAVENOUS; SUBCUTANEOUS at 12:59

## 2021-01-01 RX ADMIN — CAMPHOR AND MENTHOL: 5; 5 LOTION TOPICAL at 20:03

## 2021-01-01 RX ADMIN — SODIUM CHLORIDE, PRESERVATIVE FREE 10 ML: 5 INJECTION INTRAVENOUS at 21:12

## 2021-01-01 RX ADMIN — DOCUSATE SODIUM 100 MG: 100 CAPSULE, LIQUID FILLED ORAL at 10:18

## 2021-01-01 RX ADMIN — COLLAGENASE SANTYL: 250 OINTMENT TOPICAL at 10:26

## 2021-01-01 RX ADMIN — Medication 1 CAPSULE: at 08:38

## 2021-01-01 RX ADMIN — ASPIRIN 81 MG CHEWABLE TABLET 81 MG: 81 TABLET CHEWABLE at 09:05

## 2021-01-01 RX ADMIN — SODIUM CHLORIDE, PRESERVATIVE FREE 10 ML: 5 INJECTION INTRAVENOUS at 05:10

## 2021-01-01 RX ADMIN — PHENYLEPHRINE HYDROCHLORIDE 200 MCG: 10 INJECTION, SOLUTION INTRAMUSCULAR; INTRAVENOUS; SUBCUTANEOUS at 12:20

## 2021-01-01 RX ADMIN — VANCOMYCIN HYDROCHLORIDE 1500 MG: 1 INJECTION, POWDER, LYOPHILIZED, FOR SOLUTION INTRAVENOUS at 18:33

## 2021-01-01 RX ADMIN — Medication 1 CAPSULE: at 20:31

## 2021-01-01 RX ADMIN — POTASSIUM CHLORIDE 20 MEQ: 1.5 POWDER, FOR SOLUTION ORAL at 09:53

## 2021-01-01 RX ADMIN — Medication 1 APPLICATION: at 10:06

## 2021-01-01 RX ADMIN — LEVOTHYROXINE SODIUM 75 MCG: 75 TABLET ORAL at 06:02

## 2021-01-01 RX ADMIN — DIGOXIN 125 MCG: 125 TABLET ORAL at 14:14

## 2021-01-01 RX ADMIN — LACOSAMIDE 200 MG: 10 INJECTION INTRAVENOUS at 00:15

## 2021-01-01 RX ADMIN — LEVOTHYROXINE SODIUM ANHYDROUS 75 MCG: 100 INJECTION, POWDER, LYOPHILIZED, FOR SOLUTION INTRAVENOUS at 06:17

## 2021-01-01 RX ADMIN — AMLODIPINE BESYLATE 5 MG: 5 TABLET ORAL at 16:06

## 2021-01-01 RX ADMIN — PHENYLEPHRINE HYDROCHLORIDE 200 MCG: 10 INJECTION, SOLUTION INTRAMUSCULAR; INTRAVENOUS; SUBCUTANEOUS at 12:26

## 2021-01-01 RX ADMIN — TRAMADOL HYDROCHLORIDE 50 MG: 50 TABLET, COATED ORAL at 17:45

## 2021-01-01 RX ADMIN — LORAZEPAM 0.5 MG: 0.5 TABLET ORAL at 21:24

## 2021-01-01 RX ADMIN — SODIUM CHLORIDE, PRESERVATIVE FREE 10 ML: 5 INJECTION INTRAVENOUS at 14:19

## 2021-01-01 RX ADMIN — Medication 150 MG: at 09:24

## 2021-01-01 RX ADMIN — TIMOLOL MALEATE 1 DROP: 2.5 SOLUTION OPHTHALMIC at 13:04

## 2021-01-01 RX ADMIN — LEVETIRACETAM 1000 MG: 100 SOLUTION ORAL at 06:22

## 2021-01-01 RX ADMIN — SODIUM CHLORIDE 750 MG: 900 INJECTION, SOLUTION INTRAVENOUS at 12:14

## 2021-01-01 RX ADMIN — FAMOTIDINE 40 MG: 20 TABLET, FILM COATED ORAL at 09:53

## 2021-01-01 RX ADMIN — LEVETIRACETAM 1000 MG: 10 INJECTION, SOLUTION INTRAVENOUS at 11:43

## 2021-01-01 RX ADMIN — FENTANYL CITRATE 25 MCG: 0.05 INJECTION, SOLUTION INTRAMUSCULAR; INTRAVENOUS at 20:39

## 2021-01-01 RX ADMIN — Medication 1 TABLET: at 08:35

## 2021-01-01 RX ADMIN — Medication 1 CAPSULE: at 08:50

## 2021-01-01 RX ADMIN — SODIUM CHLORIDE 1000 MG: 900 INJECTION, SOLUTION INTRAVENOUS at 02:59

## 2021-01-01 RX ADMIN — BUMETANIDE 2 MG: 0.25 INJECTION INTRAMUSCULAR; INTRAVENOUS at 08:25

## 2021-01-01 RX ADMIN — IPRATROPIUM BROMIDE AND ALBUTEROL SULFATE 3 ML: .5; 3 SOLUTION RESPIRATORY (INHALATION) at 11:27

## 2021-01-01 RX ADMIN — Medication 1 CAPSULE: at 09:05

## 2021-01-01 RX ADMIN — ONDANSETRON 4 MG: 2 INJECTION INTRAMUSCULAR; INTRAVENOUS at 19:30

## 2021-01-01 RX ADMIN — SODIUM CHLORIDE 125 ML/HR: 9 INJECTION, SOLUTION INTRAVENOUS at 19:15

## 2021-01-01 RX ADMIN — LEVETIRACETAM 1000 MG: 500 TABLET ORAL at 21:22

## 2021-01-01 RX ADMIN — LEVETIRACETAM 1000 MG: 500 SOLUTION ORAL at 20:50

## 2021-01-01 RX ADMIN — ASPIRIN 81 MG CHEWABLE TABLET 81 MG: 81 TABLET CHEWABLE at 09:31

## 2021-01-01 RX ADMIN — SODIUM PHOSPHATE 1 ENEMA: 7; 19 ENEMA RECTAL at 11:45

## 2021-01-01 RX ADMIN — ENOXAPARIN SODIUM 30 MG: 30 INJECTION SUBCUTANEOUS at 19:15

## 2021-01-01 RX ADMIN — LEVOTHYROXINE SODIUM 75 MCG: 75 TABLET ORAL at 06:14

## 2021-01-01 RX ADMIN — LEVETIRACETAM 1000 MG: 10 INJECTION, SOLUTION INTRAVENOUS at 00:30

## 2021-01-01 RX ADMIN — LEVOTHYROXINE SODIUM 75 MCG: 75 TABLET ORAL at 18:32

## 2021-01-01 RX ADMIN — LORAZEPAM 0.25 MG: 2 INJECTION INTRAMUSCULAR; INTRAVENOUS at 23:15

## 2021-01-01 RX ADMIN — RIVAROXABAN 20 MG: 20 TABLET, FILM COATED ORAL at 08:46

## 2021-01-01 RX ADMIN — SODIUM CHLORIDE, POTASSIUM CHLORIDE, SODIUM LACTATE AND CALCIUM CHLORIDE: 600; 310; 30; 20 INJECTION, SOLUTION INTRAVENOUS at 12:09

## 2021-01-01 RX ADMIN — POLYETHYLENE GLYCOL 3350 17 G: 17 POWDER, FOR SOLUTION ORAL at 08:55

## 2021-01-01 RX ADMIN — FAMOTIDINE 40 MG: 20 TABLET ORAL at 08:53

## 2021-01-01 RX ADMIN — SODIUM CHLORIDE 100 MG: 9 INJECTION, SOLUTION INTRAVENOUS at 12:30

## 2021-01-01 RX ADMIN — SODIUM CHLORIDE, PRESERVATIVE FREE 10 ML: 5 INJECTION INTRAVENOUS at 20:32

## 2021-01-01 RX ADMIN — DIPHENHYDRAMINE HYDROCHLORIDE 25 MG: 25 CAPSULE ORAL at 17:18

## 2021-01-01 RX ADMIN — LEVETIRACETAM 1000 MG: 500 TABLET ORAL at 09:28

## 2021-01-01 RX ADMIN — ENOXAPARIN SODIUM 30 MG: 30 INJECTION SUBCUTANEOUS at 18:32

## 2021-01-01 RX ADMIN — DOCUSATE SODIUM 100 MG: 100 CAPSULE, LIQUID FILLED ORAL at 20:24

## 2021-01-01 RX ADMIN — POTASSIUM CHLORIDE 20 MEQ: 1.5 POWDER, FOR SOLUTION ORAL at 09:09

## 2021-01-01 RX ADMIN — OLANZAPINE 2.5 MG: 5 TABLET, FILM COATED ORAL at 20:02

## 2021-01-01 RX ADMIN — FLUCONAZOLE 200 MG: 100 TABLET ORAL at 09:24

## 2021-01-01 RX ADMIN — SODIUM CHLORIDE 750 MG: 900 INJECTION, SOLUTION INTRAVENOUS at 14:57

## 2021-01-01 RX ADMIN — Medication 150 MG: at 08:31

## 2021-01-01 RX ADMIN — LORAZEPAM 1 MG: 2 INJECTION INTRAMUSCULAR; INTRAVENOUS at 23:32

## 2021-01-01 RX ADMIN — CEFTRIAXONE 2 G: 2 INJECTION, SOLUTION INTRAVENOUS at 06:15

## 2021-01-01 RX ADMIN — LEVETIRACETAM 1000 MG: 500 TABLET ORAL at 20:13

## 2021-01-01 RX ADMIN — OLANZAPINE 2.5 MG: 5 TABLET, FILM COATED ORAL at 08:24

## 2021-01-01 RX ADMIN — SODIUM CHLORIDE, PRESERVATIVE FREE 10 ML: 5 INJECTION INTRAVENOUS at 09:30

## 2021-01-01 RX ADMIN — COLLAGENASE SANTYL: 250 OINTMENT TOPICAL at 09:05

## 2021-01-01 RX ADMIN — LORAZEPAM 0.25 MG: 2 INJECTION INTRAMUSCULAR; INTRAVENOUS at 18:46

## 2021-01-01 RX ADMIN — BISACODYL 10 MG: 10 SUPPOSITORY RECTAL at 18:02

## 2021-01-01 RX ADMIN — LEVETIRACETAM 1000 MG: 500 TABLET ORAL at 20:25

## 2021-01-01 RX ADMIN — DOCUSATE SODIUM 100 MG: 100 CAPSULE, LIQUID FILLED ORAL at 09:04

## 2021-01-01 RX ADMIN — VANCOMYCIN HYDROCHLORIDE 1500 MG: 1 INJECTION, POWDER, LYOPHILIZED, FOR SOLUTION INTRAVENOUS at 17:12

## 2021-01-01 RX ADMIN — TRAMADOL HYDROCHLORIDE 50 MG: 50 TABLET, COATED ORAL at 06:04

## 2021-01-01 RX ADMIN — LEVETIRACETAM 1000 MG: 500 SOLUTION ORAL at 09:30

## 2021-01-01 RX ADMIN — PANTOPRAZOLE SODIUM 40 MG: 40 INJECTION, POWDER, FOR SOLUTION INTRAVENOUS at 05:51

## 2021-01-01 RX ADMIN — AMLODIPINE BESYLATE 5 MG: 5 TABLET ORAL at 09:55

## 2021-01-01 RX ADMIN — POTASSIUM CHLORIDE 20 MEQ: 1500 TABLET, EXTENDED RELEASE ORAL at 08:50

## 2021-01-01 RX ADMIN — LEVETIRACETAM 1000 MG: 500 TABLET ORAL at 20:37

## 2021-01-01 RX ADMIN — LORAZEPAM 0.5 MG: 2 INJECTION INTRAMUSCULAR; INTRAVENOUS at 20:33

## 2021-01-01 RX ADMIN — LEVOTHYROXINE SODIUM 50 MCG: 50 TABLET ORAL at 06:09

## 2021-01-01 RX ADMIN — SODIUM CHLORIDE, PRESERVATIVE FREE 10 ML: 5 INJECTION INTRAVENOUS at 10:08

## 2021-01-01 RX ADMIN — FENTANYL CITRATE 25 MCG: 0.05 INJECTION, SOLUTION INTRAMUSCULAR; INTRAVENOUS at 23:45

## 2021-01-01 RX ADMIN — LORAZEPAM 0.25 MG: 2 INJECTION INTRAMUSCULAR; INTRAVENOUS at 22:52

## 2021-01-01 RX ADMIN — PHENYLEPHRINE HYDROCHLORIDE 100 MCG: 10 INJECTION, SOLUTION INTRAMUSCULAR; INTRAVENOUS; SUBCUTANEOUS at 12:55

## 2021-01-01 RX ADMIN — DOCUSATE SODIUM 100 MG: 100 CAPSULE, LIQUID FILLED ORAL at 08:30

## 2021-01-01 RX ADMIN — PANTOPRAZOLE SODIUM 40 MG: 40 INJECTION, POWDER, FOR SOLUTION INTRAVENOUS at 06:20

## 2021-01-01 RX ADMIN — AMLODIPINE BESYLATE 5 MG: 5 TABLET ORAL at 09:53

## 2021-01-01 RX ADMIN — SODIUM CHLORIDE 75 ML/HR: 9 INJECTION, SOLUTION INTRAVENOUS at 06:17

## 2021-01-01 RX ADMIN — IPRATROPIUM BROMIDE AND ALBUTEROL SULFATE 3 ML: .5; 3 SOLUTION RESPIRATORY (INHALATION) at 15:18

## 2021-01-01 RX ADMIN — SODIUM CHLORIDE, PRESERVATIVE FREE 10 ML: 5 INJECTION INTRAVENOUS at 23:05

## 2021-01-01 RX ADMIN — Medication 1 CAPSULE: at 20:07

## 2021-01-01 RX ADMIN — DOCUSATE SODIUM 100 MG: 100 CAPSULE, LIQUID FILLED ORAL at 20:08

## 2021-01-01 RX ADMIN — Medication: at 09:23

## 2021-01-01 RX ADMIN — Medication 150 MG: at 09:28

## 2021-01-01 RX ADMIN — CEFEPIME HYDROCHLORIDE 1 G: 1 INJECTION, SOLUTION INTRAVENOUS at 18:37

## 2021-01-01 RX ADMIN — FLUCONAZOLE 400 MG: 400 INJECTION, SOLUTION INTRAVENOUS at 18:33

## 2021-01-01 RX ADMIN — POTASSIUM CHLORIDE 40 MEQ: 1.5 POWDER, FOR SOLUTION ORAL at 10:20

## 2021-01-01 RX ADMIN — DOCUSATE SODIUM 100 MG: 100 CAPSULE, LIQUID FILLED ORAL at 20:03

## 2021-01-01 RX ADMIN — POTASSIUM CHLORIDE 20 MEQ: 1.5 POWDER, FOR SOLUTION ORAL at 09:24

## 2021-01-01 RX ADMIN — Medication 1 CAPSULE: at 08:33

## 2021-01-01 RX ADMIN — APIXABAN 5 MG: 2.5 TABLET, FILM COATED ORAL at 20:07

## 2021-01-01 RX ADMIN — RIVAROXABAN 20 MG: 10 TABLET, FILM COATED ORAL at 09:28

## 2021-01-01 RX ADMIN — BUMETANIDE 2 MG: 0.25 INJECTION INTRAMUSCULAR; INTRAVENOUS at 20:37

## 2021-01-01 RX ADMIN — ASPIRIN 81 MG CHEWABLE TABLET 81 MG: 81 TABLET CHEWABLE at 10:22

## 2021-01-01 RX ADMIN — CAMPHOR AND MENTHOL: 5; 5 LOTION TOPICAL at 21:40

## 2021-01-01 RX ADMIN — Medication 1 APPLICATION: at 10:00

## 2021-01-01 RX ADMIN — Medication 1 TABLET: at 09:04

## 2021-01-01 RX ADMIN — Medication 150 MG: at 08:35

## 2021-01-01 RX ADMIN — APIXABAN 5 MG: 2.5 TABLET, FILM COATED ORAL at 08:48

## 2021-01-01 RX ADMIN — SODIUM CHLORIDE 75 ML/HR: 9 INJECTION, SOLUTION INTRAVENOUS at 04:30

## 2021-01-01 RX ADMIN — SODIUM CHLORIDE 75 ML/HR: 9 INJECTION, SOLUTION INTRAVENOUS at 20:06

## 2021-01-01 RX ADMIN — SODIUM CHLORIDE, PRESERVATIVE FREE 10 ML: 5 INJECTION INTRAVENOUS at 20:24

## 2021-01-01 RX ADMIN — Medication 1 CAPSULE: at 08:18

## 2021-01-01 RX ADMIN — SODIUM CHLORIDE, PRESERVATIVE FREE 10 ML: 5 INJECTION INTRAVENOUS at 09:06

## 2021-01-01 RX ADMIN — INSULIN DETEMIR 8 UNITS: 100 INJECTION, SOLUTION SUBCUTANEOUS at 10:40

## 2021-01-01 RX ADMIN — Medication 150 MG: at 09:34

## 2021-01-01 RX ADMIN — APIXABAN 5 MG: 2.5 TABLET, FILM COATED ORAL at 08:42

## 2021-01-01 RX ADMIN — ASPIRIN 81 MG CHEWABLE TABLET 81 MG: 81 TABLET CHEWABLE at 08:35

## 2021-01-01 RX ADMIN — LEVOTHYROXINE SODIUM ANHYDROUS 100 MCG: 100 INJECTION, POWDER, LYOPHILIZED, FOR SOLUTION INTRAVENOUS at 05:28

## 2021-01-01 RX ADMIN — LEVETIRACETAM 500 MG: 500 SOLUTION ORAL at 21:48

## 2021-01-01 RX ADMIN — Medication 1 CAPSULE: at 20:33

## 2021-01-01 RX ADMIN — LEVOTHYROXINE SODIUM ANHYDROUS 75 MCG: 100 INJECTION, POWDER, LYOPHILIZED, FOR SOLUTION INTRAVENOUS at 23:07

## 2021-01-01 RX ADMIN — RIVAROXABAN 20 MG: 20 TABLET, FILM COATED ORAL at 10:09

## 2021-01-01 RX ADMIN — Medication 150 MG: at 08:24

## 2021-01-01 RX ADMIN — WHITE PETROLATUM 41 % TOPICAL OINTMENT: OINTMENT at 09:05

## 2021-01-01 RX ADMIN — Medication 1 TABLET: at 09:24

## 2021-01-01 RX ADMIN — Medication 1 CAPSULE: at 20:25

## 2021-01-01 RX ADMIN — LEVOTHYROXINE SODIUM 50 MCG: 50 TABLET ORAL at 05:20

## 2021-01-01 RX ADMIN — LEVETIRACETAM 1000 MG: 500 TABLET ORAL at 08:51

## 2021-01-01 RX ADMIN — LEVETIRACETAM 1000 MG: 10 INJECTION, SOLUTION INTRAVENOUS at 10:48

## 2021-01-01 RX ADMIN — SODIUM CHLORIDE 250 ML/HR: 9 INJECTION, SOLUTION INTRAVENOUS at 11:05

## 2021-01-01 RX ADMIN — AMLODIPINE BESYLATE 5 MG: 5 TABLET ORAL at 10:57

## 2021-01-01 RX ADMIN — POTASSIUM CHLORIDE 20 MEQ: 1.5 POWDER, FOR SOLUTION ORAL at 08:55

## 2021-01-01 RX ADMIN — SODIUM CHLORIDE, PRESERVATIVE FREE 10 ML: 5 INJECTION INTRAVENOUS at 16:22

## 2021-01-01 RX ADMIN — ASPIRIN 81 MG CHEWABLE TABLET 81 MG: 81 TABLET CHEWABLE at 08:06

## 2021-01-01 RX ADMIN — Medication 1 TABLET: at 09:28

## 2021-01-01 RX ADMIN — ASPIRIN 81 MG CHEWABLE TABLET 81 MG: 81 TABLET CHEWABLE at 09:41

## 2021-01-01 RX ADMIN — LEVOTHYROXINE SODIUM ANHYDROUS 75 MCG: 100 INJECTION, POWDER, LYOPHILIZED, FOR SOLUTION INTRAVENOUS at 17:05

## 2021-01-01 RX ADMIN — FENTANYL CITRATE 25 MCG: 50 INJECTION INTRAMUSCULAR; INTRAVENOUS at 14:18

## 2021-01-01 RX ADMIN — ASPIRIN 81 MG CHEWABLE TABLET 81 MG: 81 TABLET CHEWABLE at 08:46

## 2021-01-01 RX ADMIN — LEVOTHYROXINE SODIUM 75 MCG: 75 TABLET ORAL at 06:18

## 2021-01-01 RX ADMIN — ENOXAPARIN SODIUM 60 MG: 60 INJECTION SUBCUTANEOUS at 17:13

## 2021-01-01 RX ADMIN — COLLAGENASE SANTYL: 250 OINTMENT TOPICAL at 08:06

## 2021-01-01 RX ADMIN — ASPIRIN 81 MG CHEWABLE TABLET 81 MG: 81 TABLET CHEWABLE at 09:22

## 2021-01-01 RX ADMIN — CEFTRIAXONE 2 G: 2 INJECTION, SOLUTION INTRAVENOUS at 08:17

## 2021-01-01 RX ADMIN — Medication 1 APPLICATION: at 08:44

## 2021-01-01 RX ADMIN — LEVOTHYROXINE SODIUM 75 MCG: 75 TABLET ORAL at 06:12

## 2021-01-01 RX ADMIN — SODIUM CHLORIDE, PRESERVATIVE FREE 10 ML: 5 INJECTION INTRAVENOUS at 09:25

## 2021-01-01 RX ADMIN — IOPAMIDOL 50 ML: 755 INJECTION, SOLUTION INTRAVENOUS at 08:20

## 2021-01-01 RX ADMIN — LEVETIRACETAM 1000 MG: 10 INJECTION, SOLUTION INTRAVENOUS at 11:29

## 2021-01-01 RX ADMIN — DOCUSATE SODIUM 100 MG: 100 CAPSULE, LIQUID FILLED ORAL at 09:53

## 2021-01-01 RX ADMIN — DOCUSATE SODIUM 100 MG: 100 CAPSULE, LIQUID FILLED ORAL at 09:17

## 2021-01-01 RX ADMIN — LEVETIRACETAM 1000 MG: 500 SOLUTION ORAL at 21:43

## 2021-01-01 RX ADMIN — ASPIRIN 81 MG CHEWABLE TABLET 81 MG: 81 TABLET CHEWABLE at 09:54

## 2021-01-01 RX ADMIN — BUMETANIDE 2 MG: 0.25 INJECTION INTRAMUSCULAR; INTRAVENOUS at 08:27

## 2021-01-01 RX ADMIN — LEVETIRACETAM 1000 MG: 500 TABLET ORAL at 10:07

## 2021-01-01 RX ADMIN — Medication 150 MG: at 21:47

## 2021-01-01 RX ADMIN — ACETAMINOPHEN 650 MG: 325 TABLET, FILM COATED ORAL at 08:49

## 2021-01-01 RX ADMIN — TIMOLOL MALEATE 1 DROP: 2.5 SOLUTION OPHTHALMIC at 08:41

## 2021-01-01 RX ADMIN — RIVAROXABAN 20 MG: 10 TABLET, FILM COATED ORAL at 08:35

## 2021-01-01 RX ADMIN — ACETAMINOPHEN 650 MG: 325 TABLET, FILM COATED ORAL at 06:11

## 2021-01-01 RX ADMIN — CEFTRIAXONE 2 G: 2 INJECTION, SOLUTION INTRAVENOUS at 09:19

## 2021-01-01 RX ADMIN — LEVETIRACETAM 1000 MG: 500 TABLET ORAL at 08:52

## 2021-01-01 RX ADMIN — SODIUM CHLORIDE, PRESERVATIVE FREE 10 ML: 5 INJECTION INTRAVENOUS at 20:16

## 2021-01-01 RX ADMIN — RIVAROXABAN 20 MG: 20 TABLET, FILM COATED ORAL at 09:41

## 2021-01-01 RX ADMIN — ASPIRIN 81 MG CHEWABLE TABLET 81 MG: 81 TABLET CHEWABLE at 10:56

## 2021-01-01 RX ADMIN — Medication 1 CAPSULE: at 20:24

## 2021-01-01 RX ADMIN — MELATONIN TAB 5 MG 5 MG: 5 TAB at 21:24

## 2021-01-01 RX ADMIN — CEFEPIME HYDROCHLORIDE 1 G: 1 INJECTION, SOLUTION INTRAVENOUS at 17:49

## 2021-01-01 RX ADMIN — ACETAMINOPHEN ORAL SOLUTION 650 MG: 325 SOLUTION ORAL at 14:48

## 2021-01-01 RX ADMIN — Medication 1 CAPSULE: at 09:34

## 2021-01-01 RX ADMIN — MIDAZOLAM HYDROCHLORIDE 1 MG: 1 INJECTION, SOLUTION INTRAMUSCULAR; INTRAVENOUS at 12:42

## 2021-01-01 RX ADMIN — CAMPHOR AND MENTHOL: 5; 5 LOTION TOPICAL at 08:59

## 2021-01-01 RX ADMIN — DIPHENHYDRAMINE HYDROCHLORIDE 25 MG: 25 CAPSULE ORAL at 09:31

## 2021-01-01 RX ADMIN — WHITE PETROLATUM 41 % TOPICAL OINTMENT: OINTMENT at 08:39

## 2021-01-01 RX ADMIN — RIVAROXABAN 20 MG: 10 TABLET, FILM COATED ORAL at 10:22

## 2021-01-01 RX ADMIN — MELATONIN TAB 5 MG 5 MG: 5 TAB at 19:47

## 2021-01-01 RX ADMIN — ACETAMINOPHEN 650 MG: 325 TABLET, FILM COATED ORAL at 19:47

## 2021-01-01 RX ADMIN — POTASSIUM CHLORIDE 20 MEQ: 1.5 POWDER, FOR SOLUTION ORAL at 09:28

## 2021-01-01 RX ADMIN — FENTANYL CITRATE 25 MCG: 50 INJECTION INTRAMUSCULAR; INTRAVENOUS at 10:19

## 2021-01-01 RX ADMIN — LEVETIRACETAM 1000 MG: 10 INJECTION, SOLUTION INTRAVENOUS at 01:53

## 2021-01-01 RX ADMIN — DIPHENHYDRAMINE HYDROCHLORIDE 25 MG: 25 CAPSULE ORAL at 11:29

## 2021-01-01 RX ADMIN — LEVOTHYROXINE SODIUM 75 MCG: 75 TABLET ORAL at 05:23

## 2021-01-01 RX ADMIN — BUMETANIDE 2 MG: 0.25 INJECTION INTRAMUSCULAR; INTRAVENOUS at 07:53

## 2021-01-01 RX ADMIN — SODIUM CHLORIDE 1000 MG: 900 INJECTION, SOLUTION INTRAVENOUS at 15:09

## 2021-01-01 RX ADMIN — LEVETIRACETAM 1000 MG: 10 INJECTION, SOLUTION INTRAVENOUS at 23:10

## 2021-01-01 RX ADMIN — SODIUM CHLORIDE, PRESERVATIVE FREE 10 ML: 5 INJECTION INTRAVENOUS at 20:04

## 2021-01-01 RX ADMIN — MAGNESIUM HYDROXIDE 30 ML: 2400 SUSPENSION ORAL at 11:37

## 2021-01-01 RX ADMIN — Medication 150 MG: at 08:22

## 2021-01-01 RX ADMIN — SODIUM CHLORIDE, PRESERVATIVE FREE 10 ML: 5 INJECTION INTRAVENOUS at 09:18

## 2021-01-01 RX ADMIN — ACETAMINOPHEN 650 MG: 325 TABLET, FILM COATED ORAL at 01:45

## 2021-01-01 RX ADMIN — SODIUM CHLORIDE, PRESERVATIVE FREE 10 ML: 5 INJECTION INTRAVENOUS at 20:25

## 2021-01-01 RX ADMIN — MELATONIN TAB 5 MG 5 MG: 5 TAB at 21:28

## 2021-01-01 RX ADMIN — APIXABAN 5 MG: 2.5 TABLET, FILM COATED ORAL at 08:38

## 2021-01-01 RX ADMIN — RIVAROXABAN 20 MG: 20 TABLET, FILM COATED ORAL at 20:56

## 2021-01-01 RX ADMIN — DIGOXIN 125 MCG: 125 TABLET ORAL at 12:04

## 2021-01-01 RX ADMIN — SODIUM CHLORIDE, PRESERVATIVE FREE 10 ML: 5 INJECTION INTRAVENOUS at 15:05

## 2021-01-01 RX ADMIN — DEXTROSE 25 G: 50 INJECTION, SOLUTION INTRAVENOUS at 18:16

## 2021-01-04 ENCOUNTER — APPOINTMENT (OUTPATIENT)
Dept: GENERAL RADIOLOGY | Facility: HOSPITAL | Age: 71
End: 2021-01-04

## 2021-01-04 ENCOUNTER — HOSPITAL ENCOUNTER (EMERGENCY)
Facility: HOSPITAL | Age: 71
Discharge: HOME | End: 2021-01-04
Attending: EMERGENCY MEDICINE | Admitting: EMERGENCY MEDICINE

## 2021-01-04 VITALS
RESPIRATION RATE: 18 BRPM | SYSTOLIC BLOOD PRESSURE: 162 MMHG | HEIGHT: 65 IN | BODY MASS INDEX: 14.33 KG/M2 | HEART RATE: 74 BPM | DIASTOLIC BLOOD PRESSURE: 102 MMHG | OXYGEN SATURATION: 97 % | WEIGHT: 86 LBS | TEMPERATURE: 97.8 F

## 2021-01-04 DIAGNOSIS — I48.11 LONGSTANDING PERSISTENT ATRIAL FIBRILLATION (HCC): Primary | ICD-10-CM

## 2021-01-04 DIAGNOSIS — R03.0 ELEVATED BLOOD PRESSURE READING: ICD-10-CM

## 2021-01-04 DIAGNOSIS — J18.9 PNEUMONIA OF RIGHT LUNG DUE TO INFECTIOUS ORGANISM, UNSPECIFIED PART OF LUNG: ICD-10-CM

## 2021-01-04 LAB
ALBUMIN SERPL-MCNC: 3.2 G/DL (ref 3.5–5.2)
ALBUMIN/GLOB SERPL: 0.9 G/DL
ALP SERPL-CCNC: 172 U/L (ref 39–117)
ALT SERPL W P-5'-P-CCNC: 57 U/L (ref 1–33)
ANION GAP SERPL CALCULATED.3IONS-SCNC: 11.5 MMOL/L (ref 5–15)
ANISOCYTOSIS BLD QL: NORMAL
AST SERPL-CCNC: 73 U/L (ref 1–32)
BASOPHILS # BLD AUTO: 0.05 10*3/MM3 (ref 0–0.2)
BASOPHILS NFR BLD AUTO: 0.8 % (ref 0–1.5)
BILIRUB SERPL-MCNC: 0.8 MG/DL (ref 0–1.2)
BUN SERPL-MCNC: 27 MG/DL (ref 8–23)
BUN/CREAT SERPL: 26.5 (ref 7–25)
CALCIUM SPEC-SCNC: 9.3 MG/DL (ref 8.6–10.5)
CHLORIDE SERPL-SCNC: 100 MMOL/L (ref 98–107)
CO2 SERPL-SCNC: 32.5 MMOL/L (ref 22–29)
CREAT SERPL-MCNC: 1.02 MG/DL (ref 0.57–1)
DEPRECATED RDW RBC AUTO: 78.1 FL (ref 37–54)
DIGOXIN SERPL-MCNC: 1.12 NG/ML (ref 0.6–1.2)
EOSINOPHIL # BLD AUTO: 0.02 10*3/MM3 (ref 0–0.4)
EOSINOPHIL NFR BLD AUTO: 0.3 % (ref 0.3–6.2)
ERYTHROCYTE [DISTWIDTH] IN BLOOD BY AUTOMATED COUNT: 27.7 % (ref 12.3–15.4)
GFR SERPL CREATININE-BSD FRML MDRD: 65 ML/MIN/1.73
GLOBULIN UR ELPH-MCNC: 3.7 GM/DL
GLUCOSE SERPL-MCNC: 106 MG/DL (ref 65–99)
HCT VFR BLD AUTO: 40 % (ref 34–46.6)
HGB BLD-MCNC: 12.1 G/DL (ref 12–15.9)
IMM GRANULOCYTES # BLD AUTO: 0.01 10*3/MM3 (ref 0–0.05)
IMM GRANULOCYTES NFR BLD AUTO: 0.2 % (ref 0–0.5)
LARGE PLATELETS: NORMAL
LYMPHOCYTES # BLD AUTO: 0.85 10*3/MM3 (ref 0.7–3.1)
LYMPHOCYTES NFR BLD AUTO: 13.6 % (ref 19.6–45.3)
MCH RBC QN AUTO: 24.8 PG (ref 26.6–33)
MCHC RBC AUTO-ENTMCNC: 30.3 G/DL (ref 31.5–35.7)
MCV RBC AUTO: 82 FL (ref 79–97)
MONOCYTES # BLD AUTO: 0.58 10*3/MM3 (ref 0.1–0.9)
MONOCYTES NFR BLD AUTO: 9.3 % (ref 5–12)
NEUTROPHILS NFR BLD AUTO: 4.74 10*3/MM3 (ref 1.7–7)
NEUTROPHILS NFR BLD AUTO: 75.8 % (ref 42.7–76)
NT-PROBNP SERPL-MCNC: 1879 PG/ML (ref 0–900)
PLATELET # BLD AUTO: 139 10*3/MM3 (ref 140–450)
PMV BLD AUTO: ABNORMAL FL
POIKILOCYTOSIS BLD QL SMEAR: NORMAL
POTASSIUM SERPL-SCNC: 3.8 MMOL/L (ref 3.5–5.2)
PROT SERPL-MCNC: 6.9 G/DL (ref 6–8.5)
RBC # BLD AUTO: 4.88 10*6/MM3 (ref 3.77–5.28)
SODIUM SERPL-SCNC: 144 MMOL/L (ref 136–145)
TROPONIN T SERPL-MCNC: <0.01 NG/ML (ref 0–0.03)
WBC # BLD AUTO: 6.25 10*3/MM3 (ref 3.4–10.8)
WBC MORPH BLD: NORMAL

## 2021-01-04 PROCEDURE — 93005 ELECTROCARDIOGRAM TRACING: CPT | Performed by: EMERGENCY MEDICINE

## 2021-01-04 PROCEDURE — 99283 EMERGENCY DEPT VISIT LOW MDM: CPT

## 2021-01-04 PROCEDURE — 85007 BL SMEAR W/DIFF WBC COUNT: CPT | Performed by: EMERGENCY MEDICINE

## 2021-01-04 PROCEDURE — 80162 ASSAY OF DIGOXIN TOTAL: CPT | Performed by: EMERGENCY MEDICINE

## 2021-01-04 PROCEDURE — 84484 ASSAY OF TROPONIN QUANT: CPT | Performed by: EMERGENCY MEDICINE

## 2021-01-04 PROCEDURE — 80053 COMPREHEN METABOLIC PANEL: CPT | Performed by: EMERGENCY MEDICINE

## 2021-01-04 PROCEDURE — 85025 COMPLETE CBC W/AUTO DIFF WBC: CPT | Performed by: EMERGENCY MEDICINE

## 2021-01-04 PROCEDURE — 93010 ELECTROCARDIOGRAM REPORT: CPT | Performed by: INTERNAL MEDICINE

## 2021-01-04 PROCEDURE — 99283 EMERGENCY DEPT VISIT LOW MDM: CPT | Performed by: EMERGENCY MEDICINE

## 2021-01-04 PROCEDURE — 83880 ASSAY OF NATRIURETIC PEPTIDE: CPT | Performed by: EMERGENCY MEDICINE

## 2021-01-04 PROCEDURE — 71045 X-RAY EXAM CHEST 1 VIEW: CPT

## 2021-01-04 RX ORDER — DOXYCYCLINE 100 MG/1
100 CAPSULE ORAL ONCE
Status: COMPLETED | OUTPATIENT
Start: 2021-01-04 | End: 2021-01-04

## 2021-01-04 RX ORDER — DOXYCYCLINE 100 MG/1
100 CAPSULE ORAL 2 TIMES DAILY
Qty: 14 CAPSULE | Refills: 0 | Status: ON HOLD | OUTPATIENT
Start: 2021-01-04 | End: 2021-02-17

## 2021-01-04 RX ADMIN — DOXYCYCLINE 100 MG: 100 CAPSULE ORAL at 19:29

## 2021-01-04 NOTE — ED PROVIDER NOTES
EMERGENCY DEPARTMENT ENCOUNTER      Room Number: 06/06      HPI:    Chief complaint: Rapid heart rate    Location: Chest    Quality/Severity: Moderate    Timing/Duration: Intermittent for the last number of weeks    Modifying Factors: None    Associated Symptoms: Denies dyspnea, denies fevers, denies chest pain    Narrative: Pt is a 70 y.o. female who presents complaining of rapid heart rate.  She has a history of persistent atrial fibrillation and is anticoagulated as well as rate controlled with digoxin.  She is most recently seen in cardiology clinic on 17 December due to feeling like she could not tolerate some of her blood pressure medications.  She says that regarding her medications she feels like she is doing better now that they switch them up.  She just complains of feeling some palpitations more than usual and was worried.  She denies dyspnea denies fevers denies chest pain.  She says she had one episode of vomiting last night but no nausea or vomiting since then.      PMD: Hany Oviedo MD    REVIEW OF SYSTEMS  Review of Systems   Constitutional: Negative for activity change, appetite change, chills and fever.   Respiratory: Negative for chest tightness and shortness of breath.    Cardiovascular: Positive for palpitations and leg swelling. Negative for chest pain.   Gastrointestinal: Positive for nausea and vomiting. Negative for abdominal pain.   Genitourinary: Negative for difficulty urinating and dysuria.   Musculoskeletal: Negative for arthralgias and joint swelling.   Skin: Negative for color change and rash.   Neurological: Negative for dizziness and weakness.   Hematological: Negative for adenopathy. Does not bruise/bleed easily.   Psychiatric/Behavioral: Negative for agitation and confusion.         PAST MEDICAL HISTORY  Active Ambulatory Problems     Diagnosis Date Noted   • SVT (supraventricular tachycardia) (CMS/[facility]) 11/17/2016   • Abnormal laboratory test result 01/16/2017   • Dysphagia  01/16/2017   • Gastroesophageal reflux disease 01/16/2017   • Leukopenia 07/15/2015   • Monoclonal gammopathy 03/25/2013   • Neutropenia (CMS/HCC) 02/21/2013   • Normocytic anemia 02/21/2013   • Hiatal hernia 04/17/2017   • Colon polyps 04/17/2017   • Internal hemorrhoids 04/17/2017   • Paroxysmal atrial fibrillation (CMS/[facility]) 01/23/2019   • Chronic anticoagulation 01/23/2019   • Nonrheumatic mitral valve regurgitation 03/27/2019   • Esophageal dysphagia 06/02/2020   • Personal history of colonic polyps 06/02/2020   • Acute diastolic CHF (congestive heart failure) (CMS/[facility]) 10/07/2020     Resolved Ambulatory Problems     Diagnosis Date Noted   • No Resolved Ambulatory Problems     Past Medical History:   Diagnosis Date   • Anxiety    • Colon polyp    • Diabetes mellitus (CMS/[facility])    • Fatigue    • GERD (gastroesophageal reflux disease)    • Hypertension    • Persistent atrial fibrillation (CMS/[facility]) 1/23/2019   • Reflux gastritis        PAST SURGICAL HISTORY  Past Surgical History:   Procedure Laterality Date   • COLONOSCOPY N/A 3/29/2017    Procedure: COLONOSCOPY; POLYPECTOMY;  Surgeon: Brooke Garrido MD;  Location: [facility] OR;  Service:    • COLONOSCOPY N/A 8/3/2020    Procedure: COLONOSCOPY with polypectomy;  Surgeon: Rui Shi MD;  Location: [facility] OR;  Service: Gastroenterology;  Laterality: N/A;  ascending polyp  transverse polyp  rectal polyp   • CYST REMOVAL     • ENDOSCOPY N/A 3/29/2017    Procedure: ESOPHAGOGASTRODUODENOSCOPY WITH DILITATION;  Surgeon: Brooke Garrido MD;  Location: [facility] OR;  Service:    • ENDOSCOPY N/A 8/3/2020    Procedure: ESOPHAGOGASTRODUODENOSCOPY with biopsies;  Surgeon: Rui Shi MD;  Location: [facility] OR;  Service: Gastroenterology;  Laterality: N/A;  esophagitis  gastritis   • HERNIA REPAIR     • HYSTERECTOMY         FAMILY HISTORY  Family History   Problem Relation Age of Onset   • Colon cancer Mother    • Colon cancer Other    • Lung cancer  Father    • Breast cancer Maternal Aunt        SOCIAL HISTORY  Social History     Socioeconomic History   • Marital status: Single     Spouse name: Not on file   • Number of children: Not on file   • Years of education: Not on file   • Highest education level: Not on file   Tobacco Use   • Smoking status: Never Smoker   • Smokeless tobacco: Never Used   • Tobacco comment: daily caffiene   Substance and Sexual Activity   • Alcohol use: No   • Drug use: No   • Sexual activity: Defer       ALLERGIES  Patient has no known allergies.      Current Facility-Administered Medications:   •  doxycycline (MONODOX) capsule 100 mg, 100 mg, Oral, Once, Thomas Bardales MD    Current Outpatient Medications:   •  Brinzolamide-Brimonidine (SIMBRINZA) 1-0.2 % suspension, Apply  to eye., Disp: , Rfl:   •  digoxin (LANOXIN) 125 MCG tablet, Take 1 tablet by mouth Daily., Disp: 30 tablet, Rfl: 11  •  Empagliflozin-linaGLIPtin (Glyxambi) 25-5 MG tablet, Take 1 tablet by mouth Daily., Disp: , Rfl:   •  LORazepam (ATIVAN) 1 MG tablet, Take 1 mg by mouth Daily., Disp: , Rfl:   •  omeprazole (priLOSEC) 20 MG capsule, Take 1 capsule by mouth Daily., Disp: 30 capsule, Rfl: 11  •  rivaroxaban (XARELTO) 20 MG tablet, Take 20 mg by mouth Daily., Disp: , Rfl:   •  timolol (BETIMOL) 0.25 % ophthalmic solution, 1-2 drops 2 (Two) Times a Day., Disp: , Rfl:   •  torsemide (DEMADEX) 10 MG tablet, Take 1 tablet by mouth Daily., Disp: 90 tablet, Rfl: 3  •  doxycycline (MONODOX) 100 MG capsule, Take 1 capsule by mouth 2 (Two) Times a Day., Disp: 14 capsule, Rfl: 0    PHYSICAL EXAM  ED Triage Vitals   Temp Heart Rate Resp BP SpO2   01/04/21 1813 01/04/21 1800 01/04/21 1800 01/04/21 1800 01/04/21 1800   97.8 °F (36.6 °C) 80 20 (!) 149/101 100 %      Temp src Heart Rate Source Patient Position BP Location FiO2 (%)   01/04/21 1813 01/04/21 1800 01/04/21 1800 01/04/21 1800 --   Temporal Monitor Lying Right arm        Physical Exam  INITIAL VITAL SIGNS: Reviewed  by me.  Pulse ox normal  GENERAL: Alert and interactive. No acute distress.  HEAD: Head is normocephalic.  EYES: EOMI. PERRL. No scleral icterus. No conjunctival injection.  ENT: Moist mucous membranes.   NECK: Supple. Full range of motion.  RESPIRATORY: No tachypnea. Clear breath sounds bilaterally. No wheezing. No rales. No rhonchi.  CV: Irregularly irregular rhythm with a normal rate, no murmurs. No rubs or gallops.  ABDOMEN: Soft, non-distended, non-tender. No guarding. No rebound. No masses.   BACK:  No obvious deformity.  EXTREMITIES: No deformity.  1+ pitting edema bilaterally, legs are not tight  SKIN: Warm and dry. No diaphoresis. No obvious rashes.   NEUROLOGIC: Alert and oriented. Face is symmetric. Speech is normal. Moves all extremities equally. Motor and sensory distally intact.       LAB RESULTS  Results for orders placed or performed during the hospital encounter of 01/04/21   Comprehensive Metabolic Panel    Specimen: Blood   Result Value Ref Range    Glucose 106 (H) 65 - 99 mg/dL    BUN 27 (H) 8 - 23 mg/dL    Creatinine 1.02 (H) 0.57 - 1.00 mg/dL    Sodium 144 136 - 145 mmol/L    Potassium 3.8 3.5 - 5.2 mmol/L    Chloride 100 98 - 107 mmol/L    CO2 32.5 (H) 22.0 - 29.0 mmol/L    Calcium 9.3 8.6 - 10.5 mg/dL    Total Protein 6.9 6.0 - 8.5 g/dL    Albumin 3.20 (L) 3.50 - 5.20 g/dL    ALT (SGPT) 57 (H) 1 - 33 U/L    AST (SGOT) 73 (H) 1 - 32 U/L    Alkaline Phosphatase 172 (H) 39 - 117 U/L    Total Bilirubin 0.8 0.0 - 1.2 mg/dL    eGFR  African Amer 65 >60 mL/min/1.73    Globulin 3.7 gm/dL    A/G Ratio 0.9 g/dL    BUN/Creatinine Ratio 26.5 (H) 7.0 - 25.0    Anion Gap 11.5 5.0 - 15.0 mmol/L   BNP    Specimen: Blood   Result Value Ref Range    proBNP 1,879.0 (H) 0.0 - 900.0 pg/mL   Troponin    Specimen: Blood   Result Value Ref Range    Troponin T <0.010 0.000 - 0.030 ng/mL   Digoxin Level    Specimen: Blood   Result Value Ref Range    Digoxin 1.12 0.60 - 1.20 ng/mL   CBC Auto Differential    Specimen:  Blood   Result Value Ref Range    WBC 6.25 3.40 - 10.80 10*3/mm3    RBC 4.88 3.77 - 5.28 10*6/mm3    Hemoglobin 12.1 12.0 - 15.9 g/dL    Hematocrit 40.0 34.0 - 46.6 %    MCV 82.0 79.0 - 97.0 fL    MCH 24.8 (L) 26.6 - 33.0 pg    MCHC 30.3 (L) 31.5 - 35.7 g/dL    RDW 27.7 (H) 12.3 - 15.4 %    RDW-SD 78.1 (H) 37.0 - 54.0 fl    MPV      Platelets 139 (L) 140 - 450 10*3/mm3    Neutrophil % 75.8 42.7 - 76.0 %    Lymphocyte % 13.6 (L) 19.6 - 45.3 %    Monocyte % 9.3 5.0 - 12.0 %    Eosinophil % 0.3 0.3 - 6.2 %    Basophil % 0.8 0.0 - 1.5 %    Immature Grans % 0.2 0.0 - 0.5 %    Neutrophils, Absolute 4.74 1.70 - 7.00 10*3/mm3    Lymphocytes, Absolute 0.85 0.70 - 3.10 10*3/mm3    Monocytes, Absolute 0.58 0.10 - 0.90 10*3/mm3    Eosinophils, Absolute 0.02 0.00 - 0.40 10*3/mm3    Basophils, Absolute 0.05 0.00 - 0.20 10*3/mm3    Immature Grans, Absolute 0.01 0.00 - 0.05 10*3/mm3   Scan Slide    Specimen: Blood   Result Value Ref Range    Anisocytosis Slight/1+ None Seen    Poikilocytes Slight/1+ None Seen    WBC Morphology Normal Normal    Large Platelets Slight/1+ None Seen   ECG 12 Lead   Result Value Ref Range    QT Interval 392 ms         I ordered the above labs and reviewed the results    RADIOLOGY  Xr Chest 1 View    Result Date: 1/4/2021  CR Chest 1 Vw INDICATION: Chest pain. COMPARISON:  Chest x-ray from 12/12/2020 FINDINGS: Single portable AP view(s) of the chest.  There is a new hazy area of opacity in the right mid and lower lung field, and is uncertain if there may be an underlying area of consolidation/pneumonia. There is probable slight increase in pulmonary edema with prominent perihilar markings and cardiomegaly.     There is a hazy area of opacity in the right mid and lower lung field, and it is uncertain if this may represent an underlying area of consolidation/pneumonia or a confluence of shadows. There may be some pulmonary vascular congestion with edema that appears increased from prior. Signer Name: Caryl  Juno HAGEN  Signed: 1/4/2021 7:04 PM  Workstation Name: [email or web address]  [facility]      I ordered the above radiologic testing and reviewed the results    PROCEDURES  Procedures  EKG           EKG time/Interp time: 1824/1829  Rhythm/Rate: Atrial fibrillation rate of 79  P waves and CA: Regular frequent P waves,  QRS, axis: Normal QRS duration, slight left axis deviation  ST and T waves: No ST elevations, there are some slight ST depressions in V5 and V6    Independently interpreted by me contemporaneously with treatment      PROGRESS AND CONSULTS           MEDICAL DECISION MAKING      MDM   Thin tired appearing 70-year-old female with a history of hypertension, diastolic congestive heart failure, persistent atrial fibrillation presents to the emergency room with complaint of palpitations over the last couple of days.  She says she frequently has palpitations but that she noticed them more the last couple of days.  She also reports she is got some leg swelling but admits it is better than last time she saw her cardiologist on 17 December.  She says that she was worried her heart was in a stop so she called ambulance.  When I asked her why she cannot tell me why she thought her heart would stop just because of the palpitations.  She denies chest pain she denies shortness of breath she denies nausea or vomiting, she denies abdominal pain, she denies fevers just says she was worried she was going to die at home.  On exam she has unremarkable vital signs, she does have a irregularly irregular heart rate on auscultation but it is in the 70-80 range.  She has clear lungs without inspiratory crackles she has a nontender abdomen.  And while she does have some pitting edema of the lower extremities they are not tight nor shiny as her most recent cardiology visit noted them to be.  ECG shows atrial fibrillation without concern for ischemia.  I will check basic labs get a chest x-ray, she did start digoxin in  December so I will get a dig level as well.  But I do not think that think that she is acutely toxic from her digoxin.    Labs show a normal digoxin level, normal white count, normal hemoglobin, negative troponin, some minor abnormalities on CMP but nothing out of her baseline, she does have a slightly elevated proBNP at 1800 which seems in between her most recent low and high for the last 2 months.  On exam she does not appear volume overloaded and is in no distress.  Her chest x-ray was read as concern for a new hazy area of opacity in the right mid and lower field uncertain if this could be an underlying consolidation/pneumonia.  She is not short of breath and she is afebrile but given her multiple comorbidities I will go ahead and treat her for potential pneumonia.  Regarding her complaint of palpitations she is not having rapid ventricular response and she is already anticoagulated and on rate control.        DIAGNOSIS  Final diagnoses:   Pneumonia of right lung due to infectious organism, unspecified part of lung   Elevated blood pressure reading   Longstanding persistent atrial fibrillation (CMS/HCC)       Latest Documented Vital Signs:  As of 19:25 EST  BP- (!) 152/106 HR- 80 Temp- 97.8 °F (36.6 °C) (Temporal) O2 sat- 100%    DISPOSITION  Home      Discussed pertinent labs and imaging findings with the patient/family.  Patient/Family voiced understanding of need to follow-up for recheck, further testing as needed.  Return to the emergency Department warnings were given.         Medication List      New Prescriptions    doxycycline 100 MG capsule  Commonly known as: MONODOX  Take 1 capsule by mouth 2 (Two) Times a Day.           Where to Get Your Medications      These medications were sent to [facility] - [place], [place] - [address] [place] [address] - [phone]  - [phone] [address] [address]    Phone: [phone]   · doxycycline 100 MG capsule             Follow-up  Information     Call  Hany Oviedo MD.    Specialty: Family Medicine  Why: To schedule follow-up appointment  Contact information:  [facility]. [facility] [address] [phone]                     Dictated utilizing Dragon dictation     Thomas Bardales MD  01/04/21 1934

## 2021-01-05 LAB — QT INTERVAL: 392 MS

## 2021-01-05 NOTE — DISCHARGE INSTRUCTIONS
Please take medications as instructed.  Please follow-up with Dr. Oviedo in the next 2 to 3 days.  Return to the emergency room if you develop chest pain, shortness of breath, uncontrollable vomiting or for any other concerns.

## 2021-01-11 ENCOUNTER — TRANSCRIBE ORDERS (OUTPATIENT)
Dept: ADMINISTRATIVE | Facility: HOSPITAL | Age: 71
End: 2021-01-11

## 2021-01-11 DIAGNOSIS — R79.9 ABNORMAL BLOOD CHEMISTRY: Primary | ICD-10-CM

## 2021-01-13 ENCOUNTER — HOSPITAL ENCOUNTER (OUTPATIENT)
Dept: ULTRASOUND IMAGING | Facility: HOSPITAL | Age: 71
Discharge: HOME | End: 2021-01-13
Admitting: FAMILY MEDICINE

## 2021-01-13 DIAGNOSIS — R79.9 ABNORMAL BLOOD CHEMISTRY: ICD-10-CM

## 2021-01-13 PROCEDURE — 76705 ECHO EXAM OF ABDOMEN: CPT

## 2021-01-18 RX ORDER — OMEPRAZOLE 20 MG/1
20 CAPSULE, DELAYED RELEASE ORAL DAILY
Qty: 30 CAPSULE | Refills: 11 | Status: SHIPPED | OUTPATIENT
Start: 2021-01-18 | End: 2021-01-01 | Stop reason: HOSPADM

## 2021-02-17 ENCOUNTER — HOSPITAL ENCOUNTER (INPATIENT)
Facility: HOSPITAL | Age: 71
LOS: 13 days | Discharge: NURSING, REHAB OR LONG-TERM CARE | End: 2021-03-02
Attending: EMERGENCY MEDICINE | Admitting: INTERNAL MEDICINE

## 2021-02-17 ENCOUNTER — APPOINTMENT (OUTPATIENT)
Dept: GENERAL RADIOLOGY | Facility: HOSPITAL | Age: 71
End: 2021-02-17

## 2021-02-17 ENCOUNTER — APPOINTMENT (OUTPATIENT)
Dept: CT IMAGING | Facility: HOSPITAL | Age: 71
End: 2021-02-17

## 2021-02-17 DIAGNOSIS — R79.89 ELEVATED LACTIC ACID LEVEL: ICD-10-CM

## 2021-02-17 DIAGNOSIS — I95.9 HYPOTENSION, UNSPECIFIED HYPOTENSION TYPE: ICD-10-CM

## 2021-02-17 DIAGNOSIS — L03.115 CELLULITIS OF RIGHT LOWER EXTREMITY: ICD-10-CM

## 2021-02-17 DIAGNOSIS — N17.9 ACUTE RENAL FAILURE, UNSPECIFIED ACUTE RENAL FAILURE TYPE (HCC): ICD-10-CM

## 2021-02-17 DIAGNOSIS — T68.XXXA HYPOTHERMIA, INITIAL ENCOUNTER: Primary | ICD-10-CM

## 2021-02-17 DIAGNOSIS — R41.82 ALTERED MENTAL STATUS, UNSPECIFIED ALTERED MENTAL STATUS TYPE: ICD-10-CM

## 2021-02-17 LAB
ABO GROUP BLD: NORMAL
ABO GROUP BLD: NORMAL
ALBUMIN SERPL-MCNC: 2.6 G/DL (ref 3.5–5.2)
ALBUMIN/GLOB SERPL: 0.8 G/DL
ALP SERPL-CCNC: 260 U/L (ref 39–117)
ALT SERPL W P-5'-P-CCNC: 77 U/L (ref 1–33)
AMPHET+METHAMPHET UR QL: NEGATIVE
AMPHETAMINES UR QL: NEGATIVE
AMYLASE SERPL-CCNC: 117 U/L (ref 28–100)
ANION GAP SERPL CALCULATED.3IONS-SCNC: 15.2 MMOL/L (ref 5–15)
ANISOCYTOSIS BLD QL: NORMAL
APTT PPP: 62.6 SECONDS (ref 24.3–38.1)
AST SERPL-CCNC: 92 U/L (ref 1–32)
BACTERIA UR QL AUTO: ABNORMAL /HPF
BARBITURATES UR QL SCN: NEGATIVE
BASOPHILS # BLD AUTO: 0.01 10*3/MM3 (ref 0–0.2)
BASOPHILS NFR BLD AUTO: 0.2 % (ref 0–1.5)
BENZODIAZ UR QL SCN: POSITIVE
BILIRUB SERPL-MCNC: 0.7 MG/DL (ref 0–1.2)
BILIRUB UR QL STRIP: NEGATIVE
BILIRUB UR QL STRIP: NEGATIVE
BLD GP AB SCN SERPL QL: NEGATIVE
BUN SERPL-MCNC: 45 MG/DL (ref 8–23)
BUN/CREAT SERPL: 32.8 (ref 7–25)
BUPRENORPHINE SERPL-MCNC: NEGATIVE NG/ML
CALCIUM SPEC-SCNC: 9.2 MG/DL (ref 8.6–10.5)
CANNABINOIDS SERPL QL: NEGATIVE
CHLORIDE SERPL-SCNC: 99 MMOL/L (ref 98–107)
CK SERPL-CCNC: 157 U/L (ref 20–180)
CLARITY UR: CLEAR
CLARITY UR: CLEAR
CO2 SERPL-SCNC: 21.8 MMOL/L (ref 22–29)
COCAINE UR QL: NEGATIVE
COLOR UR: YELLOW
COLOR UR: YELLOW
CREAT SERPL-MCNC: 1.37 MG/DL (ref 0.57–1)
D-LACTATE SERPL-SCNC: 1.9 MMOL/L (ref 0.5–2)
D-LACTATE SERPL-SCNC: 2.6 MMOL/L (ref 0.5–2)
DEPRECATED RDW RBC AUTO: 87.1 FL (ref 37–54)
DIGOXIN SERPL-MCNC: <0.3 NG/ML (ref 0.6–1.2)
EOSINOPHIL # BLD AUTO: 0 10*3/MM3 (ref 0–0.4)
EOSINOPHIL NFR BLD AUTO: 0 % (ref 0.3–6.2)
ERYTHROCYTE [DISTWIDTH] IN BLOOD BY AUTOMATED COUNT: 29 % (ref 12.3–15.4)
ERYTHROCYTE [SEDIMENTATION RATE] IN BLOOD: 84 MM/HR (ref 0–20)
FERRITIN SERPL-MCNC: 132 NG/ML (ref 13–150)
FIBRINOGEN PPP-MCNC: 504 MG/DL (ref 200–400)
GFR SERPL CREATININE-BSD FRML MDRD: 46 ML/MIN/1.73
GLOBULIN UR ELPH-MCNC: 3.4 GM/DL
GLUCOSE BLDC GLUCOMTR-MCNC: 107 MG/DL (ref 70–130)
GLUCOSE SERPL-MCNC: 106 MG/DL (ref 65–99)
GLUCOSE UR STRIP-MCNC: ABNORMAL MG/DL
GLUCOSE UR STRIP-MCNC: ABNORMAL MG/DL
HBA1C MFR BLD: 6.4 % (ref 4.8–5.6)
HCT VFR BLD AUTO: 30.9 % (ref 34–46.6)
HGB BLD-MCNC: 9.7 G/DL (ref 12–15.9)
HGB UR QL STRIP.AUTO: ABNORMAL
HGB UR QL STRIP.AUTO: NEGATIVE
HOLD SPECIMEN: NORMAL
HOLD SPECIMEN: NORMAL
HYALINE CASTS UR QL AUTO: ABNORMAL /LPF
IMM GRANULOCYTES # BLD AUTO: 0.03 10*3/MM3 (ref 0–0.05)
IMM GRANULOCYTES NFR BLD AUTO: 0.5 % (ref 0–0.5)
INR PPP: 2.8 (ref 0.9–1.1)
IRON 24H UR-MRATE: 24 MCG/DL (ref 37–145)
IRON SATN MFR SERPL: 6 % (ref 20–50)
KETONES UR QL STRIP: NEGATIVE
KETONES UR QL STRIP: NEGATIVE
LACTATE HOLD SPECIMEN: NORMAL
LARGE PLATELETS: NORMAL
LEUKOCYTE ESTERASE UR QL STRIP.AUTO: NEGATIVE
LEUKOCYTE ESTERASE UR QL STRIP.AUTO: NEGATIVE
LIPASE SERPL-CCNC: 71 U/L (ref 13–60)
LYMPHOCYTES # BLD AUTO: 0.28 10*3/MM3 (ref 0.7–3.1)
LYMPHOCYTES NFR BLD AUTO: 4.8 % (ref 19.6–45.3)
MAGNESIUM SERPL-MCNC: 2.2 MG/DL (ref 1.6–2.4)
MCH RBC QN AUTO: 26.5 PG (ref 26.6–33)
MCHC RBC AUTO-ENTMCNC: 31.4 G/DL (ref 31.5–35.7)
MCV RBC AUTO: 84.4 FL (ref 79–97)
METHADONE UR QL SCN: NEGATIVE
MONOCYTES # BLD AUTO: 0.35 10*3/MM3 (ref 0.1–0.9)
MONOCYTES NFR BLD AUTO: 6 % (ref 5–12)
NEUTROPHILS NFR BLD AUTO: 5.16 10*3/MM3 (ref 1.7–7)
NEUTROPHILS NFR BLD AUTO: 88.5 % (ref 42.7–76)
NITRITE UR QL STRIP: NEGATIVE
NITRITE UR QL STRIP: NEGATIVE
NRBC BLD AUTO-RTO: 3.6 /100 WBC (ref 0–0.2)
NT-PROBNP SERPL-MCNC: 3020 PG/ML (ref 0–900)
OPIATES UR QL: NEGATIVE
OXYCODONE UR QL SCN: NEGATIVE
PCP UR QL SCN: NEGATIVE
PH UR STRIP.AUTO: <=5 [PH] (ref 4.5–8)
PH UR STRIP.AUTO: <=5 [PH] (ref 4.5–8)
PHOSPHATE SERPL-MCNC: 5.1 MG/DL (ref 2.5–4.5)
PLATELET # BLD AUTO: 90 10*3/MM3 (ref 140–450)
PMV BLD AUTO: ABNORMAL FL
POTASSIUM SERPL-SCNC: 4 MMOL/L (ref 3.5–5.2)
PROCALCITONIN SERPL-MCNC: 0.17 NG/ML (ref 0–0.25)
PROPOXYPH UR QL: NEGATIVE
PROT SERPL-MCNC: 6 G/DL (ref 6–8.5)
PROT UR QL STRIP: NEGATIVE
PROT UR QL STRIP: NEGATIVE
PROTHROMBIN TIME: 28.3 SECONDS (ref 12.1–15)
QT INTERVAL: 498 MS
RBC # BLD AUTO: 3.66 10*6/MM3 (ref 3.77–5.28)
RBC # UR: ABNORMAL /HPF
REF LAB TEST METHOD: ABNORMAL
RH BLD: POSITIVE
RH BLD: POSITIVE
SARS-COV-2 RNA PNL SPEC NAA+PROBE: NOT DETECTED
SODIUM SERPL-SCNC: 136 MMOL/L (ref 136–145)
SP GR UR STRIP: 1.01 (ref 1–1.03)
SP GR UR STRIP: 1.01 (ref 1–1.03)
SQUAMOUS #/AREA URNS HPF: ABNORMAL /HPF
T&S EXPIRATION DATE: NORMAL
T4 FREE SERPL-MCNC: 1.13 NG/DL (ref 0.93–1.7)
TIBC SERPL-MCNC: 371 MCG/DL (ref 298–536)
TRICYCLICS UR QL SCN: NEGATIVE
TROPONIN T SERPL-MCNC: <0.01 NG/ML (ref 0–0.03)
TSH SERPL DL<=0.05 MIU/L-ACNC: 19.03 UIU/ML (ref 0.27–4.2)
UIBC SERPL-MCNC: 347 MCG/DL (ref 112–346)
UROBILINOGEN UR QL STRIP: ABNORMAL
UROBILINOGEN UR QL STRIP: ABNORMAL
WBC # BLD AUTO: 5.83 10*3/MM3 (ref 3.4–10.8)
WBC MORPH BLD: NORMAL
WBC UR QL AUTO: ABNORMAL /HPF
WHOLE BLOOD HOLD SPECIMEN: NORMAL
WHOLE BLOOD HOLD SPECIMEN: NORMAL

## 2021-02-17 PROCEDURE — 81001 URINALYSIS AUTO W/SCOPE: CPT | Performed by: INTERNAL MEDICINE

## 2021-02-17 PROCEDURE — 80053 COMPREHEN METABOLIC PANEL: CPT | Performed by: INTERNAL MEDICINE

## 2021-02-17 PROCEDURE — 86140 C-REACTIVE PROTEIN: CPT | Performed by: NURSE PRACTITIONER

## 2021-02-17 PROCEDURE — 84484 ASSAY OF TROPONIN QUANT: CPT

## 2021-02-17 PROCEDURE — 80306 DRUG TEST PRSMV INSTRMNT: CPT | Performed by: EMERGENCY MEDICINE

## 2021-02-17 PROCEDURE — 84145 PROCALCITONIN (PCT): CPT | Performed by: INTERNAL MEDICINE

## 2021-02-17 PROCEDURE — 83735 ASSAY OF MAGNESIUM: CPT | Performed by: INTERNAL MEDICINE

## 2021-02-17 PROCEDURE — 82607 VITAMIN B-12: CPT | Performed by: NURSE PRACTITIONER

## 2021-02-17 PROCEDURE — 84439 ASSAY OF FREE THYROXINE: CPT | Performed by: NURSE PRACTITIONER

## 2021-02-17 PROCEDURE — 99291 CRITICAL CARE FIRST HOUR: CPT | Performed by: INTERNAL MEDICINE

## 2021-02-17 PROCEDURE — 84443 ASSAY THYROID STIM HORMONE: CPT | Performed by: INTERNAL MEDICINE

## 2021-02-17 PROCEDURE — 86901 BLOOD TYPING SEROLOGIC RH(D): CPT

## 2021-02-17 PROCEDURE — 84100 ASSAY OF PHOSPHORUS: CPT | Performed by: INTERNAL MEDICINE

## 2021-02-17 PROCEDURE — 85730 THROMBOPLASTIN TIME PARTIAL: CPT

## 2021-02-17 PROCEDURE — 82962 GLUCOSE BLOOD TEST: CPT

## 2021-02-17 PROCEDURE — 83880 ASSAY OF NATRIURETIC PEPTIDE: CPT | Performed by: EMERGENCY MEDICINE

## 2021-02-17 PROCEDURE — 85007 BL SMEAR W/DIFF WBC COUNT: CPT

## 2021-02-17 PROCEDURE — 86850 RBC ANTIBODY SCREEN: CPT

## 2021-02-17 PROCEDURE — P9612 CATHETERIZE FOR URINE SPEC: HCPCS

## 2021-02-17 PROCEDURE — 87635 SARS-COV-2 COVID-19 AMP PRB: CPT | Performed by: EMERGENCY MEDICINE

## 2021-02-17 PROCEDURE — 87040 BLOOD CULTURE FOR BACTERIA: CPT | Performed by: EMERGENCY MEDICINE

## 2021-02-17 PROCEDURE — 86900 BLOOD TYPING SEROLOGIC ABO: CPT

## 2021-02-17 PROCEDURE — 25010000002 CEFEPIME-DEXTROSE 2-5 GM-%(50ML) RECONSTITUTED SOLUTION: Performed by: INTERNAL MEDICINE

## 2021-02-17 PROCEDURE — 82746 ASSAY OF FOLIC ACID SERUM: CPT | Performed by: NURSE PRACTITIONER

## 2021-02-17 PROCEDURE — 83540 ASSAY OF IRON: CPT | Performed by: NURSE PRACTITIONER

## 2021-02-17 PROCEDURE — 83550 IRON BINDING TEST: CPT | Performed by: NURSE PRACTITIONER

## 2021-02-17 PROCEDURE — 83690 ASSAY OF LIPASE: CPT | Performed by: INTERNAL MEDICINE

## 2021-02-17 PROCEDURE — 85610 PROTHROMBIN TIME: CPT

## 2021-02-17 PROCEDURE — 99285 EMERGENCY DEPT VISIT HI MDM: CPT

## 2021-02-17 PROCEDURE — 83605 ASSAY OF LACTIC ACID: CPT | Performed by: EMERGENCY MEDICINE

## 2021-02-17 PROCEDURE — 70450 CT HEAD/BRAIN W/O DYE: CPT

## 2021-02-17 PROCEDURE — 99285 EMERGENCY DEPT VISIT HI MDM: CPT | Performed by: EMERGENCY MEDICINE

## 2021-02-17 PROCEDURE — 85652 RBC SED RATE AUTOMATED: CPT | Performed by: NURSE PRACTITIONER

## 2021-02-17 PROCEDURE — 71045 X-RAY EXAM CHEST 1 VIEW: CPT

## 2021-02-17 PROCEDURE — 82728 ASSAY OF FERRITIN: CPT | Performed by: NURSE PRACTITIONER

## 2021-02-17 PROCEDURE — 93010 ELECTROCARDIOGRAM REPORT: CPT | Performed by: INTERNAL MEDICINE

## 2021-02-17 PROCEDURE — 82550 ASSAY OF CK (CPK): CPT | Performed by: INTERNAL MEDICINE

## 2021-02-17 PROCEDURE — 82150 ASSAY OF AMYLASE: CPT | Performed by: INTERNAL MEDICINE

## 2021-02-17 PROCEDURE — 85384 FIBRINOGEN ACTIVITY: CPT | Performed by: NURSE PRACTITIONER

## 2021-02-17 PROCEDURE — 94762 N-INVAS EAR/PLS OXIMTRY CONT: CPT

## 2021-02-17 PROCEDURE — 93005 ELECTROCARDIOGRAM TRACING: CPT

## 2021-02-17 PROCEDURE — 85025 COMPLETE CBC W/AUTO DIFF WBC: CPT

## 2021-02-17 PROCEDURE — 82533 TOTAL CORTISOL: CPT | Performed by: INTERNAL MEDICINE

## 2021-02-17 PROCEDURE — 83036 HEMOGLOBIN GLYCOSYLATED A1C: CPT | Performed by: INTERNAL MEDICINE

## 2021-02-17 PROCEDURE — 80162 ASSAY OF DIGOXIN TOTAL: CPT | Performed by: EMERGENCY MEDICINE

## 2021-02-17 PROCEDURE — 80053 COMPREHEN METABOLIC PANEL: CPT

## 2021-02-17 PROCEDURE — 81003 URINALYSIS AUTO W/O SCOPE: CPT | Performed by: EMERGENCY MEDICINE

## 2021-02-17 RX ORDER — ONDANSETRON 4 MG/1
4 TABLET, FILM COATED ORAL EVERY 6 HOURS PRN
Status: DISCONTINUED | OUTPATIENT
Start: 2021-02-17 | End: 2021-03-02 | Stop reason: HOSPADM

## 2021-02-17 RX ORDER — SODIUM CHLORIDE, SODIUM LACTATE, POTASSIUM CHLORIDE, CALCIUM CHLORIDE 600; 310; 30; 20 MG/100ML; MG/100ML; MG/100ML; MG/100ML
100 INJECTION, SOLUTION INTRAVENOUS CONTINUOUS
Status: DISCONTINUED | OUTPATIENT
Start: 2021-02-17 | End: 2021-02-21

## 2021-02-17 RX ORDER — SODIUM CHLORIDE 9 MG/ML
INJECTION, SOLUTION INTRAVENOUS
Status: DISPENSED
Start: 2021-02-17 | End: 2021-02-18

## 2021-02-17 RX ORDER — VANCOMYCIN HYDROCHLORIDE 1 G/20ML
INJECTION, POWDER, LYOPHILIZED, FOR SOLUTION INTRAVENOUS
Status: DISPENSED
Start: 2021-02-17 | End: 2021-02-18

## 2021-02-17 RX ORDER — CEFEPIME HYDROCHLORIDE 2 G/50ML
2 INJECTION, SOLUTION INTRAVENOUS ONCE
Status: COMPLETED | OUTPATIENT
Start: 2021-02-17 | End: 2021-02-17

## 2021-02-17 RX ORDER — ASPIRIN 300 MG/1
300 SUPPOSITORY RECTAL DAILY
Status: DISCONTINUED | OUTPATIENT
Start: 2021-02-17 | End: 2021-02-19

## 2021-02-17 RX ORDER — ONDANSETRON 2 MG/ML
4 INJECTION INTRAMUSCULAR; INTRAVENOUS EVERY 6 HOURS PRN
Status: DISCONTINUED | OUTPATIENT
Start: 2021-02-17 | End: 2021-03-02 | Stop reason: HOSPADM

## 2021-02-17 RX ORDER — SODIUM CHLORIDE 0.9 % (FLUSH) 0.9 %
10 SYRINGE (ML) INJECTION AS NEEDED
Status: DISCONTINUED | OUTPATIENT
Start: 2021-02-17 | End: 2021-02-23

## 2021-02-17 RX ORDER — SODIUM CHLORIDE 9 MG/ML
40 INJECTION, SOLUTION INTRAVENOUS AS NEEDED
Status: DISCONTINUED | OUTPATIENT
Start: 2021-02-17 | End: 2021-02-23

## 2021-02-17 RX ORDER — ASPIRIN 81 MG/1
81 TABLET, CHEWABLE ORAL DAILY
Status: DISCONTINUED | OUTPATIENT
Start: 2021-02-17 | End: 2021-03-02 | Stop reason: HOSPADM

## 2021-02-17 RX ORDER — ATORVASTATIN CALCIUM 40 MG/1
80 TABLET, FILM COATED ORAL NIGHTLY
Status: DISCONTINUED | OUTPATIENT
Start: 2021-02-17 | End: 2021-02-17

## 2021-02-17 RX ORDER — SODIUM CHLORIDE, SODIUM LACTATE, POTASSIUM CHLORIDE, CALCIUM CHLORIDE 600; 310; 30; 20 MG/100ML; MG/100ML; MG/100ML; MG/100ML
125 INJECTION, SOLUTION INTRAVENOUS CONTINUOUS
Status: DISCONTINUED | OUTPATIENT
Start: 2021-02-17 | End: 2021-02-17

## 2021-02-17 RX ORDER — CEFEPIME HYDROCHLORIDE 1 G/50ML
1 INJECTION, SOLUTION INTRAVENOUS EVERY 12 HOURS
Status: COMPLETED | OUTPATIENT
Start: 2021-02-18 | End: 2021-02-22

## 2021-02-17 RX ORDER — SODIUM CHLORIDE 0.9 % (FLUSH) 0.9 %
10 SYRINGE (ML) INJECTION EVERY 12 HOURS SCHEDULED
Status: DISCONTINUED | OUTPATIENT
Start: 2021-02-17 | End: 2021-02-23

## 2021-02-17 RX ADMIN — SODIUM CHLORIDE, POTASSIUM CHLORIDE, SODIUM LACTATE AND CALCIUM CHLORIDE 500 ML: 600; 310; 30; 20 INJECTION, SOLUTION INTRAVENOUS at 16:36

## 2021-02-17 RX ADMIN — VANCOMYCIN HYDROCHLORIDE 1000 MG: 1 INJECTION, POWDER, LYOPHILIZED, FOR SOLUTION INTRAVENOUS at 17:07

## 2021-02-17 RX ADMIN — NOREPINEPHRINE BITARTRATE 0.02 MCG/KG/MIN: 1 INJECTION, SOLUTION, CONCENTRATE INTRAVENOUS at 18:32

## 2021-02-17 RX ADMIN — SODIUM CHLORIDE, PRESERVATIVE FREE 10 ML: 5 INJECTION INTRAVENOUS at 21:26

## 2021-02-17 RX ADMIN — SODIUM CHLORIDE, POTASSIUM CHLORIDE, SODIUM LACTATE AND CALCIUM CHLORIDE 500 ML: 600; 310; 30; 20 INJECTION, SOLUTION INTRAVENOUS at 15:37

## 2021-02-17 RX ADMIN — SODIUM CHLORIDE, POTASSIUM CHLORIDE, SODIUM LACTATE AND CALCIUM CHLORIDE 125 ML/HR: 600; 310; 30; 20 INJECTION, SOLUTION INTRAVENOUS at 16:10

## 2021-02-17 RX ADMIN — ASPIRIN 81 MG CHEWABLE TABLET 81 MG: 81 TABLET CHEWABLE at 20:09

## 2021-02-17 RX ADMIN — SODIUM CHLORIDE, POTASSIUM CHLORIDE, SODIUM LACTATE AND CALCIUM CHLORIDE 500 ML: 600; 310; 30; 20 INJECTION, SOLUTION INTRAVENOUS at 17:20

## 2021-02-17 RX ADMIN — SODIUM CHLORIDE 250 ML: 9 INJECTION, SOLUTION INTRAVENOUS at 14:13

## 2021-02-17 RX ADMIN — CEFEPIME HYDROCHLORIDE 2 G: 2 INJECTION, SOLUTION INTRAVENOUS at 20:54

## 2021-02-18 ENCOUNTER — APPOINTMENT (OUTPATIENT)
Dept: GENERAL RADIOLOGY | Facility: HOSPITAL | Age: 71
End: 2021-02-18

## 2021-02-18 LAB
ALBUMIN SERPL-MCNC: 2.5 G/DL (ref 3.5–5.2)
ALBUMIN/GLOB SERPL: 0.9 G/DL
ALP SERPL-CCNC: 232 U/L (ref 39–117)
ALT SERPL W P-5'-P-CCNC: 69 U/L (ref 1–33)
ANION GAP SERPL CALCULATED.3IONS-SCNC: 13.8 MMOL/L (ref 5–15)
ARTERIAL PATENCY WRIST A: POSITIVE
AST SERPL-CCNC: 81 U/L (ref 1–32)
ATMOSPHERIC PRESS: 744 MMHG
BASE EXCESS BLDA CALC-SCNC: 2.7 MMOL/L (ref 0–2)
BASOPHILS # BLD AUTO: 0.01 10*3/MM3 (ref 0–0.2)
BASOPHILS NFR BLD AUTO: 0.2 % (ref 0–1.5)
BDY SITE: ABNORMAL
BILIRUB SERPL-MCNC: 0.7 MG/DL (ref 0–1.2)
BODY TEMPERATURE: 37 C
BUN SERPL-MCNC: 43 MG/DL (ref 8–23)
BUN/CREAT SERPL: 34.1 (ref 7–25)
CALCIUM SPEC-SCNC: 8.7 MG/DL (ref 8.6–10.5)
CHLORIDE SERPL-SCNC: 102 MMOL/L (ref 98–107)
CHOLEST SERPL-MCNC: 124 MG/DL (ref 0–200)
CK SERPL-CCNC: 108 U/L (ref 20–180)
CO2 SERPL-SCNC: 23.2 MMOL/L (ref 22–29)
CORTIS SERPL-MCNC: 17.52 MCG/DL
CREAT SERPL-MCNC: 1.26 MG/DL (ref 0.57–1)
CRP SERPL-MCNC: 7.76 MG/DL (ref 0–0.5)
DEPRECATED RDW RBC AUTO: 81.8 FL (ref 37–54)
EOSINOPHIL # BLD AUTO: 0.01 10*3/MM3 (ref 0–0.4)
EOSINOPHIL NFR BLD AUTO: 0.2 % (ref 0.3–6.2)
ERYTHROCYTE [DISTWIDTH] IN BLOOD BY AUTOMATED COUNT: 28.3 % (ref 12.3–15.4)
FOLATE SERPL-MCNC: 12.4 NG/ML (ref 4.78–24.2)
GFR SERPL CREATININE-BSD FRML MDRD: 51 ML/MIN/1.73
GLOBULIN UR ELPH-MCNC: 2.9 GM/DL
GLUCOSE BLDC GLUCOMTR-MCNC: 60 MG/DL (ref 70–130)
GLUCOSE BLDC GLUCOMTR-MCNC: 77 MG/DL (ref 70–130)
GLUCOSE SERPL-MCNC: 95 MG/DL (ref 65–99)
HCO3 BLDA-SCNC: 26.2 MMOL/L (ref 20–26)
HCT VFR BLD AUTO: 27.2 % (ref 34–46.6)
HDLC SERPL-MCNC: 81 MG/DL (ref 40–60)
HGB BLD-MCNC: 9 G/DL (ref 12–15.9)
HGB BLDA-MCNC: 9.1 G/DL (ref 13.5–17.5)
IMM GRANULOCYTES # BLD AUTO: 0.02 10*3/MM3 (ref 0–0.05)
IMM GRANULOCYTES NFR BLD AUTO: 0.4 % (ref 0–0.5)
LDLC SERPL CALC-MCNC: 32 MG/DL (ref 0–100)
LDLC/HDLC SERPL: 0.43 {RATIO}
LYMPHOCYTES # BLD AUTO: 0.13 10*3/MM3 (ref 0.7–3.1)
LYMPHOCYTES NFR BLD AUTO: 2.3 % (ref 19.6–45.3)
MAGNESIUM SERPL-MCNC: 1.9 MG/DL (ref 1.6–2.4)
MCH RBC QN AUTO: 26.6 PG (ref 26.6–33)
MCHC RBC AUTO-ENTMCNC: 33.1 G/DL (ref 31.5–35.7)
MCV RBC AUTO: 80.5 FL (ref 79–97)
MODALITY: ABNORMAL
MONOCYTES # BLD AUTO: 0.54 10*3/MM3 (ref 0.1–0.9)
MONOCYTES NFR BLD AUTO: 9.5 % (ref 5–12)
NEUTROPHILS NFR BLD AUTO: 4.97 10*3/MM3 (ref 1.7–7)
NEUTROPHILS NFR BLD AUTO: 87.4 % (ref 42.7–76)
NRBC BLD AUTO-RTO: 3.3 /100 WBC (ref 0–0.2)
NT-PROBNP SERPL-MCNC: 3465 PG/ML (ref 0–900)
PCO2 BLDA: 34.7 MM HG (ref 35–45)
PCO2 TEMP ADJ BLD: 34.7 MM HG (ref 35–45)
PH BLDA: 7.49 PH UNITS (ref 7.35–7.45)
PH, TEMP CORRECTED: 7.49 PH UNITS (ref 7.35–7.45)
PHOSPHATE SERPL-MCNC: 4.6 MG/DL (ref 2.5–4.5)
PLATELET # BLD AUTO: 90 10*3/MM3 (ref 140–450)
PMV BLD AUTO: ABNORMAL FL
PO2 BLDA: 97.6 MM HG (ref 83–108)
PO2 TEMP ADJ BLD: 97.6 MM HG (ref 83–108)
POTASSIUM SERPL-SCNC: 4.1 MMOL/L (ref 3.5–5.2)
PROCALCITONIN SERPL-MCNC: 0.21 NG/ML (ref 0–0.25)
PROT SERPL-MCNC: 5.4 G/DL (ref 6–8.5)
RBC # BLD AUTO: 3.38 10*6/MM3 (ref 3.77–5.28)
SAO2 % BLDCOA: 99 % (ref 94–99)
SODIUM SERPL-SCNC: 139 MMOL/L (ref 136–145)
TRIGL SERPL-MCNC: 41 MG/DL (ref 0–150)
TROPONIN T SERPL-MCNC: 0.01 NG/ML (ref 0–0.03)
TSH SERPL DL<=0.05 MIU/L-ACNC: 18.03 UIU/ML (ref 0.27–4.2)
VANCOMYCIN SERPL-MCNC: 13.7 MCG/ML (ref 5–40)
VENTILATOR MODE: ABNORMAL
VIT B12 BLD-MCNC: >2000 PG/ML (ref 211–946)
VLDLC SERPL-MCNC: 11 MG/DL (ref 5–40)
WBC # BLD AUTO: 5.68 10*3/MM3 (ref 3.4–10.8)

## 2021-02-18 PROCEDURE — 25010000002 VANCOMYCIN 750 MG RECONSTITUTED SOLUTION 1 EACH VIAL: Performed by: INTERNAL MEDICINE

## 2021-02-18 PROCEDURE — 84484 ASSAY OF TROPONIN QUANT: CPT | Performed by: INTERNAL MEDICINE

## 2021-02-18 PROCEDURE — 80061 LIPID PANEL: CPT | Performed by: INTERNAL MEDICINE

## 2021-02-18 PROCEDURE — 85025 COMPLETE CBC W/AUTO DIFF WBC: CPT | Performed by: INTERNAL MEDICINE

## 2021-02-18 PROCEDURE — 94799 UNLISTED PULMONARY SVC/PX: CPT

## 2021-02-18 PROCEDURE — 82550 ASSAY OF CK (CPK): CPT | Performed by: NURSE PRACTITIONER

## 2021-02-18 PROCEDURE — 83735 ASSAY OF MAGNESIUM: CPT | Performed by: INTERNAL MEDICINE

## 2021-02-18 PROCEDURE — 84100 ASSAY OF PHOSPHORUS: CPT | Performed by: INTERNAL MEDICINE

## 2021-02-18 PROCEDURE — 0100U HC BIOFIRE FILMARRAY RESP PANEL 2: CPT | Performed by: HOSPITALIST

## 2021-02-18 PROCEDURE — 25010000002 ONDANSETRON PER 1 MG: Performed by: INTERNAL MEDICINE

## 2021-02-18 PROCEDURE — 71045 X-RAY EXAM CHEST 1 VIEW: CPT

## 2021-02-18 PROCEDURE — 82962 GLUCOSE BLOOD TEST: CPT

## 2021-02-18 PROCEDURE — 97162 PT EVAL MOD COMPLEX 30 MIN: CPT

## 2021-02-18 PROCEDURE — 25010000002 CEFEPIME-DEXTROSE 1-5 GM-%(50ML) RECONSTITUTED SOLUTION: Performed by: INTERNAL MEDICINE

## 2021-02-18 PROCEDURE — 83880 ASSAY OF NATRIURETIC PEPTIDE: CPT | Performed by: NURSE PRACTITIONER

## 2021-02-18 PROCEDURE — 97166 OT EVAL MOD COMPLEX 45 MIN: CPT

## 2021-02-18 PROCEDURE — 84443 ASSAY THYROID STIM HORMONE: CPT | Performed by: NURSE PRACTITIONER

## 2021-02-18 PROCEDURE — 99233 SBSQ HOSP IP/OBS HIGH 50: CPT | Performed by: HOSPITALIST

## 2021-02-18 PROCEDURE — 36600 WITHDRAWAL OF ARTERIAL BLOOD: CPT

## 2021-02-18 PROCEDURE — 82803 BLOOD GASES ANY COMBINATION: CPT

## 2021-02-18 PROCEDURE — 80202 ASSAY OF VANCOMYCIN: CPT | Performed by: INTERNAL MEDICINE

## 2021-02-18 PROCEDURE — 84145 PROCALCITONIN (PCT): CPT | Performed by: NURSE PRACTITIONER

## 2021-02-18 RX ORDER — ACETAMINOPHEN 325 MG/1
650 TABLET ORAL EVERY 6 HOURS PRN
Status: DISCONTINUED | OUTPATIENT
Start: 2021-02-18 | End: 2021-03-02 | Stop reason: HOSPADM

## 2021-02-18 RX ORDER — LORAZEPAM 0.5 MG/1
0.5 TABLET ORAL EVERY 12 HOURS SCHEDULED
Status: DISCONTINUED | OUTPATIENT
Start: 2021-02-18 | End: 2021-02-18

## 2021-02-18 RX ORDER — LORAZEPAM 0.5 MG/1
0.5 TABLET ORAL EVERY 12 HOURS SCHEDULED
Status: DISCONTINUED | OUTPATIENT
Start: 2021-02-18 | End: 2021-02-19

## 2021-02-18 RX ADMIN — LORAZEPAM 0.5 MG: 0.5 TABLET ORAL at 03:10

## 2021-02-18 RX ADMIN — Medication: at 17:57

## 2021-02-18 RX ADMIN — ACETAMINOPHEN 650 MG: 325 TABLET, FILM COATED ORAL at 20:19

## 2021-02-18 RX ADMIN — CEFEPIME HYDROCHLORIDE 1 G: 1 INJECTION, SOLUTION INTRAVENOUS at 18:14

## 2021-02-18 RX ADMIN — ASPIRIN 81 MG CHEWABLE TABLET 81 MG: 81 TABLET CHEWABLE at 09:19

## 2021-02-18 RX ADMIN — SODIUM CHLORIDE, PRESERVATIVE FREE 10 ML: 5 INJECTION INTRAVENOUS at 09:19

## 2021-02-18 RX ADMIN — ACETAMINOPHEN 650 MG: 325 TABLET, FILM COATED ORAL at 12:54

## 2021-02-18 RX ADMIN — SILVER SULFADIAZINE: 10 CREAM TOPICAL at 17:57

## 2021-02-18 RX ADMIN — SODIUM CHLORIDE, PRESERVATIVE FREE 10 ML: 5 INJECTION INTRAVENOUS at 20:20

## 2021-02-18 RX ADMIN — VANCOMYCIN HYDROCHLORIDE 750 MG: 750 INJECTION, POWDER, LYOPHILIZED, FOR SOLUTION INTRAVENOUS at 16:32

## 2021-02-18 RX ADMIN — SODIUM CHLORIDE, POTASSIUM CHLORIDE, SODIUM LACTATE AND CALCIUM CHLORIDE 200 ML/HR: 600; 310; 30; 20 INJECTION, SOLUTION INTRAVENOUS at 22:21

## 2021-02-18 RX ADMIN — SODIUM CHLORIDE, POTASSIUM CHLORIDE, SODIUM LACTATE AND CALCIUM CHLORIDE 125 ML/HR: 600; 310; 30; 20 INJECTION, SOLUTION INTRAVENOUS at 10:36

## 2021-02-18 RX ADMIN — ONDANSETRON 4 MG: 2 INJECTION, SOLUTION INTRAMUSCULAR; INTRAVENOUS at 20:30

## 2021-02-18 RX ADMIN — SODIUM CHLORIDE, POTASSIUM CHLORIDE, SODIUM LACTATE AND CALCIUM CHLORIDE 175 ML/HR: 600; 310; 30; 20 INJECTION, SOLUTION INTRAVENOUS at 16:32

## 2021-02-18 RX ADMIN — CEFEPIME HYDROCHLORIDE 1 G: 1 INJECTION, SOLUTION INTRAVENOUS at 06:37

## 2021-02-19 ENCOUNTER — APPOINTMENT (OUTPATIENT)
Dept: GENERAL RADIOLOGY | Facility: HOSPITAL | Age: 71
End: 2021-02-19

## 2021-02-19 LAB
ALBUMIN SERPL-MCNC: 2.3 G/DL (ref 3.5–5.2)
ALBUMIN/GLOB SERPL: 0.8 G/DL
ALP SERPL-CCNC: 234 U/L (ref 39–117)
ALT SERPL W P-5'-P-CCNC: 60 U/L (ref 1–33)
ANION GAP SERPL CALCULATED.3IONS-SCNC: 7.9 MMOL/L (ref 5–15)
ANISOCYTOSIS BLD QL: ABNORMAL
AST SERPL-CCNC: 65 U/L (ref 1–32)
B PARAPERT DNA SPEC QL NAA+PROBE: NOT DETECTED
B PERT DNA SPEC QL NAA+PROBE: NOT DETECTED
BILIRUB SERPL-MCNC: 0.6 MG/DL (ref 0–1.2)
BUN SERPL-MCNC: 40 MG/DL (ref 8–23)
BUN/CREAT SERPL: 27 (ref 7–25)
C PNEUM DNA NPH QL NAA+NON-PROBE: NOT DETECTED
CALCIUM SPEC-SCNC: 8.8 MG/DL (ref 8.6–10.5)
CHLORIDE SERPL-SCNC: 104 MMOL/L (ref 98–107)
CO2 SERPL-SCNC: 25.1 MMOL/L (ref 22–29)
CREAT SERPL-MCNC: 1.48 MG/DL (ref 0.57–1)
CREAT UR-MCNC: 35.5 MG/DL
DACRYOCYTES BLD QL SMEAR: ABNORMAL
DEPRECATED RDW RBC AUTO: 82.4 FL (ref 37–54)
ELLIPTOCYTES BLD QL SMEAR: ABNORMAL
ERYTHROCYTE [DISTWIDTH] IN BLOOD BY AUTOMATED COUNT: 28.5 % (ref 12.3–15.4)
FLUAV SUBTYP SPEC NAA+PROBE: NOT DETECTED
FLUBV RNA ISLT QL NAA+PROBE: NOT DETECTED
GFR SERPL CREATININE-BSD FRML MDRD: 42 ML/MIN/1.73
GIANT PLATELETS: ABNORMAL
GLOBULIN UR ELPH-MCNC: 3 GM/DL
GLUCOSE SERPL-MCNC: 83 MG/DL (ref 65–99)
HADV DNA SPEC NAA+PROBE: NOT DETECTED
HCOV 229E RNA SPEC QL NAA+PROBE: NOT DETECTED
HCOV HKU1 RNA SPEC QL NAA+PROBE: NOT DETECTED
HCOV NL63 RNA SPEC QL NAA+PROBE: NOT DETECTED
HCOV OC43 RNA SPEC QL NAA+PROBE: NOT DETECTED
HCT VFR BLD AUTO: 28 % (ref 34–46.6)
HGB BLD-MCNC: 8.9 G/DL (ref 12–15.9)
HMPV RNA NPH QL NAA+NON-PROBE: NOT DETECTED
HPIV1 RNA SPEC QL NAA+PROBE: NOT DETECTED
HPIV2 RNA SPEC QL NAA+PROBE: NOT DETECTED
HPIV3 RNA NPH QL NAA+PROBE: NOT DETECTED
HPIV4 P GENE NPH QL NAA+PROBE: NOT DETECTED
LAB AP CASE REPORT: NORMAL
LYMPHOCYTES # BLD MANUAL: 0.57 10*3/MM3 (ref 0.7–3.1)
LYMPHOCYTES NFR BLD MANUAL: 12 % (ref 19.6–45.3)
LYMPHOCYTES NFR BLD MANUAL: 6 % (ref 5–12)
M PNEUMO IGG SER IA-ACNC: NOT DETECTED
MAGNESIUM SERPL-MCNC: 1.9 MG/DL (ref 1.6–2.4)
MCH RBC QN AUTO: 25.9 PG (ref 26.6–33)
MCHC RBC AUTO-ENTMCNC: 31.8 G/DL (ref 31.5–35.7)
MCV RBC AUTO: 81.4 FL (ref 79–97)
MONOCYTES # BLD AUTO: 0.28 10*3/MM3 (ref 0.1–0.9)
NEUTROPHILS # BLD AUTO: 3.89 10*3/MM3 (ref 1.7–7)
NEUTROPHILS NFR BLD MANUAL: 74 % (ref 42.7–76)
NEUTS BAND NFR BLD MANUAL: 8 % (ref 0–5)
NRBC SPEC MANUAL: 10 /100 WBC (ref 0–0.2)
PATH REPORT.FINAL DX SPEC: NORMAL
PATHOLOGY REVIEW: YES
PHOSPHATE SERPL-MCNC: 4.5 MG/DL (ref 2.5–4.5)
PLATELET # BLD AUTO: 96 10*3/MM3 (ref 140–450)
PMV BLD AUTO: ABNORMAL FL
POTASSIUM SERPL-SCNC: 4 MMOL/L (ref 3.5–5.2)
PROT SERPL-MCNC: 5.3 G/DL (ref 6–8.5)
RBC # BLD AUTO: 3.44 10*6/MM3 (ref 3.77–5.28)
RHINOVIRUS RNA SPEC NAA+PROBE: NOT DETECTED
RSV RNA NPH QL NAA+NON-PROBE: NOT DETECTED
SCHISTOCYTES BLD QL SMEAR: ABNORMAL
SODIUM SERPL-SCNC: 137 MMOL/L (ref 136–145)
SODIUM UR-SCNC: 22 MMOL/L
TARGETS BLD QL SMEAR: ABNORMAL
URATE SERPL-MCNC: 8.4 MG/DL (ref 2.4–5.7)
VANCOMYCIN SERPL-MCNC: 13.9 MCG/ML (ref 5–40)
WBC # BLD AUTO: 4.74 10*3/MM3 (ref 3.4–10.8)
WBC MORPH BLD: NORMAL

## 2021-02-19 PROCEDURE — 92523 SPEECH SOUND LANG COMPREHEN: CPT

## 2021-02-19 PROCEDURE — 25010000002 VANCOMYCIN 750 MG RECONSTITUTED SOLUTION 1 EACH VIAL: Performed by: INTERNAL MEDICINE

## 2021-02-19 PROCEDURE — 82570 ASSAY OF URINE CREATININE: CPT | Performed by: HOSPITALIST

## 2021-02-19 PROCEDURE — 84550 ASSAY OF BLOOD/URIC ACID: CPT | Performed by: HOSPITALIST

## 2021-02-19 PROCEDURE — 25010000002 CEFEPIME-DEXTROSE 1-5 GM-%(50ML) RECONSTITUTED SOLUTION: Performed by: INTERNAL MEDICINE

## 2021-02-19 PROCEDURE — 97535 SELF CARE MNGMENT TRAINING: CPT

## 2021-02-19 PROCEDURE — 97110 THERAPEUTIC EXERCISES: CPT

## 2021-02-19 PROCEDURE — 80053 COMPREHEN METABOLIC PANEL: CPT | Performed by: INTERNAL MEDICINE

## 2021-02-19 PROCEDURE — 83735 ASSAY OF MAGNESIUM: CPT | Performed by: INTERNAL MEDICINE

## 2021-02-19 PROCEDURE — 63710000001 ONDANSETRON PER 8 MG: Performed by: INTERNAL MEDICINE

## 2021-02-19 PROCEDURE — 85007 BL SMEAR W/DIFF WBC COUNT: CPT | Performed by: INTERNAL MEDICINE

## 2021-02-19 PROCEDURE — 71045 X-RAY EXAM CHEST 1 VIEW: CPT

## 2021-02-19 PROCEDURE — 84100 ASSAY OF PHOSPHORUS: CPT | Performed by: INTERNAL MEDICINE

## 2021-02-19 PROCEDURE — 99233 SBSQ HOSP IP/OBS HIGH 50: CPT | Performed by: HOSPITALIST

## 2021-02-19 PROCEDURE — 87081 CULTURE SCREEN ONLY: CPT | Performed by: HOSPITALIST

## 2021-02-19 PROCEDURE — 94799 UNLISTED PULMONARY SVC/PX: CPT

## 2021-02-19 PROCEDURE — 84300 ASSAY OF URINE SODIUM: CPT | Performed by: HOSPITALIST

## 2021-02-19 PROCEDURE — C1751 CATH, INF, PER/CENT/MIDLINE: HCPCS

## 2021-02-19 PROCEDURE — 80202 ASSAY OF VANCOMYCIN: CPT | Performed by: INTERNAL MEDICINE

## 2021-02-19 PROCEDURE — 85025 COMPLETE CBC W/AUTO DIFF WBC: CPT | Performed by: INTERNAL MEDICINE

## 2021-02-19 RX ORDER — SODIUM CHLORIDE 0.9 % (FLUSH) 0.9 %
20 SYRINGE (ML) INJECTION AS NEEDED
Status: DISCONTINUED | OUTPATIENT
Start: 2021-02-19 | End: 2021-02-23

## 2021-02-19 RX ORDER — LORAZEPAM 0.5 MG/1
0.25 TABLET ORAL EVERY 12 HOURS SCHEDULED
Status: DISCONTINUED | OUTPATIENT
Start: 2021-02-20 | End: 2021-02-19

## 2021-02-19 RX ORDER — LORAZEPAM 0.5 MG/1
0.25 TABLET ORAL EVERY 12 HOURS SCHEDULED
Status: DISCONTINUED | OUTPATIENT
Start: 2021-02-19 | End: 2021-02-19

## 2021-02-19 RX ORDER — SODIUM CHLORIDE 0.9 % (FLUSH) 0.9 %
10 SYRINGE (ML) INJECTION EVERY 12 HOURS SCHEDULED
Status: DISCONTINUED | OUTPATIENT
Start: 2021-02-19 | End: 2021-02-23

## 2021-02-19 RX ORDER — SODIUM CHLORIDE 0.9 % (FLUSH) 0.9 %
10 SYRINGE (ML) INJECTION AS NEEDED
Status: DISCONTINUED | OUTPATIENT
Start: 2021-02-19 | End: 2021-02-23

## 2021-02-19 RX ORDER — LORAZEPAM 0.5 MG/1
0.25 TABLET ORAL EVERY 12 HOURS SCHEDULED
Status: DISCONTINUED | OUTPATIENT
Start: 2021-02-19 | End: 2021-03-02 | Stop reason: HOSPADM

## 2021-02-19 RX ADMIN — SODIUM CHLORIDE, PRESERVATIVE FREE 10 ML: 5 INJECTION INTRAVENOUS at 20:49

## 2021-02-19 RX ADMIN — ACETAMINOPHEN 650 MG: 325 TABLET, FILM COATED ORAL at 05:33

## 2021-02-19 RX ADMIN — SODIUM CHLORIDE, PRESERVATIVE FREE 10 ML: 5 INJECTION INTRAVENOUS at 20:51

## 2021-02-19 RX ADMIN — SILVER SULFADIAZINE: 10 CREAM TOPICAL at 15:04

## 2021-02-19 RX ADMIN — LORAZEPAM 0.25 MG: 0.5 TABLET ORAL at 08:04

## 2021-02-19 RX ADMIN — SODIUM CHLORIDE, PRESERVATIVE FREE 10 ML: 5 INJECTION INTRAVENOUS at 08:05

## 2021-02-19 RX ADMIN — ONDANSETRON HYDROCHLORIDE 4 MG: 4 TABLET, FILM COATED ORAL at 08:05

## 2021-02-19 RX ADMIN — CEFEPIME HYDROCHLORIDE 1 G: 1 INJECTION, SOLUTION INTRAVENOUS at 19:20

## 2021-02-19 RX ADMIN — SODIUM CHLORIDE, PRESERVATIVE FREE 10 ML: 5 INJECTION INTRAVENOUS at 20:46

## 2021-02-19 RX ADMIN — SODIUM CHLORIDE, POTASSIUM CHLORIDE, SODIUM LACTATE AND CALCIUM CHLORIDE 100 ML/HR: 600; 310; 30; 20 INJECTION, SOLUTION INTRAVENOUS at 16:02

## 2021-02-19 RX ADMIN — SODIUM CHLORIDE, PRESERVATIVE FREE 10 ML: 5 INJECTION INTRAVENOUS at 15:44

## 2021-02-19 RX ADMIN — SODIUM CHLORIDE, PRESERVATIVE FREE 10 ML: 5 INJECTION INTRAVENOUS at 12:47

## 2021-02-19 RX ADMIN — ACETAMINOPHEN 650 MG: 325 TABLET, FILM COATED ORAL at 20:45

## 2021-02-19 RX ADMIN — SODIUM CHLORIDE, PRESERVATIVE FREE 10 ML: 5 INJECTION INTRAVENOUS at 15:45

## 2021-02-19 RX ADMIN — ASPIRIN 81 MG CHEWABLE TABLET 81 MG: 81 TABLET CHEWABLE at 08:17

## 2021-02-19 RX ADMIN — VANCOMYCIN HYDROCHLORIDE 750 MG: 750 INJECTION, POWDER, LYOPHILIZED, FOR SOLUTION INTRAVENOUS at 18:05

## 2021-02-19 RX ADMIN — ACETAMINOPHEN 650 MG: 325 TABLET, FILM COATED ORAL at 11:00

## 2021-02-19 RX ADMIN — SODIUM CHLORIDE, PRESERVATIVE FREE 10 ML: 5 INJECTION INTRAVENOUS at 20:50

## 2021-02-19 RX ADMIN — Medication: at 15:03

## 2021-02-19 RX ADMIN — SODIUM CHLORIDE, POTASSIUM CHLORIDE, SODIUM LACTATE AND CALCIUM CHLORIDE 200 ML/HR: 600; 310; 30; 20 INJECTION, SOLUTION INTRAVENOUS at 08:19

## 2021-02-19 RX ADMIN — CEFEPIME HYDROCHLORIDE 1 G: 1 INJECTION, SOLUTION INTRAVENOUS at 06:55

## 2021-02-19 RX ADMIN — LORAZEPAM 0.25 MG: 0.5 TABLET ORAL at 20:54

## 2021-02-20 PROBLEM — E43 SEVERE MALNUTRITION (HCC): Status: ACTIVE | Noted: 2021-02-20

## 2021-02-20 LAB
ALBUMIN SERPL-MCNC: 2.2 G/DL (ref 3.5–5.2)
ALBUMIN/GLOB SERPL: 0.7 G/DL
ALP SERPL-CCNC: 258 U/L (ref 39–117)
ALT SERPL W P-5'-P-CCNC: 56 U/L (ref 1–33)
ANION GAP SERPL CALCULATED.3IONS-SCNC: 7.1 MMOL/L (ref 5–15)
AST SERPL-CCNC: 62 U/L (ref 1–32)
BASOPHILS # BLD AUTO: 0.01 10*3/MM3 (ref 0–0.2)
BASOPHILS NFR BLD AUTO: 0.2 % (ref 0–1.5)
BILIRUB SERPL-MCNC: 0.5 MG/DL (ref 0–1.2)
BUN SERPL-MCNC: 37 MG/DL (ref 8–23)
BUN/CREAT SERPL: 25.9 (ref 7–25)
CALCIUM SPEC-SCNC: 8.8 MG/DL (ref 8.6–10.5)
CHLORIDE SERPL-SCNC: 104 MMOL/L (ref 98–107)
CK SERPL-CCNC: 58 U/L (ref 20–180)
CO2 SERPL-SCNC: 24.9 MMOL/L (ref 22–29)
CREAT SERPL-MCNC: 1.43 MG/DL (ref 0.57–1)
DEPRECATED RDW RBC AUTO: 82.9 FL (ref 37–54)
EOSINOPHIL # BLD AUTO: 0.01 10*3/MM3 (ref 0–0.4)
EOSINOPHIL NFR BLD AUTO: 0.2 % (ref 0.3–6.2)
ERYTHROCYTE [DISTWIDTH] IN BLOOD BY AUTOMATED COUNT: 28.5 % (ref 12.3–15.4)
GFR SERPL CREATININE-BSD FRML MDRD: 44 ML/MIN/1.73
GLOBULIN UR ELPH-MCNC: 3 GM/DL
GLUCOSE BLDC GLUCOMTR-MCNC: 138 MG/DL (ref 70–130)
GLUCOSE SERPL-MCNC: 88 MG/DL (ref 65–99)
HCT VFR BLD AUTO: 28.2 % (ref 34–46.6)
HGB BLD-MCNC: 9 G/DL (ref 12–15.9)
IMM GRANULOCYTES # BLD AUTO: 0.04 10*3/MM3 (ref 0–0.05)
IMM GRANULOCYTES NFR BLD AUTO: 0.8 % (ref 0–0.5)
LYMPHOCYTES # BLD AUTO: 0.4 10*3/MM3 (ref 0.7–3.1)
LYMPHOCYTES NFR BLD AUTO: 7.8 % (ref 19.6–45.3)
MAGNESIUM SERPL-MCNC: 1.9 MG/DL (ref 1.6–2.4)
MCH RBC QN AUTO: 25.9 PG (ref 26.6–33)
MCHC RBC AUTO-ENTMCNC: 31.9 G/DL (ref 31.5–35.7)
MCV RBC AUTO: 81.3 FL (ref 79–97)
MONOCYTES # BLD AUTO: 0.49 10*3/MM3 (ref 0.1–0.9)
MONOCYTES NFR BLD AUTO: 9.5 % (ref 5–12)
MRSA SPEC QL CULT: NORMAL
NEUTROPHILS NFR BLD AUTO: 4.21 10*3/MM3 (ref 1.7–7)
NEUTROPHILS NFR BLD AUTO: 81.5 % (ref 42.7–76)
NRBC BLD AUTO-RTO: 3.7 /100 WBC (ref 0–0.2)
PHOSPHATE SERPL-MCNC: 3.8 MG/DL (ref 2.5–4.5)
PLATELET # BLD AUTO: 107 10*3/MM3 (ref 140–450)
PMV BLD AUTO: ABNORMAL FL
POTASSIUM SERPL-SCNC: 4.6 MMOL/L (ref 3.5–5.2)
PROCALCITONIN SERPL-MCNC: 0.25 NG/ML (ref 0–0.25)
PROT SERPL-MCNC: 5.2 G/DL (ref 6–8.5)
RBC # BLD AUTO: 3.47 10*6/MM3 (ref 3.77–5.28)
SODIUM SERPL-SCNC: 136 MMOL/L (ref 136–145)
WBC # BLD AUTO: 5.16 10*3/MM3 (ref 3.4–10.8)

## 2021-02-20 PROCEDURE — 25010000002 VANCOMYCIN 750 MG RECONSTITUTED SOLUTION 1 EACH VIAL: Performed by: INTERNAL MEDICINE

## 2021-02-20 PROCEDURE — 25010000002 ONDANSETRON PER 1 MG: Performed by: INTERNAL MEDICINE

## 2021-02-20 PROCEDURE — 85025 COMPLETE CBC W/AUTO DIFF WBC: CPT | Performed by: INTERNAL MEDICINE

## 2021-02-20 PROCEDURE — 84145 PROCALCITONIN (PCT): CPT | Performed by: HOSPITALIST

## 2021-02-20 PROCEDURE — 82550 ASSAY OF CK (CPK): CPT | Performed by: INTERNAL MEDICINE

## 2021-02-20 PROCEDURE — 82962 GLUCOSE BLOOD TEST: CPT

## 2021-02-20 PROCEDURE — 97110 THERAPEUTIC EXERCISES: CPT

## 2021-02-20 PROCEDURE — 80053 COMPREHEN METABOLIC PANEL: CPT | Performed by: INTERNAL MEDICINE

## 2021-02-20 PROCEDURE — 25010000002 CEFEPIME-DEXTROSE 1-5 GM-%(50ML) RECONSTITUTED SOLUTION: Performed by: INTERNAL MEDICINE

## 2021-02-20 PROCEDURE — 84100 ASSAY OF PHOSPHORUS: CPT | Performed by: INTERNAL MEDICINE

## 2021-02-20 PROCEDURE — 99232 SBSQ HOSP IP/OBS MODERATE 35: CPT | Performed by: INTERNAL MEDICINE

## 2021-02-20 PROCEDURE — 83735 ASSAY OF MAGNESIUM: CPT | Performed by: INTERNAL MEDICINE

## 2021-02-20 RX ORDER — FAMOTIDINE 10 MG/ML
20 INJECTION, SOLUTION INTRAVENOUS EVERY 12 HOURS SCHEDULED
Status: DISCONTINUED | OUTPATIENT
Start: 2021-02-20 | End: 2021-02-23

## 2021-02-20 RX ORDER — DOCUSATE SODIUM 100 MG/1
100 CAPSULE, LIQUID FILLED ORAL 2 TIMES DAILY
Status: DISCONTINUED | OUTPATIENT
Start: 2021-02-20 | End: 2021-03-02 | Stop reason: HOSPADM

## 2021-02-20 RX ADMIN — DOCUSATE SODIUM 100 MG: 100 CAPSULE, LIQUID FILLED ORAL at 21:51

## 2021-02-20 RX ADMIN — FAMOTIDINE 20 MG: 10 INJECTION INTRAVENOUS at 21:51

## 2021-02-20 RX ADMIN — DOCUSATE SODIUM 100 MG: 100 CAPSULE, LIQUID FILLED ORAL at 11:36

## 2021-02-20 RX ADMIN — LORAZEPAM 0.25 MG: 0.5 TABLET ORAL at 21:51

## 2021-02-20 RX ADMIN — SODIUM CHLORIDE, PRESERVATIVE FREE 10 ML: 5 INJECTION INTRAVENOUS at 21:52

## 2021-02-20 RX ADMIN — SODIUM CHLORIDE, PRESERVATIVE FREE 10 ML: 5 INJECTION INTRAVENOUS at 08:58

## 2021-02-20 RX ADMIN — CEFEPIME HYDROCHLORIDE 1 G: 1 INJECTION, SOLUTION INTRAVENOUS at 06:10

## 2021-02-20 RX ADMIN — ACETAMINOPHEN 650 MG: 325 TABLET, FILM COATED ORAL at 11:36

## 2021-02-20 RX ADMIN — Medication: at 08:56

## 2021-02-20 RX ADMIN — SODIUM CHLORIDE, PRESERVATIVE FREE 10 ML: 5 INJECTION INTRAVENOUS at 08:57

## 2021-02-20 RX ADMIN — SODIUM CHLORIDE, PRESERVATIVE FREE 10 ML: 5 INJECTION INTRAVENOUS at 08:59

## 2021-02-20 RX ADMIN — SILVER SULFADIAZINE: 10 CREAM TOPICAL at 08:56

## 2021-02-20 RX ADMIN — CEFEPIME HYDROCHLORIDE 1 G: 1 INJECTION, SOLUTION INTRAVENOUS at 19:28

## 2021-02-20 RX ADMIN — SODIUM CHLORIDE, POTASSIUM CHLORIDE, SODIUM LACTATE AND CALCIUM CHLORIDE 100 ML/HR: 600; 310; 30; 20 INJECTION, SOLUTION INTRAVENOUS at 11:36

## 2021-02-20 RX ADMIN — SODIUM CHLORIDE, PRESERVATIVE FREE 10 ML: 5 INJECTION INTRAVENOUS at 21:53

## 2021-02-20 RX ADMIN — LORAZEPAM 0.25 MG: 0.5 TABLET ORAL at 08:57

## 2021-02-20 RX ADMIN — ONDANSETRON 4 MG: 2 INJECTION, SOLUTION INTRAMUSCULAR; INTRAVENOUS at 09:03

## 2021-02-20 RX ADMIN — ACETAMINOPHEN 650 MG: 325 TABLET, FILM COATED ORAL at 06:19

## 2021-02-20 RX ADMIN — ASPIRIN 81 MG CHEWABLE TABLET 81 MG: 81 TABLET CHEWABLE at 08:56

## 2021-02-20 RX ADMIN — SODIUM CHLORIDE, POTASSIUM CHLORIDE, SODIUM LACTATE AND CALCIUM CHLORIDE 100 ML/HR: 600; 310; 30; 20 INJECTION, SOLUTION INTRAVENOUS at 01:45

## 2021-02-20 RX ADMIN — SODIUM CHLORIDE, POTASSIUM CHLORIDE, SODIUM LACTATE AND CALCIUM CHLORIDE 100 ML/HR: 600; 310; 30; 20 INJECTION, SOLUTION INTRAVENOUS at 21:29

## 2021-02-20 RX ADMIN — VANCOMYCIN HYDROCHLORIDE 750 MG: 750 INJECTION, POWDER, LYOPHILIZED, FOR SOLUTION INTRAVENOUS at 17:41

## 2021-02-20 RX ADMIN — FAMOTIDINE 20 MG: 10 INJECTION INTRAVENOUS at 08:56

## 2021-02-21 ENCOUNTER — APPOINTMENT (OUTPATIENT)
Dept: GENERAL RADIOLOGY | Facility: HOSPITAL | Age: 71
End: 2021-02-21

## 2021-02-21 LAB
ALBUMIN SERPL-MCNC: 2.1 G/DL (ref 3.5–5.2)
ALBUMIN/GLOB SERPL: 0.7 G/DL
ALP SERPL-CCNC: 267 U/L (ref 39–117)
ALT SERPL W P-5'-P-CCNC: 50 U/L (ref 1–33)
ANION GAP SERPL CALCULATED.3IONS-SCNC: 6.7 MMOL/L (ref 5–15)
AST SERPL-CCNC: 50 U/L (ref 1–32)
BASOPHILS # BLD AUTO: 0.01 10*3/MM3 (ref 0–0.2)
BASOPHILS NFR BLD AUTO: 0.2 % (ref 0–1.5)
BILIRUB SERPL-MCNC: 0.5 MG/DL (ref 0–1.2)
BUN SERPL-MCNC: 29 MG/DL (ref 8–23)
BUN/CREAT SERPL: 23.2 (ref 7–25)
CALCIUM SPEC-SCNC: 8.6 MG/DL (ref 8.6–10.5)
CHLORIDE SERPL-SCNC: 104 MMOL/L (ref 98–107)
CO2 SERPL-SCNC: 26.3 MMOL/L (ref 22–29)
CREAT SERPL-MCNC: 1.25 MG/DL (ref 0.57–1)
D-LACTATE SERPL-SCNC: 1.1 MMOL/L (ref 0.5–2)
D-LACTATE SERPL-SCNC: 2.2 MMOL/L (ref 0.5–2)
DEPRECATED RDW RBC AUTO: 85.1 FL (ref 37–54)
EOSINOPHIL # BLD AUTO: 0.01 10*3/MM3 (ref 0–0.4)
EOSINOPHIL NFR BLD AUTO: 0.2 % (ref 0.3–6.2)
ERYTHROCYTE [DISTWIDTH] IN BLOOD BY AUTOMATED COUNT: 28.8 % (ref 12.3–15.4)
ERYTHROCYTE [SEDIMENTATION RATE] IN BLOOD: 83 MM/HR (ref 0–20)
GFR SERPL CREATININE-BSD FRML MDRD: 51 ML/MIN/1.73
GLOBULIN UR ELPH-MCNC: 3.1 GM/DL
GLUCOSE BLDC GLUCOMTR-MCNC: 127 MG/DL (ref 70–130)
GLUCOSE BLDC GLUCOMTR-MCNC: 143 MG/DL (ref 70–130)
GLUCOSE SERPL-MCNC: 99 MG/DL (ref 65–99)
HCT VFR BLD AUTO: 29 % (ref 34–46.6)
HGB BLD-MCNC: 9.2 G/DL (ref 12–15.9)
IMM GRANULOCYTES # BLD AUTO: 0.04 10*3/MM3 (ref 0–0.05)
IMM GRANULOCYTES NFR BLD AUTO: 0.8 % (ref 0–0.5)
LYMPHOCYTES # BLD AUTO: 0.43 10*3/MM3 (ref 0.7–3.1)
LYMPHOCYTES NFR BLD AUTO: 8.6 % (ref 19.6–45.3)
MAGNESIUM SERPL-MCNC: 1.8 MG/DL (ref 1.6–2.4)
MCH RBC QN AUTO: 26.3 PG (ref 26.6–33)
MCHC RBC AUTO-ENTMCNC: 31.7 G/DL (ref 31.5–35.7)
MCV RBC AUTO: 82.9 FL (ref 79–97)
MONOCYTES # BLD AUTO: 0.49 10*3/MM3 (ref 0.1–0.9)
MONOCYTES NFR BLD AUTO: 9.8 % (ref 5–12)
NEUTROPHILS NFR BLD AUTO: 4 10*3/MM3 (ref 1.7–7)
NEUTROPHILS NFR BLD AUTO: 80.4 % (ref 42.7–76)
NRBC BLD AUTO-RTO: 2.4 /100 WBC (ref 0–0.2)
PHOSPHATE SERPL-MCNC: 3.4 MG/DL (ref 2.5–4.5)
PLATELET # BLD AUTO: 108 10*3/MM3 (ref 140–450)
PMV BLD AUTO: ABNORMAL FL
POTASSIUM SERPL-SCNC: 4.5 MMOL/L (ref 3.5–5.2)
PROCALCITONIN SERPL-MCNC: 0.18 NG/ML (ref 0–0.25)
PROT SERPL-MCNC: 5.2 G/DL (ref 6–8.5)
RBC # BLD AUTO: 3.5 10*6/MM3 (ref 3.77–5.28)
SODIUM SERPL-SCNC: 137 MMOL/L (ref 136–145)
WBC # BLD AUTO: 4.98 10*3/MM3 (ref 3.4–10.8)

## 2021-02-21 PROCEDURE — 80053 COMPREHEN METABOLIC PANEL: CPT | Performed by: INTERNAL MEDICINE

## 2021-02-21 PROCEDURE — 85652 RBC SED RATE AUTOMATED: CPT | Performed by: INTERNAL MEDICINE

## 2021-02-21 PROCEDURE — 84145 PROCALCITONIN (PCT): CPT | Performed by: INTERNAL MEDICINE

## 2021-02-21 PROCEDURE — 94799 UNLISTED PULMONARY SVC/PX: CPT

## 2021-02-21 PROCEDURE — 82962 GLUCOSE BLOOD TEST: CPT

## 2021-02-21 PROCEDURE — 83605 ASSAY OF LACTIC ACID: CPT | Performed by: INTERNAL MEDICINE

## 2021-02-21 PROCEDURE — 99232 SBSQ HOSP IP/OBS MODERATE 35: CPT | Performed by: INTERNAL MEDICINE

## 2021-02-21 PROCEDURE — 83735 ASSAY OF MAGNESIUM: CPT | Performed by: INTERNAL MEDICINE

## 2021-02-21 PROCEDURE — 25010000002 CEFEPIME-DEXTROSE 1-5 GM-%(50ML) RECONSTITUTED SOLUTION: Performed by: INTERNAL MEDICINE

## 2021-02-21 PROCEDURE — 71045 X-RAY EXAM CHEST 1 VIEW: CPT

## 2021-02-21 PROCEDURE — 25010000002 VANCOMYCIN 750 MG RECONSTITUTED SOLUTION 1 EACH VIAL: Performed by: INTERNAL MEDICINE

## 2021-02-21 PROCEDURE — 84100 ASSAY OF PHOSPHORUS: CPT | Performed by: INTERNAL MEDICINE

## 2021-02-21 PROCEDURE — 85025 COMPLETE CBC W/AUTO DIFF WBC: CPT | Performed by: INTERNAL MEDICINE

## 2021-02-21 RX ORDER — SODIUM CHLORIDE 9 MG/ML
50 INJECTION, SOLUTION INTRAVENOUS CONTINUOUS
Status: DISCONTINUED | OUTPATIENT
Start: 2021-02-21 | End: 2021-02-22

## 2021-02-21 RX ADMIN — SODIUM CHLORIDE, PRESERVATIVE FREE 10 ML: 5 INJECTION INTRAVENOUS at 21:11

## 2021-02-21 RX ADMIN — FAMOTIDINE 20 MG: 10 INJECTION INTRAVENOUS at 21:06

## 2021-02-21 RX ADMIN — CEFEPIME HYDROCHLORIDE 1 G: 1 INJECTION, SOLUTION INTRAVENOUS at 06:23

## 2021-02-21 RX ADMIN — SODIUM CHLORIDE, PRESERVATIVE FREE 10 ML: 5 INJECTION INTRAVENOUS at 08:21

## 2021-02-21 RX ADMIN — DOCUSATE SODIUM 100 MG: 100 CAPSULE, LIQUID FILLED ORAL at 08:29

## 2021-02-21 RX ADMIN — SODIUM CHLORIDE 125 ML/HR: 9 INJECTION, SOLUTION INTRAVENOUS at 17:12

## 2021-02-21 RX ADMIN — DOCUSATE SODIUM 100 MG: 100 CAPSULE, LIQUID FILLED ORAL at 21:07

## 2021-02-21 RX ADMIN — FAMOTIDINE 20 MG: 10 INJECTION INTRAVENOUS at 08:37

## 2021-02-21 RX ADMIN — SODIUM CHLORIDE, POTASSIUM CHLORIDE, SODIUM LACTATE AND CALCIUM CHLORIDE 100 ML/HR: 600; 310; 30; 20 INJECTION, SOLUTION INTRAVENOUS at 08:19

## 2021-02-21 RX ADMIN — SODIUM CHLORIDE, PRESERVATIVE FREE 10 ML: 5 INJECTION INTRAVENOUS at 08:19

## 2021-02-21 RX ADMIN — SODIUM CHLORIDE, PRESERVATIVE FREE 10 ML: 5 INJECTION INTRAVENOUS at 08:22

## 2021-02-21 RX ADMIN — SODIUM CHLORIDE 50 ML/HR: 9 INJECTION, SOLUTION INTRAVENOUS at 22:06

## 2021-02-21 RX ADMIN — LORAZEPAM 0.25 MG: 0.5 TABLET ORAL at 08:29

## 2021-02-21 RX ADMIN — SODIUM CHLORIDE, PRESERVATIVE FREE 10 ML: 5 INJECTION INTRAVENOUS at 21:10

## 2021-02-21 RX ADMIN — CEFEPIME HYDROCHLORIDE 1 G: 1 INJECTION, SOLUTION INTRAVENOUS at 18:42

## 2021-02-21 RX ADMIN — VANCOMYCIN HYDROCHLORIDE 750 MG: 750 INJECTION, POWDER, LYOPHILIZED, FOR SOLUTION INTRAVENOUS at 17:16

## 2021-02-21 RX ADMIN — ASPIRIN 81 MG CHEWABLE TABLET 81 MG: 81 TABLET CHEWABLE at 08:29

## 2021-02-21 RX ADMIN — SODIUM CHLORIDE 500 ML: 9 INJECTION, SOLUTION INTRAVENOUS at 16:13

## 2021-02-21 RX ADMIN — SILVER SULFADIAZINE: 10 CREAM TOPICAL at 08:20

## 2021-02-21 RX ADMIN — Medication: at 08:18

## 2021-02-21 RX ADMIN — LORAZEPAM 0.25 MG: 0.5 TABLET ORAL at 21:06

## 2021-02-22 ENCOUNTER — APPOINTMENT (OUTPATIENT)
Dept: ULTRASOUND IMAGING | Facility: HOSPITAL | Age: 71
End: 2021-02-22

## 2021-02-22 LAB
ALBUMIN SERPL-MCNC: 2.1 G/DL (ref 3.5–5.2)
ALBUMIN/GLOB SERPL: 0.6 G/DL
ALP SERPL-CCNC: 286 U/L (ref 39–117)
ALT SERPL W P-5'-P-CCNC: 46 U/L (ref 1–33)
ANION GAP SERPL CALCULATED.3IONS-SCNC: 8.6 MMOL/L (ref 5–15)
AST SERPL-CCNC: 52 U/L (ref 1–32)
BACTERIA SPEC AEROBE CULT: NORMAL
BACTERIA SPEC AEROBE CULT: NORMAL
BASOPHILS # BLD AUTO: 0.01 10*3/MM3 (ref 0–0.2)
BASOPHILS NFR BLD AUTO: 0.2 % (ref 0–1.5)
BILIRUB SERPL-MCNC: 0.5 MG/DL (ref 0–1.2)
BUN SERPL-MCNC: 24 MG/DL (ref 8–23)
BUN/CREAT SERPL: 19.8 (ref 7–25)
CALCIUM SPEC-SCNC: 8.6 MG/DL (ref 8.6–10.5)
CHLORIDE SERPL-SCNC: 106 MMOL/L (ref 98–107)
CO2 SERPL-SCNC: 23.4 MMOL/L (ref 22–29)
CREAT SERPL-MCNC: 1.21 MG/DL (ref 0.57–1)
DEPRECATED RDW RBC AUTO: 86.3 FL (ref 37–54)
EOSINOPHIL # BLD AUTO: 0.03 10*3/MM3 (ref 0–0.4)
EOSINOPHIL NFR BLD AUTO: 0.5 % (ref 0.3–6.2)
ERYTHROCYTE [DISTWIDTH] IN BLOOD BY AUTOMATED COUNT: 29.1 % (ref 12.3–15.4)
GFR SERPL CREATININE-BSD FRML MDRD: 53 ML/MIN/1.73
GLOBULIN UR ELPH-MCNC: 3.4 GM/DL
GLUCOSE SERPL-MCNC: 96 MG/DL (ref 65–99)
HCT VFR BLD AUTO: 29.8 % (ref 34–46.6)
HGB BLD-MCNC: 9.3 G/DL (ref 12–15.9)
IMM GRANULOCYTES # BLD AUTO: 0.05 10*3/MM3 (ref 0–0.05)
IMM GRANULOCYTES NFR BLD AUTO: 0.8 % (ref 0–0.5)
LYMPHOCYTES # BLD AUTO: 0.47 10*3/MM3 (ref 0.7–3.1)
LYMPHOCYTES NFR BLD AUTO: 7.8 % (ref 19.6–45.3)
MAGNESIUM SERPL-MCNC: 1.9 MG/DL (ref 1.6–2.4)
MCH RBC QN AUTO: 26.1 PG (ref 26.6–33)
MCHC RBC AUTO-ENTMCNC: 31.2 G/DL (ref 31.5–35.7)
MCV RBC AUTO: 83.7 FL (ref 79–97)
MONOCYTES # BLD AUTO: 0.69 10*3/MM3 (ref 0.1–0.9)
MONOCYTES NFR BLD AUTO: 11.4 % (ref 5–12)
NEUTROPHILS NFR BLD AUTO: 4.79 10*3/MM3 (ref 1.7–7)
NEUTROPHILS NFR BLD AUTO: 79.3 % (ref 42.7–76)
NRBC BLD AUTO-RTO: 1.8 /100 WBC (ref 0–0.2)
PHOSPHATE SERPL-MCNC: 3 MG/DL (ref 2.5–4.5)
PLATELET # BLD AUTO: 112 10*3/MM3 (ref 140–450)
PMV BLD AUTO: ABNORMAL FL
POTASSIUM SERPL-SCNC: 4.7 MMOL/L (ref 3.5–5.2)
PROCALCITONIN SERPL-MCNC: 0.12 NG/ML (ref 0–0.25)
PROT SERPL-MCNC: 5.5 G/DL (ref 6–8.5)
RBC # BLD AUTO: 3.56 10*6/MM3 (ref 3.77–5.28)
SODIUM SERPL-SCNC: 138 MMOL/L (ref 136–145)
WBC # BLD AUTO: 6.04 10*3/MM3 (ref 3.4–10.8)

## 2021-02-22 PROCEDURE — 84145 PROCALCITONIN (PCT): CPT | Performed by: HOSPITALIST

## 2021-02-22 PROCEDURE — 25010000002 CEFEPIME-DEXTROSE 1-5 GM-%(50ML) RECONSTITUTED SOLUTION: Performed by: HOSPITALIST

## 2021-02-22 PROCEDURE — 25010000002 FUROSEMIDE PER 20 MG: Performed by: FAMILY MEDICINE

## 2021-02-22 PROCEDURE — 76536 US EXAM OF HEAD AND NECK: CPT

## 2021-02-22 PROCEDURE — 84432 ASSAY OF THYROGLOBULIN: CPT | Performed by: FAMILY MEDICINE

## 2021-02-22 PROCEDURE — 86376 MICROSOMAL ANTIBODY EACH: CPT | Performed by: FAMILY MEDICINE

## 2021-02-22 PROCEDURE — 97530 THERAPEUTIC ACTIVITIES: CPT

## 2021-02-22 PROCEDURE — 83735 ASSAY OF MAGNESIUM: CPT | Performed by: HOSPITALIST

## 2021-02-22 PROCEDURE — 80053 COMPREHEN METABOLIC PANEL: CPT | Performed by: HOSPITALIST

## 2021-02-22 PROCEDURE — 84100 ASSAY OF PHOSPHORUS: CPT | Performed by: HOSPITALIST

## 2021-02-22 PROCEDURE — 85025 COMPLETE CBC W/AUTO DIFF WBC: CPT | Performed by: INTERNAL MEDICINE

## 2021-02-22 PROCEDURE — 97116 GAIT TRAINING THERAPY: CPT

## 2021-02-22 RX ORDER — FUROSEMIDE 10 MG/ML
40 INJECTION INTRAMUSCULAR; INTRAVENOUS ONCE
Status: COMPLETED | OUTPATIENT
Start: 2021-02-22 | End: 2021-02-22

## 2021-02-22 RX ORDER — PANTOPRAZOLE SODIUM 40 MG/1
40 TABLET, DELAYED RELEASE ORAL EVERY MORNING
Status: DISCONTINUED | OUTPATIENT
Start: 2021-02-22 | End: 2021-03-02 | Stop reason: HOSPADM

## 2021-02-22 RX ORDER — DIGOXIN 125 MCG
125 TABLET ORAL DAILY
Status: DISCONTINUED | OUTPATIENT
Start: 2021-02-22 | End: 2021-03-02 | Stop reason: HOSPADM

## 2021-02-22 RX ORDER — LEVOTHYROXINE SODIUM 0.07 MG/1
75 TABLET ORAL
Status: DISCONTINUED | OUTPATIENT
Start: 2021-02-22 | End: 2021-03-02 | Stop reason: HOSPADM

## 2021-02-22 RX ADMIN — SODIUM CHLORIDE, PRESERVATIVE FREE 10 ML: 5 INJECTION INTRAVENOUS at 09:59

## 2021-02-22 RX ADMIN — SODIUM CHLORIDE, PRESERVATIVE FREE 10 ML: 5 INJECTION INTRAVENOUS at 09:10

## 2021-02-22 RX ADMIN — Medication: at 09:11

## 2021-02-22 RX ADMIN — FUROSEMIDE 40 MG: 10 INJECTION, SOLUTION INTRAMUSCULAR; INTRAVENOUS at 09:09

## 2021-02-22 RX ADMIN — SODIUM CHLORIDE, PRESERVATIVE FREE 10 ML: 5 INJECTION INTRAVENOUS at 10:00

## 2021-02-22 RX ADMIN — CEFEPIME HYDROCHLORIDE 1 G: 1 INJECTION, SOLUTION INTRAVENOUS at 06:00

## 2021-02-22 RX ADMIN — PANTOPRAZOLE SODIUM 40 MG: 40 TABLET, DELAYED RELEASE ORAL at 09:16

## 2021-02-22 RX ADMIN — FAMOTIDINE 20 MG: 10 INJECTION INTRAVENOUS at 20:27

## 2021-02-22 RX ADMIN — SILVER SULFADIAZINE 1 APPLICATION: 10 CREAM TOPICAL at 07:50

## 2021-02-22 RX ADMIN — LORAZEPAM 0.25 MG: 0.5 TABLET ORAL at 20:22

## 2021-02-22 RX ADMIN — DOCUSATE SODIUM 100 MG: 100 CAPSULE, LIQUID FILLED ORAL at 09:10

## 2021-02-22 RX ADMIN — SODIUM CHLORIDE, PRESERVATIVE FREE 10 ML: 5 INJECTION INTRAVENOUS at 20:24

## 2021-02-22 RX ADMIN — LORAZEPAM 0.25 MG: 0.5 TABLET ORAL at 09:09

## 2021-02-22 RX ADMIN — LEVOTHYROXINE SODIUM 75 MCG: 75 TABLET ORAL at 09:16

## 2021-02-22 RX ADMIN — RIVAROXABAN 20 MG: 20 TABLET, FILM COATED ORAL at 09:16

## 2021-02-22 RX ADMIN — TIMOLOL 1 DROP: 2.56 SOLUTION/ DROPS OPHTHALMIC at 10:19

## 2021-02-22 RX ADMIN — SODIUM CHLORIDE, PRESERVATIVE FREE 10 ML: 5 INJECTION INTRAVENOUS at 10:02

## 2021-02-22 RX ADMIN — ASPIRIN 81 MG CHEWABLE TABLET 81 MG: 81 TABLET CHEWABLE at 09:10

## 2021-02-22 RX ADMIN — FAMOTIDINE 20 MG: 10 INJECTION INTRAVENOUS at 09:16

## 2021-02-22 RX ADMIN — CEFEPIME HYDROCHLORIDE 1 G: 1 INJECTION, SOLUTION INTRAVENOUS at 19:24

## 2021-02-22 RX ADMIN — DOCUSATE SODIUM 100 MG: 100 CAPSULE, LIQUID FILLED ORAL at 20:22

## 2021-02-22 RX ADMIN — DIGOXIN 125 MCG: 125 TABLET ORAL at 09:16

## 2021-02-23 LAB
ACANTHOCYTES BLD QL SMEAR: NORMAL
ALBUMIN SERPL-MCNC: 2.4 G/DL (ref 3.5–5.2)
ALBUMIN/GLOB SERPL: 0.7 G/DL
ALP SERPL-CCNC: 305 U/L (ref 39–117)
ALT SERPL W P-5'-P-CCNC: 46 U/L (ref 1–33)
ANION GAP SERPL CALCULATED.3IONS-SCNC: 8.8 MMOL/L (ref 5–15)
ANISOCYTOSIS BLD QL: NORMAL
AST SERPL-CCNC: 52 U/L (ref 1–32)
BASOPHILS # BLD AUTO: 0.07 10*3/MM3 (ref 0–0.2)
BASOPHILS NFR BLD AUTO: 1.1 % (ref 0–1.5)
BILIRUB SERPL-MCNC: 0.6 MG/DL (ref 0–1.2)
BUN SERPL-MCNC: 26 MG/DL (ref 8–23)
BUN/CREAT SERPL: 20.3 (ref 7–25)
CALCIUM SPEC-SCNC: 8.8 MG/DL (ref 8.6–10.5)
CHLORIDE SERPL-SCNC: 104 MMOL/L (ref 98–107)
CO2 SERPL-SCNC: 23.2 MMOL/L (ref 22–29)
CREAT SERPL-MCNC: 1.28 MG/DL (ref 0.57–1)
DEPRECATED RDW RBC AUTO: 84.6 FL (ref 37–54)
ELLIPTOCYTES BLD QL SMEAR: NORMAL
EOSINOPHIL # BLD AUTO: 0.03 10*3/MM3 (ref 0–0.4)
EOSINOPHIL NFR BLD AUTO: 0.5 % (ref 0.3–6.2)
ERYTHROCYTE [DISTWIDTH] IN BLOOD BY AUTOMATED COUNT: 28.4 % (ref 12.3–15.4)
GFR SERPL CREATININE-BSD FRML MDRD: 50 ML/MIN/1.73
GIANT PLATELETS: NORMAL
GLOBULIN UR ELPH-MCNC: 3.4 GM/DL
GLUCOSE SERPL-MCNC: 98 MG/DL (ref 65–99)
HCT VFR BLD AUTO: 31.1 % (ref 34–46.6)
HGB BLD-MCNC: 9.9 G/DL (ref 12–15.9)
IMM GRANULOCYTES # BLD AUTO: 0.04 10*3/MM3 (ref 0–0.05)
IMM GRANULOCYTES NFR BLD AUTO: 0.6 % (ref 0–0.5)
LYMPHOCYTES # BLD AUTO: 0.74 10*3/MM3 (ref 0.7–3.1)
LYMPHOCYTES NFR BLD AUTO: 11.3 % (ref 19.6–45.3)
MAGNESIUM SERPL-MCNC: 1.9 MG/DL (ref 1.6–2.4)
MCH RBC QN AUTO: 26.5 PG (ref 26.6–33)
MCHC RBC AUTO-ENTMCNC: 31.8 G/DL (ref 31.5–35.7)
MCV RBC AUTO: 83.4 FL (ref 79–97)
MONOCYTES # BLD AUTO: 0.67 10*3/MM3 (ref 0.1–0.9)
MONOCYTES NFR BLD AUTO: 10.2 % (ref 5–12)
NEUTROPHILS NFR BLD AUTO: 4.99 10*3/MM3 (ref 1.7–7)
NEUTROPHILS NFR BLD AUTO: 76.3 % (ref 42.7–76)
PHOSPHATE SERPL-MCNC: 3 MG/DL (ref 2.5–4.5)
PLATELET # BLD AUTO: 123 10*3/MM3 (ref 140–450)
PMV BLD AUTO: ABNORMAL FL
POTASSIUM SERPL-SCNC: 4.7 MMOL/L (ref 3.5–5.2)
PROT SERPL-MCNC: 5.8 G/DL (ref 6–8.5)
RBC # BLD AUTO: 3.73 10*6/MM3 (ref 3.77–5.28)
SCHISTOCYTES BLD QL SMEAR: NORMAL
SODIUM SERPL-SCNC: 136 MMOL/L (ref 136–145)
TARGETS BLD QL SMEAR: NORMAL
THYROPEROXIDASE AB SERPL-ACNC: <9 IU/ML (ref 0–34)
WBC # BLD AUTO: 6.54 10*3/MM3 (ref 3.4–10.8)
WBC MORPH BLD: NORMAL

## 2021-02-23 PROCEDURE — 80053 COMPREHEN METABOLIC PANEL: CPT | Performed by: HOSPITALIST

## 2021-02-23 PROCEDURE — 85007 BL SMEAR W/DIFF WBC COUNT: CPT | Performed by: HOSPITALIST

## 2021-02-23 PROCEDURE — 94799 UNLISTED PULMONARY SVC/PX: CPT

## 2021-02-23 PROCEDURE — 25010000002 IRON SUCROSE PER 1 MG: Performed by: FAMILY MEDICINE

## 2021-02-23 PROCEDURE — 97116 GAIT TRAINING THERAPY: CPT

## 2021-02-23 PROCEDURE — 25010000002 FUROSEMIDE PER 20 MG: Performed by: FAMILY MEDICINE

## 2021-02-23 PROCEDURE — 83735 ASSAY OF MAGNESIUM: CPT | Performed by: HOSPITALIST

## 2021-02-23 PROCEDURE — 97110 THERAPEUTIC EXERCISES: CPT

## 2021-02-23 PROCEDURE — 97530 THERAPEUTIC ACTIVITIES: CPT

## 2021-02-23 PROCEDURE — 84100 ASSAY OF PHOSPHORUS: CPT | Performed by: HOSPITALIST

## 2021-02-23 PROCEDURE — 85025 COMPLETE CBC W/AUTO DIFF WBC: CPT | Performed by: HOSPITALIST

## 2021-02-23 RX ORDER — FUROSEMIDE 10 MG/ML
40 INJECTION INTRAMUSCULAR; INTRAVENOUS ONCE
Status: COMPLETED | OUTPATIENT
Start: 2021-02-23 | End: 2021-02-23

## 2021-02-23 RX ADMIN — DOCUSATE SODIUM 100 MG: 100 CAPSULE, LIQUID FILLED ORAL at 08:53

## 2021-02-23 RX ADMIN — LORAZEPAM 0.25 MG: 0.5 TABLET ORAL at 21:44

## 2021-02-23 RX ADMIN — FUROSEMIDE 40 MG: 10 INJECTION, SOLUTION INTRAMUSCULAR; INTRAVENOUS at 08:53

## 2021-02-23 RX ADMIN — LEVOTHYROXINE SODIUM 75 MCG: 75 TABLET ORAL at 06:15

## 2021-02-23 RX ADMIN — LORAZEPAM 0.25 MG: 0.5 TABLET ORAL at 08:54

## 2021-02-23 RX ADMIN — RIVAROXABAN 20 MG: 20 TABLET, FILM COATED ORAL at 08:54

## 2021-02-23 RX ADMIN — IRON SUCROSE 200 MG: 20 INJECTION, SOLUTION INTRAVENOUS at 08:53

## 2021-02-23 RX ADMIN — DOCUSATE SODIUM 100 MG: 100 CAPSULE, LIQUID FILLED ORAL at 21:44

## 2021-02-23 RX ADMIN — TIMOLOL 1 DROP: 2.56 SOLUTION/ DROPS OPHTHALMIC at 08:52

## 2021-02-23 RX ADMIN — SILVER SULFADIAZINE: 10 CREAM TOPICAL at 08:52

## 2021-02-23 RX ADMIN — DIGOXIN 125 MCG: 125 TABLET ORAL at 08:53

## 2021-02-23 RX ADMIN — ASPIRIN 81 MG CHEWABLE TABLET 81 MG: 81 TABLET CHEWABLE at 08:54

## 2021-02-23 RX ADMIN — PANTOPRAZOLE SODIUM 40 MG: 40 TABLET, DELAYED RELEASE ORAL at 06:15

## 2021-02-24 PROCEDURE — 97110 THERAPEUTIC EXERCISES: CPT

## 2021-02-24 PROCEDURE — 94799 UNLISTED PULMONARY SVC/PX: CPT

## 2021-02-24 PROCEDURE — 25010000002 FUROSEMIDE PER 20 MG: Performed by: FAMILY MEDICINE

## 2021-02-24 PROCEDURE — 25010000002 IRON SUCROSE PER 1 MG: Performed by: FAMILY MEDICINE

## 2021-02-24 RX ORDER — FUROSEMIDE 10 MG/ML
40 INJECTION INTRAMUSCULAR; INTRAVENOUS ONCE
Status: COMPLETED | OUTPATIENT
Start: 2021-02-24 | End: 2021-02-24

## 2021-02-24 RX ADMIN — RIVAROXABAN 20 MG: 20 TABLET, FILM COATED ORAL at 08:16

## 2021-02-24 RX ADMIN — DIGOXIN 125 MCG: 125 TABLET ORAL at 08:16

## 2021-02-24 RX ADMIN — IRON SUCROSE 200 MG: 20 INJECTION, SOLUTION INTRAVENOUS at 08:22

## 2021-02-24 RX ADMIN — LORAZEPAM 0.25 MG: 0.5 TABLET ORAL at 20:31

## 2021-02-24 RX ADMIN — TIMOLOL 1 DROP: 2.56 SOLUTION/ DROPS OPHTHALMIC at 08:18

## 2021-02-24 RX ADMIN — DOCUSATE SODIUM 100 MG: 100 CAPSULE, LIQUID FILLED ORAL at 20:31

## 2021-02-24 RX ADMIN — FUROSEMIDE 40 MG: 10 INJECTION, SOLUTION INTRAMUSCULAR; INTRAVENOUS at 08:16

## 2021-02-24 RX ADMIN — LORAZEPAM 0.25 MG: 0.5 TABLET ORAL at 08:17

## 2021-02-24 RX ADMIN — ACETAMINOPHEN 650 MG: 325 TABLET, FILM COATED ORAL at 22:38

## 2021-02-24 RX ADMIN — LEVOTHYROXINE SODIUM 75 MCG: 75 TABLET ORAL at 06:05

## 2021-02-24 RX ADMIN — PANTOPRAZOLE SODIUM 40 MG: 40 TABLET, DELAYED RELEASE ORAL at 06:05

## 2021-02-24 RX ADMIN — ASPIRIN 81 MG CHEWABLE TABLET 81 MG: 81 TABLET CHEWABLE at 08:16

## 2021-02-24 RX ADMIN — SILVER SULFADIAZINE: 10 CREAM TOPICAL at 08:18

## 2021-02-24 RX ADMIN — DOCUSATE SODIUM 100 MG: 100 CAPSULE, LIQUID FILLED ORAL at 08:16

## 2021-02-25 PROBLEM — I48.11 LONGSTANDING PERSISTENT ATRIAL FIBRILLATION (HCC): Status: ACTIVE | Noted: 2021-02-25

## 2021-02-25 PROBLEM — R47.1 DYSARTHRIA: Status: ACTIVE | Noted: 2021-02-25

## 2021-02-25 LAB
BACTERIA UR QL AUTO: ABNORMAL /HPF
BILIRUB UR QL STRIP: NEGATIVE
CLARITY UR: CLEAR
COLOR UR: YELLOW
D-LACTATE SERPL-SCNC: 2.1 MMOL/L (ref 0.5–2)
DIGOXIN SERPL-MCNC: 0.89 NG/ML (ref 0.6–1.2)
GLUCOSE UR STRIP-MCNC: NEGATIVE MG/DL
HGB UR QL STRIP.AUTO: ABNORMAL
HYALINE CASTS UR QL AUTO: ABNORMAL /LPF
KETONES UR QL STRIP: NEGATIVE
LEUKOCYTE ESTERASE UR QL STRIP.AUTO: NEGATIVE
NITRITE UR QL STRIP: NEGATIVE
PH UR STRIP.AUTO: <=5 [PH] (ref 4.5–8)
PROCALCITONIN SERPL-MCNC: 0.26 NG/ML (ref 0–0.25)
PROT UR QL STRIP: ABNORMAL
RBC # UR: ABNORMAL /HPF
REF LAB TEST METHOD: ABNORMAL
SP GR UR STRIP: 1.02 (ref 1–1.03)
SQUAMOUS #/AREA URNS HPF: ABNORMAL /HPF
UROBILINOGEN UR QL STRIP: ABNORMAL
WBC UR QL AUTO: ABNORMAL /HPF

## 2021-02-25 PROCEDURE — 25010000002 IRON SUCROSE PER 1 MG: Performed by: FAMILY MEDICINE

## 2021-02-25 PROCEDURE — 80162 ASSAY OF DIGOXIN TOTAL: CPT | Performed by: FAMILY MEDICINE

## 2021-02-25 PROCEDURE — 25010000002 HALOPERIDOL LACTATE PER 5 MG: Performed by: FAMILY MEDICINE

## 2021-02-25 PROCEDURE — 84145 PROCALCITONIN (PCT): CPT | Performed by: FAMILY MEDICINE

## 2021-02-25 PROCEDURE — 83605 ASSAY OF LACTIC ACID: CPT | Performed by: FAMILY MEDICINE

## 2021-02-25 PROCEDURE — 81001 URINALYSIS AUTO W/SCOPE: CPT | Performed by: FAMILY MEDICINE

## 2021-02-25 PROCEDURE — 99222 1ST HOSP IP/OBS MODERATE 55: CPT | Performed by: PSYCHIATRY & NEUROLOGY

## 2021-02-25 PROCEDURE — 97110 THERAPEUTIC EXERCISES: CPT

## 2021-02-25 PROCEDURE — 97535 SELF CARE MNGMENT TRAINING: CPT

## 2021-02-25 RX ORDER — BUMETANIDE 0.25 MG/ML
2 INJECTION INTRAMUSCULAR; INTRAVENOUS ONCE
Status: COMPLETED | OUTPATIENT
Start: 2021-02-25 | End: 2021-02-25

## 2021-02-25 RX ORDER — HALOPERIDOL 5 MG/ML
1 INJECTION INTRAMUSCULAR EVERY 4 HOURS PRN
Status: DISCONTINUED | OUTPATIENT
Start: 2021-02-25 | End: 2021-03-02 | Stop reason: HOSPADM

## 2021-02-25 RX ORDER — OLANZAPINE 5 MG/1
2.5 TABLET ORAL 2 TIMES DAILY
Status: DISCONTINUED | OUTPATIENT
Start: 2021-02-25 | End: 2021-03-02 | Stop reason: HOSPADM

## 2021-02-25 RX ADMIN — DOCUSATE SODIUM 100 MG: 100 CAPSULE, LIQUID FILLED ORAL at 20:15

## 2021-02-25 RX ADMIN — LORAZEPAM 0.25 MG: 0.5 TABLET ORAL at 09:14

## 2021-02-25 RX ADMIN — TIMOLOL 1 DROP: 2.56 SOLUTION/ DROPS OPHTHALMIC at 09:14

## 2021-02-25 RX ADMIN — ACETAMINOPHEN 650 MG: 325 TABLET, FILM COATED ORAL at 11:55

## 2021-02-25 RX ADMIN — OLANZAPINE 2.5 MG: 5 TABLET, FILM COATED ORAL at 09:16

## 2021-02-25 RX ADMIN — ASPIRIN 81 MG CHEWABLE TABLET 81 MG: 81 TABLET CHEWABLE at 09:14

## 2021-02-25 RX ADMIN — LEVOTHYROXINE SODIUM 75 MCG: 75 TABLET ORAL at 09:15

## 2021-02-25 RX ADMIN — BUMETANIDE 2 MG: 0.25 INJECTION INTRAMUSCULAR; INTRAVENOUS at 09:16

## 2021-02-25 RX ADMIN — PANTOPRAZOLE SODIUM 40 MG: 40 TABLET, DELAYED RELEASE ORAL at 09:14

## 2021-02-25 RX ADMIN — RIVAROXABAN 20 MG: 20 TABLET, FILM COATED ORAL at 09:14

## 2021-02-25 RX ADMIN — DIGOXIN 125 MCG: 125 TABLET ORAL at 09:15

## 2021-02-25 RX ADMIN — HALOPERIDOL LACTATE 1 MG: 5 INJECTION, SOLUTION INTRAMUSCULAR at 01:06

## 2021-02-25 RX ADMIN — LORAZEPAM 0.25 MG: 0.5 TABLET ORAL at 20:15

## 2021-02-25 RX ADMIN — SILVER SULFADIAZINE: 10 CREAM TOPICAL at 10:23

## 2021-02-25 RX ADMIN — OLANZAPINE 2.5 MG: 5 TABLET, FILM COATED ORAL at 20:15

## 2021-02-25 RX ADMIN — IRON SUCROSE 200 MG: 20 INJECTION, SOLUTION INTRAVENOUS at 10:38

## 2021-02-25 RX ADMIN — DOCUSATE SODIUM 100 MG: 100 CAPSULE, LIQUID FILLED ORAL at 09:14

## 2021-02-26 LAB
ALBUMIN SERPL-MCNC: 2.4 G/DL (ref 3.5–5.2)
ALBUMIN/GLOB SERPL: 0.8 G/DL
ALP SERPL-CCNC: 527 U/L (ref 39–117)
ALT SERPL W P-5'-P-CCNC: 62 U/L (ref 1–33)
ANION GAP SERPL CALCULATED.3IONS-SCNC: 8.7 MMOL/L (ref 5–15)
AST SERPL-CCNC: 80 U/L (ref 1–32)
BILIRUB SERPL-MCNC: 0.6 MG/DL (ref 0–1.2)
BUN SERPL-MCNC: 38 MG/DL (ref 8–23)
BUN/CREAT SERPL: 22.9 (ref 7–25)
CALCIUM SPEC-SCNC: 8.5 MG/DL (ref 8.6–10.5)
CHLORIDE SERPL-SCNC: 101 MMOL/L (ref 98–107)
CO2 SERPL-SCNC: 27.3 MMOL/L (ref 22–29)
CREAT SERPL-MCNC: 1.66 MG/DL (ref 0.57–1)
DEPRECATED RDW RBC AUTO: 82.2 FL (ref 37–54)
ERYTHROCYTE [DISTWIDTH] IN BLOOD BY AUTOMATED COUNT: 28.1 % (ref 12.3–15.4)
GFR SERPL CREATININE-BSD FRML MDRD: 37 ML/MIN/1.73
GLOBULIN UR ELPH-MCNC: 2.9 GM/DL
GLUCOSE SERPL-MCNC: 112 MG/DL (ref 65–99)
HCT VFR BLD AUTO: 26.8 % (ref 34–46.6)
HGB BLD-MCNC: 8.5 G/DL (ref 12–15.9)
MCH RBC QN AUTO: 26.4 PG (ref 26.6–33)
MCHC RBC AUTO-ENTMCNC: 31.7 G/DL (ref 31.5–35.7)
MCV RBC AUTO: 83.2 FL (ref 79–97)
PLATELET # BLD AUTO: 199 10*3/MM3 (ref 140–450)
PMV BLD AUTO: ABNORMAL FL
POTASSIUM SERPL-SCNC: 4.4 MMOL/L (ref 3.5–5.2)
PROT SERPL-MCNC: 5.3 G/DL (ref 6–8.5)
RBC # BLD AUTO: 3.22 10*6/MM3 (ref 3.77–5.28)
SODIUM SERPL-SCNC: 137 MMOL/L (ref 136–145)
WBC # BLD AUTO: 6.63 10*3/MM3 (ref 3.4–10.8)

## 2021-02-26 PROCEDURE — 94799 UNLISTED PULMONARY SVC/PX: CPT

## 2021-02-26 PROCEDURE — 97110 THERAPEUTIC EXERCISES: CPT

## 2021-02-26 PROCEDURE — 25010000002 IRON SUCROSE PER 1 MG: Performed by: FAMILY MEDICINE

## 2021-02-26 PROCEDURE — 97535 SELF CARE MNGMENT TRAINING: CPT

## 2021-02-26 PROCEDURE — 80053 COMPREHEN METABOLIC PANEL: CPT | Performed by: FAMILY MEDICINE

## 2021-02-26 PROCEDURE — 85027 COMPLETE CBC AUTOMATED: CPT | Performed by: FAMILY MEDICINE

## 2021-02-26 RX ORDER — SODIUM CHLORIDE 9 MG/ML
50 INJECTION, SOLUTION INTRAVENOUS CONTINUOUS
Status: DISCONTINUED | OUTPATIENT
Start: 2021-02-26 | End: 2021-02-27

## 2021-02-26 RX ADMIN — PANTOPRAZOLE SODIUM 40 MG: 40 TABLET, DELAYED RELEASE ORAL at 06:05

## 2021-02-26 RX ADMIN — SILVER SULFADIAZINE: 10 CREAM TOPICAL at 08:22

## 2021-02-26 RX ADMIN — DOCUSATE SODIUM 100 MG: 100 CAPSULE, LIQUID FILLED ORAL at 20:55

## 2021-02-26 RX ADMIN — DOCUSATE SODIUM 100 MG: 100 CAPSULE, LIQUID FILLED ORAL at 09:12

## 2021-02-26 RX ADMIN — RIVAROXABAN 20 MG: 20 TABLET, FILM COATED ORAL at 09:12

## 2021-02-26 RX ADMIN — DIGOXIN 125 MCG: 125 TABLET ORAL at 09:12

## 2021-02-26 RX ADMIN — ASPIRIN 81 MG CHEWABLE TABLET 81 MG: 81 TABLET CHEWABLE at 09:12

## 2021-02-26 RX ADMIN — OLANZAPINE 2.5 MG: 5 TABLET, FILM COATED ORAL at 09:12

## 2021-02-26 RX ADMIN — LEVOTHYROXINE SODIUM 75 MCG: 75 TABLET ORAL at 06:05

## 2021-02-26 RX ADMIN — LORAZEPAM 0.25 MG: 0.5 TABLET ORAL at 09:12

## 2021-02-26 RX ADMIN — IRON SUCROSE 200 MG: 20 INJECTION, SOLUTION INTRAVENOUS at 12:10

## 2021-02-26 RX ADMIN — LORAZEPAM 0.25 MG: 0.5 TABLET ORAL at 20:55

## 2021-02-26 RX ADMIN — OLANZAPINE 2.5 MG: 5 TABLET, FILM COATED ORAL at 20:55

## 2021-02-26 RX ADMIN — SODIUM CHLORIDE 75 ML/HR: 9 INJECTION, SOLUTION INTRAVENOUS at 12:10

## 2021-02-26 RX ADMIN — TIMOLOL 1 DROP: 2.56 SOLUTION/ DROPS OPHTHALMIC at 09:13

## 2021-02-27 ENCOUNTER — APPOINTMENT (OUTPATIENT)
Dept: GENERAL RADIOLOGY | Facility: HOSPITAL | Age: 71
End: 2021-02-27

## 2021-02-27 LAB
D DIMER PPP FEU-MCNC: 2.14 MCGFEU/ML (ref 0–0.46)
NT-PROBNP SERPL-MCNC: 8979 PG/ML (ref 0–900)

## 2021-02-27 PROCEDURE — 83880 ASSAY OF NATRIURETIC PEPTIDE: CPT | Performed by: HOSPITALIST

## 2021-02-27 PROCEDURE — 25010000002 FUROSEMIDE PER 20 MG: Performed by: HOSPITALIST

## 2021-02-27 PROCEDURE — 99232 SBSQ HOSP IP/OBS MODERATE 35: CPT | Performed by: HOSPITALIST

## 2021-02-27 PROCEDURE — 25010000002 IRON SUCROSE PER 1 MG: Performed by: FAMILY MEDICINE

## 2021-02-27 PROCEDURE — 71045 X-RAY EXAM CHEST 1 VIEW: CPT

## 2021-02-27 PROCEDURE — 85379 FIBRIN DEGRADATION QUANT: CPT | Performed by: HOSPITALIST

## 2021-02-27 PROCEDURE — 97110 THERAPEUTIC EXERCISES: CPT

## 2021-02-27 RX ORDER — FUROSEMIDE 10 MG/ML
40 INJECTION INTRAMUSCULAR; INTRAVENOUS ONCE
Status: COMPLETED | OUTPATIENT
Start: 2021-02-27 | End: 2021-02-27

## 2021-02-27 RX ADMIN — DOCUSATE SODIUM 100 MG: 100 CAPSULE, LIQUID FILLED ORAL at 08:44

## 2021-02-27 RX ADMIN — TIMOLOL 1 DROP: 2.56 SOLUTION/ DROPS OPHTHALMIC at 08:44

## 2021-02-27 RX ADMIN — SODIUM CHLORIDE 75 ML/HR: 9 INJECTION, SOLUTION INTRAVENOUS at 12:46

## 2021-02-27 RX ADMIN — RIVAROXABAN 20 MG: 20 TABLET, FILM COATED ORAL at 08:44

## 2021-02-27 RX ADMIN — ASPIRIN 81 MG CHEWABLE TABLET 81 MG: 81 TABLET CHEWABLE at 08:43

## 2021-02-27 RX ADMIN — FUROSEMIDE 40 MG: 10 INJECTION, SOLUTION INTRAMUSCULAR; INTRAVENOUS at 13:21

## 2021-02-27 RX ADMIN — DOCUSATE SODIUM 100 MG: 100 CAPSULE, LIQUID FILLED ORAL at 20:47

## 2021-02-27 RX ADMIN — LORAZEPAM 0.25 MG: 0.5 TABLET ORAL at 08:44

## 2021-02-27 RX ADMIN — PANTOPRAZOLE SODIUM 40 MG: 40 TABLET, DELAYED RELEASE ORAL at 06:16

## 2021-02-27 RX ADMIN — OLANZAPINE 2.5 MG: 5 TABLET, FILM COATED ORAL at 20:47

## 2021-02-27 RX ADMIN — SILVER SULFADIAZINE: 10 CREAM TOPICAL at 08:47

## 2021-02-27 RX ADMIN — LORAZEPAM 0.25 MG: 0.5 TABLET ORAL at 20:47

## 2021-02-27 RX ADMIN — LEVOTHYROXINE SODIUM 75 MCG: 75 TABLET ORAL at 06:16

## 2021-02-27 RX ADMIN — IRON SUCROSE 200 MG: 20 INJECTION, SOLUTION INTRAVENOUS at 10:26

## 2021-02-27 RX ADMIN — OLANZAPINE 2.5 MG: 5 TABLET, FILM COATED ORAL at 08:44

## 2021-02-27 RX ADMIN — DIGOXIN 125 MCG: 125 TABLET ORAL at 08:44

## 2021-02-28 LAB — GLUCOSE BLDC GLUCOMTR-MCNC: 117 MG/DL (ref 70–130)

## 2021-02-28 PROCEDURE — 82962 GLUCOSE BLOOD TEST: CPT

## 2021-02-28 PROCEDURE — 25010000002 FUROSEMIDE PER 20 MG: Performed by: FAMILY MEDICINE

## 2021-02-28 RX ORDER — FUROSEMIDE 10 MG/ML
40 INJECTION INTRAMUSCULAR; INTRAVENOUS ONCE
Status: COMPLETED | OUTPATIENT
Start: 2021-02-28 | End: 2021-02-28

## 2021-02-28 RX ORDER — IRON POLYSACCHARIDE COMPLEX 150 MG
150 CAPSULE ORAL 2 TIMES DAILY
Status: DISCONTINUED | OUTPATIENT
Start: 2021-02-28 | End: 2021-03-02 | Stop reason: HOSPADM

## 2021-02-28 RX ADMIN — LEVOTHYROXINE SODIUM 75 MCG: 75 TABLET ORAL at 10:35

## 2021-02-28 RX ADMIN — PANTOPRAZOLE SODIUM 40 MG: 40 TABLET, DELAYED RELEASE ORAL at 10:35

## 2021-02-28 RX ADMIN — Medication 150 MG: at 20:18

## 2021-02-28 RX ADMIN — DOCUSATE SODIUM 100 MG: 100 CAPSULE, LIQUID FILLED ORAL at 20:19

## 2021-02-28 RX ADMIN — OLANZAPINE 2.5 MG: 5 TABLET, FILM COATED ORAL at 10:31

## 2021-02-28 RX ADMIN — LORAZEPAM 0.25 MG: 0.5 TABLET ORAL at 10:31

## 2021-02-28 RX ADMIN — LORAZEPAM 0.25 MG: 0.5 TABLET ORAL at 20:18

## 2021-02-28 RX ADMIN — TIMOLOL 1 DROP: 2.56 SOLUTION/ DROPS OPHTHALMIC at 10:34

## 2021-02-28 RX ADMIN — DIGOXIN 125 MCG: 125 TABLET ORAL at 10:32

## 2021-02-28 RX ADMIN — DOCUSATE SODIUM 100 MG: 100 CAPSULE, LIQUID FILLED ORAL at 10:31

## 2021-02-28 RX ADMIN — FUROSEMIDE 40 MG: 10 INJECTION, SOLUTION INTRAMUSCULAR; INTRAVENOUS at 14:16

## 2021-02-28 RX ADMIN — RIVAROXABAN 20 MG: 20 TABLET, FILM COATED ORAL at 10:32

## 2021-02-28 RX ADMIN — ASPIRIN 81 MG CHEWABLE TABLET 81 MG: 81 TABLET CHEWABLE at 10:31

## 2021-02-28 RX ADMIN — OLANZAPINE 2.5 MG: 5 TABLET, FILM COATED ORAL at 20:19

## 2021-02-28 RX ADMIN — SILVER SULFADIAZINE: 10 CREAM TOPICAL at 10:34

## 2021-03-01 LAB
ANION GAP SERPL CALCULATED.3IONS-SCNC: 6.7 MMOL/L (ref 5–15)
BUN SERPL-MCNC: 27 MG/DL (ref 8–23)
BUN/CREAT SERPL: 21.8 (ref 7–25)
CALCIUM SPEC-SCNC: 8.4 MG/DL (ref 8.6–10.5)
CHLORIDE SERPL-SCNC: 105 MMOL/L (ref 98–107)
CO2 SERPL-SCNC: 30.3 MMOL/L (ref 22–29)
CREAT SERPL-MCNC: 1.24 MG/DL (ref 0.57–1)
DEPRECATED RDW RBC AUTO: 85.3 FL (ref 37–54)
ERYTHROCYTE [DISTWIDTH] IN BLOOD BY AUTOMATED COUNT: 29.2 % (ref 12.3–15.4)
GFR SERPL CREATININE-BSD FRML MDRD: 52 ML/MIN/1.73
GLUCOSE SERPL-MCNC: 148 MG/DL (ref 65–99)
HCT VFR BLD AUTO: 28.8 % (ref 34–46.6)
HGB BLD-MCNC: 8.8 G/DL (ref 12–15.9)
MCH RBC QN AUTO: 27.5 PG (ref 26.6–33)
MCHC RBC AUTO-ENTMCNC: 30.6 G/DL (ref 31.5–35.7)
MCV RBC AUTO: 90 FL (ref 79–97)
PLATELET # BLD AUTO: 291 10*3/MM3 (ref 140–450)
PMV BLD AUTO: 11.6 FL (ref 6–12)
POTASSIUM SERPL-SCNC: 3.9 MMOL/L (ref 3.5–5.2)
RBC # BLD AUTO: 3.2 10*6/MM3 (ref 3.77–5.28)
SODIUM SERPL-SCNC: 142 MMOL/L (ref 136–145)
WBC # BLD AUTO: 8.34 10*3/MM3 (ref 3.4–10.8)

## 2021-03-01 PROCEDURE — 85027 COMPLETE CBC AUTOMATED: CPT | Performed by: FAMILY MEDICINE

## 2021-03-01 PROCEDURE — 80048 BASIC METABOLIC PNL TOTAL CA: CPT | Performed by: FAMILY MEDICINE

## 2021-03-01 PROCEDURE — 97110 THERAPEUTIC EXERCISES: CPT

## 2021-03-01 PROCEDURE — 97535 SELF CARE MNGMENT TRAINING: CPT

## 2021-03-01 RX ADMIN — OLANZAPINE 2.5 MG: 5 TABLET, FILM COATED ORAL at 08:56

## 2021-03-01 RX ADMIN — DOCUSATE SODIUM 100 MG: 100 CAPSULE, LIQUID FILLED ORAL at 08:55

## 2021-03-01 RX ADMIN — ASPIRIN 81 MG CHEWABLE TABLET 81 MG: 81 TABLET CHEWABLE at 08:55

## 2021-03-01 RX ADMIN — SILVER SULFADIAZINE: 10 CREAM TOPICAL at 08:58

## 2021-03-01 RX ADMIN — Medication 150 MG: at 20:25

## 2021-03-01 RX ADMIN — LORAZEPAM 0.25 MG: 0.5 TABLET ORAL at 20:25

## 2021-03-01 RX ADMIN — LORAZEPAM 0.25 MG: 0.5 TABLET ORAL at 08:55

## 2021-03-01 RX ADMIN — DIGOXIN 125 MCG: 125 TABLET ORAL at 08:55

## 2021-03-01 RX ADMIN — PANTOPRAZOLE SODIUM 40 MG: 40 TABLET, DELAYED RELEASE ORAL at 06:10

## 2021-03-01 RX ADMIN — DOCUSATE SODIUM 100 MG: 100 CAPSULE, LIQUID FILLED ORAL at 20:25

## 2021-03-01 RX ADMIN — RIVAROXABAN 20 MG: 20 TABLET, FILM COATED ORAL at 08:55

## 2021-03-01 RX ADMIN — Medication 150 MG: at 08:55

## 2021-03-01 RX ADMIN — LEVOTHYROXINE SODIUM 75 MCG: 75 TABLET ORAL at 06:09

## 2021-03-01 RX ADMIN — OLANZAPINE 2.5 MG: 5 TABLET, FILM COATED ORAL at 20:25

## 2021-03-01 RX ADMIN — TIMOLOL 1 DROP: 2.56 SOLUTION/ DROPS OPHTHALMIC at 08:58

## 2021-03-01 NOTE — PLAN OF CARE
Goal Outcome Evaluation:  Plan of Care Reviewed With: patient     Outcome Summary: PT: Patient requires max assist x2 for supine to sit transfers and stand pivot transfers from bed to BSC and recliner.  Patient with significant posterior lean and difficulty sequencing activity during functional tasks.  Patient requires repeated verbal cues throughout session with poor carryover noted between tasks.  Patient's overall mobility has been inconsistent over the past week with significant assistance required.  Continue to recommend ECF at discharge.

## 2021-03-01 NOTE — PLAN OF CARE
Goal Outcome Evaluation:  Plan of Care Reviewed With: patient  Progress: improving  Outcome Summary: Patient up to chair and BSC today. Telemetry notes a fib. Tolerating diet. Continue calorie count. Purewick in place when in bed. BLE dressing changes done today. No complaints of pain, nausea, or shortness of breath. Frequent weight shift assistance provided. Patient was drowsy this morning, but has been alert and orientated since breakfast.

## 2021-03-01 NOTE — NURSING NOTE
Continued Stay Note  MALLORY Asher     Patient Name: Argelia Nguyen  MRN: [id number]  Today's Date: 3/1/2021    Admit Date: 2/17/2021    Discharge Plan     Row Name 03/01/21 1234       Plan    Plan  Discharge to [facility]    Patient/Family in Agreement with Plan  yes    Plan Comments  I spoke with the patient in her room with her permission.  The patient was sitting up in the chair, she was alert and pleasant.  We discussed her discharge plan.  The patient will discharge to [facility] for short term rehab and she is agreeable to that plan.  I then spoke with Matilde from [facility] and she advised that they still have a bed available to her when she is discharged.  CM will continue to follow.        Discharge Codes    No documentation.             Beronica Paul RN

## 2021-03-01 NOTE — PLAN OF CARE
Goal Outcome Evaluation:        Outcome Summary: Patient alert and oriented at beginning of shift. Increasingly confused and groggy in the AM. Pt unable to void overnight. Bladder scan showed 318 in bladder. Afib on telemetry.

## 2021-03-01 NOTE — PLAN OF CARE
Goal Outcome Evaluation:  Plan of Care Reviewed With: patient     Outcome Summary: OT: Pt continues with significant posterior lean when standing or attempting transfers which impacts her safety. Pt needs verbal cues to initiate and continue with tasks. Plan is to transfer to ECF upon discharge.

## 2021-03-01 NOTE — PROGRESS NOTES
Adult Nutrition  Assessment/PES    Patient Name:  Argelia Nguyen  YOB: 1950  MRN: [id number]  Admit Date:  2/17/2021    Assessment Date:  3/1/2021    Comments:  Yesterday pt met 85% ave of kcal/meal and 100% ave pro/meal .Improving intake.      Reason for Assessment     Row Name 03/01/21 1233          Reason for Assessment    Reason For Assessment  calorie count order         Nutrition/Diet History     Row Name 03/01/21 1233          Nutrition/Diet History    Typical Food/Fluid Intake  Pt asleep at tray this am, then asleep in chair later at visit. Per Rounds improved from Friday.         Anthropometrics     Row Name 03/01/21 0522          Anthropometrics    Weight  57.4 kg (126 lb 9.6 oz)             Diet: Soft Chopped Cardiac with Boost glucose Control with meals     Estimated needs: 1434 kcal, 49-59 g protein      Day 1: 1,344 kcals, 56 g protein ( 3 meals recorded)   Day 2: 204 kcal, 9 gm pro ( 1 meal , 1 supplement)   Day 3: 95 kcals, 1 g protein (3 meals)  Day 4: 1060 kcal, 42 gm pro ( 2 meals, 2 supplements)   Day 5: 270 kcal , 11 gm pro ( 1 meal)   Day 6:  713 kcal,  32 gm pro ( 2 meals, 2 supplements)   Day 7: 800 kcal, 35 gm pro ( 2 meal, 2 supplement)                   Electronically signed by:  Jo Ann Ryan RD  03/01/21 12:36 EST
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
## 2021-03-01 NOTE — THERAPY TREATMENT NOTE
Patient Name: Argelia Nguyen  : 1950    MRN: [id number]                              Today's Date: 3/1/2021       Admit Date: 2021    Visit Dx:     ICD-10-CM ICD-9-CM   1. Hypothermia, initial encounter  T68.XXXA 991.6   2. Altered mental status, unspecified altered mental status type  R41.82 780.97   3. Acute renal failure, unspecified acute renal failure type (CMS/HCC)  N17.9 584.9   4. Elevated lactic acid level  R79.89 276.2   5. Cellulitis of right lower extremity  L03.115 682.6   6. Hypotension, unspecified hypotension type  I95.9 458.9     Patient Active Problem List   Diagnosis   • SVT (supraventricular tachycardia) (CMS/HCC)   • Abnormal laboratory test result   • Dysphagia   • Gastroesophageal reflux disease   • Leukopenia   • Monoclonal gammopathy   • Neutropenia (CMS/HCC)   • Normocytic anemia   • Hiatal hernia   • Colon polyps   • Internal hemorrhoids   • Paroxysmal atrial fibrillation (CMS/HCC)   • Chronic anticoagulation   • Nonrheumatic mitral valve regurgitation   • Esophageal dysphagia   • Personal history of colonic polyps   • Acute diastolic CHF (congestive heart failure) (CMS/HCC)   • Hypothermia   • Severe malnutrition (CMS/HCC)   • Longstanding persistent atrial fibrillation (CMS/HCC)   • Dysarthria     Past Medical History:   Diagnosis Date   • Anxiety    • Chronic anticoagulation 2019   • Colon polyp    • Diabetes mellitus (CMS/HCC)    • Dysphagia    • Fatigue    • GERD (gastroesophageal reflux disease)    • Hypertension    • Nonrheumatic mitral valve regurgitation 3/27/2019   • Paroxysmal atrial fibrillation (CMS/HCC) 2019   • Persistent atrial fibrillation (CMS/HCC) 2019   • Reflux gastritis      Past Surgical History:   Procedure Laterality Date   • COLONOSCOPY N/A 3/29/2017    Procedure: COLONOSCOPY; POLYPECTOMY;  Surgeon: Brooke Garrido MD;  Location: [facility];  Service:    • COLONOSCOPY N/A 8/3/2020    Procedure: COLONOSCOPY with polypectomy;  Surgeon:  Rui Shi MD;  Location:  [facility];  Service: Gastroenterology;  Laterality: N/A;  ascending polyp  transverse polyp  rectal polyp   • CYST REMOVAL     • ENDOSCOPY N/A 3/29/2017    Procedure: ESOPHAGOGASTRODUODENOSCOPY WITH DILITATION;  Surgeon: Brooke Garrido MD;  Location:  [facility];  Service:    • ENDOSCOPY N/A 8/3/2020    Procedure: ESOPHAGOGASTRODUODENOSCOPY with biopsies;  Surgeon: Rui Shi MD;  Location:  [facility];  Service: Gastroenterology;  Laterality: N/A;  esophagitis  gastritis   • HERNIA REPAIR     • HYSTERECTOMY       General Information    No documentation.         Mobility/ADL's     Row Name 03/01/21 1254          Bed Mobility    Supine-Sit [place] (Bed Mobility)  maximum assist (25% patient effort);2 person assist;verbal cues  -SD     Bed Mobility, Safety Issues  cognitive deficits limit understanding;decreased use of arms for pushing/pulling;decreased use of legs for bridging/pushing  -SD     Assistive Device (Bed Mobility)  bed rails;draw sheet;head of bed elevated  -SD     Row Name 03/01/21 1254          Transfers    Comment (Transfers)  Pt performed a stand pivot transfer from bed to BSC and from BSC to recliner. Max A x 2 for stand pivot transfers (pt with significant posterior lean).  -SD     Bed-Chair [place] (Transfers)  maximum assist (25% patient effort);2 person assist;verbal cues  -SD     Assistive Device (Bed-Chair Transfers)  walker, front-wheeled  -SD     Sit-Stand [place] (Transfers)  maximum assist (25% patient effort);2 person assist;verbal cues  -SD     Row Name 03/01/21 1254          Sit-Stand Transfer    Assistive Device (Sit-Stand Transfers)  walker, front-wheeled  -SD     Row Name 03/01/21 1254          Functional Mobility    Functional Mobility- Comment  Pt not able to perform functional mobility activity safely due to posterior lean.  -SD     Row Name 03/01/21 1254          Activities of Daily Living    BADL  Assessment/Intervention  upper body dressing;toileting  -SD     Row Name 03/01/21 1254          Upper Body Dressing Assessment/Training    [place] Level (Upper Body Dressing)  upper body dressing skills;doff;don;front opening garment;maximum assist (25% patient effort)  -SD     Position (Upper Body Dressing)  supported sitting  -SD     Row Name 03/01/21 1254          Toileting Assessment/Training    [place] Level (Toileting)  toileting skills;perform perineal hygiene;minimum assist (75% patient effort);verbal cues  -SD     Assistive Devices (Toileting)  commode, 3-in-1  -SD     Position (Toileting)  supported sitting  -SD       User Key  (r) = Recorded By, (t) = Taken By, (c) = Cosigned By    Initials Name Provider Type    Barry Hernandez, OTR Occupational Therapist        Obj/Interventions    No documentation.       Goals/Plan    No documentation.       Clinical Impression     Row Name 03/01/21 1301          Plan of Care Review    Plan of Care Reviewed With  patient  -SD     Outcome Summary  OT: Pt continues with significant posterior lean when standing or attempting transfers which impacts her safety. Pt needs verbal cues to initiate and continue with tasks. Plan is to transfer to [facility] upon discharge.  -SD     Row Name 03/01/21 1301          Therapy Assessment/Plan (OT)    Therapy Frequency (OT)  3 times/wk  -SD     Row Name 03/01/21 1301          Therapy Plan Review/Discharge Plan (OT)    Anticipated Discharge Disposition (OT)  extended care facility  -SD     Row Name 03/01/21 1301          Positioning and Restraints    Pre-Treatment Position  in bed  -SD     Post Treatment Position  chair  -SD     In Chair  notified nsg;call light within reach;encouraged to call for assist;exit alarm on;reclined  -SD       User Key  (r) = Recorded By, (t) = Taken By, (c) = Cosigned By    Initials Name Provider Type    Barry Hernandez OTR Occupational Therapist        Outcome Measures     Row Name 03/01/21  1303          How much help from another is currently needed...    Putting on and taking off regular lower body clothing?  2  -SD     Bathing (including washing, rinsing, and drying)  2  -SD     Toileting (which includes using toilet bed pan or urinal)  2  -SD     Putting on and taking off regular upper body clothing  2  -SD     Taking care of personal grooming (such as brushing teeth)  3  -SD     Eating meals  3  -SD     AM-PAC 6 Clicks Score (OT)  14  -SD     Row Name 03/01/21 1303          Functional Assessment    Outcome Measure Options  AM-PAC 6 Clicks Daily Activity (OT)  -SD       User Key  (r) = Recorded By, (t) = Taken By, (c) = Cosigned By    Initials Name Provider Type    Barry Hernandez OTPAU Occupational Therapist        Occupational Therapy Education                 Title: PT OT SLP Therapies (In Progress)     Topic: Occupational Therapy (In Progress)     Point: ADL training (In Progress)     Description:   Instruct learner(s) on proper safety adaptation and remediation techniques during self care or transfers.   Instruct in proper use of assistive devices.              Learning Progress Summary           Patient Acceptance, E,TB,D, NR by SD at 3/1/2021 1304    Comment: Education regarding OT services, benefits of activity and safety with transfers/mobility. Pt with significant posterior lean when standing and during transfers which decreases her safety.    Acceptance, E,TB,D, VU,NR by SD at 2/26/2021 1024    Comment: Education provided regarding safety with transfers/mobility. Pt with poor postural control today with posterior lean with standing activity. Rec stand pivot transfer this am for safety. Nursing notified.    Acceptance, E,TB,D, VU,DU by SD at 2/25/2021 1146    Comment: Education regarding OT services, benefits of activity and safety with transfers/mobility.  Pt needs direct support/supervision for transfers/mobility and adl tasks for safety.    Acceptance, E,TB,D, VU,NR by SD at  2/23/2021 1133    Comment: Education with benefits of activity. Pt had just returned to bed after sitting up most of am. Pt agreeable to BUE rom program. Pt needed verbal/tactile cues. Pt demonstrated functional rom of BUE.    Acceptance, E, VU by EN at 2/22/2021 1025    Comment: safety during transfers and edema management for hands    Acceptance, E,TB,D, NR by SD at 2/19/2021 1117    Comment: Education regarding OT services, benefits of activity and safety with transfers/mobility.    Acceptance, E, NL by SD at 2/18/2021 1126    Comment: Education provided regarding OT services, benefits of activity and safety with transfers. Pt is confused.                               User Key     Initials Effective Dates Name Provider Type Discipline    EN 07/05/20 -  Carmen Duarte OTR Occupational Therapist OT    SD 07/05/20 -  Barry Aguirre OTR Occupational Therapist OT              OT Recommendation and Plan  Planned Therapy Interventions (OT): BADL retraining, transfer/mobility retraining, ROM/therapeutic exercise  Therapy Frequency (OT): 3 times/wk  Plan of Care Review  Plan of Care Reviewed With: patient  Outcome Summary: OT: Pt continues with significant posterior lean when standing or attempting transfers which impacts her safety. Pt needs verbal cues to initiate and continue with tasks. Plan is to transfer to [facility] upon discharge.     Time Calculation:   Time Calculation- OT     Row Name 03/01/21 1253             Time Calculation- OT    OT Start Time  1024  -SD      OT Stop Time  1050  -SD      OT Time Calculation (min)  26 min  -SD         Timed Charges    60987 - OT Self Care/Mgmt Minutes  26  -SD        User Key  (r) = Recorded By, (t) = Taken By, (c) = Cosigned By    Initials Name Provider Type    SD Barry Aguirre OTPAU Occupational Therapist        Therapy Charges for Today     Code Description Service Date Service Provider Modifiers Qty    [id number] HC OT SELF CARE/MGMT/TRAIN EA 15 MIN 3/1/2021  Ilsar, Barry LATHAM, OTR GO 2               Barry Aguirre, OTR  3/1/2021

## 2021-03-01 NOTE — THERAPY TREATMENT NOTE
Acute Care - Physical Therapy Treatment Note   Liana Hernández     Patient Name: Argelia Nguyen  : 1950  MRN: [id number]  Today's Date: 3/1/2021   Onset of Illness/Injury or Date of Surgery: 21       PT Assessment (last 12 hours)      PT Evaluation and Treatment     Row Name 21 1024          Physical Therapy Time and Intention    Subjective Information  complains of;fatigue  -     Document Type  therapy note (daily note)  -     Mode of Treatment  physical therapy  -     Patient Effort  poor  -     Comment  posterior lean in standing, difficulty following directions  -     Row Name 21 1024          General Information    Patient/Family/Caregiver Comments/Observations  pt supine, agrees to therapy with encouragement  -     Existing Precautions/Restrictions  fall confusion within task  -     Row Name 21 1024          Cognition    Personal Safety Interventions  gait belt;nonskid shoes/slippers when out of bed  -     Row Name 21 1024          Pain Scale: Word Pre/Post-Treatment    Pre/Posttreatment Pain Comment  pt c/o right lower abdominal pain, at beginning of session, reports pain improved following bowel movement  -     Row Name 21 1024          Bed Mobility    Bed Mobility  supine-sit  -     Supine-Sit [place] (Bed Mobility)  maximum assist (25% patient effort);2 person assist;verbal cues  -     Row Name 21 1024          Transfers    Transfers  sit-stand transfer;stand-sit transfer  -     Comment (Transfers)  verbal cues for hand placement and sequencing  -     Bed-Chair [place] (Transfers)  maximum assist (25% patient effort);2 person assist;verbal cues  -     Assistive Device (Bed-Chair Transfers)  walker, front-wheeled  -     Sit-Stand [place] (Transfers)  maximum assist (25% patient effort);2 person assist;verbal cues  -     Stand-Sit [place] (Transfers)  maximum assist (25% patient effort);2 person assist;verbal  cues  -JW     Row Name 03/01/21 1024          Sit-Stand Transfer    Assistive Device (Sit-Stand Transfers)  walker, front-wheeled  -TENZIN     Row Name 03/01/21 1024          Stand-Sit Transfer    Assistive Device (Stand-Sit Transfers)  walker, front-wheeled  -TENZIN     Row Name 03/01/21 1024          Stand Pivot/Stand Step Transfer    Stand Pivot/Stand Step [place] (Transfers)  maximum assist (25% patient effort);2 person assist;verbal cues  -     Assistive Device (Stand Pivot Stand Step Transfer)  walker, front-wheeled  -TENZIN     Row Name 03/01/21 1024          Gait/Stairs (Locomotion)    Comment (Gait/Stairs)  pt unable to safely attempt gait at this time  -     Row Name 03/01/21 1024          Safety Issues, Functional Mobility    Safety Issues Affecting Function (Mobility)  insight into deficits/self-awareness;judgment;positioning of assistive device;ability to follow commands  -     Impairments Affecting Function (Mobility)  balance;cognition  -     Row Name 03/01/21 1024          Balance    Comment, Balance  significant posterior lean in standing  -     Row Name             Wound 02/17/21 1735 Left forehead Abrasion    Wound - Properties Group Placement Date: 02/17/21 -YL Placement Time: 1735 -YL Present on Hospital Admission: Y  -YL Side: Left  -YL Location: forehead  -YL Primary Wound Type: Abrasion  -YL    Retired Wound - Properties Group Date first assessed: 02/17/21 -YL Time first assessed: 1735 -YL Present on Hospital Admission: Y  -YL Side: Left  -YL Location: forehead  -YL Primary Wound Type: Abrasion  -YL    Row Name             Wound 02/17/21 1735 Left lower leg Skin Tear    Wound - Properties Group Placement Date: 02/17/21  -YL Placement Time: 1735  -YL Present on Hospital Admission: Y  -YL Side: Left  -YL Orientation: lower  -YL Location: leg  -YL Primary Wound Type: Skin tear  -YL    Retired Wound - Properties Group Date first assessed: 02/17/21 -YL Time first assessed: 1735 -YL  Present on Hospital Admission: Y  -YL Side: Left  -YL Location: leg  -YL Primary Wound Type: Skin tear  -YL    Row Name             Wound 02/17/21 1735 Right lower leg Pressure Injury    Wound - Properties Group Placement Date: 02/17/21  -YL Placement Time: 1735  -YL Present on Hospital Admission: Y  -YL Side: Right  -YL Orientation: lower  -YL Location: leg  -YL Primary Wound Type: Pressure inj  -YL Stage, Pressure Injury : unstageable  -YL    Retired Wound - Properties Group Date first assessed: 02/17/21  -YL Time first assessed: 1735 -YL Present on Hospital Admission: Y  -YL Side: Right  -YL Location: leg  -YL Primary Wound Type: Pressure inj  -YL    Row Name             Wound 02/17/21 2000 Bilateral posterior hand Other (comment)    Wound - Properties Group Placement Date: 02/17/21  -MD Placement Time: 2000  -MD Present on Hospital Admission: Y  -MD Side: Bilateral  -MD Orientation: posterior  -MD Location: hand  -MD Primary Wound Type: Other  -MD    Retired Wound - Properties Group Date first assessed: 02/17/21  -MD Time first assessed: 2000  -MD Present on Hospital Admission: Y  -MD Side: Bilateral  -MD Location: hand  -MD Primary Wound Type: Other  -MD    Row Name             Wound 02/20/21 1445 coccyx    Wound - Properties Group Placement Date: 02/20/21  -AD Placement Time: 1445 -AD Location: coccyx  -AD Stage, Pressure Injury : Stage 2  -AD    Retired Wound - Properties Group Date first assessed: 02/20/21  -AD Time first assessed: 1445 -AD Location: coccyx  -AD    Row Name 03/01/21 1024          Plan of Care Review    Outcome Summary  PT: Patient requires max assist x2 for supine to sit transfers and stand pivot transfers from bed to BSC and recliner.  Patient with significant posterior lean and difficulty sequencing activity during functional tasks.  Patient requires repeated verbal cues throughout session with poor carryover noted between tasks.  Patient's overall mobility has been inconsistent over  the past week with significant assistance required.  Continue to recommend ECF at discharge.  -     Row Name 03/01/21 1024          Positioning and Restraints    Pre-Treatment Position  in bed  -     Post Treatment Position  chair  -JW     In Chair  reclined;call light within reach;encouraged to call for assist;notified nsg  -     Row Name 03/01/21 1024          Progress Summary (PT)    Progress Toward Functional Goals (PT)  unable to show any progress toward functional goals  -     Barriers to Overall Progress (PT)  cognition, medical status  -       User Key  (r) = Recorded By, (t) = Taken By, (c) = Cosigned By    Initials Name Provider Type    Betty Dill, RN Registered Nurse    Evelin Kramer, ARLEN Physical Therapist    Beronica Maria, RN Registered Nurse    Sandy Hobbs RN Registered Nurse        Physical Therapy Education                 Title: PT OT SLP Therapies (In Progress)     Topic: Physical Therapy (In Progress)     Point: Mobility training (In Progress)     Learning Progress Summary           Patient Acceptance, E,TB, NR by  at 3/1/2021 1215    Acceptance, E, NR,NL by NIKHIL at 2/27/2021 1240    Acceptance, E,TB, NR by TENZIN at 2/26/2021 1044    Acceptance, E,TB, NR by TENZIN at 2/25/2021 1140    Acceptance, E,TB, VU by TENZIN at 2/24/2021 0928    Acceptance, E,TB, VU,NR by TENZIN at 2/23/2021 1411    Acceptance, E,TB, VU,NR by TENZIN at 2/22/2021 0949    Acceptance, E, NR,VU by HANNAH at 2/20/2021 1031    Acceptance, E,TB, VU by  at 2/19/2021 1351    Acceptance, E,TB, NR by TENZIN at 2/19/2021 1351    Acceptance, E,TB, NR by  at 2/18/2021 1147                   Point: Home exercise program (In Progress)     Learning Progress Summary           Patient Acceptance, E,TB, NR by TENZIN at 2/25/2021 1140    Acceptance, E,TB, VU by  at 2/24/2021 0928    Acceptance, E, NR,VU by HANNAH at 2/20/2021 1031                               User Key     Initials Effective Dates Name Provider Type Discipline      04/03/18 -  Evelin Chin, PT Physical Therapist PT    EA 06/12/16 -  Haylie Adams PTA Physical Therapy Assistant PT    J 12/04/20 -  Dayan Xiao, PT Physical Therapist PT              PT Recommendation and Plan  Anticipated Discharge Disposition (PT): extended care facility  Planned Therapy Interventions (PT): balance training, bed mobility training, gait training, home exercise program, patient/family education, strengthening, transfer training  Therapy Frequency (PT): other (see comments)  Progress Summary (PT)  Progress Toward Functional Goals (PT): unable to show any progress toward functional goals  Barriers to Overall Progress (PT): cognition, medical status  Plan of Care Reviewed With: patient  Outcome Summary: PT: Patient requires max assist x2 for supine to sit transfers and stand pivot transfers from bed to BSC and recliner.  Patient with significant posterior lean and difficulty sequencing activity during functional tasks.  Patient requires repeated verbal cues throughout session with poor carryover noted between tasks.  Patient's overall mobility has been inconsistent over the past week with significant assistance required.  Continue to recommend ECF at discharge.  Outcome Measures     Row Name 03/01/21 1024 02/27/21 0940          How much help from another person do you currently need...    Turning from your back to your side while in flat bed without using bedrails?  1  -JW  1  -JV     Moving from lying on back to sitting on the side of a flat bed without bedrails?  1  -JW  1  -JV     Moving to and from a bed to a chair (including a wheelchair)?  1  -JW  1  -JV     Standing up from a chair using your arms (e.g., wheelchair, bedside chair)?  1  -JW  1  -JV     Climbing 3-5 steps with a railing?  1  -JW  1  -JV     To walk in hospital room?  1  -JW  1  -JV     AM-PAC 6 Clicks Score (PT)  6  -JW  6  -JV        Functional Assessment    Outcome Measure Options  AM-PAC 6 Clicks Basic Mobility (PT)   -JW  AM-PAC 6 Clicks Basic Mobility (PT)  -NIKHIL       User Key  (r) = Recorded By, (t) = Taken By, (c) = Cosigned By    Initials Name Provider Type    Evelin Kramer, ARLEN Physical Therapist    Dayan Neff, ARLEN Physical Therapist           Time Calculation:   PT Charges     Row Name 03/01/21 1216             Time Calculation    Start Time  1024  -      Stop Time  1050  -      Time Calculation (min)  26 min  -      PT Received On  03/01/21  -TENZIN      PT - Next Appointment  03/02/21  -         Timed Charges    70370 - PT Therapeutic Exercise Minutes  26  -        User Key  (r) = Recorded By, (t) = Taken By, (c) = Cosigned By    Initials Name Provider Type    Evelin Kramer, PT Physical Therapist        Therapy Charges for Today     Code Description Service Date Service Provider Modifiers Qty    43141855394 HC PT THER PROC EA 15 MIN 3/1/2021 Evelin Chin, PT GP 2          PT G-Codes  Outcome Measure Options: AM-PAC 6 Clicks Basic Mobility (PT)  AM-PAC 6 Clicks Score (PT): 6  AM-PAC 6 Clicks Score (OT): 15    Evelin Chin PT  3/1/2021

## 2021-03-02 VITALS
DIASTOLIC BLOOD PRESSURE: 74 MMHG | BODY MASS INDEX: 20.99 KG/M2 | OXYGEN SATURATION: 99 % | SYSTOLIC BLOOD PRESSURE: 117 MMHG | RESPIRATION RATE: 18 BRPM | HEART RATE: 83 BPM | TEMPERATURE: 97 F | HEIGHT: 66 IN | WEIGHT: 130.6 LBS

## 2021-03-02 LAB
ANION GAP SERPL CALCULATED.3IONS-SCNC: 7.1 MMOL/L (ref 5–15)
BUN SERPL-MCNC: 20 MG/DL (ref 8–23)
BUN/CREAT SERPL: 19.4 (ref 7–25)
CALCIUM SPEC-SCNC: 8.2 MG/DL (ref 8.6–10.5)
CHLORIDE SERPL-SCNC: 105 MMOL/L (ref 98–107)
CO2 SERPL-SCNC: 28.9 MMOL/L (ref 22–29)
CREAT SERPL-MCNC: 1.03 MG/DL (ref 0.57–1)
DEPRECATED RDW RBC AUTO: 84.8 FL (ref 37–54)
ERYTHROCYTE [DISTWIDTH] IN BLOOD BY AUTOMATED COUNT: 29.8 % (ref 12.3–15.4)
GFR SERPL CREATININE-BSD FRML MDRD: 64 ML/MIN/1.73
GLUCOSE SERPL-MCNC: 109 MG/DL (ref 65–99)
HCT VFR BLD AUTO: 28.8 % (ref 34–46.6)
HGB BLD-MCNC: 9.1 G/DL (ref 12–15.9)
MCH RBC QN AUTO: 28.2 PG (ref 26.6–33)
MCHC RBC AUTO-ENTMCNC: 31.6 G/DL (ref 31.5–35.7)
MCV RBC AUTO: 89.2 FL (ref 79–97)
PLATELET # BLD AUTO: 316 10*3/MM3 (ref 140–450)
PMV BLD AUTO: 11.7 FL (ref 6–12)
POTASSIUM SERPL-SCNC: 3.7 MMOL/L (ref 3.5–5.2)
RBC # BLD AUTO: 3.23 10*6/MM3 (ref 3.77–5.28)
SARS-COV-2 RNA PNL SPEC NAA+PROBE: NOT DETECTED
SODIUM SERPL-SCNC: 141 MMOL/L (ref 136–145)
THYROGLOB SERPL-MCNC: 24 NG/ML
WBC # BLD AUTO: 8.07 10*3/MM3 (ref 3.4–10.8)

## 2021-03-02 PROCEDURE — 87635 SARS-COV-2 COVID-19 AMP PRB: CPT | Performed by: FAMILY MEDICINE

## 2021-03-02 PROCEDURE — 02HV33Z INSERTION OF INFUSION DEVICE INTO SUPERIOR VENA CAVA, PERCUTANEOUS APPROACH: ICD-10-PCS | Performed by: FAMILY MEDICINE

## 2021-03-02 PROCEDURE — 80048 BASIC METABOLIC PNL TOTAL CA: CPT | Performed by: FAMILY MEDICINE

## 2021-03-02 PROCEDURE — 85027 COMPLETE CBC AUTOMATED: CPT | Performed by: FAMILY MEDICINE

## 2021-03-02 RX ORDER — PSEUDOEPHEDRINE HCL 30 MG
100 TABLET ORAL 2 TIMES DAILY
Start: 2021-03-02 | End: 2021-01-01

## 2021-03-02 RX ORDER — OLANZAPINE 2.5 MG/1
2.5 TABLET ORAL 2 TIMES DAILY
Start: 2021-03-02 | End: 2021-01-01 | Stop reason: HOSPADM

## 2021-03-02 RX ORDER — IRON POLYSACCHARIDE COMPLEX 150 MG
150 CAPSULE ORAL 2 TIMES DAILY
Start: 2021-03-02 | End: 2021-01-01

## 2021-03-02 RX ORDER — ACETAMINOPHEN 325 MG/1
650 TABLET ORAL EVERY 6 HOURS PRN
Start: 2021-03-02 | End: 2021-01-01

## 2021-03-02 RX ORDER — LORAZEPAM 0.5 MG/1
0.25 TABLET ORAL EVERY 12 HOURS SCHEDULED
Qty: 6 TABLET | Refills: 0 | Status: SHIPPED | OUTPATIENT
Start: 2021-03-02 | End: 2021-03-04

## 2021-03-02 RX ORDER — ASPIRIN 81 MG/1
81 TABLET, CHEWABLE ORAL DAILY
Start: 2021-03-02 | End: 2022-01-01 | Stop reason: HOSPADM

## 2021-03-02 RX ORDER — LEVOTHYROXINE SODIUM 0.07 MG/1
75 TABLET ORAL DAILY
Start: 2021-03-02 | End: 2022-01-01 | Stop reason: HOSPADM

## 2021-03-02 RX ADMIN — TIMOLOL 1 DROP: 2.56 SOLUTION/ DROPS OPHTHALMIC at 08:21

## 2021-03-02 RX ADMIN — DOCUSATE SODIUM 100 MG: 100 CAPSULE, LIQUID FILLED ORAL at 08:20

## 2021-03-02 RX ADMIN — OLANZAPINE 2.5 MG: 5 TABLET, FILM COATED ORAL at 08:20

## 2021-03-02 RX ADMIN — Medication 150 MG: at 08:20

## 2021-03-02 RX ADMIN — LORAZEPAM 0.25 MG: 0.5 TABLET ORAL at 08:20

## 2021-03-02 RX ADMIN — PANTOPRAZOLE SODIUM 40 MG: 40 TABLET, DELAYED RELEASE ORAL at 08:20

## 2021-03-02 RX ADMIN — LEVOTHYROXINE SODIUM 75 MCG: 75 TABLET ORAL at 05:26

## 2021-03-02 RX ADMIN — ASPIRIN 81 MG CHEWABLE TABLET 81 MG: 81 TABLET CHEWABLE at 08:20

## 2021-03-02 RX ADMIN — SILVER SULFADIAZINE: 10 CREAM TOPICAL at 08:21

## 2021-03-02 RX ADMIN — DIGOXIN 125 MCG: 125 TABLET ORAL at 08:20

## 2021-03-02 RX ADMIN — RIVAROXABAN 20 MG: 20 TABLET, FILM COATED ORAL at 08:20

## 2021-03-02 NOTE — NURSING NOTE
Continued Stay Note  MALLORY Asher     Patient Name: Argelia Nguyen  MRN: [id number]  Today's Date: 3/2/2021    Admit Date: 2/17/2021    Discharge Plan     Row Name 03/02/21 0818       Plan    Plan  Discharge to [facility]    Plan Comments  I spoke with Matilde from [facility] and advised her that the patient is being discharged today. She advised that patient will need covid test prior to discharging.  SAIRA Langston notified.  CM will continue to follow.        Discharge Codes    No documentation.       Expected Discharge Date and Time     Expected Discharge Date Expected Discharge Time    Mar 2, 2021             Beronica Paul RN

## 2021-03-02 NOTE — NURSING NOTE
Continued Stay Note  MALLORY Asher     Patient Name: Argelia Nguyen  MRN: [id number]  Today's Date: 3/2/2021    Admit Date: 2/17/2021    Discharge Plan     Row Name 03/02/21 0954       Plan    Plan Comments  I spoke with the patients brother on the phone and he will come pick his sister up and bring her to [place] upon discharge.  IMM updated. CM will continue to follow.    Row Name 03/02/21 0818       Plan    Plan  Discharge to [facility]    Plan Comments  I spoke with Matilde from [facility] and advised her that the patient is being discharged today. She advised that patient will need covid test prior to discharging.  SAIRA Langston notified.  CM will continue to follow.        Discharge Codes    No documentation.       Expected Discharge Date and Time     Expected Discharge Date Expected Discharge Time    Mar 2, 2021             Beronica Paul RN
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
## 2021-03-02 NOTE — PROGRESS NOTES
Adult Nutrition  Assessment/PES    Patient Name:  Argelia Nguyen  YOB: 1950  MRN: [id number]  Admit Date:  2/17/2021    Assessment Date:  3/2/2021    Comments: Past 3 days Ave 347 kcal/meal ( 73%) and 5 gm/meal ( 28% pro)   Recommend: Continue oral supplement to aid with meeting estimated needs at transfer     Reason for Assessment     Row Name 03/02/21 1003          Reason for Assessment    Reason For Assessment  calorie count order           Anthropometrics     Row Name 03/02/21 0528          Anthropometrics    Weight  59.2 kg (130 lb 9.6 oz)                Diet: Soft Chopped Cardiac with Boost glucose Control with meals     Estimated needs: 1434 kcal, 49-59 g protein      Day 1: 1,344 kcals, 56 g protein ( 3 meals recorded)   Day 2: 204 kcal, 9 gm pro ( 1 meal , 1 supplement)   Day 3: 95 kcals, 1 g protein (3 meals)  Day 4: 1060 kcal, 42 gm pro ( 2 meals, 2 supplements)   Day 5: 270 kcal , 11 gm pro ( 1 meal)   Day 6:  713 kcal,  32 gm pro ( 2 meals, 2 supplements)   Day 7: 800 kcal, 35 gm pro ( 2 meal, 2 supplement)   Day 8: 573 kcal, 30 gm pro ( 2 meals )                   Electronically signed by:  Jo Ann Ryan RD  03/02/21 10:04 EST

## 2021-03-03 NOTE — NURSING NOTE
Case Management Discharge Note      Final Note: Discharged to [facility], skilled    Provided Post Acute Provider List?: N/A  N/A Provider List Comment: Patient confused - spoke with brother    Selected Continued Care - Discharged on 3/2/2021 Admission date: 2/17/2021 - Discharge disposition: Skilled Nursing Facility (DC - External)    Destination Coordination complete    Service Provider Selected Services Address Phone Fax Patient Preferred    [facility]  Skilled Nursing [address] [place] [id number] [phone] --          Durable Medical Equipment    No services have been selected for the patient.              Dialysis/Infusion    No services have been selected for the patient.              Home Medical Care    No services have been selected for the patient.              Therapy    No services have been selected for the patient.              Community Resources    No services have been selected for the patient.                       Final Discharge Disposition Code: 03 - skilled nursing facility (SNF)

## 2021-03-20 NOTE — DISCHARGE INSTRUCTIONS
Do not administer digoxin for the next 2 days.  Recommend checking digoxin level prior to resuming dosage.  Patient is oxygen level in the ER today was 1.90.  Therapeutic level is 0.6-1.2.  Continue other medications as directed.  Follow-up with Dr. Oviedo as above.  Return to ED for medical emergencies.

## 2021-03-20 NOTE — ED PROVIDER NOTES
"Mitul Nguyen is a 71-year-old -American female who presents secondary to altered mental status.  Nursing home staff felt patient's mental status was decreased this morning.  Thus EMS was called and patient was transported to the ER for evaluation.  She was stable in transport.  Patient's only complaint is \"I am cold\".  She is a fair historian at best.  She gives very shortened answers to my questions.      History provided by:  Patient, EMS personnel and nursing home      Review of Systems   Unable to perform ROS: Other (Poor historian)   Constitutional: Negative.  Negative for fever.   HENT: Negative.  Negative for rhinorrhea.    Eyes: Negative.  Negative for redness.   Respiratory: Negative for cough.    Cardiovascular: Negative for chest pain.   Gastrointestinal: Negative for abdominal pain.   Endocrine: Negative.    Genitourinary: Negative.  Negative for difficulty urinating.   Musculoskeletal: Negative.  Negative for back pain.   Skin: Negative.  Negative for color change.   Neurological: Negative.  Negative for syncope.   Hematological: Negative.    Psychiatric/Behavioral: Negative.    All other systems reviewed and are negative.      Past Medical History:   Diagnosis Date   • Anxiety    • Chronic anticoagulation 1/23/2019   • Colon polyp    • Diabetes mellitus (CMS/HCC)    • Dysphagia    • Fatigue    • GERD (gastroesophageal reflux disease)    • Hypertension    • Nonrheumatic mitral valve regurgitation 3/27/2019   • Paroxysmal atrial fibrillation (CMS/HCC) 1/23/2019   • Persistent atrial fibrillation (CMS/HCC) 1/23/2019   • Reflux gastritis        No Known Allergies    Past Surgical History:   Procedure Laterality Date   • COLONOSCOPY N/A 3/29/2017    Procedure: COLONOSCOPY; POLYPECTOMY;  Surgeon: Brooke Garrido MD;  Location: [facility] OR;  Service:    • COLONOSCOPY N/A 8/3/2020    Procedure: COLONOSCOPY with polypectomy;  Surgeon: Rui Shi MD;  Location: [facility] OR;  " Service: Gastroenterology;  Laterality: N/A;  ascending polyp  transverse polyp  rectal polyp   • CYST REMOVAL     • ENDOSCOPY N/A 3/29/2017    Procedure: ESOPHAGOGASTRODUODENOSCOPY WITH DILITATION;  Surgeon: Brooke Garrido MD;  Location: [facility] OR;  Service:    • ENDOSCOPY N/A 8/3/2020    Procedure: ESOPHAGOGASTRODUODENOSCOPY with biopsies;  Surgeon: Rui Shi MD;  Location:  [facility] OR;  Service: Gastroenterology;  Laterality: N/A;  esophagitis  gastritis   • HERNIA REPAIR     • HYSTERECTOMY         Family History   Problem Relation Age of Onset   • Colon cancer Mother    • Colon cancer Other    • Lung cancer Father    • Breast cancer Maternal Aunt        Social History     Socioeconomic History   • Marital status: Single     Spouse name: Not on file   • Number of children: Not on file   • Years of education: Not on file   • Highest education level: Not on file   Tobacco Use   • Smoking status: Never Smoker   • Smokeless tobacco: Never Used   • Tobacco comment: daily caffiene   Vaping Use   • Vaping Use: Never used   Substance and Sexual Activity   • Alcohol use: No   • Drug use: No   • Sexual activity: Defer           Objective   Physical Exam  Vitals and nursing note reviewed.   Constitutional:       General: She is not in acute distress.     Appearance: Normal appearance. She is well-developed. She is not diaphoretic.      Comments: 71-year-old -American female laying in bed.  Patient is frail and appears and chronically poor health.  Vital signs notable for heart rate of 55 otherwise unremarkable.   HENT:      Head: Normocephalic and atraumatic.      Right Ear: Tympanic membrane, ear canal and external ear normal.      Left Ear: Tympanic membrane, ear canal and external ear normal.      Nose: Nose normal.      Mouth/Throat:      Mouth: Mucous membranes are moist.      Pharynx: Oropharynx is clear.   Eyes:      Extraocular Movements: Extraocular movements intact.      Conjunctiva/sclera:  Conjunctivae normal.      Pupils: Pupils are equal, round, and reactive to light.   Cardiovascular:      Rate and Rhythm: Normal rate. Rhythm irregularly irregular.      Pulses: Normal pulses.      Heart sounds: Murmur heard.   Systolic murmur is present with a grade of 2/6.   No friction rub. No gallop.    Pulmonary:      Effort: Pulmonary effort is normal.      Breath sounds: Normal breath sounds.   Abdominal:      General: Bowel sounds are normal. There is no distension.      Palpations: Abdomen is soft.      Tenderness: There is no abdominal tenderness.   Musculoskeletal:         General: Normal range of motion.      Cervical back: Normal range of motion and neck supple.   Skin:     General: Skin is warm and dry.   Neurological:      General: No focal deficit present.      Mental Status: She is alert and oriented to person, place, and time.      Deep Tendon Reflexes: Reflexes are normal and symmetric.      Comments: Patient is oriented to self, date and location.   Psychiatric:         Mood and Affect: Mood normal.         Behavior: Behavior normal.         Procedures       EKG 12-lead  Date 3/20/2021  Time 15: 03  Atrial fibrillation with controlled ventricular rate  Leftward axis  Low voltage in all leads  Normal QRS interval  Normal QT interval  No ST elevations or depressions  T wave flattening seen in multiple leads  Abnormal EKG    Ischemic changes seen on EKG dated 2/17/2021 have resolved.    ED Course  ED Course as of Mar 20 1627   Sat Mar 20, 2021   1446 Patient provides limited history.  She does not have any specific complaint.  She is oriented to herself, place and time.  Obtaining full set of labs, UA, EKG and chest x-ray.    [SS]   1511 Hemoglobin(!): 10.1 [SS]   1511 Hematocrit(!): 33.4 [SS]   1511 Platelets(!): 108 [SS]   1511 CBC notable for anemia with hemoglobin 10.1 and hematocrit 33.4 with thrombocytopenia with platelets 108K.  H&H are at baseline.  Thrombocytopenia is new.    [SS]   1544  Digoxin(!): 1.90 [SS]   1544 ALT (SGPT)(!): 68 [SS]   1544 AST (SGOT)(!): 55 [SS]   1544 Alkaline Phosphatase(!): 497 [SS]   1544 CMP notable for elevation of ALT, ALT and alkaline phosphatase.  CO2 also a bit elevated.  Troponin negative.  proBNP    [SS]   1545  elevated at 3575.  UA shows glucose and blood.  No evidence of UTI.    [SS]   1545 BNP appears to be at baseline.    [SS]   1553 EMR shows patient's elevated LFTs are chronic.  Patient is a bit supratherapeutic on digoxin with a level of 1.9.  Side effects of digoxin toxicity include mental status changes.  However patient is oriented as above.  I do not feel patient requires Digibind.  Recommend holding patient's digoxin for the next 2 days and rechecking her level at that point.  I do not find indication for hospitalization.  Discharging to nursing home.    [SS]      ED Course User Index  [SS] Jevon Bailey MD      Labs Reviewed   COMPREHENSIVE METABOLIC PANEL - Abnormal; Notable for the following components:       Result Value    Glucose 127 (*)     Chloride 97 (*)     CO2 32.1 (*)     Albumin 2.80 (*)     ALT (SGPT) 68 (*)     AST (SGOT) 55 (*)     Alkaline Phosphatase 497 (*)     All other components within normal limits    Narrative:     GFR Normal >60  Chronic Kidney Disease <60  Kidney Failure <15     URINALYSIS W/ MICROSCOPIC IF INDICATED (NO CULTURE) - Abnormal; Notable for the following components:    Glucose,  mg/dL (Trace) (*)     Blood, UA Moderate (2+) (*)     All other components within normal limits   BNP (IN-HOUSE) - Abnormal; Notable for the following components:    proBNP 3,575.0 (*)     All other components within normal limits    Narrative:     Among patients with dyspnea, NT-proBNP is highly sensitive for the detection of acute congestive heart failure. In addition NT-proBNP of <300 pg/ml effectively rules out acute congestive heart failure with 99% negative predictive value.    Results may be falsely decreased if patient  taking Biotin.     DIGOXIN LEVEL - Abnormal; Notable for the following components:    Digoxin 1.90 (*)     All other components within normal limits   CBC WITH AUTO DIFFERENTIAL - Abnormal; Notable for the following components:    RBC 3.61 (*)     Hemoglobin 10.1 (*)     Hematocrit 33.4 (*)     MCHC 30.2 (*)     RDW 26.9 (*)     RDW-SD 89.7 (*)     Platelets 108 (*)     Neutrophil % 84.3 (*)     Lymphocyte % 9.7 (*)     Lymphocytes, Absolute 0.37 (*)     nRBC 1.3 (*)     All other components within normal limits   URINALYSIS, MICROSCOPIC ONLY - Abnormal; Notable for the following components:    RBC, UA 13-20 (*)     All other components within normal limits   TROPONIN (IN-HOUSE) - Normal    Narrative:     Troponin T Reference Range:  <= 0.03 ng/mL-   Negative for AMI  >0.03 ng/mL-     Abnormal for myocardial necrosis.  Clinicians would have to utilize clinical acumen, EKG, Troponin and serial changes to determine if it is an Acute Myocardial Infarction or myocardial injury due to an underlying chronic condition.       Results may be falsely decreased if patient taking Biotin.     SCAN SLIDE   CBC AND DIFFERENTIAL    Narrative:     The following orders were created for panel order CBC & Differential.  Procedure                               Abnormality         Status                     ---------                               -----------         ------                     Scan Slide[[id number]]                                       Final result               CBC Auto Differential[[id number]]        Abnormal            Final result                 Please view results for these tests on the individual orders.     US Thyroid    Result Date: 2/22/2021  Narrative: THYROID ULTRASOUND EXAMINATION, 02/22/2021  HISTORY: 70-year-old female hospital inpatient with multiple medical problems including sepsis, acute kidney injury. Elevated TSH levels have been noted.  TECHNIQUE: High-resolution grayscale ultrasound imaging of the  thyroid gland was performed.  THYROID SIZE: Thyroid size is within normal limits. *  Right lobe: 3.7 x 2.0 x 1.9 cm. *  Left lobe: 3.6 x 2.0 x 1.2 cm. *  Isthmus thickness: 0.6 cm.  THYROID FINDINGS: Thyroid parenchyma has a normal, diffusely homogeneous appearance. No thyroid nodule or other significant focal thyroid lesion is demonstrated.      Impression: Normal thyroid ultrasound examination.    This report was finalized on 2/22/2021 1:44 PM by Dr. Rg Holman MD.      XR Chest 1 View    Result Date: 3/20/2021  Narrative: CR Chest 1 Vw INDICATION: Altered mental status, history of atrial fibrillation COMPARISON:  Chest x-ray from 2/27/2021 FINDINGS: Three  portable AP view(s) of the chest.  The heart is again noted to be enlarged. Chronic fibrotic areas with opacity in the patient's right upper lobe do not appear significantly changed from the prior x-ray. There may be trace left pleural fluid, unchanged. No definite pneumothorax. No acute osseous abnormality.     Impression: Findings do not appear significantly changed from prior. Again noted is cardiomegaly with pulmonary vascular congestion superimposed on chronic lung changes. No definite new or acute findings. Signer Name: Caryl Fraire MD  Signed: 3/20/2021 3:47 PM  Workstation Name: [id number]  [facility]    XR Chest 1 View    Result Date: 2/27/2021  Narrative: CR Chest 1 Vw INDICATION: Increasing respiratory rate short of breath hypoxic media altered mental status kidney failure hypotension COMPARISON:  Chest x-ray from 2/21/2021. FINDINGS: Single portable AP view(s) of the chest. Cardiac silhouette is enlarged and globular configuration, similar to prior. There is improvement in lung volumes. There is probably improvement in pleural effusions also. There is however increase in central vascular and interstitial markings with interval development of bilateral alveolar infiltrates superimposed upon underlying chronic disease.  Please correlate for clinical evidence of congestive heart failure. Underlying aspiration or pneumonia to be excluded. No pneumothorax. There is particularly focal chronic scarring in the left greater than right upper lung. This is noted on prior studies.     Impression: Interval worsening in the appearance the chest. Findings are most suggestive of worsening congestive heart failure superimposed upon underlying chronic lung disease. Clinical correlation and follow-up is recommended to exclude underlying aspiration or pneumonia to ensure resolution of the new bilateral infiltrates. Cardiac silhouette is enlarged. Signer Name: Marisela Douglas MD  Signed: 2/27/2021 2:06 PM  Workstation Name: [id number]  [facility]    XR Chest 1 View    Result Date: 2/21/2021  Narrative: CR Chest 1 Vw INDICATION: Pulmonary congestion, hypothermia, altered mental status COMPARISON:  Chest x-ray from 2/19/2021 FINDINGS: Single portable AP view(s) of the chest. Heart size has enlarged from prior. There are no bilateral pleural effusions with increasing edema. No pneumothorax.     Impression: Heart size has enlarged from prior and there are new bilateral pleural effusions and increasing edema. Findings may be seen with CHF/fluid overload. Signer Name: Caryl Fraire MD  Signed: 2/21/2021 4:15 PM  Workstation Name: [id number]  [facility]    XR Chest 1 View    Result Date: 2/19/2021  Narrative: XR CHEST 1 VW-: 2/19/2021 2:19 PM  INDICATION: 70-year-old female for PICC line placement  COMPARISON:  02/18/2021.  FINDINGS: Single Portable AP view(s) of the chest. New right-sided PICC line present with the tip at the level the distal SVC. No postprocedure pneumothorax. The heart is enlarged but stable. No volume overload or pleural effusion. Previously documented probable sequela of advanced COPD with perihilar and upper lung zone fibrosis/scarring with associated volume loss is unchanged.       Impression:  1. New right-sided PICC line with the tip terminating at the distal SVC level. No pneumothorax or other significant change.  This report was finalized on 2/19/2021 2:38 PM by Dr. Kamar Morel MD.      My differential diagnosis for altered mental status includes but is not limited to:  Hypoglycemia, hyperglycemia, DKA, overdose, ethanol intoxication, thiamine deficiency, niacin deficiency, hypothymia, hyperviscosity, Luther’s disease, hyponatremia, hypernatremia, liver failure, kidney failure, hyper or hypothyroid, no insufficiency, hypoxia, hypercarbia, carbon monoxide poisoning, postanoxic encephalopathy, ischemic stroke, intracranial bleed, subarachnoid hemorrhage, brain tumor, closed head injury, epidural hematoma, epidural hematoma, seizure activity, postictal state, syncopal episode, disseminated encephalomyelitis, central pontine myelinolysis, post cardiac arrest, bacterial meningitis, viral meningitis, fungal meningitis, encephalitis, brain abscess, subdural empyema, hysteria, catatonic state, malingering, hypertensive encephalopathy, vasculitis, TTP, DIC                                       MDM    Final diagnoses:   Elevated digoxin level       ED Disposition  ED Disposition     ED Disposition Condition Comment    Discharge Stable           Hany Oviedo MD  [address] [facility] [address] [phone]    Call   Monday morning to arrange for repeat digoxin level and follow-up         Medication List      No changes were made to your prescriptions during this visit.          Jevon Bailey MD  03/20/21 1761

## 2021-04-12 PROBLEM — N17.9 SEPSIS WITH ACUTE RENAL FAILURE WITHOUT SEPTIC SHOCK (HCC): Status: ACTIVE | Noted: 2021-01-01

## 2021-04-12 PROBLEM — R65.20 SEPSIS WITH ACUTE RENAL FAILURE WITHOUT SEPTIC SHOCK (HCC): Status: ACTIVE | Noted: 2021-01-01

## 2021-04-12 PROBLEM — A41.9 SEPSIS WITH ACUTE RENAL FAILURE WITHOUT SEPTIC SHOCK (HCC): Status: ACTIVE | Noted: 2021-01-01

## 2021-04-13 PROBLEM — N13.1 HYDRONEPHROSIS DUE TO OBSTRUCTION OF URETER: Status: ACTIVE | Noted: 2021-01-01

## 2021-04-13 NOTE — ANESTHESIA PREPROCEDURE EVALUATION
Anesthesia Evaluation     Patient summary reviewed and Nursing notes reviewed   no history of anesthetic complications:  NPO Solid Status: > 8 hours  NPO Liquid Status: > 8 hours           Airway   Mallampati: II  TM distance: >3 FB  Neck ROM: full  No difficulty expected  Dental          Pulmonary    (+) shortness of breath, decreased breath sounds,   Cardiovascular   Exercise tolerance: good (4-7 METS)    ECG reviewed  Patient on routine beta blocker, Beta blocker given within 24 hours of surgery and Beta blocker not taken-may be given intraoperatively  Rhythm: irregular  Rate: normal    (+) hypertension well controlled, valvular problems/murmurs MR, dysrhythmias (Paroxcismal SVT) Atrial Fib, CHF Diastolic >=55%, PVD,     ROS comment: hasnt taken metoprolol or xarelto since 7/30    · Calculated right ventricular systolic pressure from tricuspid regurgitation is 41.0 mmHg.  · Left ventricular systolic function is normal.  · Calculated EF = 60.0%.  · Right ventricular cavity is severely dilated.  · Left atrial cavity size is severely dilated.  · Right atrial cavity size is severely dilated.  · Mild-to-moderate mitral valve regurgitation is present  · Severe tricuspid valve regurgitation is present.       Neuro/Psych  (+) dizziness/light headedness, psychiatric history Anxiety, poor historian ( mental status changes with this admission , related to sepsis).,     GI/Hepatic/Renal/Endo    (+)  hiatal hernia, GERD,  renal disease ( guru), diabetes mellitus (BS 71) well controlled, thyroid problem hypothyroidism    Musculoskeletal     (+) back pain,   Abdominal  - normal exam   Substance History - negative use     OB/GYN          Other   blood dyscrasia anemia,                     Anesthesia Plan    ASA 4     general   (Anesthetic plan, risks/benefits discussed with patient's brother on phone:  Germán Nguyen. He agrees with anesthetic plan of GA. Risks discussed include seizures, heart attack, stroke, death,  hypotension, difficult intubation, aspiration/aspiration pneumonia, etc. )  intravenous induction     Anesthetic plan, all risks, benefits, and alternatives have been provided, discussed and informed consent has been obtained with: sibling.    Plan discussed with CRNA.

## 2021-04-13 NOTE — ANESTHESIA POSTPROCEDURE EVALUATION
Patient: Argelia Nguyen    Procedure Summary     Date: 04/13/21 Room / Location:  [facility] OR 4 /  [facility] OR    Anesthesia Start: 1204 Anesthesia Stop: 1351    Procedure: CYSTOSCOPY URETERAL CATHETER/STENT INSERTION (Right Ureter) Diagnosis:       Hydronephrosis due to obstruction of ureter      (Hydronephrosis due to obstruction of ureter [N13.2])    Surgeons: Onesimo Fuller MD Provider: Cee Sultana CRNA    Anesthesia Type: general ASA Status: 4          Anesthesia Type: general    Vitals  Vitals Value Taken Time   /88 04/13/21 1345   Temp 98.1 °F (36.7 °C) 04/13/21 1345   Pulse 101 04/13/21 1345   Resp 15 04/13/21 1345   SpO2 98 % 04/13/21 1345           Post Anesthesia Care and Evaluation    Patient location during evaluation: ICU  Patient participation: complete - patient cannot participate  Level of consciousness: obtunded/minimal responses  Airway patency: patent  Anesthetic complications: No anesthetic complications  PONV Status: none  Cardiovascular status: acceptable  Respiratory status: acceptable and ETT  Hydration status: acceptable

## 2021-04-13 NOTE — ANESTHESIA PROCEDURE NOTES
Airway  Urgency: elective    Date/Time: 4/13/2021 12:16 PM  End Time:4/13/2021 12:16 PM  Airway not difficult    General Information and Staff    Patient location during procedure: OR  CRNA: Cee Sultana CRNA    Indications and Patient Condition  Indications for airway management: airway protection    Preoxygenated: yes  MILS maintained throughout  Mask difficulty assessment: 0 - not attempted (RSI)    Final Airway Details  Final airway type: endotracheal airway      Successful airway: ETT  Cuffed: yes   Successful intubation technique: direct laryngoscopy  Facilitating devices/methods: intubating stylet and cricoid pressure  Endotracheal tube insertion site: oral  Blade: CMAC  Blade size: 3  ETT size (mm): 7.5  Cormack-Lehane Classification: grade I - full view of glottis  Placement verified by: chest auscultation   Measured from: lips  ETT/EBT  to lips (cm): 22  Number of attempts at approach: 1  Assessment: lips, teeth, and gum same as pre-op and atraumatic intubation

## 2021-04-17 PROBLEM — T14.8XXA MULTIPLE WOUNDS OF SKIN: Status: ACTIVE | Noted: 2021-01-01

## 2021-04-17 PROBLEM — G93.41 ACUTE METABOLIC ENCEPHALOPATHY: Status: ACTIVE | Noted: 2021-01-01

## 2021-04-17 PROBLEM — I50.31 ACUTE DIASTOLIC CHF (CONGESTIVE HEART FAILURE) (HCC): Status: RESOLVED | Noted: 2020-10-07 | Resolved: 2021-01-01

## 2021-04-17 PROBLEM — R60.1 ANASARCA: Status: ACTIVE | Noted: 2021-01-01

## 2021-04-17 PROBLEM — I38 VALVULAR HEART DISEASE: Status: ACTIVE | Noted: 2021-01-01

## 2021-04-17 PROBLEM — I48.0 PAROXYSMAL ATRIAL FIBRILLATION (HCC): Chronic | Status: RESOLVED | Noted: 2019-01-23 | Resolved: 2021-01-01

## 2021-04-17 PROBLEM — G40.401: Status: ACTIVE | Noted: 2021-01-01

## 2021-04-17 PROBLEM — T68.XXXA HYPOTHERMIA: Status: RESOLVED | Noted: 2021-02-17 | Resolved: 2021-01-01

## 2021-04-17 PROBLEM — N28.9 ACUTE KIDNEY INSUFFICIENCY: Status: ACTIVE | Noted: 2021-01-01

## 2021-04-17 PROBLEM — R47.1 DYSARTHRIA: Status: RESOLVED | Noted: 2021-02-25 | Resolved: 2021-01-01

## 2021-04-18 PROBLEM — R40.2430 GLASGOW COMA SCALE TOTAL SCORE 3-8 (HCC): Status: ACTIVE | Noted: 2021-01-01

## 2021-04-18 PROBLEM — G40.109 FOCAL MOTOR SEIZURE (HCC): Status: ACTIVE | Noted: 2021-01-01

## 2021-04-30 PROBLEM — I95.9 SEPSIS ASSOCIATED HYPOTENSION (HCC): Status: ACTIVE | Noted: 2021-01-01

## 2021-04-30 PROBLEM — N12 PYELONEPHRITIS: Status: ACTIVE | Noted: 2021-01-01

## 2021-05-01 NOTE — CASE MANAGEMENT/SOCIAL WORK
Case Management Discharge Note      Final Note: Discharged to [facility], skilled         Selected Continued Care - Discharged on 4/30/2021 Admission date: 4/12/2021 - Discharge disposition: Skilled Nursing Facility (DC - External)    Destination Coordination complete    Service Provider Selected Services Address Phone Fax Patient Preferred    [facility]  Skilled Nursing [address] [facility] [address] [phone] --          Durable Medical Equipment    No services have been selected for the patient.              Dialysis/Infusion    No services have been selected for the patient.              Home Medical Care    No services have been selected for the patient.              Therapy    No services have been selected for the patient.              Community Resources    No services have been selected for the patient.                Selected Continued Care - Prior Encounters Includes selections from prior encounters from 1/12/2021 to 4/30/2021    Discharged on 3/2/2021 Admission date: 2/17/2021 - Discharge disposition: Skilled Nursing Facility (DC - External)    Destination     Service Provider Selected Services Address Phone Fax Patient Preferred    [facility]  Skilled Nursing [address] [facility] [address] [phone] --                         Final Discharge Disposition Code: 03 - skilled nursing facility (SNF)

## 2021-05-03 PROBLEM — R41.82 ALTERED MENTAL STATUS: Status: ACTIVE | Noted: 2021-01-01

## 2021-05-08 NOTE — CASE MANAGEMENT/SOCIAL WORK
Case Management Discharge Note      Final Note: D/C to SNF    Provided Post Acute Provider List?: N/A  N/A Provider List Comment: Current at [facility]  Provided Post Acute Provider Quality & Resource List?: N/A  N/A Quality & Resource List Comment: Current at [facility]    Selected Continued Care - Discharged on 5/7/2021 Admission date: 5/3/2021 - Discharge disposition: Long Term Care (DC - External)    Destination Coordination complete    Service Provider Selected Services Address Phone Fax Patient Preferred    [facility]  Intermediate Care [address] [facility] [address] [phone] --          Durable Medical Equipment    No services have been selected for the patient.              Dialysis/Infusion    No services have been selected for the patient.              Home Medical Care    No services have been selected for the patient.              Therapy    No services have been selected for the patient.              Community Resources    No services have been selected for the patient.                Selected Continued Care - Prior Encounters Includes selections from prior encounters from 2/2/2021 to 5/7/2021    Discharged on 4/30/2021 Admission date: 4/12/2021 - Discharge disposition: Skilled Nursing Facility (DC - External)    Destination     Service Provider Selected Services Address Phone Fax Patient Preferred    [facility]  Skilled Nursing [address] [facility] [address] [phone] --                Discharged on 3/2/2021 Admission date: 2/17/2021 - Discharge disposition: Skilled Nursing Facility (DC - External)    Destination     Service Provider Selected Services Address Phone Fax Patient Preferred    [facility]  Skilled Nursing [address] [facility] [address] [phone] --                         Final Discharge Disposition Code: 03 - skilled nursing facility (SNF)

## 2021-05-10 PROBLEM — T68.XXXA HYPOTHERMIA: Status: ACTIVE | Noted: 2021-01-01

## 2021-05-13 PROBLEM — Z51.89 ENCOUNTER FOR REHABILITATION: Status: ACTIVE | Noted: 2021-01-01

## 2021-05-22 NOTE — PROGRESS NOTES
"Daily Progress Note:      Chief complaint: Hypothermia, altered mental status, Candida septicemia, MRSA bacteremia, seizure disorder, chronic kidney disease, anemia    Subjective : Remains stable no events noted since last visit       LOS: 9 days     Vital Signs  Temp:  [97 °F (36.1 °C)-98.1 °F (36.7 °C)] 97 °F (36.1 °C)  Heart Rate:  [83-94] 84  Resp:  [16-18] 18  BP: (119-145)/(74-85) 121/79  Oxygen Therapy  SpO2: 97 %  Pulse Oximetry Type: Intermittent  Device (Oxygen Therapy): room air}  Body mass index is 18.28 kg/m².  Flowsheet Rows        First Filed Value   Admission Height  182.9 cm (72\") Documented at 04/12/2021 2141   Admission Weight  53.5 kg (118 lb) Documented at 04/12/2021 0816                     Documented weights    05/13/21 1044 05/14/21 0630 05/15/21 0555 05/16/21 0620   Weight: 60.4 kg (133 lb 1 oz) 54.5 kg (120 lb 3 oz) 53.5 kg (118 lb) 53.5 kg (118 lb)    05/17/21 0630 05/18/21 0500 05/19/21 0500 05/20/21 1300   Weight: 53.6 kg (118 lb 1.6 oz) 54.2 kg (119 lb 8 oz) 53.6 kg (118 lb 2 oz) 52.1 kg (114 lb 14.4 oz)    05/21/21 0500 05/22/21 0553   Weight: 52.8 kg (116 lb 8 oz) 51.4 kg (113 lb 4 oz)           Patient Vitals for the past 24 hrs:   BP Temp Temp src Pulse Resp SpO2 Weight   05/22/21 0748 121/79 97 °F (36.1 °C) Oral 84 18 97 % --   05/22/21 0553 -- -- -- -- -- -- 51.4 kg (113 lb 4 oz)   05/21/21 1834 119/74 98.1 °F (36.7 °C) Rectal 94 17 94 % --   05/21/21 1543 124/85 -- -- 83 -- -- --   05/21/21 1315 145/78 -- -- 88 16 98 % --       51.4 kg (113 lb 4 oz)      Intake/Output Summary (Last 24 hours) at 5/22/2021 0758  Last data filed at 5/22/2021 0656  Gross per 24 hour   Intake 1140 ml   Output --   Net 1140 ml       Review of Systems   Constitutional: Positive for activity change and fatigue. Negative for appetite change.   HENT: Negative for congestion.    Respiratory: Negative for cough, chest tightness, shortness of breath and wheezing.    Cardiovascular: Negative for chest pain. "   Gastrointestinal: Negative for abdominal distention, abdominal pain, diarrhea, nausea and vomiting.   Endocrine: Negative for polyphagia and polyuria.   Genitourinary: Negative for frequency.   Skin: Positive for wound. Negative for rash.   Neurological: Positive for weakness. Negative for light-headedness.   Hematological: Does not bruise/bleed easily.   Psychiatric/Behavioral: Positive for confusion. Negative for agitation and behavioral problems.       Physical Exam  Vitals and nursing note reviewed.   Constitutional:       General: She is not in acute distress.     Appearance: She is well-developed. She is not ill-appearing.   HENT:      Head: Normocephalic.   Eyes:      Conjunctiva/sclera: Conjunctivae normal.   Neck:      Thyroid: No thyromegaly.      Vascular: No JVD.   Cardiovascular:      Rate and Rhythm: Normal rate. Rhythm irregularly irregular.      Heart sounds: Normal heart sounds. No murmur heard.     Pulmonary:      Effort: Pulmonary effort is normal. No respiratory distress.      Breath sounds: Normal breath sounds. No wheezing or rales.   Abdominal:      General: Bowel sounds are normal. There is no distension.      Palpations: Abdomen is soft.      Tenderness: There is no abdominal tenderness. There is no guarding.   Musculoskeletal:      Cervical back: Normal range of motion.      Right lower leg: Edema (Trace bilateral) present.      Left lower leg: Edema present.   Skin:     General: Skin is warm and dry.      Findings: No rash.      Comments: Left lower extremity/sacrum and left elbow dressings in place   Neurological:      Mental Status: She is alert and easily aroused.      GCS: GCS eye subscore is 4. GCS verbal subscore is 4. GCS motor subscore is 6.      Cranial Nerves: Cranial nerves are intact.      Motor: Motor function is intact.      Comments: She is oriented x2 conversant follows commands         Medication Review:   I have reviewed the patient's current medication list  Scheduled  Meds:amLODIPine, 5 mg, Oral, Daily  Aquaphor Advanced Therapy, 1 application, Topical, Q24H  aspirin, 81 mg, Oral, Daily  camphor-menthol, , Topical, TID  digoxin, 125 mcg, Oral, Daily  docusate sodium, 100 mg, Oral, BID  famotidine, 40 mg, Oral, Daily  fluconazole, 200 mg, Oral, Q24H  iron polysaccharides, 150 mg, Oral, BID  lactobacillus acidophilus, 1 capsule, Oral, BID  levETIRAcetam, 1,000 mg, Oral, Q12H  levothyroxine, 75 mcg, Oral, Q AM  multivitamin with minerals, 1 tablet, Oral, Daily  polyethylene glycol, 17 g, Oral, Daily  potassium chloride, 20 mEq, Oral, Daily  rivaroxaban, 20 mg, Oral, Daily  sodium chloride, 10 mL, Intravenous, Q12H  vancomycin, 1,000 mg, Intravenous, Q24H      Continuous Infusions:Pharmacy Consult - Pharmacy to dose,       PRN Meds:.•  acetaminophen **OR** acetaminophen **OR** acetaminophen  •  acetaminophen  •  bisacodyl  •  bisacodyl  •  dextrose  •  dextrose  •  diphenhydrAMINE  •  docusate sodium  •  glucagon (human recombinant)  •  ipratropium-albuterol  •  LORazepam  •  magnesium hydroxide  •  melatonin  •  nitroglycerin  •  ondansetron **OR** ondansetron  •  ondansetron **OR** ondansetron  •  Pharmacy Consult - Pharmacy to dose  •  sennosides-docusate  •  sodium chloride  •  sodium chloride  •  sodium chloride  •  traMADol      Labs:  Results from last 7 days   Lab Units 05/21/21  0530 05/17/21  0741   WBC 10*3/mm3 6.15 5.22   HEMOGLOBIN g/dL 8.9* 8.8*   HEMATOCRIT % 28.2* 27.9*   PLATELETS 10*3/mm3 203 223     Results from last 7 days   Lab Units 05/21/21  0530 05/17/21  0741   SODIUM mmol/L 133* 135*   POTASSIUM mmol/L 4.8 4.5   CHLORIDE mmol/L 98 100   CO2 mmol/L 29.3* 27.3   BUN mg/dL 8 6*   CREATININE mg/dL 0.69 0.67   CALCIUM mg/dL 8.5* 8.3*   BILIRUBIN mg/dL 0.5  --    ALK PHOS U/L 336*  --    ALT (SGPT) U/L 19  --    AST (SGOT) U/L 20  --    GLUCOSE mg/dL 84 69           Results from last 7 days   Lab Units 05/21/21  0530 05/17/21  0741   AST (SGOT) U/L 20  --    ALT  (SGPT) U/L 19  --    PLATELETS 10*3/mm3 203 223         Lab Results (last 24 hours)     Procedure Component Value Units Date/Time    POC Glucose Once [[id number]]  (Normal) Collected: 05/22/21 0644    Specimen: Blood Updated: 05/22/21 0650     Glucose 85 mg/dL     POC Glucose Once [[id number]]  (Abnormal) Collected: 05/21/21 1927    Specimen: Blood Updated: 05/21/21 1932     Glucose 167 mg/dL     POC Glucose Once [[id number]]  (Normal) Collected: 05/21/21 1726    Specimen: Blood Updated: 05/21/21 1737     Glucose 108 mg/dL                                         Glucose   Date/Time Value Ref Range Status   05/22/2021 0644 85 70 - 130 mg/dL Final   05/21/2021 1927 167 (H) 70 - 130 mg/dL Final   05/21/2021 1726 108 70 - 130 mg/dL Final   05/21/2021 0541 73 70 - 130 mg/dL Final   05/20/2021 1931 137 (H) 70 - 130 mg/dL Final   05/20/2021 1636 111 70 - 130 mg/dL Final   05/20/2021 1400 188 (H) 70 - 130 mg/dL Final   05/20/2021 1329 54 (L) 70 - 130 mg/dL Final                         Radiology:  Imaging Results (Last 24 Hours)     ** No results found for the last 24 hours. **          Cardiology:  ECG/EMG Results (last 24 hours)       ** No results found for the last 24 hours. **            I have reviewed recent labs results and consult notes.  Parts of this note may have been copied and pasted but patient was examined and interviewed by me today    Assessment and Plan:    1.  Immobilization syndrome generalized weakness.  PT notes reviewed he has made very poor progress with therapies    2.  MRSA bacteremia and candidemia has been changed to p.o. Diflucan remains on IV vancomycin     3.  Hypothermia controlled on supportive management     4.    protein calorie malnutrition continue with current diet boost added diet advanced      5.  Chronic diastolic congestive heart failure patient is euvolemic     6.  Grand mal seizure disorder controlled continue with Keppra     7.  Anemia and thrombocytopenia at baseline we will  continue to monitor periodically     8.  Atrial fibrillation controlled rate response on chronic anticoagulation patient's rate is controlled continue with home medications     9.  Left lower extremity ulcers, sacral decubitus, left elbow decubitus continue with  wound care     10.  Bilateral upper lobe infiltrates antibiotics discontinued by infectious disease will monitor     11.  Hypothyroidism nothing acute continue with home levothyroxine     12.  Hypertension controlled better on current regimen    13.  Transaminitis chronic likely due to multiple underlying medical problems liver enzymes improved ultrasound reviewed be due to persistent right heart failure      14.  Valvular heart disease- severe tricuspid regurgitation    Much of this encounter note is an electronic transcription/translation of spoken language to printed text using Dragon Software

## 2021-05-22 NOTE — PLAN OF CARE
Goal Outcome Evaluation:  Plan of Care Reviewed With: patient  Progress: improving  Outcome Summary: No new or worsened cough, shortness of air, sore throat,or loss of taste or smell

## 2021-05-22 NOTE — PLAN OF CARE
Goal Outcome Evaluation:         Pt was up in her chair this am, pt complained of pain and itching where her coccyx wound is; pt had no other complaints this am.     Pt had no new or worsened cough, no shortness of air, no sore throat, and no loss of taste or smell on am assessment.

## 2021-05-23 NOTE — PLAN OF CARE
Goal Outcome Evaluation:     Progress: improving  Continue itching on her buttock dressing changed and apply Sarna and give her Benadryl  Problem: Adult Inpatient Plan of Care  Goal: Absence of Hospital-Acquired Illness or Injury  Intervention: Identify and Manage Fall Risk  Recent Flowsheet Documentation  Taken 5/23/2021 0954 by Cedrick Ramos RN  Safety Promotion/Fall Prevention:   activity supervised   safety round/check completed     Problem: Skin Injury Risk Increased  Goal: Skin Health and Integrity  Intervention: Optimize Skin Protection  Recent Flowsheet Documentation  Taken 5/23/2021 0954 by Cedrick Ramos RN  Pressure Reduction Techniques: frequent weight shift encouraged  Pressure Reduction Devices:   pressure-redistributing mattress utilized   chair cushion utilized  Skin Protection: incontinence pads utilized

## 2021-05-23 NOTE — PLAN OF CARE
Goal Outcome Evaluation:  Plan of Care Reviewed With: patient  Progress: no change    Patient resting in bed with eyes closed. Patient anxious at beginning of shift. Medications given and improved. Still itching at times. Lotion has helped and still asking for medications. 0 complaints of new or worsening cough, shortness of breath, sore throat, loss of taste or smell noted. Patient given snacks at bedtime.  Continues on iv antibiotics related to infection. 0 adverse reactions noted. Safety measures in place.

## 2021-05-24 NOTE — NURSING NOTE
No new or worsened cough, soa, sore throat or loss of taste or smell reported by pt or on AM assessment.

## 2021-05-24 NOTE — PLAN OF CARE
Problem: Skin Injury Risk Increased  Goal: Skin Health and Integrity  Outcome: Ongoing, Progressing     Problem: Fall Injury Risk  Goal: Absence of Fall and Fall-Related Injury  Outcome: Ongoing, Progressing     Problem: Adult Inpatient Plan of Care  Goal: Absence of Hospital-Acquired Illness or Injury  Outcome: Ongoing, Progressing   Goal Outcome Evaluation:  Plan of Care Reviewed With: patient  Progress: no change    Patient denies new or worsened cough, SOA, loss of taste or smell, and sore throat.  All safety measures in place, patient stable.  HS medications taken without difficulty.

## 2021-05-24 NOTE — PLAN OF CARE
Problem: Adult Inpatient Plan of Care  Goal: Plan of Care Review  Outcome: Ongoing, Progressing  Goal: Patient-Specific Goal (Individualized)  Outcome: Ongoing, Progressing  Goal: Absence of Hospital-Acquired Illness or Injury  Outcome: Ongoing, Progressing  Intervention: Identify and Manage Fall Risk  Recent Flowsheet Documentation  Taken 5/24/2021 1811 by Sanam Tomlin LPN  Safety Promotion/Fall Prevention:   activity supervised   assistive device/personal items within reach   clutter free environment maintained   fall prevention program maintained   lighting adjusted   nonskid shoes/slippers when out of bed   room organization consistent   safety round/check completed  Taken 5/24/2021 0917 by Sanam Tomlin LPN  Safety Promotion/Fall Prevention: safety round/check completed  Intervention: Prevent Skin Injury  Recent Flowsheet Documentation  Taken 5/24/2021 1811 by Sanam Tomlin LPN  Body Position:   turned   side-lying, right   legs elevated  Taken 5/24/2021 0917 by Sanam Tomlin LPN  Skin Protection:   skin sealant/moisture barrier applied   skin-to-skin areas padded  Intervention: Prevent and Manage VTE (venous thromboembolism) Risk  Recent Flowsheet Documentation  Taken 5/24/2021 0917 by Sanam Tomlin LPN  VTE Prevention/Management:   bilateral   dorsiflexion/plantar flexion performed  Goal: Optimal Comfort and Wellbeing  Outcome: Ongoing, Progressing  Intervention: Provide Person-Centered Care  Recent Flowsheet Documentation  Taken 5/24/2021 0917 by Sanam Tomlin LPN  Trust Relationship/Rapport: care explained  Goal: Readiness for Transition of Care  Outcome: Ongoing, Progressing     Problem: Skin Injury Risk Increased  Goal: Skin Health and Integrity  Outcome: Ongoing, Progressing  Intervention: Optimize Skin Protection  Recent Flowsheet Documentation  Taken 5/24/2021 1811 by Sanam Tomlin LPN  Head of Bed (HOB): HOB at 30 degrees  Taken 5/24/2021 0917 by Sanam Tomlin LPN  Pressure Reduction Techniques:    heels elevated off bed   weight shift assistance provided  Pressure Reduction Devices: pressure-redistributing mattress utilized  Skin Protection:   skin sealant/moisture barrier applied   skin-to-skin areas padded     Problem: Diabetes Comorbidity  Goal: Blood Glucose Level Within Desired Range  Outcome: Ongoing, Progressing  Intervention: Maintain Glycemic Control  Recent Flowsheet Documentation  Taken 5/24/2021 0917 by Sanam Tomlin LPN  Glycemic Management: blood glucose monitoring     Problem: Seizure Disorder Comorbidity  Goal: Maintenance of Seizure Control  Outcome: Ongoing, Progressing  Intervention: Maintain Seizure-Symptom Control  Recent Flowsheet Documentation  Taken 5/24/2021 0917 by Sanam Tomlin LPN  Seizure Precautions:   clutter-free environment maintained   soft boundaries provided     Problem: Fall Injury Risk  Goal: Absence of Fall and Fall-Related Injury  Outcome: Ongoing, Progressing  Intervention: Identify and Manage Contributors to Fall Injury Risk  Recent Flowsheet Documentation  Taken 5/24/2021 0917 by Sanam Tomlin LPN  Medication Review/Management: medications reviewed  Intervention: Promote Injury-Free Environment  Recent Flowsheet Documentation  Taken 5/24/2021 1811 by Sanam Tomlin LPN  Safety Promotion/Fall Prevention:   activity supervised   assistive device/personal items within reach   clutter free environment maintained   fall prevention program maintained   lighting adjusted   nonskid shoes/slippers when out of bed   room organization consistent   safety round/check completed  Taken 5/24/2021 0917 by Sanam Tomlin LPN  Safety Promotion/Fall Prevention: safety round/check completed   Goal Outcome Evaluation:   Pt calm and cooperative; took all meds. Sat up in chair till after lunch. Safety measures in place.

## 2021-05-25 NOTE — NURSING NOTE
Patient has been hollering out several times tonight, very anxious,complaining of hurting and itching through out the night. Patient educated on using call light for assistance. Continue to not use call light for assistance. Safety measures in place .

## 2021-05-25 NOTE — PLAN OF CARE
Problem: Adult Inpatient Plan of Care  Goal: Plan of Care Review  Outcome: Ongoing, Progressing  Goal: Patient-Specific Goal (Individualized)  Outcome: Ongoing, Progressing  Goal: Absence of Hospital-Acquired Illness or Injury  Outcome: Ongoing, Progressing  Intervention: Identify and Manage Fall Risk  Recent Flowsheet Documentation  Taken 5/25/2021 0830 by Sanam Tomlin LPN  Safety Promotion/Fall Prevention: safety round/check completed  Intervention: Prevent Skin Injury  Recent Flowsheet Documentation  Taken 5/25/2021 0830 by Sanam Tomlin LPN  Skin Protection: skin sealant/moisture barrier applied  Intervention: Prevent and Manage VTE (venous thromboembolism) Risk  Recent Flowsheet Documentation  Taken 5/25/2021 0830 by Sanam Tomlin LPN  VTE Prevention/Management:   bilateral   dorsiflexion/plantar flexion performed  Goal: Optimal Comfort and Wellbeing  Outcome: Ongoing, Progressing  Intervention: Provide Person-Centered Care  Recent Flowsheet Documentation  Taken 5/25/2021 0830 by Sanam Tomlin LPN  Trust Relationship/Rapport:   care explained   choices provided   emotional support provided   thoughts/feelings acknowledged  Goal: Readiness for Transition of Care  Outcome: Ongoing, Progressing     Problem: Skin Injury Risk Increased  Goal: Skin Health and Integrity  Outcome: Ongoing, Progressing  Intervention: Optimize Skin Protection  Recent Flowsheet Documentation  Taken 5/25/2021 0830 by Sanam Tomlin LPN  Pressure Reduction Techniques:   heels elevated off bed   pressure points protected   weight shift assistance provided  Pressure Reduction Devices: pressure-redistributing mattress utilized  Skin Protection: skin sealant/moisture barrier applied     Problem: Diabetes Comorbidity  Goal: Blood Glucose Level Within Desired Range  Outcome: Ongoing, Progressing  Intervention: Maintain Glycemic Control  Recent Flowsheet Documentation  Taken 5/25/2021 0830 by Sanam Tomlin LPN  Glycemic Management: blood glucose  monitoring     Problem: Seizure Disorder Comorbidity  Goal: Maintenance of Seizure Control  Outcome: Ongoing, Progressing  Intervention: Maintain Seizure-Symptom Control  Recent Flowsheet Documentation  Taken 5/25/2021 0830 by Sanam Tomlin LPN  Seizure Precautions:   activity supervised   clutter-free environment maintained     Problem: Fall Injury Risk  Goal: Absence of Fall and Fall-Related Injury  Outcome: Ongoing, Progressing  Intervention: Identify and Manage Contributors to Fall Injury Risk  Recent Flowsheet Documentation  Taken 5/25/2021 0830 by Sanam Tomlin LPN  Medication Review/Management: medications reviewed  Intervention: Promote Injury-Free Environment  Recent Flowsheet Documentation  Taken 5/25/2021 0830 by Sanam Tomlin LPN  Safety Promotion/Fall Prevention: safety round/check completed   Goal Outcome Evaluation:

## 2021-05-25 NOTE — PLAN OF CARE
Problem: Skin Injury Risk Increased  Goal: Skin Health and Integrity  Outcome: Ongoing, Not Progressing     Problem: Diabetes Comorbidity  Goal: Blood Glucose Level Within Desired Range  Outcome: Ongoing, Not Progressing     Problem: Fall Injury Risk  Goal: Absence of Fall and Fall-Related Injury  Outcome: Ongoing, Not Progressing   Goal Outcome Evaluation:  Plan of Care Reviewed With: patient  Progress: no change    Patient denies new or worsened cough, SOA, loss of taste or smell, and sore throat.  All safety measures in place, patient stable.  HS medication taken without difficulty.

## 2021-05-25 NOTE — DISCHARGE SUMMARY
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  Anemia of chronic disease thrombocytopenia  Transaminitis  Atrial fibrillation with controlled rate response on chronic anticoagulation  Bilateral upper lobe infiltrates improving  Hypothyroidism  Intermittent hypoglycemia  Valvular heart disease with severe tricuspid regurgitation  Severe protein calorie malnutrition  MGUS    PCP  Patient Care Team:  Hany Oviedo MD as PCP - General  Hany Oviedo MD as PCP - Family Medicine    Consults:   Consults     Date and Time Order Name Status Description    5/13/2021  3:53 PM Inpatient Urology Consult Completed     5/13/2021 10:50 AM Inpatient Infectious Diseases Consult      5/6/2021  8:24 AM Inpatient Infectious Diseases Consult Completed     4/22/2021  4:00 PM Inpatient Gastroenterology Consult Completed     4/17/2021 10:38 AM Inpatient Neurology Consult General Completed     4/16/2021 11:30 PM Inpatient Cardiology Consult Completed     4/15/2021  6:38 AM Inpatient Gastroenterology Consult Completed     4/12/2021  6:07 PM Inpatient Urology Consult Completed             History of Present Illness:    69-year-old -American female with multiple medical problems who has been admitted multiple times in the last 3 months for post sepsis hypothermia most recently from metabolic encephalopathy hypothermia has been admitted skilled rehab continuation of IV antibiotics PT and OT and monitoring for recurrent hypothermia    Hospital Course    Patient admitted to skilled rehab and continued on IV vancomycin to complete a course on 5/25/2021.  She was continued on IV Diflucan  for 10 days and then switched over to p.o. Diflucan and she should finish off of 15-day course.    With regards to her generalized weakness and immobilization PT and OT did work with the patient she made some improvement but plateaued.  Still requiring assist of 2 and her progress was impeded by a short attention span distractibility and confusion.  Patient made very little functional progress with therapies    Her hypothermia was entirely controlled throughout the stay at TCU.  Her ambient room temperature is Between 78 and 80.  She did not require using warming blankets or bear hugger's.  Her hypoglycemia resolved she only 1 episode throughout her stay.  Renal functions remained stable transaminitis continue to improve hemoglobin and thrombocytopenia remained stable    Operations and Procedures Performed:       Adult Transthoracic Echo Complete W/ Cont if Necessary Per Protocol    Result Date: 5/6/2021  Narrative: · Calculated right ventricular systolic pressure from tricuspid regurgitation is 49 mmHg. · Estimated left ventricular EF = 55% Left ventricular systolic function is normal. · Left ventricular diastolic function was indeterminate. · Moderate to severe mitral valve regurgitation is present. · Left atrial volume is severely increased. · Severe tricuspid valve regurgitation is present. · Moderate pulmonary hypertension is present. · The right atrial cavity is severely dilated. · The right ventricular cavity is mildly dilated.      CT Abdomen Pelvis Without Contrast    Result Date: 5/13/2021  Narrative: CT Abdomen Pelvis WO INDICATION: Recheck of hydronephrosis TECHNIQUE: CT of the abdomen and pelvis without IV contrast. Coronal and sagittal reconstructions were obtained.  Radiation dose reduction techniques included automated exposure control or exposure modulation based on body size. Count of known CT and cardiac nuc med studies performed in previous 12 months: 0. COMPARISON: CT of the abdomen and pelvis  from 4/12/2021 FINDINGS: Abdomen: Exam is again noted to be limited due to quality and patient motion. There are bibasilar effusions with consolidation and atelectasis. Right-sided renal pelviectasis with proximal hydroureter are again noted. There is contrast retention within the proximal right renal collecting system. Pelvis: There is a levoscoliosis. There is a fat-containing right inguinal hernia. Bowel appears grossly unremarkable given exam limitations. The patient appears anemic. No acute osseous abnormality.     Impression: 1. Right-sided pelviectasis with potential hydronephrosis is again noted and does not appear significantly changed from prior. There is contrast retention within the proximal right renal collecting system. It is uncertain as there may be some potential UPJ obstruction, other type of obstruction or chronic findings. This does not appear significantly changed from prior given difference in technique. 2. Bilateral pleural effusions with bibasilar atelectasis/consolidation. Signer Name: Caryl Fraire MD  Signed: 5/13/2021 5:38 PM  Workstation Name: [id number]  [facility]    XR Chest 2 View    Result Date: 5/20/2021  Narrative: PA AND LATERAL CHEST, 5/20/2021 8:20 AM  HISTORY: pneumonia; R13.11-Dysphagia, oral phase   pneumonia for 2 weeks  COMPARISON: 05/10/2021  TECHNIQUE: PA and lateral upright chest series.  FINDINGS: Heart size upper limits of normal. There are small bilateral effusions. The irregular density in the left upper lobe is stable. Measures about 3 cm in diameter. Patient had previous chest CT 05/12/2021 which showed bilateral upper lobe central consolidation. Right upper lobe density is also stable and chest x-ray. There is mild left base atelectasis      Impression: No significant change from 05/10/2021. There are bilateral suprahilar areas of irregular density. Please see recent chest CT.  Small bilateral pleural effusions with left base atelectasis   This report was finalized on 5/20/2021 8:46 AM by Dr. Samir Velez MD.      CT Head Without Contrast    Result Date: 5/10/2021  Narrative: CT Head WO HISTORY: Altered mental status prior to arrival with hypothermia TECHNIQUE: Axial unenhanced head CT. Radiation dose reduction techniques included automated exposure control or exposure modulation based on body size. Count of known CT and cardiac nuc med studies performed in previous 12 months: 5. Time of scan: 1:43 PM COMPARISON: 5/3/2021 FINDINGS: No intracranial hemorrhage, mass, or infarct. No hydrocephalus or extra-axial fluid collection. There are senescent changes, including volume loss and nonspecific white matter change, but no acute abnormality is seen. The skull base, calvarium, and extracranial soft tissues are normal.     Impression: Senescent changes without acute abnormality. Signer Name: Thomas Farah MD  Signed: 5/10/2021 2:05 PM  Workstation Name: [id number]  [facility]    CT Head Without Contrast    Result Date: 5/3/2021  Narrative: PROCEDURE: CT Head WO COMPARISON: 4/17/2021 INDICATIONS: Mental status change, unknown cause Additional Relevant clinical info: Nursing home patient with increased confusion x 2 days.  TECHNIQUE:  CT examination of the brain is performed without IV contrast. Radiation dose reduction techniques included automated exposure control or exposure modulation based on body size.  Count of known CT and cardiac nuc med studies performed in previous 12 months: 6.  FINDINGS:   There is no evidence of acute intracranial hemorrhage, mass effect or midline shift. No intra-axial or extra-axial fluid collections. There is mild cortical atrophy. Periventricular low attenuation is likely secondary to small vessel ischemic disease. The ventricular system is not dilated.  The calvarium is intact. Left maxillary sinus mucosal polyp or retention cyst similar to prior study. Patient is in the Gantry asymmetrically. On the  right inferior to the ear and near the angle of the mandible, heterogeneous focus measuring 2.7 x 1.7 cm is likely the parotid gland but only partially captured.     Impression: 1. No acute intracranial process. Probable small vessel ischemic disease. 2. Probable volume averaging producing abnormal appearance of the partially imaged right parotid gland. Outpatient nonemergent neck CT with IV contrast recommended to exclude parotid lesion as this is not related to patient's current acute mental status change.  Signer Name: Susie Thompson MD  Signed: 5/3/2021 9:02 PM  Workstation Name: [facility]  [facility]    CT Chest With Contrast Diagnostic    Result Date: 5/12/2021  Narrative: CT Chest W INDICATION: 71-year-old woman admitted to hospital with hypothermia and mental status changes. Evaluate left upper lobe infiltrate on chest film. TECHNIQUE: CT of the thorax with Isovue-370-100 cc IV contrast. Coronal and sagittal reconstructions were obtained.  Radiation dose reduction techniques included automated exposure control or exposure modulation based on body size. Count of known CT and cardiac nuc med studies performed in previous 12 months: 6. COMPARISON: Portable chest x-ray 5/10/2021, CT abdomen and pelvis 4/12/2021 FINDINGS: Images through the thoracic inlet demonstrate no thyroid mass or supraclavicular adenopathy Images through the chest demonstrate dense contrast opacification of the aorta which is normal. There is cardiomegaly with large right atrium and right ventricle. Right atrium measures 8.1 x 7.7 cm. Right ventricle measures 6.1 x 6.4 cm. There is some shift of the ventricular septum to the left suggesting right heart dysfunction. Pulmonary arteries are normal in caliber. No filling defects to suggest pulmonary emboli. There are moderate dependent bilateral pleural effusions which are slightly increased from CT abdomen 4/12/2021. Lung window images demonstrate dense airspace  change in the left and right suprahilar regions likely representing pneumonia. Dependent bibasilar densities are similar to prior study and are favored to represent compressive atelectasis. Limited views of the upper abdomen demonstrate reflux of contrast from the right heart into the intrahepatic IVC. The IVC is enlarged consistent with right heart dysfunction. There is dilatation of the right renal pelvis as was seen previously which could represent obstruction EJ obstruction. This is unchanged from 4/12/2021. Bone window images demonstrate scoliosis. There is compression deformity at L1 which is stable. Slight retropulsion of the body which is unchanged.     Impression: 1. Moderate dependent bilateral pleural effusions which are stable to minimally increased from CT abdomen 4/12/2021 100 2. Bilateral upper lobe suprahilar air space changes likely representing pneumonia. 3. Dependent parenchymal changes in the lungs most consistent with compressive atelectasis 4. Enlarged heart with very large right atrium and right ventricle and reflux of contrast to the IVC suggesting right heart dysfunction. Signer Name: Guerda Orellana MD  Signed: 5/12/2021 8:49 AM  Workstation Name: [id number]  [facility]    US Liver    Result Date: 5/13/2021  Narrative: ULTRASOUND ABDOMEN, LIMITED, 05/12/2021  HISTORY: 71-year-old female hospital inpatient admitted to the hospital yesterday with multiple medical problems including Candida/MRSA bacteremia. Elevated liver enzymes are noted.  TECHNIQUE: Grayscale ultrasound imaging of the right upper abdomen.  COMPARISON: *  Abdomen images from CTA chest yesterday.  FINDINGS: The liver is mildly enlarged, and the hepatic veins are distended. Doppler imaging shows markedly pulsatile flow within the main portal vein. These findings are very likely represent congestive hepatopathy related to elevated right heart pressure as suggested on CTA yesterday. There is a right  pleural effusion, but no right upper quadrant ascites is seen.  Gallbladder is unremarkable. No cholelithiasis or gallbladder distention. No intrahepatic or extrahepatic bile duct dilatation.  Moderately severe right-sided hydronephrosis is noted. In addition, there is the suggestion of some particulate debris within the dilated right renal collecting system. Right renal parenchyma is echodense, a nonspecific finding usually reflecting medical renal disease.      Impression: 1.  Cardiomegaly with distended hepatic veins and pulsatile flow in the main portal vein. This is likely secondary to elevated right heart pressure. Correlate for congestive hepatopathy as an etiology for the patient's transaminitis. 2.  Negative gallbladder. 3.  Abnormal right kidney with moderately severe right hydronephrosis. Particulate debris within the renal pelvis and collecting system. This could be secondary to significant urinary tract infection or gross hematuria, correlate clinically. Echodense renal parenchyma is also noted.  This report was finalized on 5/13/2021 12:29 PM by Dr. Rg Holman MD.      XR Chest 1 View    Result Date: 5/10/2021  Narrative: AP PORTABLE CHEST, 05/10/2021  HISTORY: Altered mental status and hypothermia  COMPARISON: 05/06/2021  TECHNIQUE: AP portable chest x-ray.  FINDINGS: PIC catheter tip is in the lower superior vena cava. There is increased density in the left base suggesting atelectasis or infiltrate. There is patchy density in the left upper lobe measuring 3 cm in diameter. This is stable and may represent an infiltrate or mass.      Impression: Left lower lobe atelectasis or infiltrate.  3 cm patchy density left upper lobe which is been present on most recent studies. Mass or infiltrate cannot be excluded  This report was finalized on 5/10/2021 2:09 PM by Dr. Samir Velez MD.      XR Chest 1 View    Result Date: 5/6/2021  Narrative: CHEST X-RAY, 05/06/2021     HISTORY: PICC placement  TECHNIQUE:  AP portable chest x-ray.  COMPARISON: *  Chest x-ray 5/3/2021 and 1/16/2019.  FINDINGS: Newly placed right arm PICC is in good position with tip in the low SVC.  Dense left lower lobe consolidation. Suspected small pleural effusions.  Diffusely increased interstitial markings suggesting potential mild interstitial edema, correlate clinically. 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 Correlate for known history of sarcoidosis or pneumoconiosis.      Impression: Newly placed PICC in good position.  This report was finalized on 5/6/2021 3:32 PM by Dr. Rg Holman MD.      XR Chest 1 View    Result Date: 5/3/2021  Narrative: CR Chest 1 Vw INDICATION: Altered mental status, extremity edema COMPARISON:  4/18/2021 FINDINGS: Single portable AP view(s) of the chest.  Moderate cardiomegaly. Chronic reticular densities throughout both lungs with masslike opacity in the right mid to upper lung which is unchanged. Possible small left pleural effusion though similar to the prior study. Overall similar appearance of the chest is compared to prior radiographs.     Impression: Overall similar appearance of the chest compared to prior radiographs with background chronic changes and areas of scarring with cardiomegaly and probably a small left pleural effusion. Masslike opacity mid left upper lung is not not appreciably different from recent chest radiographs from April 2021. If nonresolving on follow-up chest radiograph consider CT chest to better evaluate. Signer Name: JIGAR CABRERA MD  Signed: 5/3/2021 6:03 PM  Workstation Name: [id number]  [facility]    XR Abdomen KUB    Result Date: 5/6/2021  Narrative: CR Abdomen 1 Vw INDICATION: Abdominal pain, constipation, chronicity not specified COMPARISON: None available FINDINGS: Single AP radiographic view of the abdomen is limited by patient  factors. Overall the bowel gas pattern appears nonobstructive but further detail is limited. At least moderate if not large stool burden in the right colon.     Impression: Overall nonobstructive bowel gas pattern. Moderate to large stool burden in the right colon. Signer Name: JIGAR CABRERA MD  Signed: 5/6/2021 4:59 PM  Workstation Name: [id number]  [facility] [place]      Labs:  Results from last 7 days   Lab Units 05/24/21  0732 05/21/21  0530   WBC 10*3/mm3 5.69 6.15   HEMOGLOBIN g/dL 8.5* 8.9*   HEMATOCRIT % 27.3* 28.2*   PLATELETS 10*3/mm3 208 203     Results from last 7 days   Lab Units 05/25/21  0610 05/21/21  0530   SODIUM mmol/L 136 133*   POTASSIUM mmol/L 4.7 4.8   CHLORIDE mmol/L 101 98   CO2 mmol/L 29.3* 29.3*   BUN mg/dL 9 8   CREATININE mg/dL 0.61 0.69   CALCIUM mg/dL 8.5* 8.5*   BILIRUBIN mg/dL 0.5 0.5   ALK PHOS U/L 268* 336*   ALT (SGPT) U/L 14 19   AST (SGOT) U/L 20 20   GLUCOSE mg/dL 75 84           Lab Results (last 24 hours)     Procedure Component Value Units Date/Time    Comprehensive Metabolic Panel [[id number]]  (Abnormal) Collected: 05/25/21 0610    Specimen: Blood Updated: 05/25/21 0633     Glucose 75 mg/dL      BUN 9 mg/dL      Creatinine 0.61 mg/dL      Sodium 136 mmol/L      Potassium 4.7 mmol/L      Chloride 101 mmol/L      CO2 29.3 mmol/L      Calcium 8.5 mg/dL      Total Protein 5.5 g/dL      Albumin 2.20 g/dL      ALT (SGPT) 14 U/L      AST (SGOT) 20 U/L      Alkaline Phosphatase 268 U/L      Total Bilirubin 0.5 mg/dL      eGFR  African Amer 117 mL/min/1.73      Globulin 3.3 gm/dL      A/G Ratio 0.7 g/dL      BUN/Creatinine Ratio 14.8     Anion Gap 5.7 mmol/L     Narrative:      GFR Normal >60  Chronic Kidney Disease <60  Kidney Failure <15      POC Glucose Once [[id number]]  (Normal) Collected: 05/25/21 0535    Specimen: Blood Updated: 05/25/21 0541     Glucose 79 mg/dL     POC Glucose Once [[id number]]  (Normal) Collected: 05/24/21 1954    Specimen: Blood Updated:  05/24/21 2000     Glucose 99 mg/dL     POC Glucose Once [[id number]]  (Normal) Collected: 05/24/21 1647    Specimen: Blood Updated: 05/24/21 1653     Glucose 76 mg/dL     POC Glucose Once [[id number]]  (Normal) Collected: 05/24/21 1458    Specimen: Blood Updated: 05/24/21 1504     Glucose 87 mg/dL                                   Invalid input(s): LDLCALC          Glucose   Date/Time Value Ref Range Status   05/25/2021 0535 79 70 - 130 mg/dL Final   05/24/2021 1954 99 70 - 130 mg/dL Final   05/24/2021 1647 76 70 - 130 mg/dL Final   05/24/2021 1458 87 70 - 130 mg/dL Final   05/24/2021 1121 94 70 - 130 mg/dL Final   05/24/2021 0523 74 70 - 130 mg/dL Final   05/23/2021 1849 149 (H) 70 - 130 mg/dL Final   05/23/2021 1704 116 70 - 130 mg/dL Final                         Radiology:  Imaging Results (Last 24 Hours)     ** No results found for the last 24 hours. **          PROCEDURES          Allergies:  has No Known Allergies.      Discharge Medications:     Your medication list      START taking these medications      Instructions Last Dose Given Next Dose Due   amLODIPine 5 MG tablet  Commonly known as: NORVASC      Take 1 tablet by mouth Daily.       fluconazole 200 MG tablet  Commonly known as: DIFLUCAN      Take 1 tablet by mouth Daily for 5 doses. Indications: Bacteria in the Blood       melatonin 5 MG tablet tablet      Take 1 tablet by mouth At Night As Needed (insomnia).          CHANGE how you take these medications      Instructions Last Dose Given Next Dose Due   acetaminophen 325 MG tablet  Commonly known as: TYLENOL  What changed: reasons to take this      Take 2 tablets by mouth Every 6 (Six) Hours As Needed for Mild Pain .       acetaminophen 325 MG tablet  Commonly known as: TYLENOL  What changed: You were already taking a medication with the same name, and this prescription was added. Make sure you understand how and when to take each.      Take 2 tablets by mouth Every 4 (Four) Hours As Needed for Mild  Pain  or Fever.       acetaminophen 160 MG/5ML solution  Commonly known as: TYLENOL  What changed: You were already taking a medication with the same name, and this prescription was added. Make sure you understand how and when to take each.      Take 20.3 mL by mouth Every 4 (Four) Hours As Needed for Mild Pain .       digoxin 125 MCG tablet  Commonly known as: LANOXIN  What changed: when to take this      Take 1 tablet by mouth Daily.          CONTINUE taking these medications      Instructions Last Dose Given Next Dose Due   Aquaphor Advanced Therapy ointment ointment      Apply 1 application topically to the appropriate area as directed Daily.       aspirin 81 MG chewable tablet      Chew 1 tablet Daily.       bisacodyl 10 MG suppository  Commonly known as: DULCOLAX      Insert 1 suppository into the rectum Daily As Needed for Constipation.       docusate sodium 100 MG capsule      Take 1 capsule by mouth 2 (Two) Times a Day.       famotidine 40 MG tablet  Commonly known as: PEPCID      Take 40 mg by mouth Every Night.       ipratropium-albuterol 0.5-2.5 mg/3 ml nebulizer  Commonly known as: DUO-NEB      Take 3 mL by nebulization Every 4 (Four) Hours As Needed for Shortness of Air.       iron polysaccharides 150 MG capsule  Commonly known as: NIFEREX      Take 1 capsule by mouth 2 (Two) Times a Day.       lactobacillus acidophilus capsule capsule      Take 1 capsule by mouth 2 (Two) Times a Day.       lactulose 10 GM/15ML solution  Commonly known as: CHRONULAC      Take 10 g by mouth Daily As Needed (constipation).       levETIRAcetam 1000 MG tablet  Commonly known as: Keppra      Take 1 tablet by mouth 2 (Two) Times a Day.       levothyroxine 75 MCG tablet  Commonly known as: SYNTHROID, LEVOTHROID      Take 1 tablet by mouth Daily.       LORazepam 0.5 MG tablet  Commonly known as: ATIVAN      Take 0.5 mg by mouth Every 12 (Twelve) Hours As Needed for Anxiety.       Medihoney Wound/Burn Dressing gel      Apply   topically Daily. Apply to sacrum; L elbow; RLE & LLE       MiraLax 17 g packet  Generic drug: polyethylene glycol      Take 17 g by mouth Daily.       multivitamin with minerals tablet tablet      Take 1 tablet by mouth Daily.       potassium chloride 20 MEQ packet  Commonly known as: KLOR-CON      Take 20 mEq by mouth Daily.       rivaroxaban 20 MG tablet  Commonly known as: XARELTO      Take 1 tablet by mouth Daily.       timolol 0.25 % ophthalmic solution  Commonly known as: BETIMOL      1-2 drops 2 (Two) Times a Day.       traMADol 50 MG tablet  Commonly known as: ULTRAM      Take 50 mg by mouth Every 6 (Six) Hours As Needed for Moderate Pain .          STOP taking these medications    fluconazole 400-0.9 MG/200ML-% IVPB  Commonly known as: DIFLUCAN           ASK your doctor about these medications      Instructions Last Dose Given Next Dose Due   vancomycin  Ask about: Should I take this medication?      Infuse 250 mL into a venous catheter Every 18 (Eighteen) Hours for 8 doses. Indications: Bacteremia             Where to Get Your Medications      These medications were sent to [facility] - [place], [place] - [address] [place] [address] - [phone]  - [phone] [address] [address]    Phone: [phone]   · fluconazole 200 MG tablet     Information about where to get these medications is not yet available    Ask your nurse or doctor about these medications  · acetaminophen 160 MG/5ML solution  · acetaminophen 325 MG tablet  · amLODIPine 5 MG tablet  · melatonin 5 MG tablet tablet         Home medications and discharge medications reconciled with patient      Condition on Discharge: Stable    Discharge Disposition  [facility]      Discharge Diet:      Dietary Orders (From admission, onward)     Start     Ordered    05/21/21 1524  Diet Soft Texture; Ground; Thin  Diet Effective Now     Comments: May have whole meats of kaur and sausage with breakfast.   Question  Answer Comment   Diet Texture / Consistency Soft Texture    Select Texture: Ground    Fluid Consistency Thin        05/21/21 1524    05/19/21 0800  Dietary Nutrition Supplement: Boost Plus (Ensure Enlive, Ensure Plus)  Daily With Breakfast, Lunch & Dinner     Question:  Select Supplement:  Answer:  Boost Plus (Ensure Enlive, Ensure Plus)    05/18/21 1801                Activity at Discharge:    To PT and OT to improve functional status      Follow-up Appointments:  Follow-up Information     Hany Oviedo MD .    Specialty: Family Medicine  Contact information:  [address] [place] [address] [phone]                   Test Results Pending at Discharge       Jose Walters MD  05/25/21  07:59 EDT    Much of this encounter note is an electronic transcription/translation of spoken language to printed text using Dragon Software

## 2021-05-25 NOTE — NURSING NOTE
No new or worsened cough, soa, sore throat, or loss of taste or smell reported by pt or on AM assessment.

## 2021-05-26 NOTE — PLAN OF CARE
Goal Outcome Evaluation:     Progress: improving  Outcome Summary: increased appetite stil need wound care

## 2021-05-26 NOTE — NURSING NOTE
Seen for weekly CWON skin assessment on rehab unit today. Sacral wound assessed and non-viable tissue is still fairly adherent to wound bed but does appear to be softening and remains stable. May get more aggressive debridement with daily Santyl ointment. She is being discharged to [facility] today.

## 2021-05-26 NOTE — PLAN OF CARE
"Goal Outcome Evaluation:  Plan of Care Reviewed With: patient  Progress: no change     Patient resting in bed with eyes closed. Patient anxious at beginning of shift. Medications given and improved. Still itching at times. Lotion has helped and still asking for medications. 0 complaints of new or worsening cough, shortness of breath, sore throat, loss of taste or smell noted. Patient given snacks at bedtime.  Completed  iv antibiotics related to infection. 0 adverse reactions noted. Patient educated on using call light instead of hollering out during the night. Patient stated \"ok\"  Dressing changed to buttock as ordered related to being soiled. Safety measures in place.  "

## 2021-11-23 NOTE — ED NOTES
Patient relates she came to ER because the doctor told her to go get her stomach checked out due to diarrhea complaints. Patient relates she started drinking Ensure last Wednesday and after she drinks it she has diarrhea. Patient relates she had diarrhea last night after drinking her ensure, but none today.     Judy Duran, RN  11/23/21 3335

## 2021-11-23 NOTE — ED PROVIDER NOTES
EMERGENCY DEPARTMENT ENCOUNTER      Room Number: 03/03    History is provided by the patient, no translation services needed    HPI:    Chief complaint: Abdominal pain    Location: Abdomen    Quality/Severity: Patient advises that the pain is a dull and cramping pain.  She rates the pain 3 out of 10.    Timing/Duration: 1 week    Modifying Factors: None    Associated Symptoms: Nausea, diarrhea, cough    Narrative: Pt is a 71 y.o. female who presents complaining of abdominal pain x1 week.  She advises that she has been nauseated but has not thrown up.  She advises that she has had 1 episode of diarrhea per day.  She describes the pain as a dull and cramping pain.  She rates the pain 3 out of 10.  Patient also admits to a cough and states that she has been coughing up clear phlegm.  Patient denies any chest pain, shortness of breath, lower extremity edema, dysuria, or blood in the stool.      PMD: Hany Oviedo MD    REVIEW OF SYSTEMS  Review of Systems   Constitutional: Negative for chills and fever.   HENT: Negative for congestion and rhinorrhea.    Eyes: Negative for pain and visual disturbance.   Respiratory: Negative for cough, chest tightness and shortness of breath.    Cardiovascular: Negative for chest pain, palpitations and leg swelling.   Gastrointestinal: Positive for abdominal pain, diarrhea and nausea. Negative for constipation and vomiting.   Genitourinary: Negative for dysuria and flank pain.   Musculoskeletal: Negative for arthralgias and myalgias.   Skin: Negative for color change, pallor, rash and wound.   Neurological: Negative for dizziness, syncope and headaches.   Psychiatric/Behavioral: Negative for suicidal ideas. The patient is not nervous/anxious.          PAST MEDICAL HISTORY  Active Ambulatory Problems     Diagnosis Date Noted   • Abnormal laboratory test result 01/16/2017   • Gastroesophageal reflux disease 01/16/2017   • MGUS (monoclonal gammopathy of unknown significance) 03/25/2013    • Normocytic anemia 02/21/2013   • Hiatal hernia 04/17/2017   • Colon polyps 04/17/2017   • Internal hemorrhoids 04/17/2017   • Chronic anticoagulation 01/23/2019   • Esophageal dysphagia 06/02/2020   • Severe malnutrition (HCC) 02/20/2021   • Longstanding persistent atrial fibrillation (HCC) 02/25/2021   • Sepsis associated hypotension (HCC) 04/12/2021   • Hydronephrosis due to obstruction of ureter 04/12/2021   • Acute metabolic encephalopathy 04/17/2021   • Multiple wounds of skin 04/17/2021   • Anasarca 04/17/2021   • Valvular heart disease 04/17/2021   • Acute kidney insufficiency 04/17/2021   • Grand mal status epilepticus (HCC) 04/17/2021   • Focal motor seizure (HCC) 04/18/2021   • Ricardo coma scale total score 3-8 (HCC) 04/18/2021   • Pyelonephritis 04/30/2021   • Altered mental status 05/03/2021   • Hypothermia 05/10/2021   • Encounter for rehabilitation 05/13/2021     Resolved Ambulatory Problems     Diagnosis Date Noted   • Leukopenia 07/15/2015   • Neutropenia (HCC) 02/21/2013   • Paroxysmal atrial fibrillation (HCC) 01/23/2019   • Acute diastolic CHF (congestive heart failure) (HCC) 10/07/2020   • Hypothermia 02/17/2021   • Dysarthria 02/25/2021     Past Medical History:   Diagnosis Date   • Anxiety    • Arthritis    • Chronic diastolic (congestive) heart failure (HCC)    • Colon polyp    • Dysphagia    • GERD (gastroesophageal reflux disease)    • Hypertension    • Hypothyroidism    • Monoclonal gammopathy 3/25/2013   • Nonrheumatic mitral valve regurgitation    • Persistent atrial fibrillation (HCC)    • Reflux gastritis    • Seizure (HCC)    • Tricuspid regurgitation    • Type 2 diabetes mellitus (HCC)        PAST SURGICAL HISTORY  Past Surgical History:   Procedure Laterality Date   • COLONOSCOPY N/A 3/29/2017    Procedure: COLONOSCOPY; POLYPECTOMY;  Surgeon: Brooke Garrido MD;  Location: [facility];  Service:    • COLONOSCOPY N/A 8/3/2020    Procedure: COLONOSCOPY with polypectomy;  Surgeon:  Rui Shi MD;  Location: [facility] OR;  Service: Gastroenterology;  Laterality: N/A;  ascending polyp  transverse polyp  rectal polyp   • CYST REMOVAL     • CYSTOSCOPY W/ URETERAL STENT PLACEMENT Right 4/13/2021    Procedure: CYSTOSCOPY URETERAL CATHETER/STENT INSERTION;  Surgeon: Onesimo Fuller MD;  Location: [facility] OR;  Service: Urology;  Laterality: Right;   • CYSTOSCOPY W/ URETERAL STENT PLACEMENT Right 4/28/2021    Procedure: CYSTOSCOPY URETERAL STENT REMOVAL;  Surgeon: Onesimo Fuller MD;  Location: [facility] OR;  Service: Urology;  Laterality: Right;  CYSTOSCOPY URETERAL STENT REMOVAL   • ENDOSCOPY N/A 3/29/2017    Procedure: ESOPHAGOGASTRODUODENOSCOPY WITH DILITATION;  Surgeon: Brooke Garrido MD;  Location: [facility] OR;  Service:    • ENDOSCOPY N/A 8/3/2020    Procedure: ESOPHAGOGASTRODUODENOSCOPY with biopsies;  Surgeon: Rui Shi MD;  Location: [facility] OR;  Service: Gastroenterology;  Laterality: N/A;  esophagitis  gastritis   • HERNIA REPAIR     • HYSTERECTOMY         FAMILY HISTORY  Family History   Problem Relation Age of Onset   • Colon cancer Mother    • Colon cancer Other    • Lung cancer Father    • Breast cancer Maternal Aunt        SOCIAL HISTORY  Social History     Socioeconomic History   • Marital status: Single   Tobacco Use   • Smoking status: Never Smoker   • Smokeless tobacco: Never Used   • Tobacco comment: daily caffiene   Vaping Use   • Vaping Use: Never used   Substance and Sexual Activity   • Alcohol use: No   • Drug use: No   • Sexual activity: Defer       ALLERGIES  Patient has no known allergies.      Current Facility-Administered Medications:   •  [COMPLETED] Insert peripheral IV, , , Once **AND** sodium chloride 0.9 % flush 10 mL, 10 mL, Intravenous, PRN, Viky Brown PA-C    Current Outpatient Medications:   •  acetaminophen (TYLENOL) 160 MG/5ML solution, Take 20.3 mL by mouth Every 4 (Four) Hours As Needed for Mild Pain ., Disp: , Rfl:    •  acetaminophen (TYLENOL) 325 MG tablet, Take 2 tablets by mouth Every 6 (Six) Hours As Needed for Mild Pain . (Patient taking differently: Take 650 mg by mouth Every 6 (Six) Hours As Needed for Mild Pain  or Fever.), Disp:  , Rfl:   •  acetaminophen (TYLENOL) 325 MG tablet, Take 2 tablets by mouth Every 4 (Four) Hours As Needed for Mild Pain  or Fever., Disp: , Rfl:   •  amLODIPine (NORVASC) 5 MG tablet, Take 1 tablet by mouth Daily., Disp: , Rfl:   •  aspirin 81 MG chewable tablet, Chew 1 tablet Daily., Disp:  , Rfl:   •  bisacodyl (DULCOLAX) 10 MG suppository, Insert 1 suppository into the rectum Daily As Needed for Constipation., Disp: , Rfl:   •  digoxin (LANOXIN) 125 MCG tablet, Take 1 tablet by mouth Daily. (Patient taking differently: Take 125 mcg by mouth Daily.), Disp: 30 tablet, Rfl: 11  •  docusate sodium 100 MG capsule, Take 1 capsule by mouth 2 (Two) Times a Day., Disp:  , Rfl:   •  famotidine (PEPCID) 40 MG tablet, Take 40 mg by mouth Every Night., Disp: , Rfl:   •  ipratropium-albuterol (DUO-NEB) 0.5-2.5 mg/3 ml nebulizer, Take 3 mL by nebulization Every 4 (Four) Hours As Needed for Shortness of Air., Disp: 360 mL, Rfl:   •  iron polysaccharides (NIFEREX) 150 MG capsule, Take 1 capsule by mouth 2 (Two) Times a Day., Disp:  , Rfl:   •  lactobacillus acidophilus (RISAQUAD) capsule capsule, Take 1 capsule by mouth 2 (Two) Times a Day., Disp: , Rfl:   •  lactulose (CHRONULAC) 10 GM/15ML solution, Take 10 g by mouth Daily As Needed (constipation)., Disp: , Rfl:   •  levETIRAcetam (Keppra) 1000 MG tablet, Take 1 tablet by mouth 2 (Two) Times a Day., Disp: , Rfl:   •  levothyroxine (SYNTHROID, LEVOTHROID) 75 MCG tablet, Take 1 tablet by mouth Daily., Disp: , Rfl:   •  LORazepam (ATIVAN) 0.5 MG tablet, Take 0.5 mg by mouth Every 12 (Twelve) Hours As Needed for Anxiety., Disp: , Rfl:   •  melatonin 5 MG tablet tablet, Take 1 tablet by mouth At Night As Needed (insomnia)., Disp: , Rfl:   •  multivitamin  with minerals tablet tablet, Take 1 tablet by mouth Daily., Disp: , Rfl:   •  polyethylene glycol (MiraLax) 17 g packet, Take 17 g by mouth Daily., Disp: , Rfl:   •  potassium chloride (KLOR-CON) 20 MEQ packet, Take 20 mEq by mouth Daily., Disp: , Rfl:   •  rivaroxaban (XARELTO) 20 MG tablet, Take 1 tablet by mouth Daily., Disp: 30 tablet, Rfl: 5  •  timolol (BETIMOL) 0.25 % ophthalmic solution, 1-2 drops 2 (Two) Times a Day., Disp: , Rfl:   •  traMADol (ULTRAM) 50 MG tablet, Take 50 mg by mouth Every 6 (Six) Hours As Needed for Moderate Pain ., Disp: , Rfl:   •  Emollient (Aquaphor Advanced Therapy) ointment ointment, Apply 1 application topically to the appropriate area as directed Daily., Disp: , Rfl:   •  ondansetron ODT (ZOFRAN-ODT) 4 MG disintegrating tablet, Place 1 tablet on the tongue Every 8 (Eight) Hours As Needed for Nausea or Vomiting for up to 4 days., Disp: 12 tablet, Rfl: 0  •  Wound Dressings (Medihoney Wound/Burn Dressing) gel, Apply  topically Daily. Apply to sacrum; L elbow; RLE & LLE, Disp: , Rfl:     PHYSICAL EXAM  ED Triage Vitals [11/23/21 1520]   Temp Heart Rate Resp BP SpO2   97.8 °F (36.6 °C) 55 18 102/62 100 %      Temp src Heart Rate Source Patient Position BP Location FiO2 (%)   Oral Monitor Sitting Left arm --       Physical Exam  Vitals and nursing note reviewed.   Constitutional:       General: She is not in acute distress.     Appearance: Normal appearance. She is not ill-appearing, toxic-appearing or diaphoretic.   HENT:      Head: Normocephalic and atraumatic.      Nose: Nose normal.      Mouth/Throat:      Mouth: Mucous membranes are moist.   Eyes:      Conjunctiva/sclera: Conjunctivae normal.   Cardiovascular:      Rate and Rhythm: Regular rhythm. Bradycardia present.      Heart sounds: Normal heart sounds.   Pulmonary:      Effort: Pulmonary effort is normal. No respiratory distress.      Breath sounds: Normal breath sounds.   Abdominal:      General: Bowel sounds are normal.  There is no distension.      Palpations: Abdomen is soft.      Tenderness: There is no abdominal tenderness.   Musculoskeletal:         General: No swelling, tenderness, deformity or signs of injury. Normal range of motion.      Cervical back: Normal range of motion and neck supple.      Right lower leg: No edema.      Left lower leg: No edema.   Skin:     General: Skin is warm and dry.      Coloration: Skin is not jaundiced or pale.      Findings: No bruising, erythema, lesion or rash.   Neurological:      Mental Status: She is alert and oriented to person, place, and time.   Psychiatric:         Mood and Affect: Mood and affect normal.         Behavior: Behavior normal.           LAB RESULTS  Lab Results (last 24 hours)     Procedure Component Value Units Date/Time    Digoxin Level [[id number]]  (Abnormal) Collected: 11/23/21 1719    Specimen: Blood Updated: 11/23/21 1810     Digoxin 2.13 ng/mL     COVID-19 and FLU A/B PCR - Swab, Nasopharynx [[id number]]  (Normal) Collected: 11/23/21 1719    Specimen: Swab from Nasopharynx Updated: 11/23/21 1750     COVID19 Not Detected     Influenza A PCR Not Detected     Influenza B PCR Not Detected    Narrative:      Fact sheet for providers: https://www.fda.gov/media/509868/download    Fact sheet for patients: https://www.fda.gov/media/589968/download    Test performed by PCR.    CBC & Differential [[id number]]  (Abnormal) Collected: 11/23/21 1719    Specimen: Blood Updated: 11/23/21 1810    Narrative:      The following orders were created for panel order CBC & Differential.  Procedure                               Abnormality         Status                     ---------                               -----------         ------                     CBC Auto Differential[[id number]]        Abnormal            Final result               Scan Slide[[id number]]                                       Final result                 Please view results for these tests on the individual  orders.    Comprehensive Metabolic Panel [[id number]]  (Abnormal) Collected: 11/23/21 1719    Specimen: Blood Updated: 11/23/21 1753     Glucose 107 mg/dL      BUN 24 mg/dL      Creatinine 1.16 mg/dL      Sodium 134 mmol/L      Potassium 4.8 mmol/L      Comment: Slight hemolysis detected by analyzer. Results may be affected.        Chloride 99 mmol/L      CO2 22.1 mmol/L      Calcium 9.9 mg/dL      Total Protein 7.7 g/dL      Albumin 3.90 g/dL      ALT (SGPT) 48 U/L      AST (SGOT) 51 U/L      Comment: Slight hemolysis detected by analyzer. Results may be affected.        Alkaline Phosphatase 239 U/L      Total Bilirubin 0.6 mg/dL      eGFR   Amer 56 mL/min/1.73      Globulin 3.8 gm/dL      A/G Ratio 1.0 g/dL      BUN/Creatinine Ratio 20.7     Anion Gap 12.9 mmol/L     Narrative:      GFR Normal >60  Chronic Kidney Disease <60  Kidney Failure <15      Lipase [[id number]]  (Normal) Collected: 11/23/21 1719    Specimen: Blood Updated: 11/23/21 1749     Lipase 49 U/L     CBC Auto Differential [[id number]]  (Abnormal) Collected: 11/23/21 1719    Specimen: Blood Updated: 11/23/21 1735     WBC 2.40 10*3/mm3      RBC 4.77 10*6/mm3      Hemoglobin 12.0 g/dL      Hematocrit 38.6 %      MCV 80.9 fL      MCH 25.2 pg      MCHC 31.1 g/dL      RDW 17.1 %      RDW-SD 49.7 fl      MPV --     Comment: Unable to calculate         Platelets 152 10*3/mm3      Neutrophil % 66.2 %      Lymphocyte % 22.5 %      Monocyte % 8.8 %      Eosinophil % 2.1 %      Basophil % 0.4 %      Immature Grans % 0.0 %      Neutrophils, Absolute 1.59 10*3/mm3      Lymphocytes, Absolute 0.54 10*3/mm3      Monocytes, Absolute 0.21 10*3/mm3      Eosinophils, Absolute 0.05 10*3/mm3      Basophils, Absolute 0.01 10*3/mm3      Immature Grans, Absolute 0.00 10*3/mm3     Scan Slide [[id number]] Collected: 11/23/21 1719    Specimen: Blood Updated: 11/23/21 1810     RBC Morphology Normal     WBC Morphology Normal     Platelet Estimate Adequate     Large Platelets  Slight/1+    Urinalysis With Microscopic If Indicated (No Culture) - Urine, Clean Catch [[id number]]  (Abnormal) Collected: 11/23/21 1742    Specimen: Urine, Clean Catch Updated: 11/23/21 1759     Color, UA Yellow     Appearance, UA Clear     pH, UA <=5.0     Specific Gravity, UA 1.020     Glucose, UA Negative     Ketones, UA Negative     Bilirubin, UA Negative     Blood, UA Trace     Protein, UA Negative     Leuk Esterase, UA Negative     Nitrite, UA Negative     Urobilinogen, UA 0.2 E.U./dL    Urinalysis, Microscopic Only - Urine, Clean Catch [[id number]]  (Abnormal) Collected: 11/23/21 1742    Specimen: Urine, Clean Catch Updated: 11/23/21 1810     RBC, UA 3-5 /HPF      WBC, UA None Seen /HPF      Bacteria, UA None Seen /HPF      Squamous Epithelial Cells, UA 3-6 /HPF      Hyaline Casts, UA None Seen /LPF      Methodology Manual Light Microscopy            I ordered the above labs and reviewed the results    RADIOLOGY  No Radiology Exams Resulted Within Past 24 Hours        PROCEDURES  Procedures      PROGRESS AND CONSULTS  ED Course as of 11/23/21 1826 Tue Nov 23, 2021 1822 Spoke with Dr. Woodruff. He advised to DC home and have pt skip two days of Digoxin. Stated to not get a CT due to benign PE.  [AH]      ED Course User Index  [AH] Viky Brown PA-C           MEDICAL DECISION MAKING    MDM     My differential diagnosis for abdominal pain includes but is not limited to:  Gastritis, gastroenteritis, peptic ulcer disease, GERD, esophageal perforation, acute appendicitis, mesenteric adenitis, Meckel’s diverticulum, epiploic appendagitis, diverticulitis, colon cancer, ulcerative colitis, Crohn’s disease, intussusception, small bowel obstruction, adhesions, ischemic bowel, perforated viscus, ileus, obstipation, biliary colic, cholecystitis, cholelithiasis, Vivek-Tj Cristopher, hepatitis, pancreatitis, common bile duct obstruction, cholangitis, bile leak, splenic trauma, splenic rupture, splenic infarction,  splenic abscess, abdominal abscess, ascites, spontaneous bacterial peritonitis, hernia, UTI, cystitis,ureterolithiasis, urinary obstruction, ovarian cyst, torsion, pregnancy, ectopic pregnancy, PID, pelvic abscess, mittelschmerz, endometriosis, AAA, myocardial infarction, pneumonia, cancer, porphyria, DKA, medications, sickle cell, viral syndrome, zoster  DIAGNOSIS  Final diagnoses:   Generalized abdominal pain       Latest Documented Vital Signs:  As of 18:26 EST  BP- 113/85 HR- 55 Temp- 97.8 °F (36.6 °C) (Oral) O2 sat- 98%    DISPOSITION  Pt discharged      Discussed pertinent findings with the patient.  Patient voiced understanding of need to follow-up for recheck and further testing as needed.  Return to the Emergency Department warnings were given.         Medication List      New Prescriptions    ondansetron ODT 4 MG disintegrating tablet  Commonly known as: ZOFRAN-ODT  Place 1 tablet on the tongue Every 8 (Eight) Hours As Needed for Nausea or Vomiting for up to 4 days.        Changed    * acetaminophen 325 MG tablet  Commonly known as: TYLENOL  Take 2 tablets by mouth Every 6 (Six) Hours As Needed for Mild Pain .  What changed: reasons to take this     * acetaminophen 325 MG tablet  Commonly known as: TYLENOL  Take 2 tablets by mouth Every 4 (Four) Hours As Needed for Mild Pain  or Fever.  What changed: Another medication with the same name was changed. Make sure you understand how and when to take each.     * acetaminophen 160 MG/5ML solution  Commonly known as: TYLENOL  Take 20.3 mL by mouth Every 4 (Four) Hours As Needed for Mild Pain .  What changed: Another medication with the same name was changed. Make sure you understand how and when to take each.     digoxin 125 MCG tablet  Commonly known as: LANOXIN  Take 1 tablet by mouth Daily.  What changed: when to take this         * This list has 3 medication(s) that are the same as other medications prescribed for you. Read the directions carefully, and ask  your doctor or other care provider to review them with you.               Where to Get Your Medications      These medications were sent to [facility] - [place], [place] - [address] [place] [address] - [phone]  - [phone] [address] [address]    Phone: [phone]   · ondansetron ODT 4 MG disintegrating tablet              Follow-up Information     Hany Oviedo MD. Call in 1 day.    Specialty: Family Medicine  Why: to schedule follow up  Contact information:   [facility] [address] [phone]                           Dictated utilizing independenceIT dictation     Viky Brown PA-C  11/23/21 9706

## 2021-11-30 NOTE — PROGRESS NOTES
Date of Office Visit: 2021  Encounter Provider: EDEL Acosta  Place of Service: [facility] CARDIOLOGY  Patient Name: Argelia Nguyen  :1950  Primary Cardiologist:     CC:      Dear Dr. Oviedo    HPI: Argelia Nguyen is a pleasant 71 y.o. female who presents 2021 for cardiac follow up.  I reviewed her past medical records including notes, labs and testing in preparation for today's visit.  She has paroxysmal atrial fibrillation, CDCHF, MR, hypertension, and SVT.     She was seen on 2019 in our office and medical therapy was adjusted after she was found to have paroxysmal atrial fibrillation.  Metoprolol was increased and losartan was also added because of hypertension.      Echocardiogram 19:   · Calculated EF = 63.0%.  · Left ventricular systolic function is normal.  · The left ventricular cavity is small.  · Left ventricular wall thickness is consistent with moderate concentric   hypertrophy.  · Right atrial cavity size is severely dilated.  · Left atrial cavity size is moderate-to-severely dilated.  · Right ventricular cavity is severely dilated.  · Right ventricular wall thickness is consistent with moderate   hypertrophy.  · Mildly reduced right ventricular systolic function noted.  · Severe mitral valve regurgitation is present  · Severe tricuspid valve regurgitation is present.  · Estimated right ventricular systolic pressure from tricuspid   regurgitation is mildly elevated (35-45 mmHg).  · Calculated right ventricular systolic pressure from tricuspid   regurgitation is 43 mmHg.     Holter monitor 19:  An abnormal monitor study. · Evidence of atrial fibrillation was noted. Had atrial fibrillation 59.7%  of the time.     She saw  on 10/7/2020 because she has been feeling worse.  She was getting more shortness of breath with activity.  In  she had an echocardiogram that Dr. Dos Santos felt that the mitral valve  was only leaking mild to moderately, although when he review it, he thought it  possibly could be more significant than that, but was unsure.  She wore a Holter monitor that showed reasonable rate control.  We tried to increase her metoprolol, to get additional rate control and see if that would help, but she stated she just felt worse and felt dizzy.  She complained of getting very SOA with any activity as well as some shortness of breath at rest.  She was having some increasing lower extremity edema.  She states that when she walked from the waiting room to her exam room that day,  she got short of breath.  She was having a little bit of chest discomfort at times when she gets real short of breath.  She cut her metoprolol in half because she was having dizziness and that did not seem to help; she did not notice any change in the degree of shortness of breath.     Echocardiogram June 2020:  Interpretation Summary      · Calculated right ventricular systolic pressure from tricuspid regurgitation is 41.0 mmHg.  · Left ventricular systolic function is normal.  · Calculated EF = 60.0%.  · Right ventricular cavity is severely dilated.  · Left atrial cavity size is severely dilated.  · Right atrial cavity size is severely dilated.  · Mild-to-moderate mitral valve regurgitation is present  · Severe tricuspid valve regurgitation is present.          As echo was repeated 10/30/2020:  Interpretation Summary     · Estimated left ventricular EF = 60% Left ventricular systolic function is normal.  · The right ventricular cavity is moderately dilated.  · The left atrial cavity is severely dilated.  · The right atrial cavity is severely dilated.  · Mild mitral valve regurgitation is present.  · Moderate to severe tricuspid valve regurgitation is present      Valsartan 80 mg was ordered but she has been unable to tolerate it.  She got dizzy and weak even if she cut it to 40 mg daily. She was seen in the ED on 12/12/2020 with  complaints of dizziness and weakness.  She was instructed to stop the Valsartan and follow up in our office.     I saw her 12/17/2020 in follow-up.  She felt much better since stopping the valsartan.  It was noted noted she was also intolerant to metoprolol.  Both of these medications made her feel weak and very dizzy.  She did still have some dizziness but it was better than when she was taking the other medications.  She continued to have some shortness of breath but it has improved as well.  She had not been doing a lot because she still just does not feel herself.  She had lower extremity edema.  Her legs are tight and shiny with 2+ edema mildly pitting.  They more not weeping.  She was taking the torsemide 10 mg and her Xarelto.   She remains in atrial fibrillation with a heart rate 100-105.  Her blood pressure is well controlled being off of the blood pressure medications.  Today at visit she is 116/70.  I started her on low-dose digoxin.    She has had multiple hospitalizations in 2021.  She was treated for pneumonia in January 2021.  She was in the hospital from February 17 through March 2 for unresponsiveness, septic shock and acute kidney injury.  She was seen in the ER on March 20 for acute mental status changes.  She was admitted from April 12 through April 30 for altered mental status, urinary sepsis and septic shock secondary to right kidney infection.  She was admitted again with altered mental status from May 3 through May 7.  She was admitted from May 10 through May 13 for altered mental status and pneumonia.  She was then sent to rehab from May 14 through the 25th.  All during those admissions, she was living in a nursing home.    She was then seen in the ER on November 23 for abdominal pain and found to have an increased dig level.  She was told to hurt her digoxin for 2 days and was sent home.  She was reseen in the ER on 1126 with a low heart rate, complaints of dizziness.  She still had a high  dig level and was told to hold her digoxin for another 2 days and to be seen in cardiology.    She states she is now been living back with her brother who is 6 years younger than her and he helps to take care of her medicines.  She states that he is good to her.  She is very shaky, frail, and has trouble focusing on what she wants to say and sequencing her thoughts.  She denies any palpitations or shortness of breath.  She has no lower extremity edema.  She states she has intermittent dizziness.  She will occasionally has some chest discomfort but that is not very often.  Her biggest complaint is fatigue, shakiness and weakness.  She states compliant with her medications.  She states she has home health coming once a week.  She admits to not drinking enough water.  She states she is eating and drinking and has plenty of access to these things and does not have trouble with meals.  She states she has appointment with you this week.    Past Medical History:   Diagnosis Date   • Anxiety    • Arthritis    • Chronic anticoagulation 1/23/2019   • Chronic diastolic (congestive) heart failure (HCC)    • Colon polyp    • Dysphagia    • GERD (gastroesophageal reflux disease)    • Hiatal hernia 4/17/2017   • Hypertension    • Hypothyroidism    • Internal hemorrhoids 4/17/2017   • Monoclonal gammopathy 3/25/2013   • Nonrheumatic mitral valve regurgitation     has ranged from mild-mod to severe depending on the study   • Persistent atrial fibrillation (HCC)    • Pyelonephritis 4/30/2021   • Reflux gastritis    • Seizure (HCC)    • Tricuspid regurgitation     mod-severe vs severe   • Type 2 diabetes mellitus (HCC)        Past Surgical History:   Procedure Laterality Date   • COLONOSCOPY N/A 3/29/2017    Procedure: COLONOSCOPY; POLYPECTOMY;  Surgeon: Brooke Garrido MD;  Location: [facility];  Service:    • COLONOSCOPY N/A 8/3/2020    Procedure: COLONOSCOPY with polypectomy;  Surgeon: Rui Shi MD;  Location:   [facility] OR;  Service: Gastroenterology;  Laterality: N/A;  ascending polyp  transverse polyp  rectal polyp   • CYST REMOVAL     • CYSTOSCOPY W/ URETERAL STENT PLACEMENT Right 4/13/2021    Procedure: CYSTOSCOPY URETERAL CATHETER/STENT INSERTION;  Surgeon: Onesimo Fuller MD;  Location: [facility] OR;  Service: Urology;  Laterality: Right;   • CYSTOSCOPY W/ URETERAL STENT PLACEMENT Right 4/28/2021    Procedure: CYSTOSCOPY URETERAL STENT REMOVAL;  Surgeon: Onesimo Fuller MD;  Location: [facility] OR;  Service: Urology;  Laterality: Right;  CYSTOSCOPY URETERAL STENT REMOVAL   • ENDOSCOPY N/A 3/29/2017    Procedure: ESOPHAGOGASTRODUODENOSCOPY WITH DILITATION;  Surgeon: Brooke Garrido MD;  Location: [facility] OR;  Service:    • ENDOSCOPY N/A 8/3/2020    Procedure: ESOPHAGOGASTRODUODENOSCOPY with biopsies;  Surgeon: Rui Shi MD;  Location: [facility] OR;  Service: Gastroenterology;  Laterality: N/A;  esophagitis  gastritis   • HERNIA REPAIR     • HYSTERECTOMY         Social History     Socioeconomic History   • Marital status: Single   Tobacco Use   • Smoking status: Never Smoker   • Smokeless tobacco: Never Used   • Tobacco comment: daily caffiene   Vaping Use   • Vaping Use: Never used   Substance and Sexual Activity   • Alcohol use: No   • Drug use: No   • Sexual activity: Defer       Family History   Problem Relation Age of Onset   • Colon cancer Mother    • Colon cancer Other    • Lung cancer Father    • Breast cancer Maternal Aunt        The following portion of the patient's history were reviewed and updated as appropriate: past medical history, past surgical history, past social history, past family history, allergies, current medications, and problem list.    Review of Systems   Constitutional: Positive for malaise/fatigue. Negative for diaphoresis and fever.   HENT: Negative for congestion, hearing loss, hoarse voice, nosebleeds and sore throat.    Eyes: Negative for photophobia, vision loss in left  "eye, vision loss in right eye and visual disturbance.   Cardiovascular: Negative for chest pain, dyspnea on exertion, irregular heartbeat, leg swelling, near-syncope, orthopnea, palpitations, paroxysmal nocturnal dyspnea and syncope.   Respiratory: Negative for cough, hemoptysis, shortness of breath, sleep disturbances due to breathing, snoring, sputum production and wheezing.    Endocrine: Negative for cold intolerance, heat intolerance, polydipsia, polyphagia and polyuria.   Hematologic/Lymphatic: Negative for bleeding problem. Does not bruise/bleed easily.   Skin: Negative for color change, dry skin, poor wound healing, rash and suspicious lesions.   Musculoskeletal: Negative for arthritis, back pain, falls, gout, joint pain, joint swelling, muscle cramps, muscle weakness and myalgias.   Gastrointestinal: Negative for bloating, abdominal pain, constipation, diarrhea, dysphagia, melena, nausea and vomiting.   Neurological: Positive for tremors and weakness. Negative for excessive daytime sleepiness, dizziness, headaches, light-headedness, loss of balance, numbness, paresthesias, seizures and vertigo.        \"shaky\"   Psychiatric/Behavioral: Negative for depression, memory loss and substance abuse. The patient is not nervous/anxious.        No Known Allergies      Current Outpatient Medications:   •  acetaminophen (TYLENOL) 160 MG/5ML solution, Take 20.3 mL by mouth Every 4 (Four) Hours As Needed for Mild Pain ., Disp: , Rfl:   •  acetaminophen (TYLENOL) 325 MG tablet, Take 2 tablets by mouth Every 6 (Six) Hours As Needed for Mild Pain . (Patient taking differently: Take 650 mg by mouth Every 6 (Six) Hours As Needed for Mild Pain  or Fever.), Disp:  , Rfl:   •  acetaminophen (TYLENOL) 325 MG tablet, Take 2 tablets by mouth Every 4 (Four) Hours As Needed for Mild Pain  or Fever., Disp: , Rfl:   •  amLODIPine (NORVASC) 5 MG tablet, Take 1 tablet by mouth Daily., Disp: , Rfl:   •  aspirin 81 MG chewable tablet, Chew 1 " tablet Daily., Disp:  , Rfl:   •  bisacodyl (DULCOLAX) 10 MG suppository, Insert 1 suppository into the rectum Daily As Needed for Constipation., Disp: , Rfl:   •  digoxin (LANOXIN) 125 MCG tablet, Take 1 tablet by mouth Daily. (Patient taking differently: Take 125 mcg by mouth Daily.), Disp: 30 tablet, Rfl: 11  •  docusate sodium 100 MG capsule, Take 1 capsule by mouth 2 (Two) Times a Day., Disp:  , Rfl:   •  Emollient (Aquaphor Advanced Therapy) ointment ointment, Apply 1 application topically to the appropriate area as directed Daily., Disp: , Rfl:   •  famotidine (PEPCID) 40 MG tablet, Take 40 mg by mouth Every Night., Disp: , Rfl:   •  ipratropium-albuterol (DUO-NEB) 0.5-2.5 mg/3 ml nebulizer, Take 3 mL by nebulization Every 4 (Four) Hours As Needed for Shortness of Air., Disp: 360 mL, Rfl:   •  iron polysaccharides (NIFEREX) 150 MG capsule, Take 1 capsule by mouth 2 (Two) Times a Day., Disp:  , Rfl:   •  lactobacillus acidophilus (RISAQUAD) capsule capsule, Take 1 capsule by mouth 2 (Two) Times a Day., Disp: , Rfl:   •  lactulose (CHRONULAC) 10 GM/15ML solution, Take 10 g by mouth Daily As Needed (constipation)., Disp: , Rfl:   •  levETIRAcetam (Keppra) 1000 MG tablet, Take 1 tablet by mouth 2 (Two) Times a Day., Disp: , Rfl:   •  levothyroxine (SYNTHROID, LEVOTHROID) 75 MCG tablet, Take 1 tablet by mouth Daily., Disp: , Rfl:   •  LORazepam (ATIVAN) 0.5 MG tablet, Take 0.5 mg by mouth Every 12 (Twelve) Hours As Needed for Anxiety., Disp: , Rfl:   •  melatonin 5 MG tablet tablet, Take 1 tablet by mouth At Night As Needed (insomnia)., Disp: , Rfl:   •  multivitamin with minerals tablet tablet, Take 1 tablet by mouth Daily., Disp: , Rfl:   •  polyethylene glycol (MiraLax) 17 g packet, Take 17 g by mouth Daily., Disp: , Rfl:   •  potassium chloride (KLOR-CON) 20 MEQ packet, Take 20 mEq by mouth Daily., Disp: , Rfl:   •  rivaroxaban (XARELTO) 20 MG tablet, Take 1 tablet by mouth Daily., Disp: 30 tablet, Rfl: 5  •   timolol (BETIMOL) 0.25 % ophthalmic solution, 1-2 drops 2 (Two) Times a Day., Disp: , Rfl:   •  traMADol (ULTRAM) 50 MG tablet, Take 50 mg by mouth Every 6 (Six) Hours As Needed for Moderate Pain ., Disp: , Rfl:   •  Wound Dressings ([facility] Wound/Burn Dressing) gel, Apply  topically Daily. Apply to sacrum; L elbow; RLE & LLE, Disp: , Rfl:         Objective:     There were no vitals filed for this visit.  There is no height or weight on file to calculate BMI.      Vitals reviewed.   Constitutional:       General: Not in acute distress.     Appearance: Cachectic and frail. Chronically ill-appearing.   Eyes:      General:         Right eye: No discharge.         Left eye: No discharge.      Conjunctiva/sclera: Conjunctivae normal.   HENT:      Head: Normocephalic and atraumatic.      Right Ear: External ear normal.      Left Ear: External ear normal.      Nose: Nose normal.   Neck:      Thyroid: No thyromegaly.      Vascular: No JVD.      Trachea: No tracheal deviation.      Lymphadenopathy: No cervical adenopathy.   Pulmonary:      Effort: Pulmonary effort is normal. No respiratory distress.      Breath sounds: Normal breath sounds. No wheezing. No rales.   Chest:      Chest wall: Not tender to palpatation.   Cardiovascular:      Normal rate. Irregularly irregular rhythm.      Murmurs: There is a high frequency blowing holosystolic murmur at the apex.      No gallop.   Pulses:     Intact distal pulses.   Edema:     Peripheral edema absent.   Abdominal:      General: There is no distension.      Palpations: Abdomen is soft.      Tenderness: There is no abdominal tenderness.   Musculoskeletal: Normal range of motion.         General: No tenderness or deformity.      Cervical back: Normal range of motion and neck supple. Skin:     General: Skin is warm and dry.      Findings: No erythema or rash.   Neurological:      Mental Status: Alert and oriented to person, place, and time.      Coordination: Coordination normal.    Psychiatric:         Attention and Perception: Attention normal.         Mood and Affect: Mood normal.         Speech: Speech normal.         Behavior: Behavior normal. Behavior is cooperative.         Thought Content: Thought content normal.         Cognition and Memory: Cognition normal.         Judgment: Judgment normal.               ECG 12 Lead    Date/Time: 11/30/2021 4:34 PM  Performed by: Alyssa Choi APRN  Authorized by: Alyssa Choi APRN   Comparison: compared with previous ECG from 8/5/2021  Similar to previous ECG  Rhythm: atrial fibrillation  Rate: normal  Conduction: conduction normal  Q waves: aVL, V2 and V3    ST Segments: ST segments normal  T inversion: V1, V2 and V3  T flattening: V4  QRS axis: left    Clinical impression: abnormal EKG              Assessment:       Diagnosis Plan   1. Longstanding persistent atrial fibrillation (HCC)     2. Chronic anticoagulation     3. Valvular heart disease            Plan:       1.  Atrial Fibrillation and Atrial Flutter  Assessment  • The patient has paroxysmal atrial fibrillation.  Rate not adequately controlled.  Intolerant to metoprolol  • The patient's CHADS2-VASc score is 2  • A [id number] score of 2 or more is considered a high risk for a thromboembolic event  . Xarelto prescribed.  Her digoxin was put on hold secondary to elevated digoxin level. She is a bit confused as if she has had her repeat digoxin level with PCP. On a call their office for their latest labs. Have told her to stop her digoxin for now. She remains rate controlled and has not been on digoxin for approximately 1 week. I wrote instructions down and went over them with the patient and her brother. We'll have her return in 1 week.  Plan  • Attempt to maintain sinus rhythm  • Continue rivaroxaban for antithrombotic therapy, bleeding issues discussed     2.  SVT - no episodes recorded during Holter monitoring     3.  Chronic anticoagulation -she remains on Xarelto without  complaints of bleeding.     4.  HTN -  Intolerant to valsartan due to severe dizziness and weakness. She is on amlodipine 5 and well controlled.     5.  Severe mitral and trace regurgitation echocardiogram in June was felt to show only mild to moderate MR.  Repeat echo 10/30/2020 reveal mild MV regurgitation with moderate to severe tricuspid valve regurgitation.      6.  Chronic diastolic congestive heart failure -  appears euvolemic. She is currently not on diuretic therapy.    7. She has had multiple hospitalizations from January through May 2021. She had many episodes of altered mental status. She was treated for pneumonia twice. She was treated for septic shock twice. She is frail. She is very shaky. She is having trouble staying on track with her thoughts and talking. She is back living with her brother and feels safe at home.    We'll stop her digoxin for now to see if any of her current symptoms were related to an increased digoxin level. I have written these instructions down for herself and her brother. I will see her back in 1 to 2 weeks for appointment and another EKG.     As always, it has been a pleasure to participate in your patient's care. Thank you.       Sincerely,       EDEL Acosta      Current Outpatient Medications:   •  acetaminophen (TYLENOL) 160 MG/5ML solution, Take 20.3 mL by mouth Every 4 (Four) Hours As Needed for Mild Pain ., Disp: , Rfl:   •  acetaminophen (TYLENOL) 325 MG tablet, Take 2 tablets by mouth Every 6 (Six) Hours As Needed for Mild Pain . (Patient taking differently: Take 650 mg by mouth Every 6 (Six) Hours As Needed for Mild Pain  or Fever.), Disp:  , Rfl:   •  acetaminophen (TYLENOL) 325 MG tablet, Take 2 tablets by mouth Every 4 (Four) Hours As Needed for Mild Pain  or Fever., Disp: , Rfl:   •  amLODIPine (NORVASC) 5 MG tablet, Take 1 tablet by mouth Daily., Disp: , Rfl:   •  aspirin 81 MG chewable tablet, Chew 1 tablet Daily., Disp:  , Rfl:   •  bisacodyl (DULCOLAX)  10 MG suppository, Insert 1 suppository into the rectum Daily As Needed for Constipation., Disp: , Rfl:   •  docusate sodium 100 MG capsule, Take 1 capsule by mouth 2 (Two) Times a Day., Disp:  , Rfl:   •  Emollient (Aquaphor Advanced Therapy) ointment ointment, Apply 1 application topically to the appropriate area as directed Daily., Disp: , Rfl:   •  famotidine (PEPCID) 40 MG tablet, Take 40 mg by mouth Every Night., Disp: , Rfl:   •  ipratropium-albuterol (DUO-NEB) 0.5-2.5 mg/3 ml nebulizer, Take 3 mL by nebulization Every 4 (Four) Hours As Needed for Shortness of Air., Disp: 360 mL, Rfl:   •  iron polysaccharides (NIFEREX) 150 MG capsule, Take 1 capsule by mouth 2 (Two) Times a Day., Disp:  , Rfl:   •  lactobacillus acidophilus (RISAQUAD) capsule capsule, Take 1 capsule by mouth 2 (Two) Times a Day., Disp: , Rfl:   •  lactulose (CHRONULAC) 10 GM/15ML solution, Take 10 g by mouth Daily As Needed (constipation)., Disp: , Rfl:   •  levETIRAcetam (Keppra) 1000 MG tablet, Take 1 tablet by mouth 2 (Two) Times a Day., Disp: , Rfl:   •  levothyroxine (SYNTHROID, LEVOTHROID) 75 MCG tablet, Take 1 tablet by mouth Daily., Disp: , Rfl:   •  LORazepam (ATIVAN) 0.5 MG tablet, Take 0.5 mg by mouth Every 12 (Twelve) Hours As Needed for Anxiety., Disp: , Rfl:   •  melatonin 5 MG tablet tablet, Take 1 tablet by mouth At Night As Needed (insomnia)., Disp: , Rfl:   •  multivitamin with minerals tablet tablet, Take 1 tablet by mouth Daily., Disp: , Rfl:   •  polyethylene glycol (MiraLax) 17 g packet, Take 17 g by mouth Daily., Disp: , Rfl:   •  potassium chloride (KLOR-CON) 20 MEQ packet, Take 20 mEq by mouth Daily., Disp: , Rfl:   •  rivaroxaban (XARELTO) 20 MG tablet, Take 1 tablet by mouth Daily., Disp: 30 tablet, Rfl: 5  •  timolol (BETIMOL) 0.25 % ophthalmic solution, 1-2 drops 2 (Two) Times a Day., Disp: , Rfl:   •  traMADol (ULTRAM) 50 MG tablet, Take 50 mg by mouth Every 6 (Six) Hours As Needed for Moderate Pain ., Disp: ,  Rfl:   •  Wound Dressings ([facility] Wound/Burn Dressing) gel, Apply  topically Daily. Apply to sacrum; L elbow; RLE & LLE, Disp: , Rfl:       Dictated utilizing Dragon dictation

## 2021-12-02 PROBLEM — J69.0 ASPIRATION PNEUMONIA OF RIGHT LOWER LOBE (HCC): Status: ACTIVE | Noted: 2021-01-01

## 2021-12-02 NOTE — ED PROVIDER NOTES
Subjective   History of Present Illness  71-year-old female with a history of A. fib on chronic anticoagulation, hypothyroidism, seizures, multiple hospitalizations this year brought to the ER from home by EMS.  They were called because the patient was complaining of generalized weakness.  On questioning she says she just does not feel good, feels nauseated but does not tell me much else.  She was seen a couple of times in the last 2 weeks in the ER for some lightheadedness, had some mildly elevated digoxin levels was told to stop those and have repeat testing.  Follow-up with cardiology on the 30th they continue to hold her dig.  Upon arrival here she was noted to be hypothermic and bradycardic.  She says she knows she is at the hospital but does not tell us much else.  Review of Systems   Unable to perform ROS: Mental status change       Past Medical History:   Diagnosis Date   • Anxiety    • Arthritis    • Chronic anticoagulation 1/23/2019   • Chronic diastolic (congestive) heart failure (HCC)    • Colon polyp    • Dysphagia    • GERD (gastroesophageal reflux disease)    • Hiatal hernia 4/17/2017   • Hypertension    • Hypothyroidism    • Internal hemorrhoids 4/17/2017   • Monoclonal gammopathy 3/25/2013   • Nonrheumatic mitral valve regurgitation     has ranged from mild-mod to severe depending on the study   • Persistent atrial fibrillation (HCC)    • Pyelonephritis 4/30/2021   • Reflux gastritis    • Seizure (HCC)    • Tricuspid regurgitation     mod-severe vs severe   • Type 2 diabetes mellitus (HCC)        No Known Allergies    Past Surgical History:   Procedure Laterality Date   • COLONOSCOPY N/A 3/29/2017    Procedure: COLONOSCOPY; POLYPECTOMY;  Surgeon: Brooke Garrido MD;  Location: [facility] OR;  Service:    • COLONOSCOPY N/A 8/3/2020    Procedure: COLONOSCOPY with polypectomy;  Surgeon: Rui Shi MD;  Location: [facility] OR;  Service: Gastroenterology;  Laterality: N/A;  ascending  polyp  transverse polyp  rectal polyp   • CYST REMOVAL     • CYSTOSCOPY W/ URETERAL STENT PLACEMENT Right 4/13/2021    Procedure: CYSTOSCOPY URETERAL CATHETER/STENT INSERTION;  Surgeon: Onesimo Fuller MD;  Location: [facility] OR;  Service: Urology;  Laterality: Right;   • CYSTOSCOPY W/ URETERAL STENT PLACEMENT Right 4/28/2021    Procedure: CYSTOSCOPY URETERAL STENT REMOVAL;  Surgeon: Onesimo Fuller MD;  Location: [facility] OR;  Service: Urology;  Laterality: Right;  CYSTOSCOPY URETERAL STENT REMOVAL   • ENDOSCOPY N/A 3/29/2017    Procedure: ESOPHAGOGASTRODUODENOSCOPY WITH DILITATION;  Surgeon: Brooke Garrido MD;  Location: [facility] OR;  Service:    • ENDOSCOPY N/A 8/3/2020    Procedure: ESOPHAGOGASTRODUODENOSCOPY with biopsies;  Surgeon: Rui Shi MD;  Location: [facility] OR;  Service: Gastroenterology;  Laterality: N/A;  esophagitis  gastritis   • HERNIA REPAIR     • HYSTERECTOMY         Family History   Problem Relation Age of Onset   • Colon cancer Mother    • Colon cancer Other    • Lung cancer Father    • Breast cancer Maternal Aunt        Social History     Socioeconomic History   • Marital status: Single   Tobacco Use   • Smoking status: Never Smoker   • Smokeless tobacco: Never Used   • Tobacco comment: daily caffiene   Vaping Use   • Vaping Use: Never used   Substance and Sexual Activity   • Alcohol use: No   • Drug use: No   • Sexual activity: Defer           Objective    ED Triage Vitals   Temp Heart Rate Resp BP SpO2   12/02/21 0953 12/02/21 0924 12/02/21 0924 12/02/21 0924 12/02/21 0924   (!) 87.8 °F (31 °C) 53 14 119/58 98 %      Temp src Heart Rate Source Patient Position BP Location FiO2 (%)   12/02/21 0953 12/02/21 0924 12/02/21 0924 12/02/21 0924 --   Rectal Monitor Sitting Right arm        Physical Exam  INITIAL VITAL SIGNS: Reviewed by me.  Pulse ox normal, hypothermic at 87.8 rectally, bradycardic as low as 20s, pulse as high as 70s.  GENERAL: Alert and interactive. No acute  distress.  During questioning patient asked for bag to vomit  HEAD: Head is normocephalic.  EYES: EOMI. PERRL. No scleral icterus. No conjunctival injection.  ENT: Moist mucous membranes.   NECK: Supple. Full range of motion.  RESPIRATORY: No tachypnea.  Difficult to hear breath sounds as patient is not taking deep breaths, within these confines sounds slightly decreased in the mid to lower lung field on the right  CV: Regular rate and rhythm. No murmurs. No rubs or gallops.  ABDOMEN: Soft, nondistended, nontender  BACK:  No obvious deformity.  EXTREMITIES: No deformity. No clubbing or cyanosis. No edema.   SKIN: Warm and dry. No diaphoresis. No obvious rashes.   NEUROLOGIC: Eyes are open, says she knows she is in [place] does not know the day or place or situation  Results for orders placed or performed during the hospital encounter of 12/02/21   COVID-19 and FLU A/B PCR - Swab, Nasopharynx    Specimen: Nasopharynx; Swab   Result Value Ref Range    COVID19 Not Detected Not Detected - Ref. Range    Influenza A PCR Not Detected Not Detected    Influenza B PCR Not Detected Not Detected   Comprehensive Metabolic Panel    Specimen: Blood   Result Value Ref Range    Glucose 107 (H) 65 - 99 mg/dL    BUN 27 (H) 8 - 23 mg/dL    Creatinine 1.40 (H) 0.57 - 1.00 mg/dL    Sodium 136 136 - 145 mmol/L    Potassium 5.0 3.5 - 5.2 mmol/L    Chloride 102 98 - 107 mmol/L    CO2 16.9 (L) 22.0 - 29.0 mmol/L    Calcium 10.4 8.6 - 10.5 mg/dL    Total Protein 7.5 6.0 - 8.5 g/dL    Albumin 3.70 3.50 - 5.20 g/dL    ALT (SGPT) 74 (H) 1 - 33 U/L    AST (SGOT) 67 (H) 1 - 32 U/L    Alkaline Phosphatase 249 (H) 39 - 117 U/L    Total Bilirubin 0.6 0.0 - 1.2 mg/dL    eGFR  African Amer 45 (L) >60 mL/min/1.73    Globulin 3.8 gm/dL    A/G Ratio 1.0 g/dL    BUN/Creatinine Ratio 19.3 7.0 - 25.0    Anion Gap 17.1 (H) 5.0 - 15.0 mmol/L   Lipase    Specimen: Blood   Result Value Ref Range    Lipase 41 13 - 60 U/L   Urinalysis With Microscopic If  Indicated (No Culture) - Urine, Catheter    Specimen: Urine, Catheter   Result Value Ref Range    Color, UA Yellow Yellow, Straw    Appearance, UA Clear Clear    pH, UA <=5.0 4.5 - 8.0    Specific Gravity, UA 1.022 1.003 - 1.030    Glucose, UA Negative Negative    Ketones, UA Trace (A) Negative    Bilirubin, UA Negative Negative    Blood, UA Trace (A) Negative    Protein, UA Negative Negative    Leuk Esterase, UA Small (1+) (A) Negative    Nitrite, UA Negative Negative    Urobilinogen, UA 0.2 E.U./dL 0.2 - 1.0 E.U./dL   BNP    Specimen: Blood   Result Value Ref Range    proBNP 1,500.0 (H) 0.0 - 900.0 pg/mL   Lactic Acid, Plasma    Specimen: Blood   Result Value Ref Range    Lactate 1.6 0.5 - 2.0 mmol/L   Procalcitonin    Specimen: Blood   Result Value Ref Range    Procalcitonin 0.04 0.00 - 0.25 ng/mL   CBC Auto Differential    Specimen: Blood   Result Value Ref Range    WBC 2.38 (L) 3.40 - 10.80 10*3/mm3    RBC 4.62 3.77 - 5.28 10*6/mm3    Hemoglobin 11.9 (L) 12.0 - 15.9 g/dL    Hematocrit 37.0 34.0 - 46.6 %    MCV 80.1 79.0 - 97.0 fL    MCH 25.8 (L) 26.6 - 33.0 pg    MCHC 32.2 31.5 - 35.7 g/dL    RDW 18.8 (H) 12.3 - 15.4 %    RDW-SD 51.6 37.0 - 54.0 fl    MPV      Platelets 107 (L) 140 - 450 10*3/mm3    Neutrophil % 58.9 42.7 - 76.0 %    Lymphocyte % 23.9 19.6 - 45.3 %    Monocyte % 13.9 (H) 5.0 - 12.0 %    Eosinophil % 2.9 0.3 - 6.2 %    Basophil % 0.4 0.0 - 1.5 %    Neutrophils, Absolute 1.40 (L) 1.70 - 7.00 10*3/mm3    Lymphocytes, Absolute 0.57 (L) 0.70 - 3.10 10*3/mm3    Monocytes, Absolute 0.33 0.10 - 0.90 10*3/mm3    Eosinophils, Absolute 0.07 0.00 - 0.40 10*3/mm3    Basophils, Absolute 0.01 0.00 - 0.20 10*3/mm3   Digoxin Level    Specimen: Blood   Result Value Ref Range    Digoxin 1.92 (H) 0.60 - 1.20 ng/mL   Scan Slide    Specimen: Blood   Result Value Ref Range    Scan Slide     Manual Differential    Specimen: Blood   Result Value Ref Range    Neutrophil % 62.0 42.7 - 76.0 %    Lymphocyte % 22.0 19.6 -  45.3 %    Monocyte % 16.0 (H) 5.0 - 12.0 %    Neutrophils Absolute 1.48 (L) 1.70 - 7.00 10*3/mm3    Lymphocytes Absolute 0.52 (L) 0.70 - 3.10 10*3/mm3    Monocytes Absolute 0.38 0.10 - 0.90 10*3/mm3    RBC Morphology Normal Normal    WBC Morphology Normal Normal    Platelet Estimate Decreased Normal   TSH    Specimen: Blood   Result Value Ref Range    TSH 0.332 0.270 - 4.200 uIU/mL   T4, Free    Specimen: Blood   Result Value Ref Range    Free T4 2.34 (H) 0.93 - 1.70 ng/dL   Urinalysis, Microscopic Only - Urine, Catheter    Specimen: Urine, Catheter   Result Value Ref Range    RBC, UA None Seen None Seen /HPF    WBC, UA 0-2 (A) None Seen /HPF    Bacteria, UA Trace (A) None Seen /HPF    Squamous Epithelial Cells, UA 0-2 None Seen, 0-2 /HPF    Hyaline Casts, UA 3-6 None Seen /LPF    Methodology Manual Light Microscopy    ECG 12 Lead   Result Value Ref Range    QT Interval 495 ms   ECG 12 Lead   Result Value Ref Range    QT Interval 412 ms   ECG 12 Lead   Result Value Ref Range    QT Interval 472 ms     CT Chest Without Contrast Diagnostic    Result Date: 12/2/2021  CT CHEST, NONCONTRAST, 12/2/2021 HISTORY: 71-year-old female in the ED with generalized weakness, severe bradycardia. Abnormal chest x-ray. Of note, oldest available chest x-rays here have shown evidence of significant chronic reticulonodular interstitial lung disease with an upper lung and perihilar predominance compatible with sarcoidosis, pneumoconiosis or prior fungal infection. CT here in May showing significant right heart failure and congestive hepatopathy. TECHNIQUE: CT imaging of the chest ordered without IV contrast. Radiation dose reduction techniques included automated exposure control. Radiation audit for CT and nuclear cardiology exams in the last 12 months: 7. COMPARISON: *  CTA chest, 5/12/2021. *  Chest x-ray, 12/2/2021 and 1/16/2019. FINDINGS: Marked cardiomegaly with severe right heart chamber dilatation and distended IVC and hepatic  veins compatible with chronically elevated right heart pressures. Pleural effusions and body wall edema have resolved since the prior study. Patchy airspace infiltrate scattered throughout the right lower lung, greatest in the posterior basal segment compatible with pneumonia. Of note, the thoracic esophagus is significantly dilated and fluid-filled today, and aspiration pneumonia is an additional consideration. Advanced chronic reticulonodular interstitial lung disease with upper lobe predominance associated with upper lung volume loss, upward hilar retraction and the central upper perihilar conglomerate masses. The appearance is compatible with sarcoidosis or pneumoconiosis (typical for silicosis), correlate clinically. As noted above, this has been present on older chest x-rays. Limited upper abdominal images show chronic right hydronephrosis.     1.  Advanced chronic upper lung reticulonodular lung disease with chronic volume loss and findings typical for either sarcoidosis or silicosis. This has been present on older chest x-rays. 2.  New airspace infiltrate in the right lower lobe, greatest in the dependent posterior basal segment. Pneumonitis is likely. Given the presence of a dilated and fluid-filled thoracic esophagus today, aspiration pneumonia is an additional consideration. 3.  Chronic right heart failure. Marked cardiomegaly and severe right heart chamber dilatation. Edema and pleural effusions present on the prior study have resolved. Signer Name: Rg Holman MD  Signed: 12/2/2021 12:21 PM  Workstation Name: [id number]  [facility]    XR Chest 1 View    Result Date: 12/2/2021  XR CHEST 1 VW-: 12/2/2021 10:27 AM  INDICATION: Generalized weakness. Severe bradycardia.  COMPARISON:  05/20/2021.  FINDINGS: Single Portable AP view(s) of the chest. Cardiac silhouette is borderline enlarged and stable. Right-sided PICC line has been removed. Small effusions have decreased and there  is improved aeration of the left base. Chronic appearing perihilar and suprahilar opacities bilaterally are stable to worse. Increasing opacities in the suprahilar aspect of both lobes and in the right hilum. Imaging features suspicious for acute pneumonia upon superimposed chronic changes. There is no pneumothorax.       1. Worsening opacities in the suprahilar and perihilar lungs bilaterally suspicious for pneumonia upon superimposed more chronic masslike consolidation. This could be better assessed with CT. 2. Improved aeration of the left base. Resolution of bilateral effusions.  This report was finalized on 12/2/2021 10:55 AM by Dr. Kamar Morel MD.        Procedures      EKG           EKG time/Interp time: 0946/0947  Rhythm/Rate: Junctional rhythm rate of 30 with some PVCs  ST and T waves: No ST elevations concerning for ischemia      Independently interpreted by me contemporaneously with treatment      EKG           EKG time/Interp time: 0949/953  Rhythm/Rate: Atrial flutter rate of 77  QRS, axis: Normal QRS duration   ST and T waves: No ST elevations concerning for ischemia    Independently interpreted by me contemporaneously with treatment    ED Course  ED Course as of 12/02/21 1306   Thu Dec 02, 2021   1121 Patient with some slight mental status change, hypothermic and bradycardic as low as the mid 20s.  This was of concern for me so I did talk with  of cardiology he notes that as she frequently is in the 40s to 50s that it would not be surprising for such hypothermia to cause her to drop into the 20s.  Pending labs and further work-up.  Have ordered her some antibiotics while awaiting work-up.  Will need to rule out thyroid issue as she is possibly taking levothyroxine but looking at last labs she was decently controlled.  Chest x-ray does show some pneumonia which certainly could be a cause of her hypothermia.  Further evaluation of that with CT scan at the behest of radiology.  [RO]   1237  Patient's work-up so far appears to show evidence of chronic upper lung disease, evidence of aspiration, lactates normal pro-Thiago is normal TSH is normal, free T4 is slightly elevated.  Dig level is elevated still.  No evidence of UTI.  BNP is 1500 but does not prickly bad for her.  No further episodes of severe bradycardia.  I think the patient would be best served to be admitted as she was hypothermic despite being inside, has evidence of aspiration pneumonitis.  Waiting for callback from Dr. Walters. [RO]   1306 Discussed with Dr. Walters, he says she is frequently hypothermic and not sure why but with her temperature, aspiration pneumonia and slight bump in creatinine will admit her to the ICU. [RO]      ED Course User Index  [RO] Thomas Bardales MD                                                 [facility]    Final diagnoses:   Aspiration pneumonia of right lower lobe, unspecified aspiration pneumonia type (HCC)   Hypothermia, initial encounter       ED Disposition  ED Disposition     ED Disposition Condition Comment    Decision to Admit  Level of Care: Critical Care [6]   Diagnosis: Aspiration pneumonia of right lower lobe, unspecified aspiration pneumonia type (HCC) [[id number]]   Admitting Physician: TRAE WALTERS [[id number]]   Isolate for COVID?: No [0]   Certification: I Certify That Inpatient Hospital Services Are Medically Necessary For Greater Than 2 Midnights            No follow-up provider specified.       Medication List      No changes were made to your prescriptions during this visit.          Thomas Bardales MD  12/02/21 3351

## 2021-12-02 NOTE — H&P
HISTORY AND PHYSICAL      Patient Care Team:  Hany Oviedo MD as PCP - General  Hany Oviedo MD as PCP - Family Medicine    CHIEF COMPLAINT: Cough, generalized weakness    HISTORY OF PRESENT ILLNESS:    71-year-old -American female with multiple medical problems including seizure disorder atrial fibrillation on chronic anticoagulation malnutrition hypothyroidism who is brought to the emergency by her brother because of generalized weakness cough chest congestion.  She is in the emergency room several times in last week with nausea and vomiting dizziness and was found to have supratherapeutic digoxin level is being held.  Patient has had some episodes of vomiting and she has been having more chest congestion generalized weakness.  She was found to be significantly hypothermic with a core temperature of 87.8 was noted to have several episodes of bradycardia while in the ER.  Further work-up revealed bilateral infiltrates consistent probable aspiration pneumonia should be admitted for evaluation treatment.    The patient is a very poor historian I spoke to the brother was at bedside said she was doing well for last couple months until about 2 weeks ago.  She had been in a assisted living and was discharged back home.  She is usually ambulatory at home feeds herself and has been able to go to her doctor's visits and privilege to care for self at home until about 2 weeks ago he started having nausea vomiting and diarrhea.  He relates no fever chills she has noticed some chest congestion and cough last week.    Past Medical History:   Diagnosis Date   • Anxiety    • Arthritis    • Chronic anticoagulation 1/23/2019   • Chronic diastolic (congestive) heart failure (HCC)    • Colon polyp    • Dysphagia    • GERD (gastroesophageal reflux disease)    • Hiatal hernia 4/17/2017   • Hypertension    • Hypothyroidism    • Internal hemorrhoids 4/17/2017   • Monoclonal gammopathy 3/25/2013   • Nonrheumatic mitral  valve regurgitation     has ranged from mild-mod to severe depending on the study   • Persistent atrial fibrillation (HCC)    • Pyelonephritis 4/30/2021   • Reflux gastritis    • Seizure (HCC)    • Tricuspid regurgitation     mod-severe vs severe   • Type 2 diabetes mellitus (HCC)      Past Surgical History:   Procedure Laterality Date   • COLONOSCOPY N/A 3/29/2017    Procedure: COLONOSCOPY; POLYPECTOMY;  Surgeon: Brooke Garrido MD;  Location: [facility] OR;  Service:    • COLONOSCOPY N/A 8/3/2020    Procedure: COLONOSCOPY with polypectomy;  Surgeon: Rui Shi MD;  Location: [facility] OR;  Service: Gastroenterology;  Laterality: N/A;  ascending polyp  transverse polyp  rectal polyp   • CYST REMOVAL     • CYSTOSCOPY W/ URETERAL STENT PLACEMENT Right 4/13/2021    Procedure: CYSTOSCOPY URETERAL CATHETER/STENT INSERTION;  Surgeon: Onesimo Fuller MD;  Location: [facility] OR;  Service: Urology;  Laterality: Right;   • CYSTOSCOPY W/ URETERAL STENT PLACEMENT Right 4/28/2021    Procedure: CYSTOSCOPY URETERAL STENT REMOVAL;  Surgeon: Onesimo Fuller MD;  Location: [facility] OR;  Service: Urology;  Laterality: Right;  CYSTOSCOPY URETERAL STENT REMOVAL   • ENDOSCOPY N/A 3/29/2017    Procedure: ESOPHAGOGASTRODUODENOSCOPY WITH DILITATION;  Surgeon: Brooke Garrido MD;  Location: [facility] OR;  Service:    • ENDOSCOPY N/A 8/3/2020    Procedure: ESOPHAGOGASTRODUODENOSCOPY with biopsies;  Surgeon: Rui Shi MD;  Location: [facility];  Service: Gastroenterology;  Laterality: N/A;  esophagitis  gastritis   • HERNIA REPAIR     • HYSTERECTOMY       Family History   Problem Relation Age of Onset   • Colon cancer Mother    • Colon cancer Other    • Lung cancer Father    • Breast cancer Maternal Aunt      Social History     Tobacco Use   • Smoking status: Never Smoker   • Smokeless tobacco: Never Used   • Tobacco comment: daily caffiene   Vaping Use   • Vaping Use: Never used   Substance Use Topics   •  "Alcohol use: No   • Drug use: No     (Not in a hospital admission)    Allergies:  Patient has no known allergies.     Review of Systems   Unable to perform ROS: Acuity of condition       Vital Signs  Temp:  [87.8 °F (31 °C)-91.9 °F (33.3 °C)] 91.9 °F (33.3 °C)  Heart Rate:  [53-84] 84  Resp:  [14-16] 16  BP: ()/(48-87) 116/75  Oxygen Therapy  SpO2: 95 %  Device (Oxygen Therapy): room air}  Body mass index is 17.01 kg/m².  Flowsheet Rows      First Filed Value   Admission Height 160 cm (63\") Documented at 12/02/2021 0924   Admission Weight 43.5 kg (96 lb) Documented at 12/02/2021 0924                 Physical Exam  Vitals and nursing note reviewed.   Constitutional:       Appearance: She is well-developed and underweight. She is ill-appearing (Chronically).   HENT:      Head: Normocephalic.   Eyes:      Conjunctiva/sclera: Conjunctivae normal.   Neck:      Thyroid: No thyromegaly.      Vascular: No JVD.   Cardiovascular:      Rate and Rhythm: Normal rate. Rhythm irregularly irregular.      Heart sounds: Normal heart sounds. No murmur heard.      Pulmonary:      Effort: Pulmonary effort is normal. No respiratory distress.      Breath sounds: Normal breath sounds. No wheezing or rales.   Abdominal:      General: Bowel sounds are normal. There is no distension.      Palpations: Abdomen is soft.      Tenderness: There is no abdominal tenderness. There is no guarding.   Skin:     General: Skin is warm and dry.      Findings: No rash.   Neurological:      General: No focal deficit present.      Mental Status: She is alert.      Cranial Nerves: No cranial nerve deficit.      Motor: Motor function is intact.      Comments: Oriented to place and person          Debilities/Disabilities Identified: Mental status change    Emotional Behavior: Appropriate    Results Review:    I reviewed the patient's new clinical results.  Lab Results (most recent)     Procedure Component Value Units Date/Time    T4, Free [[id number]]  " (Abnormal) Collected: 12/02/21 0955    Specimen: Blood Updated: 12/02/21 1133     Free T4 2.34 ng/dL     Narrative:      Results may be falsely increased if patient taking Biotin.      TSH [[id number]]  (Normal) Collected: 12/02/21 0955    Specimen: Blood Updated: 12/02/21 1127     TSH 0.332 uIU/mL     Urinalysis, Microscopic Only - Urine, Catheter [[id number]]  (Abnormal) Collected: 12/02/21 1050    Specimen: Urine, Catheter Updated: 12/02/21 1122     RBC, UA None Seen /HPF      WBC, UA 0-2 /HPF      Bacteria, UA Trace /HPF      Squamous Epithelial Cells, UA 0-2 /HPF      Hyaline Casts, UA 3-6 /LPF      Methodology Manual Light Microscopy    Lactic Acid, Plasma [[id number]]  (Normal) Collected: 12/02/21 1055    Specimen: Blood Updated: 12/02/21 1120     Lactate 1.6 mmol/L     Urinalysis With Microscopic If Indicated (No Culture) - Urine, Catheter [[id number]]  (Abnormal) Collected: 12/02/21 1050    Specimen: Urine, Catheter Updated: 12/02/21 1110     Color, UA Yellow     Appearance, UA Clear     pH, UA <=5.0     Specific Gravity, UA 1.022     Comment: Result obtained by Refractometer        Glucose, UA Negative     Ketones, UA Trace     Bilirubin, UA Negative     Blood, UA Trace     Protein, UA Negative     Leuk Esterase, UA Small (1+)     Nitrite, UA Negative     Urobilinogen, UA 0.2 E.U./dL    Digoxin Level [[id number]]  (Abnormal) Collected: 12/02/21 0955    Specimen: Blood Updated: 12/02/21 1102     Digoxin 1.92 ng/mL     Blood Culture - Blood, Arm, Right [[id number]] Collected: 12/02/21 1055    Specimen: Blood from Arm, Right Updated: 12/02/21 1059    Comprehensive Metabolic Panel [[id number]]  (Abnormal) Collected: 12/02/21 0955    Specimen: Blood Updated: 12/02/21 1053     Glucose 107 mg/dL      BUN 27 mg/dL      Creatinine 1.40 mg/dL      Sodium 136 mmol/L      Potassium 5.0 mmol/L      Comment: Slight hemolysis detected by analyzer. Results may be affected.        Chloride 102 mmol/L      CO2 16.9 mmol/L       Calcium 10.4 mg/dL      Total Protein 7.5 g/dL      Albumin 3.70 g/dL      ALT (SGPT) 74 U/L      AST (SGOT) 67 U/L      Comment: Slight hemolysis detected by analyzer. Results may be affected.        Alkaline Phosphatase 249 U/L      Total Bilirubin 0.6 mg/dL      eGFR  African Amer 45 mL/min/1.73      Globulin 3.8 gm/dL      A/G Ratio 1.0 g/dL      BUN/Creatinine Ratio 19.3     Anion Gap 17.1 mmol/L     Narrative:      GFR Normal >60  Chronic Kidney Disease <60  Kidney Failure <15      Lipase [[id number]]  (Normal) Collected: 12/02/21 0955    Specimen: Blood Updated: 12/02/21 1043     Lipase 41 U/L     Manual Differential [[id number]]  (Abnormal) Collected: 12/02/21 0955    Specimen: Blood Updated: 12/02/21 1042     Neutrophil % 62.0 %      Lymphocyte % 22.0 %      Monocyte % 16.0 %      Neutrophils Absolute 1.48 10*3/mm3      Lymphocytes Absolute 0.52 10*3/mm3      Monocytes Absolute 0.38 10*3/mm3      RBC Morphology Normal     WBC Morphology Normal     Platelet Estimate Decreased    CBC & Differential [[id number]]  (Abnormal) Collected: 12/02/21 0955    Specimen: Blood Updated: 12/02/21 1042    Narrative:      The following orders were created for panel order CBC & Differential.  Procedure                               Abnormality         Status                     ---------                               -----------         ------                     CBC Auto Differential[[id number]]        Abnormal            Final result               Scan Slide[[id number]]                                       Final result                 Please view results for these tests on the individual orders.    Scan Slide [[id number]] Collected: 12/02/21 0955    Specimen: Blood Updated: 12/02/21 1042     Scan Slide --     Comment: See Manual Differential Results       BNP [[id number]]  (Abnormal) Collected: 12/02/21 0955    Specimen: Blood Updated: 12/02/21 1039     proBNP 1,500.0 pg/mL     Narrative:      Among patients with dyspnea,  NT-proBNP is highly sensitive for the detection of acute congestive heart failure. In addition NT-proBNP of <300 pg/ml effectively rules out acute congestive heart failure with 99% negative predictive value.    Results may be falsely decreased if patient taking Biotin.      Procalcitonin [[id number]]  (Normal) Collected: 12/02/21 0955    Specimen: Blood Updated: 12/02/21 1038     Procalcitonin 0.04 ng/mL     Narrative:      Results may be falsely decreased if patient taking Biotin.     COVID PRE-OP / PRE-PROCEDURE SCREENING ORDER (NO ISOLATION) - Swab, Nasopharynx [[id number]]  (Normal) Collected: 12/02/21 0957    Specimen: Swab from Nasopharynx Updated: 12/02/21 1036    Narrative:      The following orders were created for panel order COVID PRE-OP / PRE-PROCEDURE SCREENING ORDER (NO ISOLATION) - Swab, Nasopharynx.  Procedure                               Abnormality         Status                     ---------                               -----------         ------                     COVID-19 and FLU A/B PCR...[[id number]]  Normal              Final result                 Please view results for these tests on the individual orders.    COVID-19 and FLU A/B PCR - Swab, Nasopharynx [[id number]]  (Normal) Collected: 12/02/21 0957    Specimen: Swab from Nasopharynx Updated: 12/02/21 1036     COVID19 Not Detected     Influenza A PCR Not Detected     Influenza B PCR Not Detected    Narrative:      Fact sheet for providers: https://www.fda.gov/media/113297/download    Fact sheet for patients: https://www.fda.gov/media/023711/download    Test performed by PCR.    CBC Auto Differential [[id number]]  (Abnormal) Collected: 12/02/21 0955    Specimen: Blood Updated: 12/02/21 1019     WBC 2.38 10*3/mm3      RBC 4.62 10*6/mm3      Hemoglobin 11.9 g/dL      Hematocrit 37.0 %      MCV 80.1 fL      MCH 25.8 pg      MCHC 32.2 g/dL      RDW 18.8 %      RDW-SD 51.6 fl      MPV --     Comment: UNABLE TO CALCULATE        Platelets 107  10*3/mm3      Neutrophil % 58.9 %      Lymphocyte % 23.9 %      Monocyte % 13.9 %      Eosinophil % 2.9 %      Basophil % 0.4 %      Neutrophils, Absolute 1.40 10*3/mm3      Lymphocytes, Absolute 0.57 10*3/mm3      Monocytes, Absolute 0.33 10*3/mm3      Eosinophils, Absolute 0.07 10*3/mm3      Basophils, Absolute 0.01 10*3/mm3     Blood Culture - Blood, Hand, Left [[id number]] Collected: 12/02/21 0955    Specimen: Blood from Hand, Left Updated: 12/02/21 1008          Imaging Results (Most Recent)     Procedure Component Value Units Date/Time    CT Chest Without Contrast Diagnostic [[id number]] Collected: 12/02/21 1221     Updated: 12/02/21 1223    Narrative:      CT CHEST, NONCONTRAST, 12/2/2021    HISTORY:  71-year-old female in the ED with generalized weakness, severe bradycardia. Abnormal chest x-ray. Of note, oldest available chest x-rays here have shown evidence of significant chronic reticulonodular interstitial lung disease with an upper lung and  perihilar predominance compatible with sarcoidosis, pneumoconiosis or prior fungal infection. CT here in May showing significant right heart failure and congestive hepatopathy.    TECHNIQUE:  CT imaging of the chest ordered without IV contrast. Radiation dose reduction techniques included automated exposure control. Radiation audit for CT and nuclear cardiology exams in the last 12 months: 7.    COMPARISON:  *  CTA chest, 5/12/2021.  *  Chest x-ray, 12/2/2021 and 1/16/2019.    FINDINGS:  Marked cardiomegaly with severe right heart chamber dilatation and distended IVC and hepatic veins compatible with chronically elevated right heart pressures. Pleural effusions and body wall edema have resolved since the prior study.    Patchy airspace infiltrate scattered throughout the right lower lung, greatest in the posterior basal segment compatible with pneumonia. Of note, the thoracic esophagus is significantly dilated and fluid-filled today, and aspiration pneumonia is  an  additional consideration.    Advanced chronic reticulonodular interstitial lung disease with upper lobe predominance associated with upper lung volume loss, upward hilar retraction and the central upper perihilar conglomerate masses. The appearance is compatible with sarcoidosis or  pneumoconiosis (typical for silicosis), correlate clinically. As noted above, this has been present on older chest x-rays.    Limited upper abdominal images show chronic right hydronephrosis.      Impression:      1.  Advanced chronic upper lung reticulonodular lung disease with chronic volume loss and findings typical for either sarcoidosis or silicosis. This has been present on older chest x-rays.  2.  New airspace infiltrate in the right lower lobe, greatest in the dependent posterior basal segment. Pneumonitis is likely. Given the presence of a dilated and fluid-filled thoracic esophagus today, aspiration pneumonia is an additional consideration.  3.  Chronic right heart failure. Marked cardiomegaly and severe right heart chamber dilatation. Edema and pleural effusions present on the prior study have resolved.    Signer Name: Rg Holman MD   Signed: 12/2/2021 12:21 PM   Workstation Name: [id number]    [facility]    XR Chest 1 View [[id number]] Collected: 12/02/21 1051     Updated: 12/02/21 1057    Narrative:      XR CHEST 1 VW-: 12/2/2021 10:27 AM     INDICATION:   Generalized weakness. Severe bradycardia.      COMPARISON:    05/20/2021.     FINDINGS:  Single Portable AP view(s) of the chest. Cardiac silhouette is  borderline enlarged and stable. Right-sided PICC line has been removed.  Small effusions have decreased and there is improved aeration of the  left base. Chronic appearing perihilar and suprahilar opacities  bilaterally are stable to worse. Increasing opacities in the suprahilar  aspect of both lobes and in the right hilum. Imaging features suspicious  for acute pneumonia upon superimposed  chronic changes. There is no  pneumothorax.       Impression:         1. Worsening opacities in the suprahilar and perihilar lungs bilaterally  suspicious for pneumonia upon superimposed more chronic masslike  consolidation. This could be better assessed with CT.  2. Improved aeration of the left base. Resolution of bilateral  effusions.     This report was finalized on 12/2/2021 10:55 AM by Dr. Kamar Morel MD.           reviewed    ECG/EMG Results (most recent)     Procedure Component Value Units Date/Time    ECG 12 Lead [[id number]] Collected: 12/02/21 0930     Updated: 12/02/21 0959     QT Interval 495 ms     Narrative:      HEART RATE= 54  bpm  RR Interval= 1114  ms  SD Interval=   ms  P Horizontal Axis=   deg  P Front Axis=   deg  QRSD Interval= 94  ms  QT Interval= 495  ms  QRS Axis= 10  deg  T Wave Axis= 128  deg  - ABNORMAL ECG -  Atrial fibrillation  Abnormal Q wave in V1  Borderline T abnormalities, inferior leads  Electronically Signed By:   Date and Time of Study: 2021-12-02 09:30:55    ECG 12 Lead [[id number]] Collected: 12/02/21 0946     Updated: 12/02/21 1000     QT Interval 472 ms     Narrative:      HEART RATE= 30  bpm  RR Interval= 2588  ms  SD Interval=   ms  P Horizontal Axis=   deg  P Front Axis=   deg  QRSD Interval= 124  ms  QT Interval= 472  ms  QRS Axis= 89  deg  T Wave Axis=   deg  - ABNORMAL ECG -  Junctional rhythm  Paired ventricular premature complexes  Nonspecific intraventricular conduction delay  Nonspecific T abnormalities, diffuse leads  ST elevation, consider lateral injury  Electronically Signed By:   Date and Time of Study: 2021-12-02 09:46:34    ECG 12 Lead [[id number]] Collected: 12/02/21 0949     Updated: 12/02/21 1000     QT Interval 412 ms     Narrative:      HEART RATE= 77  bpm  RR Interval= 777  ms  SD Interval=   ms  P Horizontal Axis=   deg  P Front Axis=   deg  QRSD Interval= 108  ms  QT Interval= 412  ms  QRS Axis= 20  deg  T Wave Axis= -27  deg  - ABNORMAL ECG  -  Atrial flutter  Ventricular premature complex  Borderline T abnormalities, inferior leads  Borderline ST elevation, lateral leads  Electronically Signed By:   Date and Time of Study: 2021-12-02 09:49:48        reviewed    Assessment/Plan       1.  Aspiration pneumonia patient will be started on cefepime and vancomycin supportive care.  Nurses note that she failed her bedside swallow evaluation will get speech therapy to evaluate    2.  Profound hypothermia secondary to sepsis and aspiration pneumonia treat symptomatically with bear hugger heated fluids.  Patient has had repeated episodes of hypothermia in the past    3.  Chronic atrial fibrillation on anticoagulation rate controlled continue with home Xarelto.  Digoxin on hold due to therapeutic levels will monitor    4.  Sepsis likely due to underlying aspiration pneumonia supportive care fluid resuscitation broad-spectrum IV antibiotics and cultures have been ordered    5.  [place] seizure disorder continue with Keppra    6.  Metabolic encephalopathy secondary to pneumonia hypothermia and sepsis we will continue to monitor    7.  Hypothyroidism her free T4 is elevated will adjust dose of her thyroid medications    8.  Mild renal insufficiency on background chronic kidney disease stage III likely due to decreased p.o. intake and prerenal azotemia IV fluids and monitor    9.  Chronic diastolic congestive heart failure she is euvolemic    10.  Moderate protein calorie malnutrition nutritional consult and supportive care          I discussed the patients findings and my recommendations with patient and brother    Jose Walters MD  12/02/21  16:26 EST    Time: Total critical care time was 60  minutes, excluding any separately billable procedure time.      Much of this encounter note is an electronic transcription/translation of spoken language to printed text using Dragon Software

## 2021-12-02 NOTE — ED NOTES
Pt is going to ICU, but an unanticipated pt is being admitted from PACU on the ventilator, so Ms. Nguyen has to wait.      Shanita Pepe RN  12/02/21 1414

## 2021-12-02 NOTE — ED NOTES
Call placed to [facility] cardio rounding nurse Crystal will page out the on call and have them return the call     Donna Martins  12/02/21 [id number]

## 2021-12-02 NOTE — ED NOTES
Call placed to Dr. Walters office and cell phone, they are on lunch and no answer on the cell phone will try again     Donna Martins  12/02/21 4711

## 2021-12-02 NOTE — ED NOTES
Second call placed to Dr. Walters. The office is closed. Called his cell phone, no answer Donna Jarquin  12/02/21 1257

## 2021-12-02 NOTE — ED NOTES
Second call placed to [facility] cardio rounding nurse Jessie, she will re page on call     Donna Martins  12/02/21 [id number]

## 2021-12-02 NOTE — PLAN OF CARE
Goal Outcome Evaluation:  Plan of Care Reviewed With: patient, family           Outcome Summary: new admit to ICU pt as pneumonia IVF NS @ 75 per warmer, bear hugger on temp 92.0ax  RA and o2 sat 100%. dry cough noted lung sounds diminished. Brother at bedside bp 91/75. Bowels moved yesterday. Meds reviewed swallow study at bedside faied, mg rodriguez md

## 2021-12-03 NOTE — PLAN OF CARE
Goal Outcome Evaluation:  Plan of Care Reviewed With: sibling        Progress: improving  Outcome Summary: patient had a fair day, MBS done and pt now on soft  texture ground nectar thick liquids, assisted with meal per staff. Has sit up in bed today. Krueger catheter anchored and 700ml urine out this shift. IVF cont NS @ 75, Vancomycin, and cefepime cont. Remains A-fib, temp 97.1 ax, resp 20, RA, and o2 sat 99%. Brother was here to visit today, cont to have periods of confusion and ativan 0.25mg given prn. B/p 116/87

## 2021-12-03 NOTE — THERAPY EVALUATION
Patient Name: Argelia Nguyen  : 1950    MRN: [id number]                              Today's Date: 12/3/2021   OT Evaluation    Admit Date: 2021    Visit Dx:     ICD-10-CM ICD-9-CM   1. Aspiration pneumonia of right lower lobe, unspecified aspiration pneumonia type (HCC)  J69.0 507.0   2. Hypothermia, initial encounter  T68.XXXA 991.6     Patient Active Problem List   Diagnosis   • Abnormal laboratory test result   • Gastroesophageal reflux disease   • MGUS (monoclonal gammopathy of unknown significance)   • Normocytic anemia   • Hiatal hernia   • Colon polyps   • Internal hemorrhoids   • Chronic anticoagulation   • Esophageal dysphagia   • Severe malnutrition (HCC)   • Longstanding persistent atrial fibrillation (HCC)   • Sepsis associated hypotension (HCC)   • Hydronephrosis due to obstruction of ureter   • Acute metabolic encephalopathy   • Multiple wounds of skin   • Anasarca   • Valvular heart disease   • Acute kidney insufficiency   • Grand mal status epilepticus (HCC)   • Focal motor seizure (HCC)   • [place] coma scale total score 3-8 (HCC)   • Pyelonephritis   • Altered mental status   • Hypothermia   • Encounter for rehabilitation   • Aspiration pneumonia of right lower lobe (HCC)     Past Medical History:   Diagnosis Date   • Anxiety    • Arthritis    • Chronic anticoagulation 2019   • Chronic diastolic (congestive) heart failure (HCC)    • Colon polyp    • Dysphagia    • GERD (gastroesophageal reflux disease)    • Hiatal hernia 2017   • Hypertension    • Hypothyroidism    • Internal hemorrhoids 2017   • Monoclonal gammopathy 3/25/2013   • Nonrheumatic mitral valve regurgitation     has ranged from mild-mod to severe depending on the study   • Persistent atrial fibrillation (HCC)    • Pyelonephritis 2021   • Reflux gastritis    • Seizure (HCC)    • Tricuspid regurgitation     mod-severe vs severe   • Type 2 diabetes mellitus (HCC)      Past Surgical History:   Procedure  Laterality Date   • COLONOSCOPY N/A 3/29/2017    Procedure: COLONOSCOPY; POLYPECTOMY;  Surgeon: Brooke Garrido MD;  Location: [facility] OR;  Service:    • COLONOSCOPY N/A 8/3/2020    Procedure: COLONOSCOPY with polypectomy;  Surgeon: Rui Shi MD;  Location: [facility] OR;  Service: Gastroenterology;  Laterality: N/A;  ascending polyp  transverse polyp  rectal polyp   • CYST REMOVAL     • CYSTOSCOPY W/ URETERAL STENT PLACEMENT Right 4/13/2021    Procedure: CYSTOSCOPY URETERAL CATHETER/STENT INSERTION;  Surgeon: Onesimo Fuller MD;  Location: [facility] OR;  Service: Urology;  Laterality: Right;   • CYSTOSCOPY W/ URETERAL STENT PLACEMENT Right 4/28/2021    Procedure: CYSTOSCOPY URETERAL STENT REMOVAL;  Surgeon: Onesimo Fuller MD;  Location: [facility] OR;  Service: Urology;  Laterality: Right;  CYSTOSCOPY URETERAL STENT REMOVAL   • ENDOSCOPY N/A 3/29/2017    Procedure: ESOPHAGOGASTRODUODENOSCOPY WITH DILITATION;  Surgeon: Brooke Garrido MD;  Location: [facility] OR;  Service:    • ENDOSCOPY N/A 8/3/2020    Procedure: ESOPHAGOGASTRODUODENOSCOPY with biopsies;  Surgeon: Rui Shi MD;  Location: [facility] OR;  Service: Gastroenterology;  Laterality: N/A;  esophagitis  gastritis   • HERNIA REPAIR     • HYSTERECTOMY        General Information     Row Name 12/03/21 1421          OT Time and Intention    Document Type evaluation  -EN     Mode of Treatment occupational therapy  -EN     Row Name 12/03/21 1428          General Information    Patient Profile Reviewed yes  Patient brought to ED with c/o weakness, cough, congestion and found to have profound hypothermia. Patient diagnosed with aspiration pna. Patient currently lives with brother.  -EN     Prior Level of Function --  Patient poor historian. Reports she walks at home but then stated she uses a w/c for mobility. Patient reported she was ind with ADLs.  -EN     Barriers to Rehab medically complex; previous functional deficit; cognitive  status  -EN     Row Name 12/03/21 1421          Living Environment    Lives With sibling(s)  Lives with brother  -EN     Row Name 12/03/21 1421          Home Main Entrance    Number of Stairs, Main Entrance --  Patient reports no stairs  -EN     Row Name 12/03/21 1421          Stairs Within Home, Primary    Stairs, Within Home, Primary Patient reports she lives in a one level home with no stairs to enter  -EN     Row Name 12/03/21 1421          Cognition    Orientation Status (Cognition) oriented to; person; place; time  stated Nov. 2021  -EN     Row Name 12/03/21 1421          Safety Issues, Functional Mobility    Safety Issues Affecting Function (Mobility) at risk behavior observed; judgment; positioning of assistive device; problem-solving; safety precaution awareness; safety precautions follow-through/compliance; sequencing abilities  -EN     Impairments Affecting Function (Mobility) cognition; balance; strength  -EN     Comment, Safety Issues/Impairments (Mobility) Patient unable to fully stand upright, significant posterior lean. Unsure if patient is ambulatory at home.  -EN           User Key  (r) = Recorded By, (t) = Taken By, (c) = Cosigned By    Initials Name Provider Type    EN Carmen Duarte OTR Occupational Therapist                 Mobility/ADL's     Row Name 12/03/21 1427          Bed Mobility    Bed Mobility supine-sit; sit-supine; scooting/bridging  -EN     Scooting/Bridging [place] (Bed Mobility) dependent (less than 25% patient effort); 2 person assist  -EN     Supine-Sit [place] (Bed Mobility) minimum assist (75% patient effort); 2 person assist; verbal cues  -EN     Sit-Supine [place] (Bed Mobility) verbal cues; moderate assist (50% patient effort); 2 person assist  -EN     Bed Mobility, Safety Issues cognitive deficits limit understanding  -EN     Assistive Device (Bed Mobility) bed rails; head of bed elevated; draw sheet  -EN     Row Name 12/03/21 1427          Transfers     Comment (Transfers) Attempted sit to stand from elevated EOB. Patient unable to full come to an upright stand despite cues and max A X 2  -EN     Bed-Chair [place] (Transfers) maximum assist (25% patient effort); 2 person assist  -EN     Assistive Device (Bed-Chair Transfers) walker, front-wheeled  -EN     Row Name 12/03/21 1427          Functional Mobility    Functional Mobility- Comment deferred  -EN     Row Name 12/03/21 1427          Activities of Daily Living    BADL Assessment/Intervention lower body dressing  -EN     Row Name 12/03/21 1427          Lower Body Dressing Assessment/Training    Comment (Lower Body Dressing) Anticipate max A for LB ADLs.  -EN           User Key  (r) = Recorded By, (t) = Taken By, (c) = Cosigned By    Initials Name Provider Type    Carmen Resendez OTR Occupational Therapist               Obj/Interventions     Row Name 12/03/21 1430          Range of Motion Comprehensive    General Range of Motion bilateral upper extremity ROM WFL  -EN     Row Name 12/03/21 1430          Strength Comprehensive (MMT)    Comment, General Manual Muscle Testing (MMT) Assessment B UE strength 3+/5  -EN     Row Name 12/03/21 1430          Balance    Comment, Balance sat at EOB with SBA  -EN           User Key  (r) = Recorded By, (t) = Taken By, (c) = Cosigned By    Initials Name Provider Type    Carmen Resendez OTR Occupational Therapist               Goals/Plan     Row Name 12/03/21 1436          Transfer Goal 1 (OT)    Activity/Assistive Device (Transfer Goal 1, OT) sit-to-stand/stand-to-sit; walker, rolling  -EN     [place] Level/Cues Needed (Transfer Goal 1, OT) moderate assist (50-74% patient effort)  -EN     Time Frame (Transfer Goal 1, OT) 1 week  -EN     Progress/Outcome (Transfer Goal 1, OT) other (see comments)  new goal  -EN     Row Name 12/03/21 1436          Strength Goal 1 (OT)    Strength Goal 1 (OT) Patient to perform UE HEP independently after demonstration.   -EN     Time Frame (Strength Goal 1, OT) by discharge  -EN     Progress/Outcome (Strength Goal 1, OT) other (see comments)  new goal  -EN     Row Name 12/03/21 1436          Problem Specific Goal 1 (OT)    Problem Specific Goal 1 (OT) Patient to perform static standing activity with FWW and mod assist (1-2 minutes) for improved strength/safety during functional transfers and ADLs.  -EN     Time Frame (Problem Specific Goal 1, OT) 1 week  -EN     Progress/Outcome (Problem Specific Goal 1, OT) other (see comments)  new goal  -EN     Row Name 12/03/21 1436          Therapy Assessment/Plan (OT)    Planned Therapy Interventions (OT) BADL retraining; functional balance retraining; patient/caregiver education/training; strengthening exercise; transfer/mobility retraining  -EN           User Key  (r) = Recorded By, (t) = Taken By, (c) = Cosigned By    Initials Name Provider Type    Carmen Resendez OTR Occupational Therapist               Clinical Impression     Row Name 12/03/21 1430          Pain Assessment    Additional Documentation Pain Scale: Numbers Pre/Post-Treatment (Group)  -EN     Row Name 12/03/21 1430          Pain Scale: Numbers Pre/Post-Treatment    Pretreatment Pain Rating 0/10 - no pain  -EN     Posttreatment Pain Rating 0/10 - no pain  -EN     Pain Intervention(s) Repositioned  -EN     Row Name 12/03/21 1430          Plan of Care Review    Plan of Care Reviewed With patient  -EN     Outcome Summary OT evaluation completed. Patient oriented X 3 however confused during functional tasks and unable to provide accurate prior level of independence. Patient required min A X 2 for supine to sit and max A X 2 for sit to supine. Attempted sit to stand 2 trials however patient unable to full stand upright despite max A X2 (significant posterior lean). Patient's UE strnegth 3+/5, anticipate max A for LB ADLs. Patient will benefit from OT for improved overall strength/ind. Recommend SNF at discharge with  possibility to transition to long term care.  -EN     Row Name 12/03/21 1430          Therapy Assessment/Plan (OT)    Therapy Frequency (OT) 5 times/wk  -EN     Predicted Duration of Therapy Intervention (OT) 2 weeks  -EN     Row Name 12/03/21 1430          Therapy Plan Review/Discharge Plan (OT)    Anticipated Discharge Disposition (OT) skilled nursing facility; extended care facility  -EN     Row Name 12/03/21 1430          Positioning and Restraints    Pre-Treatment Position in bed  -EN     Post Treatment Position bed  -EN     In Bed supine; call light within reach; encouraged to call for assist  -EN           User Key  (r) = Recorded By, (t) = Taken By, (c) = Cosigned By    Initials Name Provider Type    Carmen Resendez, OTR Occupational Therapist               Outcome Measures     Row Name 12/03/21 1439          How much help from another is currently needed...    Putting on and taking off regular lower body clothing? 2  -EN     Bathing (including washing, rinsing, and drying) 2  -EN     Toileting (which includes using toilet bed pan or urinal) 2  -EN     Putting on and taking off regular upper body clothing 3  -EN     Taking care of personal grooming (such as brushing teeth) 3  -EN     Eating meals 4  -EN     AM-PAC 6 Clicks Score (OT) 16  -EN     Row Name 12/03/21 1429          How much help from another person do you currently need...    Turning from your back to your side while in flat bed without using bedrails? 2  -BP     Moving from lying on back to sitting on the side of a flat bed without bedrails? 2  -BP     Moving to and from a bed to a chair (including a wheelchair)? 1  -BP     Standing up from a chair using your arms (e.g., wheelchair, bedside chair)? 1  -BP     Climbing 3-5 steps with a railing? 1  -BP     To walk in hospital room? 1  -BP     AM-PAC 6 Clicks Score (PT) 8  -BP     Row Name 12/03/21 1439 12/03/21 1429       Functional Assessment    Outcome Measure Options AM-PAC 6 Clicks  Daily Activity (OT)  -EN AM-PAC 6 Clicks Basic Mobility (PT)  -BP          User Key  (r) = Recorded By, (t) = Taken By, (c) = Cosigned By    Initials Name Provider Type    EN Carmen Duarte OTR Occupational Therapist    BP Love Shaver, PT Physical Therapist                Occupational Therapy Education                 Title: PT OT SLP Therapies (In Progress)     Topic: Occupational Therapy (In Progress)     Point: ADL training (Done)     Description:   Instruct learner(s) on proper safety adaptation and remediation techniques during self care or transfers.   Instruct in proper use of assistive devices.              Learning Progress Summary           Patient Acceptance, E, VU by MATTIE at 12/3/2021 1440    Comment: Safety during transfers                   Point: Home exercise program (Not Started)     Description:   Instruct learner(s) on appropriate technique for monitoring, assisting and/or progressing therapeutic exercises/activities.              Learner Progress:  Not documented in this visit.                      User Key     Initials Effective Dates Name Provider Type Discipline    EN 06/16/21 -  Carmen Duarte OTR Occupational Therapist OT              OT Recommendation and Plan  Planned Therapy Interventions (OT): BADL retraining, functional balance retraining, patient/caregiver education/training, strengthening exercise, transfer/mobility retraining  Therapy Frequency (OT): 5 times/wk  Plan of Care Review  Plan of Care Reviewed With: patient  Outcome Summary: OT evaluation completed. Patient oriented X 3 however confused during functional tasks and unable to provide accurate prior level of independence. Patient required min A X 2 for supine to sit and max A X 2 for sit to supine. Attempted sit to stand 2 trials however patient unable to full stand upright despite max A X2 (significant posterior lean). Patient's UE strnegth 3+/5, anticipate max A for LB ADLs. Patient will benefit from OT  for improved overall strength/ind. Recommend SNF at discharge with possibility to transition to long term care.     Time Calculation:    Time Calculation- OT     Row Name 12/03/21 1441             Time Calculation- OT    OT Start Time 1345  -EN            User Key  (r) = Recorded By, (t) = Taken By, (c) = Cosigned By    Initials Name Provider Type    EN Carmen Duarte OTR Occupational Therapist              Therapy Charges for Today     Code Description Service Date Service Provider Modifiers Qty    [id number]  OT EVAL MOD COMPLEXITY 2 12/3/2021 Carmen Duarte OTR GO 1               YAJAIRA Hernandez  12/3/2021

## 2021-12-03 NOTE — H&P
"Daily Progress Note:      Chief complaint: Follow-up of aspiration pneumonia, sepsis, hypothermia, atrial fibrillation, malnutrition, seizure disorder    Subjective: She is resting comfortably.  Remains intermittently confused she is oriented to person only she does follow commands.       LOS: 1 day     Vital Signs  Temp:  [87.8 °F (31 °C)-98.2 °F (36.8 °C)] 97.5 °F (36.4 °C)  Heart Rate:  [] 71  Resp:  [14-22] 20  BP: ()/() 108/72  Oxygen Therapy  SpO2: 97 %  Pulse Oximetry Type: Continuous  Device (Oxygen Therapy): room air}  Body mass index is 16.87 kg/m².  Flowsheet Rows      First Filed Value   Admission Height 160 cm (63\") Documented at 12/02/2021 0924   Admission Weight 43.5 kg (96 lb) Documented at 12/02/2021 0924                   Documented weights    12/02/21 0924 12/02/21 1830   Weight: 43.5 kg (96 lb) 43.2 kg (95 lb 3.2 oz)           Patient Vitals for the past 24 hrs:   BP Temp Temp src Pulse Resp SpO2 Height Weight   12/03/21 0700 108/72 -- -- 71 -- 97 % -- --   12/03/21 0615 113/87 97.5 °F (36.4 °C) Axillary 58 20 94 % -- --   12/03/21 0500 129/89 -- -- 85 -- 99 % -- --   12/03/21 0400 106/63 97.4 °F (36.3 °C) Axillary 70 22 100 % -- --   12/03/21 0300 113/71 -- -- 74 -- 100 % -- --   12/03/21 0200 114/99 97 °F (36.1 °C) Axillary 86 -- 100 % -- --   12/03/21 0100 102/70 -- -- 92 -- 96 % -- --   12/03/21 0000 133/97 97.7 °F (36.5 °C) -- 117 22 99 % -- --   12/02/21 2300 120/67 -- -- (!) 129 -- 97 % -- --   12/02/21 2200 119/69 -- -- 109 -- 97 % -- --   12/02/21 2100 112/65 98.2 °F (36.8 °C) Axillary 113 -- 99 % -- --   12/02/21 2020 -- -- -- -- -- 98 % -- --   12/02/21 2000 137/100 -- -- 103 -- 100 % -- --   12/02/21 1915 -- 94.5 °F (34.7 °C) Axillary 90 -- 100 % -- --   12/02/21 1900 109/71 -- -- 70 -- 100 % -- --   12/02/21 1830 (!) 89/60 -- -- 75 20 98 % 160 cm (62.99\") 43.2 kg (95 lb 3.2 oz)   12/02/21 1700 118/67 92 °F (33.3 °C) Axillary 64 20 100 % -- --   12/02/21 1616 116/75 " "(!) 91.9 °F (33.3 °C) Rectal 84 -- 98 % -- --   12/02/21 1601 116/76 -- -- 61 -- -- -- --   12/02/21 1546 102/75 -- -- 59 -- -- -- --   12/02/21 1531 114/84 -- -- 70 -- -- -- --   12/02/21 1501 98/75 -- -- 64 -- -- -- --   12/02/21 1401 101/87 -- -- 75 -- -- -- --   12/02/21 1346 101/73 -- -- 64 -- 95 % -- --   12/02/21 1316 118/79 -- -- 70 -- 100 % -- --   12/02/21 1246 92/63 -- -- 64 -- 98 % -- --   12/02/21 1231 (!) 89/67 -- -- 72 -- 97 % -- --   12/02/21 1211 94/66 (!) 89.1 °F (31.7 °C) Rectal 55 16 98 % -- --   12/02/21 1201 (!) 88/67 -- -- 56 -- 98 % -- --   12/02/21 1132 -- -- -- 59 -- 98 % -- --   12/02/21 1131 90/48 -- -- -- -- -- -- --   12/02/21 1116 (!) 76/61 -- -- 67 -- 99 % -- --   12/02/21 1046 104/68 -- -- 53 -- 98 % -- --   12/02/21 1016 105/72 -- -- 71 -- 98 % -- --   12/02/21 0953 -- (!) 87.8 °F (31 °C) Rectal -- -- -- -- --   12/02/21 0950 111/73 -- -- 70 -- 98 % -- --   12/02/21 0924 119/58 -- -- 53 14 98 % 160 cm (63\") 43.5 kg (96 lb)       43.2 kg (95 lb 3.2 oz)    Intake/Output                 12/02/21 0701 - 12/03/21 0700     [id number] 2059-4923 Total              Intake    I.V.  --  1025 1025    IV Piggyback  800  -- 800    Total Intake 800 1025 1825       Output    Total Output -- -- --           Intake/Output Summary (Last 24 hours) at 12/3/2021 0747  Last data filed at 12/3/2021 0617  Gross per 24 hour   Intake 1825 ml   Output --   Net 1825 ml        Intake/Output Summary (Last 24 hours) at 12/3/2021 0747  Last data filed at 12/3/2021 0617  Gross per 24 hour   Intake 1825 ml   Output --   Net 1825 ml        Review of Systems   Unable to perform ROS: Mental status change       Physical Exam  Vitals and nursing note reviewed.   Constitutional:       Appearance: She is well-developed and underweight. She is ill-appearing (Chronically).   HENT:      Head: Normocephalic.   Eyes:      Conjunctiva/sclera: Conjunctivae normal.   Neck:      Thyroid: No thyromegaly.      Vascular: No JVD. "   Cardiovascular:      Rate and Rhythm: Normal rate. Rhythm irregular.      Heart sounds: Normal heart sounds. No murmur heard.      Pulmonary:      Effort: Pulmonary effort is normal. No respiratory distress.      Breath sounds: Normal breath sounds. No wheezing or rales.   Abdominal:      General: Bowel sounds are normal. There is no distension.      Palpations: Abdomen is soft.      Tenderness: There is no abdominal tenderness. There is no guarding.   Skin:     General: Skin is warm and dry.      Findings: No rash.   Neurological:      General: No focal deficit present.      Mental Status: She is alert. She is disoriented.      Comments: To person and year.  Follows commands         Medication Review:   I have reviewed the patient's current medication list  Scheduled Meds:aspirin, 81 mg, Oral, Daily  cefepime, 1 g, Intravenous, Q24H  levETIRAcetam, 500 mg, Oral, Q12H  levothyroxine sodium, 75 mcg, Intravenous, Q AM  pantoprazole, 40 mg, Intravenous, Q AM  rivaroxaban, 20 mg, Oral, Daily  sodium chloride, 10 mL, Intravenous, Q12H  vancomycin, 750 mg, Intravenous, Q24H      Continuous Infusions:Pharmacy Consult - Pharmacy to dose,   sodium chloride, 75 mL/hr, Last Rate: 75 mL/hr (12/03/21 0617)      PRN Meds:.•  acetaminophen **OR** acetaminophen **OR** acetaminophen  •  aluminum-magnesium hydroxide-simethicone  •  atropine  •  calcium carbonate  •  LORazepam  •  nitroglycerin  •  ondansetron **OR** ondansetron  •  Pharmacy Consult - Pharmacy to dose  •  sodium chloride  •  [COMPLETED] Insert peripheral IV **AND** sodium chloride  •  sodium chloride      Labs:  Results from last 7 days   Lab Units 12/03/21  0422 12/02/21 0955 11/26/21  1208   WBC 10*3/mm3 3.87 2.38* 3.00*   HEMOGLOBIN g/dL 9.8* 11.9* 12.3   HEMATOCRIT % 29.8* 37.0 39.3   PLATELETS 10*3/mm3 108* 107* 125*   MONOCYTES % %  --  16.0*  --      Results from last 7 days   Lab Units 12/03/21  0422 12/02/21  0955 11/26/21  1244   SODIUM mmol/L 136 136 141    POTASSIUM mmol/L 5.0 5.0 4.6   CHLORIDE mmol/L 103 102 106   CO2 mmol/L 20.5* 16.9* 23.4   BUN mg/dL 27* 27* 27*   CREATININE mg/dL 1.43* 1.40* 1.14*   CALCIUM mg/dL 8.9 10.4 10.0   BILIRUBIN mg/dL  --  0.6  --    ALK PHOS U/L  --  249*  --    ALT (SGPT) U/L  --  74*  --    AST (SGOT) U/L  --  67*  --    GLUCOSE mg/dL 63* 107* 89           Results from last 7 days   Lab Units 12/03/21  0422 12/02/21  1055 12/02/21  0955 11/26/21  1208   AST (SGOT) U/L  --   --  67*  --    ALT (SGPT) U/L  --   --  74*  --    PROCALCITONIN ng/mL  --   --  0.04  --    LACTATE mmol/L  --  1.6  --   --    PLATELETS 10*3/mm3 108*  --  107* 125*         Lab Results (last 24 hours)     Procedure Component Value Units Date/Time    Basic Metabolic Panel [[id number]]  (Abnormal) Collected: 12/03/21 0422    Specimen: Blood Updated: 12/03/21 0451     Glucose 63 mg/dL      BUN 27 mg/dL      Creatinine 1.43 mg/dL      Sodium 136 mmol/L      Potassium 5.0 mmol/L      Chloride 103 mmol/L      CO2 20.5 mmol/L      Calcium 8.9 mg/dL      eGFR  African Amer 44 mL/min/1.73      BUN/Creatinine Ratio 18.9     Anion Gap 12.5 mmol/L     Narrative:      GFR Normal >60  Chronic Kidney Disease <60  Kidney Failure <15      CBC (No Diff) [[id number]]  (Abnormal) Collected: 12/03/21 0422    Specimen: Blood Updated: 12/03/21 0446     WBC 3.87 10*3/mm3      RBC 3.86 10*6/mm3      Hemoglobin 9.8 g/dL      Hematocrit 29.8 %      MCV 77.2 fL      MCH 25.4 pg      MCHC 32.9 g/dL      RDW 17.8 %      RDW-SD 48.9 fl      Platelets 108 10*3/mm3     Narrative:      Flags resolved upon repeat analysis      T4, Free [[id number]]  (Abnormal) Collected: 12/02/21 0955    Specimen: Blood Updated: 12/02/21 1133     Free T4 2.34 ng/dL     Narrative:      Results may be falsely increased if patient taking Biotin.      TSH [[id number]]  (Normal) Collected: 12/02/21 0955    Specimen: Blood Updated: 12/02/21 1127     TSH 0.332 uIU/mL     Urinalysis, Microscopic Only - Urine, Catheter  [[id number]]  (Abnormal) Collected: 12/02/21 1050    Specimen: Urine, Catheter Updated: 12/02/21 1122     RBC, UA None Seen /HPF      WBC, UA 0-2 /HPF      Bacteria, UA Trace /HPF      Squamous Epithelial Cells, UA 0-2 /HPF      Hyaline Casts, UA 3-6 /LPF      Methodology Manual Light Microscopy    Lactic Acid, Plasma [[id number]]  (Normal) Collected: 12/02/21 1055    Specimen: Blood Updated: 12/02/21 1120     Lactate 1.6 mmol/L     Urinalysis With Microscopic If Indicated (No Culture) - Urine, Catheter [[id number]]  (Abnormal) Collected: 12/02/21 1050    Specimen: Urine, Catheter Updated: 12/02/21 1110     Color, UA Yellow     Appearance, UA Clear     pH, UA <=5.0     Specific Gravity, UA 1.022     Comment: Result obtained by Refractometer        Glucose, UA Negative     Ketones, UA Trace     Bilirubin, UA Negative     Blood, UA Trace     Protein, UA Negative     Leuk Esterase, UA Small (1+)     Nitrite, UA Negative     Urobilinogen, UA 0.2 E.U./dL    Digoxin Level [[id number]]  (Abnormal) Collected: 12/02/21 0955    Specimen: Blood Updated: 12/02/21 1102     Digoxin 1.92 ng/mL     Blood Culture - Blood, Arm, Right [[id number]] Collected: 12/02/21 1055    Specimen: Blood from Arm, Right Updated: 12/02/21 1059    Comprehensive Metabolic Panel [[id number]]  (Abnormal) Collected: 12/02/21 0955    Specimen: Blood Updated: 12/02/21 1053     Glucose 107 mg/dL      BUN 27 mg/dL      Creatinine 1.40 mg/dL      Sodium 136 mmol/L      Potassium 5.0 mmol/L      Comment: Slight hemolysis detected by analyzer. Results may be affected.        Chloride 102 mmol/L      CO2 16.9 mmol/L      Calcium 10.4 mg/dL      Total Protein 7.5 g/dL      Albumin 3.70 g/dL      ALT (SGPT) 74 U/L      AST (SGOT) 67 U/L      Comment: Slight hemolysis detected by analyzer. Results may be affected.        Alkaline Phosphatase 249 U/L      Total Bilirubin 0.6 mg/dL      eGFR  African Amer 45 mL/min/1.73      Globulin 3.8 gm/dL      A/G Ratio 1.0 g/dL       BUN/Creatinine Ratio 19.3     Anion Gap 17.1 mmol/L     Narrative:      GFR Normal >60  Chronic Kidney Disease <60  Kidney Failure <15      Lipase [[id number]]  (Normal) Collected: 12/02/21 0955    Specimen: Blood Updated: 12/02/21 1043     Lipase 41 U/L     Manual Differential [[id number]]  (Abnormal) Collected: 12/02/21 0955    Specimen: Blood Updated: 12/02/21 1042     Neutrophil % 62.0 %      Lymphocyte % 22.0 %      Monocyte % 16.0 %      Neutrophils Absolute 1.48 10*3/mm3      Lymphocytes Absolute 0.52 10*3/mm3      Monocytes Absolute 0.38 10*3/mm3      RBC Morphology Normal     WBC Morphology Normal     Platelet Estimate Decreased    CBC & Differential [[id number]]  (Abnormal) Collected: 12/02/21 0955    Specimen: Blood Updated: 12/02/21 1042    Narrative:      The following orders were created for panel order CBC & Differential.  Procedure                               Abnormality         Status                     ---------                               -----------         ------                     CBC Auto Differential[[id number]]        Abnormal            Final result               Scan Slide[[id number]]                                       Final result                 Please view results for these tests on the individual orders.    Scan Slide [[id number]] Collected: 12/02/21 0955    Specimen: Blood Updated: 12/02/21 1042     Scan Slide --     Comment: See Manual Differential Results       BNP [[id number]]  (Abnormal) Collected: 12/02/21 0955    Specimen: Blood Updated: 12/02/21 1039     proBNP 1,500.0 pg/mL     Narrative:      Among patients with dyspnea, NT-proBNP is highly sensitive for the detection of acute congestive heart failure. In addition NT-proBNP of <300 pg/ml effectively rules out acute congestive heart failure with 99% negative predictive value.    Results may be falsely decreased if patient taking Biotin.      Procalcitonin [[id number]]  (Normal) Collected: 12/02/21 0955    Specimen: Blood  Updated: 12/02/21 1038     Procalcitonin 0.04 ng/mL     Narrative:      Results may be falsely decreased if patient taking Biotin.     COVID PRE-OP / PRE-PROCEDURE SCREENING ORDER (NO ISOLATION) - Swab, Nasopharynx [[id number]]  (Normal) Collected: 12/02/21 0957    Specimen: Swab from Nasopharynx Updated: 12/02/21 1036    Narrative:      The following orders were created for panel order COVID PRE-OP / PRE-PROCEDURE SCREENING ORDER (NO ISOLATION) - Swab, Nasopharynx.  Procedure                               Abnormality         Status                     ---------                               -----------         ------                     COVID-19 and FLU A/B PCR...[[id number]]  Normal              Final result                 Please view results for these tests on the individual orders.    COVID-19 and FLU A/B PCR - Swab, Nasopharynx [[id number]]  (Normal) Collected: 12/02/21 0957    Specimen: Swab from Nasopharynx Updated: 12/02/21 1036     COVID19 Not Detected     Influenza A PCR Not Detected     Influenza B PCR Not Detected    Narrative:      Fact sheet for providers: https://www.fda.gov/media/484228/download    Fact sheet for patients: https://www.fda.gov/media/911412/download    Test performed by PCR.    CBC Auto Differential [[id number]]  (Abnormal) Collected: 12/02/21 0955    Specimen: Blood Updated: 12/02/21 1019     WBC 2.38 10*3/mm3      RBC 4.62 10*6/mm3      Hemoglobin 11.9 g/dL      Hematocrit 37.0 %      MCV 80.1 fL      MCH 25.8 pg      MCHC 32.2 g/dL      RDW 18.8 %      RDW-SD 51.6 fl      MPV --     Comment: UNABLE TO CALCULATE        Platelets 107 10*3/mm3      Neutrophil % 58.9 %      Lymphocyte % 23.9 %      Monocyte % 13.9 %      Eosinophil % 2.9 %      Basophil % 0.4 %      Neutrophils, Absolute 1.40 10*3/mm3      Lymphocytes, Absolute 0.57 10*3/mm3      Monocytes, Absolute 0.33 10*3/mm3      Eosinophils, Absolute 0.07 10*3/mm3      Basophils, Absolute 0.01 10*3/mm3     Blood Culture - Blood,  Hand, Left [[id number]] Collected: 12/02/21 0955    Specimen: Blood from Hand, Left Updated: 12/02/21 1008            Results from last 7 days   Lab Units 12/02/21  0955   TSH uIU/mL 0.332     Results from last 7 days   Lab Units 12/02/21  0955   PROBNP pg/mL 1,500.0*                         No results found for: POCGLU  Results from last 7 days   Lab Units 12/02/21  1055 12/02/21  0955   PROCALCITONIN ng/mL  --  0.04   LACTATE mmol/L 1.6  --          Results from last 7 days   Lab Units 12/02/21  1050   NITRITE UA  Negative   WBC UA /HPF 0-2*   BACTERIA UA /HPF Trace*   SQUAM EPITHEL UA /HPF 0-2     Results from last 7 days   Lab Units 12/02/21  0957   INFLUENZA B PCR  Not Detected   INFLUENZA A PCR  Not Detected         Radiology:  Imaging Results (Last 24 Hours)     Procedure Component Value Units Date/Time    CT Chest Without Contrast Diagnostic [[id number]] Collected: 12/02/21 1221     Updated: 12/02/21 1223    Narrative:      CT CHEST, NONCONTRAST, 12/2/2021    HISTORY:  71-year-old female in the ED with generalized weakness, severe bradycardia. Abnormal chest x-ray. Of note, oldest available chest x-rays here have shown evidence of significant chronic reticulonodular interstitial lung disease with an upper lung and  perihilar predominance compatible with sarcoidosis, pneumoconiosis or prior fungal infection. CT here in May showing significant right heart failure and congestive hepatopathy.    TECHNIQUE:  CT imaging of the chest ordered without IV contrast. Radiation dose reduction techniques included automated exposure control. Radiation audit for CT and nuclear cardiology exams in the last 12 months: 7.    COMPARISON:  *  CTA chest, 5/12/2021.  *  Chest x-ray, 12/2/2021 and 1/16/2019.    FINDINGS:  Marked cardiomegaly with severe right heart chamber dilatation and distended IVC and hepatic veins compatible with chronically elevated right heart pressures. Pleural effusions and body wall edema have resolved  since the prior study.    Patchy airspace infiltrate scattered throughout the right lower lung, greatest in the posterior basal segment compatible with pneumonia. Of note, the thoracic esophagus is significantly dilated and fluid-filled today, and aspiration pneumonia is an  additional consideration.    Advanced chronic reticulonodular interstitial lung disease with upper lobe predominance associated with upper lung volume loss, upward hilar retraction and the central upper perihilar conglomerate masses. The appearance is compatible with sarcoidosis or  pneumoconiosis (typical for silicosis), correlate clinically. As noted above, this has been present on older chest x-rays.    Limited upper abdominal images show chronic right hydronephrosis.      Impression:      1.  Advanced chronic upper lung reticulonodular lung disease with chronic volume loss and findings typical for either sarcoidosis or silicosis. This has been present on older chest x-rays.  2.  New airspace infiltrate in the right lower lobe, greatest in the dependent posterior basal segment. Pneumonitis is likely. Given the presence of a dilated and fluid-filled thoracic esophagus today, aspiration pneumonia is an additional consideration.  3.  Chronic right heart failure. Marked cardiomegaly and severe right heart chamber dilatation. Edema and pleural effusions present on the prior study have resolved.    Signer Name: Rg Holman MD   Signed: 12/2/2021 12:21 PM   Workstation Name: [id number]    [facility]    XR Chest 1 View [[id number]] Collected: 12/02/21 1051     Updated: 12/02/21 1057    Narrative:      XR CHEST 1 VW-: 12/2/2021 10:27 AM     INDICATION:   Generalized weakness. Severe bradycardia.      COMPARISON:    05/20/2021.     FINDINGS:  Single Portable AP view(s) of the chest. Cardiac silhouette is  borderline enlarged and stable. Right-sided PICC line has been removed.  Small effusions have decreased and there is  improved aeration of the  left base. Chronic appearing perihilar and suprahilar opacities  bilaterally are stable to worse. Increasing opacities in the suprahilar  aspect of both lobes and in the right hilum. Imaging features suspicious  for acute pneumonia upon superimposed chronic changes. There is no  pneumothorax.       Impression:         1. Worsening opacities in the suprahilar and perihilar lungs bilaterally  suspicious for pneumonia upon superimposed more chronic masslike  consolidation. This could be better assessed with CT.  2. Improved aeration of the left base. Resolution of bilateral  effusions.     This report was finalized on 12/2/2021 10:55 AM by Dr. Kamar Morel MD.             Cardiology:  ECG/EMG Results (last 24 hours)     Procedure Component Value Units Date/Time    ECG 12 Lead [[id number]] Collected: 12/02/21 0949     Updated: 12/02/21 1829     QT Interval 412 ms     Narrative:      HEART RATE= 77  bpm  RR Interval= 777  ms  ID Interval=   ms  P Horizontal Axis=   deg  P Front Axis=   deg  QRSD Interval= 108  ms  QT Interval= 412  ms  QRS Axis= 20  deg  T Wave Axis= -27  deg  - ABNORMAL ECG -  Atrial fibrillation  Borderline T abnormalities, diffuse leads   Minimal lateral ST elevation  PVCs resolved  Electronically Signed By: Liat Hager ([facility]) 02-Dec-2021 18:29:27  Date and Time of Study: 2021-12-02 09:49:48    ECG 12 Lead [[id number]] Collected: 12/02/21 0946     Updated: 12/02/21 1830     QT Interval 472 ms     Narrative:      HEART RATE= 30  bpm  RR Interval= 2588  ms  ID Interval=   ms  P Horizontal Axis=   deg  P Front Axis=   deg  QRSD Interval= 124  ms  QT Interval= 472  ms  QRS Axis= 89  deg  T Wave Axis=   deg  - ABNORMAL ECG -  Junctional rhythm  Paired ventricular premature complexes  Nonspecific intraventricular conduction delay  Nonspecific T abnormalities, diffuse leads  ST elevation, consider lateral injury  Poor quality tracing repeat  Electronically Signed By: Alley  Liat ([facility]) 02-Dec-2021 18:29:38  Date and Time of Study: 2021-12-02 09:46:34    ECG 12 Lead [[id number]] Collected: 12/02/21 0930     Updated: 12/02/21 1830     QT Interval 495 ms     Narrative:      HEART RATE= 54  bpm  RR Interval= 1114  ms  DC Interval=   ms  P Horizontal Axis=   deg  P Front Axis=   deg  QRSD Interval= 94  ms  QT Interval= 495  ms  QRS Axis= 10  deg  T Wave Axis= 128  deg  - ABNORMAL ECG -  Atrial fibrillation  Abnormal Q wave in V1  Borderline T abnormalities, inferior leads  Ventricular premature complex - new  Electronically Signed By: Liat Hager ([facility]) 02-Dec-2021 18:29:56  Date and Time of Study: 2021-12-02 09:30:55          I have reviewed recent labs results and consult notes.  Parts of this note may have been copied and pasted but patient was examined and interviewed by me today    Assessment and Plan:      1.  Aspiration pneumonia patient on cefepime and vancomycin supportive care.       2.  Profound hypothermia secondary to sepsis and aspiration pneumonia improved.  Continue to monitor as patient had problem with recurrent hypothermia in the past     3.  Chronic atrial fibrillation on anticoagulation rate controlled continue with home Xarelto.  Digoxin on hold due to therapeutic levels will monitor     4.  Sepsis likely due to underlying aspiration pneumonia supportive care fluid resuscitation broad-spectrum IV antibiotics and cultures have been ordered     5.  [place] seizure disorder continue with Keppra     6.  Metabolic encephalopathy secondary to pneumonia hypothermia and sepsis we will continue to monitor     7.  Hypothyroidism continue with current medication     8.  Mild renal insufficiency on background chronic kidney disease stage III likely due to decreased p.o. intake and prerenal azotemia creatinine still elevated not at baseline    9.  Chronic diastolic congestive heart failure she is euvolemic     10.  Moderate protein calorie malnutrition nutritional consult  and supportive care    11.  Anemia hemoglobin trending down check iron studies likely just due to hydration    12.  ADLs below baseline PT and OT evaluate the patient      Much of this encounter note is an electronic transcription/translation of spoken language to printed text using Dragon Software      Total critical care time was 35  minutes, excluding any separately billable procedure time

## 2021-12-03 NOTE — CASE MANAGEMENT/SOCIAL WORK
Discharge Planning Assessment  MALLORY Asher     Patient Name: Argelia Nguyen  MRN: [id number]  Today's Date: 12/3/2021    Admit Date: 12/2/2021     Discharge Needs Assessment     Row Name 12/03/21 1634       Living Environment    Lives With sibling(s)    Name(s) of Who Lives With Patient brother Mosqueda    Current Living Arrangements home/apartment/condo    Duration at Residence 72 years    Potentially Unsafe Housing Conditions --  none    Primary Care Provided by self; other (see comments)  brother    Provides Primary Care For no one    Family Caregiver if Needed sibling(s)    Family Caregiver Names brother Oseguera    Quality of Family Relationships supportive; helpful    Able to Return to Prior Arrangements yes       Resource/Environmental Concerns    Resource/Environmental Concerns none    Transportation Concerns car, none       Transition Planning    Patient/Family Anticipates Transition to home with help/services; home with family    Patient/Family Anticipated Services at Transition none    Transportation Anticipated family or friend will provide       Discharge Needs Assessment    Readmission Within the Last 30 Days no previous admission in last 30 days    Current Outpatient/Agency/Support Group homecare agency    Equipment Currently Used at Home walker, rolling; cane, straight; commode    Concerns to be Addressed discharge planning    Anticipated Changes Related to Illness none    Equipment Needed After Discharge none    Outpatient/Agency/Support Group Needs homecare agency; skilled nursing facility    Discharge Facility/Level of Care Needs home with home health; nursing facility, skilled    Provided Post Acute Provider List? Yes    Post Acute Provider List Inpatient Rehab; Nursing Home    Provided Post Acute Provider Quality & Resource List? Yes    Post Acute Provider Quality and Resource List Inpatient Rehab; Nursing Home    Delivered To Support Person; Patient    Method of Delivery In person     Patient's Choice of Community Agency(s) [place]    Current Discharge Risk chronically ill               Discharge Plan     Row Name 12/03/21 1636       Plan    Patient/Family in Agreement with Plan yes    Plan Comments I spoke with the patient and her brother at bedside. Patient has altered mental status.  She is sitting up in bed alert but would not participate in our conversation.    I introduced myself and explained my role as .  I verified the information on the facesheet and the patient’s PCP as Dr. Oviedo.   The patient uses [facility] in [place].  She is able to afford her medications and can  them up.  The patient lives in single story home for the past 72 years. She lives with her brother.  There are no steps to enter the home and she is able to enter and maneuver around her home.  She is independent with her ADL’s.  The patient’s brother will pick her up at discharge.  She has a rolling walker, cane and bedside commode and her brother denied needing any further DME. I offered the patient information regarding home health and other community resources and Germán advised that she is current with home health and if she needs rehab their preference is [place] where she has been before.  The patient will discharge home with her family to assist and home health at this time and they are agreeable to that plan.  She denied having and further questions or concerns at this time.  CM will continue to follow for further needs and possible STR placement.              Continued Care and Services - Admitted Since 12/2/2021    Coordination has not been started for this encounter.          Demographic Summary     Row Name 12/03/21 4434       General Information    Admission Type inpatient    Arrived From home    Referral Source admission list    Reason for Consult discharge planning    Preferred Language English     Used During This Interaction no       Contact Information     Permission Granted to Share Info With                Functional Status    No documentation.                Psychosocial    No documentation.                Abuse/Neglect    No documentation.                Legal    No documentation.                Substance Abuse    No documentation.                Patient Forms    No documentation.                   Beronica Paul RN

## 2021-12-03 NOTE — PLAN OF CARE
Goal Outcome Evaluation:  Plan of Care Reviewed With: patient           Outcome Summary: PT Evaluation Complete: Patient performs supine to/from sit transfers with min/mod A x 2. Attempted sit to/from stand transfers x 2 however despite max A x 2 patient unable to fully transfer to stand due to significant posterior lean and B knee flexion. Patient is oriented to person, place and year but seems confused within conversation. Follows one step commands. Patient would benefit from physical therapy to address deficits in strength and maximize functional independence. Patients baseline not clear, patient reports she ambulates in the home but also reports she uses a w/c for mobility. At this time patient would not be safe to return home, will likely requires SNF/ECF.

## 2021-12-03 NOTE — MBS/VFSS/FEES
Acute Care - Speech Language Pathology   Swallow Initial Evaluation MALLORY Asher     Patient Name: Argelia Nguyen  : 1950  MRN: [id number]  Today's Date: 12/3/2021               Admit Date: 2021    Visit Dx:     ICD-10-CM ICD-9-CM   1. Aspiration pneumonia of right lower lobe, unspecified aspiration pneumonia type (HCC)  J69.0 507.0   2. Hypothermia, initial encounter  T68.XXXA 991.6   3. Oropharyngeal dysphagia  R13.12 787.22     Patient Active Problem List   Diagnosis   • Abnormal laboratory test result   • Gastroesophageal reflux disease   • MGUS (monoclonal gammopathy of unknown significance)   • Normocytic anemia   • Hiatal hernia   • Colon polyps   • Internal hemorrhoids   • Chronic anticoagulation   • Esophageal dysphagia   • Severe malnutrition (HCC)   • Longstanding persistent atrial fibrillation (HCC)   • Sepsis associated hypotension (HCC)   • Hydronephrosis due to obstruction of ureter   • Acute metabolic encephalopathy   • Multiple wounds of skin   • Anasarca   • Valvular heart disease   • Acute kidney insufficiency   • Grand mal status epilepticus (HCC)   • Focal motor seizure (HCC)   • [place] coma scale total score 3-8 (HCC)   • Pyelonephritis   • Altered mental status   • Hypothermia   • Encounter for rehabilitation   • Aspiration pneumonia of right lower lobe (HCC)     Past Medical History:   Diagnosis Date   • Anxiety    • Arthritis    • Chronic anticoagulation 2019   • Chronic diastolic (congestive) heart failure (HCC)    • Colon polyp    • Dysphagia    • GERD (gastroesophageal reflux disease)    • Hiatal hernia 2017   • Hypertension    • Hypothyroidism    • Internal hemorrhoids 2017   • Monoclonal gammopathy 3/25/2013   • Nonrheumatic mitral valve regurgitation     has ranged from mild-mod to severe depending on the study   • Persistent atrial fibrillation (HCC)    • Pyelonephritis 2021   • Reflux gastritis    • Seizure (HCC)    • Tricuspid regurgitation      mod-severe vs severe   • Type 2 diabetes mellitus (HCC)      Past Surgical History:   Procedure Laterality Date   • COLONOSCOPY N/A 3/29/2017    Procedure: COLONOSCOPY; POLYPECTOMY;  Surgeon: Brooke Garrido MD;  Location: [facility] OR;  Service:    • COLONOSCOPY N/A 8/3/2020    Procedure: COLONOSCOPY with polypectomy;  Surgeon: Rui Shi MD;  Location: [facility] OR;  Service: Gastroenterology;  Laterality: N/A;  ascending polyp  transverse polyp  rectal polyp   • CYST REMOVAL     • CYSTOSCOPY W/ URETERAL STENT PLACEMENT Right 4/13/2021    Procedure: CYSTOSCOPY URETERAL CATHETER/STENT INSERTION;  Surgeon: Onesimo Fuller MD;  Location: [facility] OR;  Service: Urology;  Laterality: Right;   • CYSTOSCOPY W/ URETERAL STENT PLACEMENT Right 4/28/2021    Procedure: CYSTOSCOPY URETERAL STENT REMOVAL;  Surgeon: Onesimo Fuller MD;  Location: [facility] OR;  Service: Urology;  Laterality: Right;  CYSTOSCOPY URETERAL STENT REMOVAL   • ENDOSCOPY N/A 3/29/2017    Procedure: ESOPHAGOGASTRODUODENOSCOPY WITH DILITATION;  Surgeon: rBooke Garrido MD;  Location: [facility] OR;  Service:    • ENDOSCOPY N/A 8/3/2020    Procedure: ESOPHAGOGASTRODUODENOSCOPY with biopsies;  Surgeon: Rui Shi MD;  Location: [facility] OR;  Service: Gastroenterology;  Laterality: N/A;  esophagitis  gastritis   • HERNIA REPAIR     • HYSTERECTOMY         SLP Recommendation and Plan  SLP Swallowing Diagnosis: moderate, oral dysphagia, pharyngeal dysphagia (12/03/21 1340)  SLP Diet Recommendation: soft textures, ground, nectar thick liquids (12/03/21 1340)  Recommended Precautions and Strategies: upright posture during/after eating, small bites of food and sips of liquid, check mouth frequently for oral residue/pocketing, general aspiration precautions, reflux precautions, fatigue precautions, 1:1 supervision, assist with feeding (12/03/21 1340)  SLP Rec. for Method of Medication Administration: meds whole, meds crushed, with pudding  or applesauce, as tolerated (12/03/21 1340)  Monitor for Signs of Aspiration: no, notify SLP if any concerns (12/03/21 1340)  Recommended Diagnostics: reassess via clinical swallow evaluation, other (see comments) (for advancement of liquids to thins) (12/03/21 1340)  Swallow Criteria for Skilled Therapeutic Interventions Met: demonstrates skilled criteria (12/03/21 1340)  Anticipated Discharge Disposition (SLP): extended care facility (12/03/21 1340)  Rehab Potential/Prognosis, Swallowing: adequate, monitor progress closely (12/03/21 1340)  Therapy Frequency (Swallow): at least, 3 days per week (12/03/21 1340)  Predicted Duration Therapy Intervention (Days): 2 weeks (12/03/21 1340)      Patient/Family Concerns, Anticipated Discharge Disposition (SLP): Pt is unable to state at this time. (12/03/21 1340)         Plan of Care Reviewed With: patient  Outcome Summary: SLP/MBS: Pt demonstrates a moderate ororpharyngeal dysphagia with decreased bolus control and manipulation of bolus. Tongue pumping used to transition the bolus into the pharynx. Delay of swallow w/liquids pooling to the level of pyriforms prior to initiation of the swallow. Pt with no significant pharyngeal residue after the swallow w/exception of thins. Cued subsequent swallow does not fully clear pharyngeal residue on thins. Pt with mild to moderate penetration of thins from single straw drinks. Pt unable to clear from the vestibule. Residual material in the vestibule eventually makes its way down to the level of the cords without a cough. Recommend mechanical soft diet with ground meats and nectar thick liquids. Strict aspiration and reflux precautions given CT chest results. Oral care before breakfast and after po intake. Meds whole or crushed in applesauce as pt tolerates. Check mouth for oral residue. SLP to follow during stay to advance diet to soft with thins as tolerated.      SWALLOW EVALUATION (last 72 hours)     SLP Adult Swallow Evaluation      Row Name 12/03/21 1340       Rehab Evaluation    Document Type evaluation  -AD    Subjective Information complains of; weakness  -AD    Patient Observations alert; cooperative; agree to therapy  -AD    Patient/Family/Caregiver Comments/Observations Pt seen upright in video imaging chair, alert and cooperative. Appears fatigued and frail.  -AD    Patient Effort good  -AD    Symptoms Noted During/After Treatment fatigue  -AD            General Information    Patient Profile Reviewed yes  -AD    Pertinent History Of Current Problem Pt is a 71 year old female admitted for generalized weakness. She wa found to be hypothermic and to have aspiration pneumonia of the RLL with unspecified type. CT of chest was remarkable for adv chr upper lung reticulondular dz w/chr volume loss and findings consistent for either sarcoidosis or silicosis. New airspace infiltrate in RLL greatest in dependent posterior basal segment. Pneumonitis is likely, given presence of dilated fluid filled thoracic esophagus today (12/2/21). Aspiration pneumonia is additional consideration. Chronic right sided heart failure. Marked cardiomegaly and severe right heart chamber dilation. Edema and pleural effusiion present on prior studies have resolved.  -AD    Current Method of Nutrition NPO  -AD    Precautions/Limitations, Vision WFL; for purposes of eval  -AD    Precautions/Limitations, Hearing WFL; for purposes of eval  -AD    Prior Level of Function-Communication cognitive-linguistic impairment; motor speech impairment  -AD    Prior Level of Function-Swallowing mechanical soft textures  soft diet with chopped meats; prior NTL but resolved and returned to thin liquids by discharge in May 2021.  -AD    Plans/Goals Discussed with patient; other (see comments)  Pt indicates agreement but not sure of full understanding at this time.  -AD    Barriers to Rehab medically complex; previous functional deficit; cognitive status  swallowing deficit; esophageal  dysphagia  -AD    Patient's Goals for Discharge patient could not state  -AD    Family Goals for Discharge other (see comments)  not present at time of examination  -AD            Pain    Additional Documentation --  no pain reported at this time  -AD            Oral Motor Structure and Function    Oral Lesions or Structural Abnormalities and/or variants none  -AD    Dentition Assessment natural, present and adequate; missing teeth  -AD    Secretion Management anterior loss; problems swallowing secretions; other (see comments)  excess secretions in mouth w/anterior loss  -AD    Mucosal Quality moist, healthy  -AD    Volitional Swallow delayed; weak  -AD    Volitional Cough weak  -AD            Oral Musculature and Cranial Nerve Assessment    Oral Motor General Assessment generalized oral motor weakness  -AD    Oral Motor, Comment Generalized oral motor weakness of lips, tongue, jaw and larynx. Palate appears WFL.  -AD            General Eating/Swallowing Observations    Respiratory Support Currently in Use room air  -AD    Eating/Swallowing Skills fed by SLP; self-fed  cup/straw drinks self fed  -AD    Positioning During Eating upright 90 degree; upright in chair  -AD            Respiratory    Respiratory Status WFL; room air; during swallowing/eating  -AD            MBS/VFSS    Utensils Used spoon; cup; straw  -AD    Consistencies Trialed regular textures; pureed; thin liquids; nectar/syrup-thick liquids; honey-thick liquids  -AD            MBS/VFSS Interpretation    Oral Prep Phase impaired oral phase of swallowing  -AD    Oral Transit Phase impaired  -AD    Oral Residue impaired  -AD    VFSS Summary Pt presents with an overall moderate oropharyngeal dysphagia. Moderate oral phase dysphagia with decreased control and anterior loss of secretions prior to the study. Pt with decreased bolus control and manipulation/transit for liquids. She utilized tongue pumping to move the bolus posteriorly in the oral cavity w/  all consistencies. Pooling of all material over the back of the tongue noted. Pt with premature spillage on one trial of thins from initial, small straw drink.Moderate pharyngeal dysphagia with greatest deficit of delay of swallow on all trials and consistencies ranging from 5 seconds to 11 seconds. Pooling of thins, nectar thick liquids, honey thick liquids and puree to the pyriforms. Solid to the valleculae only. Pt with mild vallecular residue on nectar thick and honey thick liquids. Mild to moderate vallecular and mild pyriform sinus residue on thins. Pt able to clear with both spontaneous and cued subsequent swallows. Trace, transient penetration noted on small straw drink and mild to moderate, trace penetration on larger bolus of thins via straw. No aspiration seen and penetration did not reach a depth to the vocal folds during the study. No penetration was viewed on spoon size trials of thins. Delay of 5 seconds on thins, 9 seconds on nectar thick liquids, 6 seconds on honey thick liquids, 10-11 seconds on puree, 5 seconds on solids.  -AD    Oral Phase, Comment Pt presents with a moderate oral phase dysphagia with decreased control and anterior loss of secretions prior to the study. Pt with decreased bolus control and manipulation/transit for liquids. She utilized tongue pumping to move the bolus posteriorly in the oral cavity w/ all consistencies. Pooling of all material over the back of the tongue noted. Pt with premature spillage on one trial of thins from initial, small straw drink.  -AD            Oral Preparatory Phase    Oral Preparatory Phase anterior loss; other (see comments)  -AD    Anterior Loss other (see comments)  saliva  -AD    Oral Preparatory Phase, Comment anterior loss of secretions/saliva prior to the study  -AD            Oral Transit Phase    Impaired Oral Transit Phase tongue pumping; premature spillage of liquids into pharynx  -AD    Tongue Pumping all consistencies tested; secondary  to reduced lingual control  -AD    Premature Spillage of Liquids into Pharynx thin liquids; secondary to reduced lingual control  -AD    Oral Transit Phase, Comment Decreased tongue control and bolus manipulation. Tongue pumping on all consistencies and premature spill of thins on initial trial from straw to valleculae.  -AD            Oral Residue    Impaired Oral Residue lingual residue; floor of mouth residue  -AD    Floor of Mouth Residue thin liquids; secondary to reduced lingual strength  -AD    Lingual Residue thin liquids; secondary to reduced lingual strength  -AD    Response to Oral Residue cleared residue; other (see comments)  -AD    Oral Residue, Comment Pt with mild lingual and floor of mouth residue of thins after the swallow due to decreased lingual strength and coordination. Able to partially clear with spontaneous subsequent swallows.  -AD            Initiation of Pharyngeal Swallow    Initiation of Pharyngeal Swallow bolus in valleculae; bolus in pyriform sinuses  -AD    Pharyngeal Phase impaired pharyngeal phase of swallowing  -AD    Penetration During the Swallow thin liquids; secondary to delayed swallow initiation or mistiming; secondary to reduced vestibular closure  -AD    Response to Penetration transient; shallow; no response; other (see comments)  did not reach the level of the vocal cords  -AD    Rosenbek's Scale thin:; 3--->level 3; nectar:; honey:; pudding/puree:; regular textures:; 1--->level 1  -AD    Pharyngeal Residue thin liquids; nectar-thick liquids; secondary to reduced base of tongue retraction  -AD    Response to Residue cleared residue with spontaneous subsequent swallow; cleared residue with cued swallow  cued swallow on trials of thins  -AD    Attempted Compensatory Maneuvers bolus size; additional subsequent swallow  -AD    Response to Attempted Compensatory Maneuvers prevented penetration; reduced residue  -AD    Pharyngeal Phase, Comment Pt presents with a moderate  pharyngeal dysphagia with greatest deficit of delay of swallow on all trials and consistencies ranging from 5 seconds to 11 seconds. Pooling of thins, nectar thick liquids, honey thick liquids and puree to the pyriforms. Solid to the valleculae only. Pt with mild vallecular residue on nectar thick and honey thick liquids. Mild to moderate vallecular and mild pyriform sinus residue on thins. Pt able to clear with both spontaneous and cued subsequent swallows. Trace, transient penetration noted on small straw drink and mild to moderate, trace penetration on larger bolus of thins via straw. No aspiration seen and penetration did not reach a depth to the vocal folds during the study. No penetration was viewed on spoon size trials of thins. Delay of 5 seconds on thins, 9 seconds on nectar thick liquids, 6 seconds on honey thick liquids, 10-11 seconds on puree, 5 seconds on solids.  -AD            Esophageal Phase    Esophageal Phase, Comment Not viewed at this time due to difficulty with positioning pt in AP view.  -AD            Clinical Impression    SLP Swallowing Diagnosis moderate; oral dysphagia; pharyngeal dysphagia  -AD    Functional Impact risk of aspiration/pneumonia; risk of malnutrition  -AD    Rehab Potential/Prognosis, Swallowing adequate, monitor progress closely  -AD    Swallow Criteria for Skilled Therapeutic Interventions Met demonstrates skilled criteria  -AD            Recommendations    Therapy Frequency (Swallow) at least; 3 days per week  -AD    Predicted Duration Therapy Intervention (Days) 2 weeks  -AD    SLP Diet Recommendation soft textures; ground; nectar thick liquids  -AD    Recommended Diagnostics reassess via clinical swallow evaluation; other (see comments)  for advancement of liquids to thins  -AD    Recommended Precautions and Strategies upright posture during/after eating; small bites of food and sips of liquid; check mouth frequently for oral residue/pocketing; general aspiration  precautions; reflux precautions; fatigue precautions; 1:1 supervision; assist with feeding  -AD    Oral Care Recommendations Oral Care before breakfast, after meals and PRN; Toothbrush  -AD    SLP Rec. for Method of Medication Administration meds whole; meds crushed; with pudding or applesauce; as tolerated  -AD    Monitor for Signs of Aspiration no; notify SLP if any concerns  -AD    Anticipated Discharge Disposition (SLP) extended care facility  -AD    Patient/Family Concerns, Anticipated Discharge Disposition (SLP) Pt is unable to state at this time.  -AD            Swallow Goals (SLP)    Oral Nutrition/Hydration Goal Selection (SLP) oral nutrition/hydration, SLP goal 1  -AD    Lingual Strengthening Goal Selection (SLP) lingual strengthening, SLP goal 1  -AD    Additional Documentation lingual strengthening goal selection (SLP)  -AD            Oral Nutrition/Hydration Goal 1 (SLP)    Oral Nutrition/Hydration Goal 1, SLP Pt will demonstrate tolerance of a soft, ground diet with thin liquids w/o clinical s/s of aspiration or complications such as aspiration pneumonia.  -AD    Time Frame (Oral Nutrition/Hydration Goal 1, SLP) 2 weeks; other (see comments)  LTG  -AD    Barriers (Oral Nutrition/Hydration Goal 1, SLP) medically complex; suspected esophageal dysphagia and prior deficit/cognitive deficit  -AD    Progress/Outcomes (Oral Nutrition/Hydration Goal 1, SLP) other (see comments)  New  -AD            Lingual Strengthening Goal 1 (SLP)    Activity (Lingual Strengthening Goal 1, SLP) increase lingual tone/sensation/control/coordination/movement; increase tongue back strength  -AD    Increase Lingual Tone/Sensation/Control/Coordination/Movement lingual movement exercises  control/coordination  -AD    Increase Tongue Back Strength lingual movement exercises; lingual resistance exercises  -AD    [place]/Accuracy (Lingual Strengthening Goal 1, SLP) with moderate cues (50-74% accuracy)  -AD    Time Frame (Lingual  Strengthening Goal 1, SLP) short term goal (STG); 1 week  -AD    Barriers (Lingual Strengthening Goal 1, SLP) medically complex; suspected esophageal dysphagia and prior deficit/cognitive deficit  -AD    Progress/Outcomes (Lingual Strengthening Goal 1, SLP) other (see comments)  New  -AD          User Key  (r) = Recorded By, (t) = Taken By, (c) = Cosigned By    Initials Name Effective Dates    Viky Meier MS CCC-SLP 06/16/21 -                 EDUCATION  The patient has been educated in the following areas:   Dysphagia (Swallowing Impairment) Modified Diet Instruction.  Pt nods head in understanding of dysphagia and recommended diet modifications but unsure of full understanding at this time.        SLP GOALS     Row Name 12/03/21 1340       Oral Nutrition/Hydration Goal 1 (SLP)    Oral Nutrition/Hydration Goal 1, SLP Pt will demonstrate tolerance of a soft, ground diet with thin liquids w/o clinical s/s of aspiration or complications such as aspiration pneumonia.  -AD    Time Frame (Oral Nutrition/Hydration Goal 1, SLP) 2 weeks; other (see comments)  LTG  -AD    Barriers (Oral Nutrition/Hydration Goal 1, SLP) medically complex; suspected esophageal dysphagia and prior deficit/cognitive deficit  -AD    Progress/Outcomes (Oral Nutrition/Hydration Goal 1, SLP) other (see comments)  New  -AD            Lingual Strengthening Goal 1 (SLP)    Activity (Lingual Strengthening Goal 1, SLP) increase lingual tone/sensation/control/coordination/movement; increase tongue back strength  -AD    Increase Lingual Tone/Sensation/Control/Coordination/Movement lingual movement exercises  control/coordination  -AD    Increase Tongue Back Strength lingual movement exercises; lingual resistance exercises  -AD    [place]/Accuracy (Lingual Strengthening Goal 1, SLP) with moderate cues (50-74% accuracy)  -AD    Time Frame (Lingual Strengthening Goal 1, SLP) short term goal (STG); 1 week  -AD    Barriers (Lingual Strengthening  Goal 1, SLP) medically complex; suspected esophageal dysphagia and prior deficit/cognitive deficit  -AD    Progress/Outcomes (Lingual Strengthening Goal 1, SLP) other (see comments)  New  -AD          User Key  (r) = Recorded By, (t) = Taken By, (c) = Cosigned By    Initials Name Provider Type    Viky Meier MS CCC-SLP Speech and Language Pathologist                   Time Calculation:    Time Calculation- SLP     Row Name 12/03/21 1706             Time Calculation- SLP    SLP Start Time 1230  -AD      SLP Stop Time 1340  -AD      SLP Time Calculation (min) 70 min  -AD      Total Timed Code Minutes- SLP 0 minute(s)  -AD      SLP Non-Billable Time (min) 0 min  -AD      SLP Received On 12/03/21  -AD              Untimed Charges    SLP Eval/Re-eval  [facility] Fluoro Eval Swallow - 57071  -AD      28472-[id number] Motion Fluoro Eval Swallow Minutes 70  -AD              Total Minutes    Untimed Charges Total Minutes 70  -AD       Total Minutes 70  -AD            User Key  (r) = Recorded By, (t) = Taken By, (c) = Cosigned By    Initials Name Provider Type    Viky Meier MS CCC-SLP Speech and Language Pathologist                Therapy Charges for Today     Code Description Service Date Service Provider Modifiers Qty    [id number] HC [facility] MOTION FLUORO EVAL SWALLOW 5 12/3/2021 Viky Aguirre MS CCC-SLP GN 1               Viky Aguirre MS CCC-SLP  12/3/2021

## 2021-12-03 NOTE — THERAPY EVALUATION
Patient Name: Argelia Nguyen  : 1950    MRN: [id number]                              Today's Date: 12/3/2021       Admit Date: 2021    Visit Dx:     ICD-10-CM ICD-9-CM   1. Aspiration pneumonia of right lower lobe, unspecified aspiration pneumonia type (HCC)  J69.0 507.0   2. Hypothermia, initial encounter  T68.XXXA 991.6     Patient Active Problem List   Diagnosis   • Abnormal laboratory test result   • Gastroesophageal reflux disease   • MGUS (monoclonal gammopathy of unknown significance)   • Normocytic anemia   • Hiatal hernia   • Colon polyps   • Internal hemorrhoids   • Chronic anticoagulation   • Esophageal dysphagia   • Severe malnutrition (HCC)   • Longstanding persistent atrial fibrillation (HCC)   • Sepsis associated hypotension (HCC)   • Hydronephrosis due to obstruction of ureter   • Acute metabolic encephalopathy   • Multiple wounds of skin   • Anasarca   • Valvular heart disease   • Acute kidney insufficiency   • Grand mal status epilepticus (HCC)   • Focal motor seizure (HCC)   • Stout coma scale total score 3-8 (HCC)   • Pyelonephritis   • Altered mental status   • Hypothermia   • Encounter for rehabilitation   • Aspiration pneumonia of right lower lobe (HCC)     Past Medical History:   Diagnosis Date   • Anxiety    • Arthritis    • Chronic anticoagulation 2019   • Chronic diastolic (congestive) heart failure (HCC)    • Colon polyp    • Dysphagia    • GERD (gastroesophageal reflux disease)    • Hiatal hernia 2017   • Hypertension    • Hypothyroidism    • Internal hemorrhoids 2017   • Monoclonal gammopathy 3/25/2013   • Nonrheumatic mitral valve regurgitation     has ranged from mild-mod to severe depending on the study   • Persistent atrial fibrillation (HCC)    • Pyelonephritis 2021   • Reflux gastritis    • Seizure (HCC)    • Tricuspid regurgitation     mod-severe vs severe   • Type 2 diabetes mellitus (HCC)      Past Surgical History:   Procedure Laterality  Date   • COLONOSCOPY N/A 3/29/2017    Procedure: COLONOSCOPY; POLYPECTOMY;  Surgeon: Brooke Garrido MD;  Location: [facility] OR;  Service:    • COLONOSCOPY N/A 8/3/2020    Procedure: COLONOSCOPY with polypectomy;  Surgeon: Rui Shi MD;  Location: [facility] OR;  Service: Gastroenterology;  Laterality: N/A;  ascending polyp  transverse polyp  rectal polyp   • CYST REMOVAL     • CYSTOSCOPY W/ URETERAL STENT PLACEMENT Right 4/13/2021    Procedure: CYSTOSCOPY URETERAL CATHETER/STENT INSERTION;  Surgeon: Onesimo Fuller MD;  Location: [facility] OR;  Service: Urology;  Laterality: Right;   • CYSTOSCOPY W/ URETERAL STENT PLACEMENT Right 4/28/2021    Procedure: CYSTOSCOPY URETERAL STENT REMOVAL;  Surgeon: Onesimo Fuller MD;  Location: [facility] OR;  Service: Urology;  Laterality: Right;  CYSTOSCOPY URETERAL STENT REMOVAL   • ENDOSCOPY N/A 3/29/2017    Procedure: ESOPHAGOGASTRODUODENOSCOPY WITH DILITATION;  Surgeon: Brooke Garrido MD;  Location: [facility] OR;  Service:    • ENDOSCOPY N/A 8/3/2020    Procedure: ESOPHAGOGASTRODUODENOSCOPY with biopsies;  Surgeon: Rui Shi MD;  Location: [facility] OR;  Service: Gastroenterology;  Laterality: N/A;  esophagitis  gastritis   • HERNIA REPAIR     • HYSTERECTOMY        General Information     Row Name 12/03/21 1401          Physical Therapy Time and Intention    Document Type evaluation  -BP     Mode of Treatment physical therapy  -BP     Row Name 12/03/21 1402          General Information    Patient Profile Reviewed yes  Patient admitted for profound hypothermia (body temp of 87.8) secondary to aspiration PNA. Patient well known to therapy services due to previous admissions. Patient had been at a facility however has returned to live with her brother  -BP     Prior Level of Function --  Recall of prior level difficult to obtain. She reports she was ambulatory prior to admission but also reports she used a w/c for mobility. Brother not present to  obtain history and prior function.  -BP     Existing Precautions/Restrictions fall  cognition  -BP     Barriers to Rehab medically complex; previous functional deficit; cognitive status  oriented to person, place, and year however seems confused within conversation  -BP     Row Name 12/03/21 1407          Living Environment    Lives With sibling(s)  Lives with her brother  -BP     Row Name 12/03/21 1407          Home Main Entrance    Stair Railings, Main Entrance none  -BP     Row Name 12/03/21 1407          Stairs Within Home, Primary    Stairs, Within Home, Primary Patient states she lives in a one story home with no stairs.  -BP     Row Name 12/03/21 1407          Cognition    Orientation Status (Cognition) oriented to; person; place; other (see comments)  oriented to year.  -BP     Row Name 12/03/21 1407          Safety Issues, Functional Mobility    Safety Issues Affecting Function (Mobility) at risk behavior observed; insight into deficits/self-awareness; awareness of need for assistance; problem-solving; sequencing abilities  -BP     Comment, Safety Issues/Impairments (Mobility) Patient requires cues for safety and sequencing with all transfers.  -BP           User Key  (r) = Recorded By, (t) = Taken By, (c) = Cosigned By    Initials Name Provider Type    BP Love Shaver, PT Physical Therapist               Mobility     Row Name 12/03/21 1411          Bed Mobility    Bed Mobility supine-sit; sit-supine; scooting/bridging  -BP     Scooting/Bridging [place]inkley (Bed Mobility) dependent (less than 25% patient effort); 2 person assist  -BP     Supine-Sit [place] (Bed Mobility) minimum assist (75% patient effort); 2 person assist  -BP     Sit-Supine [place] (Bed Mobility) moderate assist (50% patient effort); 2 person assist  -BP     Assistive Device (Bed Mobility) bed rails; head of bed elevated; draw sheet  -BP     Row Name 12/03/21 1414          Transfers    Comment (Transfers) Attempted sit  to/from stand transfers from elevated bed surface x 2. Patient unable to transfer fully to stand due to posterior lean and B knee flexion. Patient unable to correct despite cues and assist.  -BP     Row Name 12/03/21 1414          Bed-Chair Transfer    Bed-Chair [place]son (Transfers) maximum assist (25% patient effort); 2 person assist  -BP     Assistive Device (Bed-Chair Transfers) walker, front-wheeled  -BP     Row Name 12/03/21 1414          Gait/Stairs (Locomotion)    Comment (Gait/Stairs) Unable to initiate gait training due to posterior lean  -BP           User Key  (r) = Recorded By, (t) = Taken By, (c) = Cosigned By    Initials Name Provider Type    BP Love Shaver, PT Physical Therapist               Obj/Interventions     Row Name 12/03/21 1418          Range of Motion Comprehensive    Comment, General Range of Motion B LE AROM WFL.  -BP     Row Name 12/03/21 1418          Strength Comprehensive (MMT)    Comment, General Manual Muscle Testing (MMT) Assessment B LE gross MMT 3+/5  -BP     Row Name 12/03/21 1418          Balance    Comment, Balance static sitting balance with SBA  -BP           User Key  (r) = Recorded By, (t) = Taken By, (c) = Cosigned By    Initials Name Provider Type    Love Joyce, PT Physical Therapist               Goals/Plan     Row Name 12/03/21 1428          Bed Mobility Goal 1 (PT)    Activity/Assistive Device (Bed Mobility Goal 1, PT) bed mobility activities, all  -BP     [place]son Level/Cues Needed (Bed Mobility Goal 1, PT) minimum assist (75% or more patient effort)  -BP     Time Frame (Bed Mobility Goal 1, PT) 5 days  -BP     Progress/Outcomes (Bed Mobility Goal 1, PT) goal ongoing  -BP     Row Name 12/03/21 1428          Transfer Goal 1 (PT)    Activity/Assistive Device (Transfer Goal 1, PT) sit-to-stand/stand-to-sit; bed-to-chair/chair-to-bed; walker, rolling  -BP     [place] Level/Cues Needed (Transfer Goal 1, PT) moderate assist (50-74% patient  effort)  -BP     Time Frame (Transfer Goal 1, PT) 5 days  -BP     Progress/Outcome (Transfer Goal 1, PT) goal ongoing  -BP           User Key  (r) = Recorded By, (t) = Taken By, (c) = Cosigned By    Initials Name Provider Type    Love Jocye, PT Physical Therapist               Clinical Impression     Row Name 12/03/21 1422          Pain    Additional Documentation Pain Scale: Numbers Pre/Post-Treatment (Group)  -BP     Row Name 12/03/21 1422          Pain Scale: Numbers Pre/Post-Treatment    Pretreatment Pain Rating 0/10 - no pain  -BP     Posttreatment Pain Rating 0/10 - no pain  -BP     Row Name 12/03/21 1422          Plan of Care Review    Plan of Care Reviewed With patient  -BP     Outcome Summary PT Evaluation Complete: Patient performs supine to/from sit transfers with min/mod A x 2. Attempted sit to/from stand transfers x 2 however despite max A x 2 patient unable to fully transfer to stand due to significant posterior lean and B knee flexion. Patient is oriented to person, place and year but seems confused within conversation. Follows one step commands. Patient would benefit from physical therapy to address deficits in strength and maximize functional independence. Patients baseline not clear, patient reports she ambulates in the home but also reports she uses a w/c for mobility. At this time patient would not be safe to return home, will likely requires SNF/ECF.  -BP     Row Name 12/03/21 1422          Therapy Assessment/Plan (PT)    Rehab Potential (PT) fair, will monitor progress closely  -BP     Criteria for Skilled Interventions Met (PT) yes; skilled treatment is necessary  -BP     Predicted Duration of Therapy Intervention (PT) 5 days  -BP     Row Name 12/03/21 1422          Positioning and Restraints    Pre-Treatment Position in bed  -BP     Post Treatment Position bed  -BP     In Bed supine; call light within reach; encouraged to call for assist  -BP           User Key  (r) = Recorded By,  (t) = Taken By, (c) = Cosigned By    Initials Name Provider Type    BP Love Shaver, PT Physical Therapist               Outcome Measures     Row Name 12/03/21 1429          How much help from another person do you currently need...    Turning from your back to your side while in flat bed without using bedrails? 2  -BP     Moving from lying on back to sitting on the side of a flat bed without bedrails? 2  -BP     Moving to and from a bed to a chair (including a wheelchair)? 1  -BP     Standing up from a chair using your arms (e.g., wheelchair, bedside chair)? 1  -BP     Climbing 3-5 steps with a railing? 1  -BP     To walk in hospital room? 1  -BP     AM-PAC 6 Clicks Score (PT) 8  -BP     Row Name 12/03/21 1429          Functional Assessment    Outcome Measure Options AM-PAC 6 Clicks Basic Mobility (PT)  -           User Key  (r) = Recorded By, (t) = Taken By, (c) = Cosigned By    Initials Name Provider Type    Love Joyce PT Physical Therapist                             Physical Therapy Education                 Title: PT OT SLP Therapies (In Progress)     Topic: Physical Therapy (In Progress)     Point: Mobility training (Done)     Learning Progress Summary           Patient Acceptance, E,TB, VU,NR by  at 12/3/2021 1431                   Point: Home exercise program (Not Started)     Learner Progress:  Not documented in this visit.                      User Key     Initials Effective Dates Name Provider Type Discipline     06/16/21 -  Love Shaver PT Physical Therapist PT              PT Recommendation and Plan  Planned Therapy Interventions (PT): balance training, bed mobility training, home exercise program, patient/family education, strengthening, transfer training  Plan of Care Reviewed With: patient  Outcome Summary: PT Evaluation Complete: Patient performs supine to/from sit transfers with min/mod A x 2. Attempted sit to/from stand transfers x 2 however despite max A x 2 patient  unable to fully transfer to stand due to significant posterior lean and B knee flexion. Patient is oriented to person, place and year but seems confused within conversation. Follows one step commands. Patient would benefit from physical therapy to address deficits in strength and maximize functional independence. Patients baseline not clear, patient reports she ambulates in the home but also reports she uses a w/c for mobility. At this time patient would not be safe to return home, will likely requires SNF/ECF.     Time Calculation:    PT Charges     Row Name 12/03/21 1431             Time Calculation    Start Time 1345  -BP      PT Received On 12/03/21  -BP      PT - Next Appointment 12/04/21  -BP            User Key  (r) = Recorded By, (t) = Taken By, (c) = Cosigned By    Initials Name Provider Type    Love Joyce, ARLEN Physical Therapist              Therapy Charges for Today     Code Description Service Date Service Provider Modifiers Qty    96088106455 HC PT EVAL MOD COMPLEXITY 2 12/3/2021 Love Shaver PT GP 1          PT G-Codes  Outcome Measure Options: AM-PAC 6 Clicks Basic Mobility (PT)  AM-PAC 6 Clicks Score (PT): 8    Love Shaver PT  12/3/2021

## 2021-12-03 NOTE — PLAN OF CARE
Goal Outcome Evaluation:  Plan of Care Reviewed With: patient           Outcome Summary: SLP/MBS: Pt demonstrates a moderate ororpharyngeal dysphagia with decreased bolus control and manipulation of bolus. Tongue pumping used to transition the bolus into the pharynx. Delay of swallow w/liquids pooling to the level of pyriforms prior to initiation of the swallow. Pt with no significant pharyngeal residue after the swallow w/exception of thins. Cued subsequent swallow does not fully clear pharyngeal residue on thins. Pt with mild to moderate penetration of thins from single straw drinks. Pt unable to clear from the vestibule. Residual material in the vestibule eventually makes its way down to the level of the cords without a cough. Recommend mechanical soft diet with ground meats and nectar thick liquids. Strict aspiration and reflux precautions given CT chest results. Oral care before breakfast and after po intake. Meds whole or crushed in applesauce as pt tolerates. Check mouth for oral residue. SLP to follow during stay to advance diet to soft with thins as tolerated. Will review films and full report to follow.

## 2021-12-03 NOTE — PROGRESS NOTES
Adult Nutrition  Assessment/PES    Patient Name:  Argelia Nguyen  YOB: 1950  MRN: [id number]  Admit Date:  12/2/2021    Assessment Date:  12/3/2021    Comments:  Pt meets criteria for severe malnutrition based on wt loss, reduced appetite, muscle wasting and fat loss on exam.  Recommend: Add Boost Plus with meals to supplement po intake.  Will cont to follow.     Reason for Assessment     Row Name 12/03/21 1447          Reason for Assessment    Reason For Assessment identified at risk by screening criteria  BMI     Diagnosis pulmonary disease; infection/sepsis; neurologic conditions  Asp PNA, Sepsis, Malnutrition hx sz     Identified At Risk by Screening Criteria MST SCORE 2+                Nutrition/Diet History     Row Name 12/03/21 1448          Nutrition/Diet History    Typical Food/Fluid Intake Spoke w pt at bedside, answer most questions. RN states off/on confused. Pt listening to [place] music on phone. Pt agreeable to physical exam. Clear visible muscle wasting/fat loss.                Anthropometrics     Row Name 12/03/21 1451          Anthropometrics    Weight --  95.2#            Usual Body Weight (UBW)    Weight Loss unintentional     Weight Loss Time Frame 10# down 9.5% since end of May 2021 per EMR data            Body Mass Index (BMI)    BMI Assessment BMI 16-16.9: protein-energy malnutrition grade II                Labs/Tests/Procedures/Meds     Row Name 12/03/21 1451          Labs/Procedures/Meds    Lab Results Reviewed reviewed     Lab Results Comments BUn 27/creat 1.4 H, ALT 74 H            Diagnostic Tests/Procedures    Diagnostic Test/Procedure Reviewed reviewed     Diagnostic Test/Procedures Comments SLP eval            Medications    Pertinent Medications Reviewed reviewed                Physical Findings     Row Name 12/03/21 1451          Physical Findings    Overall Physical Appearance loss of muscle mass; loss of subcutaneous fat                Estimated/Assessed Needs      Row Name 12/03/21 1452          Estimated/Assessed Needs    Additional Documentation Calorie Requirements (Group); Fluid Requirements (Group); Protein Requirements (Group)            Calorie Requirements    Estimated Calorie Need Method kcal/kg     Estimated Calorie Requirement Comment 1361-8616 kcal ( 30-35 kcal/kg )            Protein Requirements    Est Protein Requirement Amount (gms/kg) 1.2 gm protein  52-65 gm pro ( 1.2-1.5 gm/kg pro )            Fluid Requirements    Estimated Fluid Requirement Method RDA Method  1296-1512ml                Nutrition Prescription Ordered     Row Name 12/03/21 1452          Nutrition Prescription PO    Current PO Diet Soft Texture     Texture Ground     Fluid Consistency Nectar/syrup thick                Evaluation of Received Nutrient/Fluid Intake     Row Name 12/03/21 1453          Fluid Intake Evaluation    IV Fluid (mL) 1025  insufficient data            PO Evaluation    Number of Days PO Intake Evaluated Insufficient Data                  Malnutrition Severity Assessment     Row Name 12/03/21 1453          Malnutrition Severity Assessment    Malnutrition Type Chronic Disease - Related Malnutrition            Insufficient Energy Intake     Insufficient Energy Intake  < or equal to 50% of est. energy requirement for > or equal to 5d)            Unintentional Weight Loss     Unintentional Weight Loss  Weight loss of 10% in six months            Muscle Loss    Temple Region Moderate - slight depression     Clavicle Bone Region Severe - protruding prominent bone     Acromion Bone Region Severe - squared shoulders, bones, and acromion process protrusion prominent     Dorsal Hand Region Severe - prominent depression     Patellar Region Moderate - patella more prominent, less muscle definition around patella     Posterior Calf Region Severe - thin with very little definition/firmness            Fat Loss    Orbital Region  Severe - pronounced hollowness/depression, dark circles,  loose saggy skin     Upper Arm Region Severe - mostly skin, very little space between folds, fingers touch            Declining Functional Status    Declining Functional Status Findings Measurably Reduced            Criteria Met (Must meet criteria for severity in at least 2 of these categories: M Wasting, Fat Loss, Fluid, Secondary Signs, Wt. Status, Intake)    Patient meets criteria for  Severe Malnutrition                 Problem/Interventions:   Problem 1     Row Name 12/03/21 1454          Nutrition Diagnoses Problem 1    Problem 1 Malnutrition     Etiology (related to) Factors Affecting Nutrition; Medical Diagnosis     Signs/Symptoms (evidenced by) Report/Observation  MSA                      Intervention Goal     Row Name 12/03/21 1455          Intervention Goal    General Meet nutritional needs for age/condition     PO Establish PO; PO intake (%)     PO Intake % 50 %  or greater     Weight Appropriate weight gain                Nutrition Intervention     Row Name 12/03/21 1457          Nutrition Intervention    RD/Tech Action Recommend/ordered; Follow Tx progress     Recommended/Ordered Supplement                Nutrition Prescription     Row Name 12/03/21 1457          Nutrition Prescription PO    PO Prescription Begin/change supplement                Education/Evaluation     Row Name 12/03/21 1457          Education    Education Education not appropriate at this time            Monitor/Evaluation    Monitor Per protocol; I&O; PO intake; Supplement intake; Pertinent labs; Weight; Symptoms                 Electronically signed by:  Jo Ann Ryan RD  12/03/21 14:58 EST

## 2021-12-03 NOTE — PLAN OF CARE
Goal Outcome Evaluation:  Plan of Care Reviewed With: patient           Outcome Summary: OT evaluation completed. Patient oriented X 3 however confused during functional tasks and unable to provide accurate prior level of independence. Patient required min A X 2 for supine to sit and max A X 2 for sit to supine. Attempted sit to stand 2 trials however patient unable to full stand upright despite max A X2 (significant posterior lean). Patient's UE strength 3+/5, anticipate max A for LB ADLs. Patient will benefit from OT for improved overall strength/ind. Recommend SNF at discharge with possibility to transition to long term care.

## 2021-12-04 NOTE — PLAN OF CARE
Goal Outcome Evaluation:  Plan of Care Reviewed With: patient           Outcome Summary: OT- Patient performed supine to sit with mod/max assist X 2. Patient sat EOB with SBA. Patient stood from elevated EOB with mod A X 2 and performed SPS to chair with FWW and mod A X 2. Patient stood again from recliner with min assist X 2. Patient continues to demonstrate a posterior lean in standing however demonstrated improvement with transfers today.  Continue to recommend SNF with possibility to transfer to long term care.

## 2021-12-04 NOTE — PLAN OF CARE
Goal Outcome Evaluation:           Progress: no change  Outcome Summary: Awake all night.  [place] warmer used most of shift. See vitals for [place]. and B/P's. Remains restless and anxious when awake. Continuous moaning and trying to get up and leave. Remains on R/A with good saturations.

## 2021-12-04 NOTE — SIGNIFICANT NOTE
12/04/21 0928   OTHER   Discipline physical therapist   Rehab Time/Intention   Session Not Performed   (PT: Per chart review, patient up throughout the night restless. Patient restless upon entering room, refuses participation multiple times. Will check back tomorrow.)

## 2021-12-04 NOTE — THERAPY TREATMENT NOTE
Acute Care - Occupational Therapy Treatment Note  MALLORY Asher     Patient Name: Argelia Nguyen  : 1950  MRN: [id number]  Today's Date: 2021             Admit Date: 2021       ICD-10-CM ICD-9-CM   1. Aspiration pneumonia of right lower lobe, unspecified aspiration pneumonia type (HCC)  J69.0 507.0   2. Hypothermia, initial encounter  T68.XXXA 991.6   3. Oropharyngeal dysphagia  R13.12 787.22     Patient Active Problem List   Diagnosis   • Abnormal laboratory test result   • Gastroesophageal reflux disease   • MGUS (monoclonal gammopathy of unknown significance)   • Normocytic anemia   • Hiatal hernia   • Colon polyps   • Internal hemorrhoids   • Chronic anticoagulation   • Esophageal dysphagia   • Severe malnutrition (HCC)   • Longstanding persistent atrial fibrillation (HCC)   • Sepsis associated hypotension (HCC)   • Hydronephrosis due to obstruction of ureter   • Acute metabolic encephalopathy   • Multiple wounds of skin   • Anasarca   • Valvular heart disease   • Acute kidney insufficiency   • Grand mal status epilepticus (HCC)   • Focal motor seizure (HCC)   • Ricardo coma scale total score 3-8 (HCC)   • Pyelonephritis   • Altered mental status   • Hypothermia   • Encounter for rehabilitation   • Aspiration pneumonia of right lower lobe (HCC)     Past Medical History:   Diagnosis Date   • Anxiety    • Arthritis    • Chronic anticoagulation 2019   • Chronic diastolic (congestive) heart failure (HCC)    • Colon polyp    • Dysphagia    • GERD (gastroesophageal reflux disease)    • Hiatal hernia 2017   • Hypertension    • Hypothyroidism    • Internal hemorrhoids 2017   • Monoclonal gammopathy 3/25/2013   • Nonrheumatic mitral valve regurgitation     has ranged from mild-mod to severe depending on the study   • Persistent atrial fibrillation (HCC)    • Pyelonephritis 2021   • Reflux gastritis    • Seizure (HCC)    • Tricuspid regurgitation     mod-severe vs severe   • Type 2  diabetes mellitus (HCC)      Past Surgical History:   Procedure Laterality Date   • COLONOSCOPY N/A 3/29/2017    Procedure: COLONOSCOPY; POLYPECTOMY;  Surgeon: Brooke Garrido MD;  Location: [facility] OR;  Service:    • COLONOSCOPY N/A 8/3/2020    Procedure: COLONOSCOPY with polypectomy;  Surgeon: Rui Shi MD;  Location: [facility] OR;  Service: Gastroenterology;  Laterality: N/A;  ascending polyp  transverse polyp  rectal polyp   • CYST REMOVAL     • CYSTOSCOPY W/ URETERAL STENT PLACEMENT Right 4/13/2021    Procedure: CYSTOSCOPY URETERAL CATHETER/STENT INSERTION;  Surgeon: Onesimo Fuller MD;  Location: [facility] OR;  Service: Urology;  Laterality: Right;   • CYSTOSCOPY W/ URETERAL STENT PLACEMENT Right 4/28/2021    Procedure: CYSTOSCOPY URETERAL STENT REMOVAL;  Surgeon: Onesimo Fuller MD;  Location: [facility] OR;  Service: Urology;  Laterality: Right;  CYSTOSCOPY URETERAL STENT REMOVAL   • ENDOSCOPY N/A 3/29/2017    Procedure: ESOPHAGOGASTRODUODENOSCOPY WITH DILITATION;  Surgeon: Brooke Garrido MD;  Location: [facility] OR;  Service:    • ENDOSCOPY N/A 8/3/2020    Procedure: ESOPHAGOGASTRODUODENOSCOPY with biopsies;  Surgeon: Rui Shi MD;  Location: [facility] OR;  Service: Gastroenterology;  Laterality: N/A;  esophagitis  gastritis   • HERNIA REPAIR     • HYSTERECTOMY           OT ASSESSMENT FLOWSHEET (last 12 hours)     OT Evaluation and Treatment     Row Name 12/04/21 0933                   OT Time and Intention    Document Type therapy note (daily note)  -EN        Mode of Treatment occupational therapy  -EN        Patient Effort adequate  -EN        Comment Patient reclined in bed, RN present.  -EN                  General Information    Patient/Family/Caregiver Comments/Observations Patient reclined in bed, agreed to OT  -EN        Risks Reviewed patient:; LOB  -EN        Benefits Reviewed patient:; improve function; increase strength; increase balance  -EN        Barriers to  Rehab medically complex; previous functional deficit; cognitive status  -EN                  Cognition    Personal Safety Interventions gait belt; nonskid shoes/slippers when out of bed  -EN                  Pain Scale: Numbers Pre/Post-Treatment    Pretreatment Pain Rating 0/10 - no pain  -EN        Posttreatment Pain Rating 0/10 - no pain  -EN        Pain Intervention(s) Repositioned  -EN                  Bed Mobility    Supine-Sit [place] (Bed Mobility) moderate assist (50% patient effort); maximum assist (25% patient effort); 2 person assist; verbal cues  -EN        Assistive Device (Bed Mobility) bed rails; draw sheet; head of bed elevated  -EN                  Transfer Assessment/Treatment    Transfers sit-stand transfer; stand-sit transfer; stand pivot/stand step transfer  -EN        Comment (Transfers) cues for hand placement, assist to manage walker. Patient stood from EOB initially with mod A X 2. Patient stood from recliner with min A X 2.  -EN                  Transfers    Sit-Stand [place] (Transfers) minimum assist (75% patient effort); moderate assist (50% patient effort); verbal cues; 2 person assist  -EN        Stand-Sit [place] (Transfers) minimum assist (75% patient effort); moderate assist (50% patient effort); verbal cues; 2 person assist  -EN                  Sit-Stand Transfer    Assistive Device (Sit-Stand Transfers) walker, front-wheeled  -EN                  Stand-Sit Transfer    Assistive Device (Stand-Sit Transfers) walker, front-wheeled  -EN                  Stand Pivot/Stand Step Transfer    Stand Pivot/Stand Step [place] (Transfers) verbal cues; moderate assist (50% patient effort); 2 person assist  -EN        Assistive Device (Stand Pivot Stand Step Transfer) walker, front-wheeled  -EN                  Balance    Comment, Balance static standing with mod A X 2 with FWW, continues to demonstrate posterior lean  -EN                  Wound 04/12/21 1940 coccyx  Pressure Injury    Wound - Properties Group Placement Date: 04/12/21  -AP Placement Time: 1940 -AP Present on Hospital Admission: Y  -AP Location: coccyx  -AP Primary Wound Type: Pressure inj  -AP Stage, Pressure Injury : Stage 2  -AP Wound Outcome: Healed  -KO        Retired Wound - Properties Group Date first assessed: 04/12/21  -AP Time first assessed: 1940 -AP Present on Hospital Admission: Y  -AP Location: coccyx  -AP Primary Wound Type: Pressure inj  -AP Wound Outcome: Healed  -KO                  Plan of Care Review    Outcome Summary OT- Patient performed supine to sit with mod/max assist X 2. Patient sat EOB with SBA. Patient stood from elevated EOB with mod A X 2 and performed SPS to chair with FWW and mod A X 2. Patient stood again from recliner with min assist X 2. Patient continues to demonstrate a posterior lean in standing however demonstrated improvement with transfers today.  -EN                  Positioning and Restraints    Pre-Treatment Position in bed  -EN        Post Treatment Position chair  -EN        In Chair call light within reach; encouraged to call for assist; reclined; exit alarm on; with nsg  RN present  -EN                  Progress Summary (OT)    Daily Progress Summary (OT) improved  -EN              User Key  (r) = Recorded By, (t) = Taken By, (c) = Cosigned By    Initials Name Effective Dates    Carmen Resendez OTR 06/16/21 -     AP Lety Maradiaga RN 06/16/16 - 06/15/21    Shanita Youssef RN 06/16/16 -                  Occupational Therapy Education                 Title: PT OT SLP Therapies (In Progress)     Topic: Occupational Therapy (In Progress)     Point: ADL training (Done)     Description:   Instruct learner(s) on proper safety adaptation and remediation techniques during self care or transfers.   Instruct in proper use of assistive devices.              Learning Progress Summary           Patient Acceptance, E, VU by EN at 12/4/2021 0955     Comment: safety during trasnfers    NAILA Cavazos VU by MATTIE at 12/3/2021 5920    Comment: Safety during transfers                   Point: Home exercise program (Not Started)     Description:   Instruct learner(s) on appropriate technique for monitoring, assisting and/or progressing therapeutic exercises/activities.              Learner Progress:  Not documented in this visit.                      User Key     Initials Effective Dates Name Provider Type Discipline    EN 06/16/21 -  Carmen Duarte OTR Occupational Therapist OT                  OT Recommendation and Plan  Planned Therapy Interventions (OT): BADL retraining, functional balance retraining, patient/caregiver education/training, strengthening exercise, transfer/mobility retraining  Therapy Frequency (OT): 5 times/wk  Plan of Care Review  Plan of Care Reviewed With: patient  Outcome Summary: OT- Patient performed supine to sit with mod/max assist X 2. Patient sat EOB with SBA. Patient stood from elevated EOB with mod A X 2 and performed SPS to chair with FWW and mod A X 2. Patient stood again from recliner with min assist X 2. Patient continues to demonstrate a posterior lean in standing however demonstrated improvement with transfers today.  Plan of Care Reviewed With: patient  Outcome Summary: OT- Patient performed supine to sit with mod/max assist X 2. Patient sat EOB with SBA. Patient stood from elevated EOB with mod A X 2 and performed SPS to chair with FWW and mod A X 2. Patient stood again from recliner with min assist X 2. Patient continues to demonstrate a posterior lean in standing however demonstrated improvement with transfers today.     Outcome Measures     Row Name 12/04/21 0933 12/04/21 0930          How much help from another person do you currently need...    Turning from your back to your side while in flat bed without using bedrails? -- 2  -BP     Moving from lying on back to sitting on the side of a flat bed without bedrails? --  1  -BP     Moving to and from a bed to a chair (including a wheelchair)? -- 2  -BP     Standing up from a chair using your arms (e.g., wheelchair, bedside chair)? -- 2  -BP     Climbing 3-5 steps with a railing? -- 1  -BP     To walk in hospital room? -- 1  -BP     AM-PAC 6 Clicks Score (PT) -- 9  -BP            How much help from another is currently needed...    Putting on and taking off regular lower body clothing? 2  -EN --     Bathing (including washing, rinsing, and drying) 2  -EN --     Toileting (which includes using toilet bed pan or urinal) 2  -EN --     Putting on and taking off regular upper body clothing 3  -EN --     Taking care of personal grooming (such as brushing teeth) 3  -EN --     Eating meals 4  -EN --     AM-PAC 6 Clicks Score (OT) 16  -EN --            Functional Assessment    Outcome Measure Options -- AM-PAC 6 Clicks Basic Mobility (PT)  -BP           User Key  (r) = Recorded By, (t) = Taken By, (c) = Cosigned By    Initials Name Provider Type    EN Carmen Duarte OTR Occupational Therapist    BP Love Shaver, PT Physical Therapist                Time Calculation:    Time Calculation- OT     Row Name 12/04/21 0957             Time Calculation- OT    OT Start Time 0933  -EN      OT Stop Time 0944  -EN      OT Time Calculation (min) 11 min  -EN              Timed Charges    87787 - OT Therapeutic Activity Minutes 11  -EN              Total Minutes    Timed Charges Total Minutes 11  -EN       Total Minutes 11  -EN            User Key  (r) = Recorded By, (t) = Taken By, (c) = Cosigned By    Initials Name Provider Type    EN Carmen Duarte OTR Occupational Therapist              Therapy Charges for Today     Code Description Service Date Service Provider Modifiers Qty    [id number]  OT EVAL MOD COMPLEXITY 2 12/3/2021 Carmen Duarte OTR GO 1    [id number]  OT THERAPEUTIC ACT EA 15 MIN 12/4/2021 Carmen Duarte OTR GO 1               Carmen Duarte  OTR  12/4/2021

## 2021-12-04 NOTE — PROGRESS NOTES
"Daily Progress Note:      Chief complaint: Follow-up of hypothermia, hypotension, sepsis, aspiration pneumonia, encephalopathy    Subjective: Hypothermia is improved.  She sitting up in the chair confused she is oriented to person only blood pressures have improved she is no longer hypotensive urine output is adequate        LOS: 2 days     Vital Signs  Temp:  [95.36 °F (35.2 °C)-97.7 °F (36.5 °C)] 96.98 °F (36.1 °C)  Heart Rate:  [] 78  Resp:  [15-22] 16  BP: ()/() 110/76  Oxygen Therapy  SpO2: 97 %  Pulse Oximetry Type: Continuous  Device (Oxygen Therapy): room air}  Body mass index is 16.87 kg/m².  Flowsheet Rows      First Filed Value   Admission Height 160 cm (63\") Documented at 12/02/2021 0924   Admission Weight 43.5 kg (96 lb) Documented at 12/02/2021 0924                   Documented weights    12/02/21 0924 12/02/21 1830   Weight: 43.5 kg (96 lb) 43.2 kg (95 lb 3.2 oz)           Patient Vitals for the past 24 hrs:   BP Temp Temp src Pulse Resp SpO2   12/04/21 0900 110/76 96.98 °F (36.1 °C) -- 78 16 97 %   12/04/21 0800 130/95 97.34 °F (36.3 °C) -- 88 -- 97 %   12/04/21 0700 (!) 134/106 97.34 °F (36.3 °C) Rectal 96 15 99 %   12/04/21 0600 120/82 97.52 °F (36.4 °C) -- 79 -- 97 %   12/04/21 0500 115/82 97.34 °F (36.3 °C) -- 87 -- 97 %   12/04/21 0400 (!) 149/127 97.34 °F (36.3 °C) Rectal 93 20 98 %   12/04/21 0345 -- 97.16 °F (36.2 °C) -- 86 -- 98 %   12/04/21 0330 144/88 96.98 °F (36.1 °C) -- 85 -- 99 %   12/04/21 0315 -- 96.98 °F (36.1 °C) -- 114 -- 96 %   12/04/21 0300 124/80 96.98 °F (36.1 °C) -- 85 -- 97 %   12/04/21 0245 -- 96.8 °F (36 °C) -- 89 -- 97 %   12/04/21 0230 146/98 96.98 °F (36.1 °C) -- 96 -- 98 %   12/04/21 0215 -- 96.8 °F (36 °C) -- 106 -- 97 %   12/04/21 0200 -- 96.8 °F (36 °C) -- 98 -- 97 %   12/04/21 0145 -- 96.62 °F (35.9 °C) -- 92 -- 99 %   12/04/21 0130 120/74 96.62 °F (35.9 °C) -- 87 -- 98 %   12/04/21 0115 -- 96.8 °F (36 °C) -- 87 -- 96 %   12/04/21 0100 (!) 156/124 " 96.98 °F (36.1 °C) -- 90 -- 100 %   12/04/21 0045 -- 96.98 °F (36.1 °C) -- 87 -- 98 %   12/04/21 0030 144/83 96.8 °F (36 °C) -- 78 -- 95 %   12/04/21 0015 -- 96.8 °F (36 °C) -- 71 -- 99 %   12/04/21 0000 103/79 96.8 °F (36 °C) -- 68 22 98 %   12/03/21 2345 -- 96.98 °F (36.1 °C) -- 80 -- 98 %   12/03/21 2330 113/68 96.98 °F (36.1 °C) -- 79 -- 98 %   12/03/21 2315 -- 97.16 °F (36.2 °C) -- 71 -- 97 %   12/03/21 2300 109/69 97.34 °F (36.3 °C) Rectal 72 -- 99 %   12/03/21 2245 100/70 97.7 °F (36.5 °C) -- 96 -- 96 %   12/03/21 2230 106/66 97.7 °F (36.5 °C) -- 72 -- 96 %   12/03/21 2215 (!) 70/54 97.7 °F (36.5 °C) -- 90 -- 98 %   12/03/21 2200 106/63 97.52 °F (36.4 °C) -- 90 -- (!) 84 %   12/03/21 2145 111/68 97.16 °F (36.2 °C) -- 69 -- 99 %   12/03/21 2130 95/52 96.98 °F (36.1 °C) -- 67 -- 98 %   12/03/21 2115 105/91 96.8 °F (36 °C) -- 71 -- 94 %   12/03/21 2100 93/75 96.62 °F (35.9 °C) -- 76 -- 90 %   12/03/21 2037 (!) 77/50 96.26 °F (35.7 °C) -- 59 -- 98 %   12/03/21 1955 -- -- -- -- -- 98 %   12/1949 -- 95.72 °F (35.4 °C) Axillary (!) 44 -- 98 %   12/03/21 1945 (!) 82/53 95.36 °F (35.2 °C) Axillary 50 -- 97 %   12/03/21 1939 (!) 78/45 -- -- 50 -- 97 %   12/03/21 1936 (!) 63/35 -- -- 54 -- 98 %   12/03/21 1920 (!) 73/50 -- -- (!) 49 -- 98 %   12/03/21 1916 115/84 -- -- 52 -- 95 %   12/03/21 1913 (!) 67/41 -- -- (!) 43 -- 98 %   12/03/21 1908 -- -- -- (!) 44 -- 98 %   12/03/21 1902 (!) 59/37 -- -- 54 -- 98 %   12/03/21 1900 (!) 60/35 -- -- (!) 49 -- 99 %   12/03/21 1800 116/87 -- -- 66 -- 100 %   12/03/21 1700 109/80 -- -- 66 20 99 %   12/03/21 1600 97/64 97.2 °F (36.2 °C) Axillary 61 20 99 %   12/03/21 1520 -- -- -- 63 -- 99 %   12/03/21 1500 137/83 -- -- 71 20 99 %   12/03/21 1400 119/75 -- -- -- -- 92 %       43.2 kg (95 lb 3.2 oz)    Intake/Output                             12/02/21 0701 - 12/03/21 0700 12/03/21 0701 - 12/04/21 0700 12/04/21 0701 - 12/05/21 0700     5302-8006 4875-6760 Total 4858-0874  7552-6082 Total [id number] 1901-0700 Total                    Intake    P.O.  --  -- --  120  -- 120  60  -- 60    I.V.  --  1025 1025  --  1778.8 1778.8  --  -- --    IV Piggyback  800  -- 800  --  300 300  --  -- --    Total Intake 800 1025 0253 667 7522.8 2198.8 60 -- 60       Output    Urine  --  -- --  700  350 1050  --  -- --    Total Output -- -- --  -- -- --           Intake/Output Summary (Last 24 hours) at 12/4/2021 1353  Last data filed at 12/4/2021 0900  Gross per 24 hour   Intake 2258.75 ml   Output 600 ml   Net 1658.75 ml        Intake/Output Summary (Last 24 hours) at 12/4/2021 1353  Last data filed at 12/4/2021 0900  Gross per 24 hour   Intake 2258.75 ml   Output 600 ml   Net 1658.75 ml        Review of Systems   Unable to perform ROS: Acuity of condition       Physical Exam  Vitals and nursing note reviewed.   Constitutional:       General: She is not in acute distress.     Appearance: She is well-developed and underweight. She is ill-appearing.   HENT:      Head: Normocephalic.   Eyes:      Conjunctiva/sclera: Conjunctivae normal.   Neck:      Thyroid: No thyromegaly.      Vascular: No JVD.   Cardiovascular:      Rate and Rhythm: Normal rate. Rhythm irregularly irregular.      Heart sounds: Normal heart sounds. No murmur heard.      Pulmonary:      Effort: Pulmonary effort is normal. No respiratory distress.      Breath sounds: Normal breath sounds. No wheezing or rales.   Abdominal:      General: Bowel sounds are normal. There is no distension.      Palpations: Abdomen is soft.      Tenderness: There is no abdominal tenderness. There is no guarding.   Musculoskeletal:      Cervical back: Normal range of motion.      Right lower leg: Edema (Trace bilateral) present.      Left lower leg: Edema present.   Skin:     General: Skin is warm and dry.      Findings: No rash.   Neurological:      General: No focal deficit present.      Mental Status: She is alert and easily aroused. She is  disoriented.      Cranial Nerves: Cranial nerves are intact. No cranial nerve deficit.      Motor: Motor function is intact. No weakness.   Psychiatric:         Attention and Perception: Attention normal.         Speech: She is noncommunicative.         Medication Review:   I have reviewed the patient's current medication list  Scheduled Meds:aspirin, 81 mg, Oral, Daily  cefepime, 1 g, Intravenous, Q24H  levETIRAcetam, 1,000 mg, Oral, Q12H  levothyroxine, 50 mcg, Oral, Q AM  pantoprazole, 40 mg, Intravenous, Q AM  rivaroxaban, 20 mg, Oral, Daily  sodium chloride, 10 mL, Intravenous, Q12H  vancomycin, 750 mg, Intravenous, Q24H      Continuous Infusions:Pharmacy Consult - Pharmacy to dose,   sodium chloride, 75 mL/hr, Last Rate: 75 mL/hr (12/04/21 0001)      PRN Meds:.•  acetaminophen **OR** acetaminophen **OR** acetaminophen  •  aluminum-magnesium hydroxide-simethicone  •  atropine  •  calcium carbonate  •  LORazepam  •  nitroglycerin  •  ondansetron **OR** ondansetron  •  Pharmacy Consult - Pharmacy to dose  •  sodium chloride  •  [COMPLETED] Insert peripheral IV **AND** sodium chloride  •  sodium chloride      Labs:  Results from last 7 days   Lab Units 12/04/21  0505 12/03/21  0422 12/02/21  0955   WBC 10*3/mm3 3.52 3.87 2.38*   HEMOGLOBIN g/dL 10.0* 9.8* 11.9*   HEMATOCRIT % 30.5* 29.8* 37.0   PLATELETS 10*3/mm3 113* 108* 107*   MONOCYTES % % 10.0  --  16.0*     Results from last 7 days   Lab Units 12/04/21  0505 12/03/21  0422 12/02/21  0955   SODIUM mmol/L 142 136 136   POTASSIUM mmol/L 4.4 5.0 5.0   CHLORIDE mmol/L 111* 103 102   CO2 mmol/L 19.2* 20.5* 16.9*   BUN mg/dL 20 27* 27*   CREATININE mg/dL 1.37* 1.43* 1.40*   CALCIUM mg/dL 9.0 8.9 10.4   BILIRUBIN mg/dL 0.7  --  0.6   ALK PHOS U/L 228*  --  249*   ALT (SGPT) U/L 49*  --  74*   AST (SGOT) U/L 47*  --  67*   GLUCOSE mg/dL 82 63* 107*           Results from last 7 days   Lab Units 12/04/21  0505 12/03/21  0422 12/02/21  1055 12/02/21  0955   AST (SGOT)  U/L 47*  --   --  67*   ALT (SGPT) U/L 49*  --   --  74*   PROCALCITONIN ng/mL  --   --   --  0.04   LACTATE mmol/L  --   --  1.6  --    PLATELETS 10*3/mm3 113* 108*  --  107*         Lab Results (last 24 hours)     Procedure Component Value Units Date/Time    Folate [[id number]]  (Normal) Collected: 12/04/21 0505    Specimen: Blood Updated: 12/04/21 1244     Folate 19.00 ng/mL     Narrative:      Results may be falsely increased if patient taking Biotin.      Blood Culture - Blood, Arm, Right [[id number]]  (Normal) Collected: 12/02/21 1055    Specimen: Blood from Arm, Right Updated: 12/04/21 1101     Blood Culture No growth at 2 days    Blood Culture - Blood, Hand, Left [[id number]]  (Normal) Collected: 12/02/21 0955    Specimen: Blood from Hand, Left Updated: 12/04/21 1015     Blood Culture No growth at 2 days    Manual Differential [[id number]]  (Abnormal) Collected: 12/04/21 0505    Specimen: Blood Updated: 12/04/21 0635     Neutrophil % 70.0 %      Lymphocyte % 16.0 %      Monocyte % 10.0 %      Eosinophil % 4.0 %      Neutrophils Absolute 2.46 10*3/mm3      Lymphocytes Absolute 0.56 10*3/mm3      Monocytes Absolute 0.35 10*3/mm3      Eosinophils Absolute 0.14 10*3/mm3      nRBC 4.0 /100 WBC      Anisocytosis Slight/1+     Elliptocytes --     Comment: SLIGHT        Poikilocytes Slight/1+     Schistocytes --     Comment: OCCASSIONAL        WBC Morphology Normal     Large Platelets Slight/1+    CBC & Differential [[id number]]  (Abnormal) Collected: 12/04/21 0505    Specimen: Blood Updated: 12/04/21 0635    Narrative:      The following orders were created for panel order CBC & Differential.  Procedure                               Abnormality         Status                     ---------                               -----------         ------                     CBC Auto Differential[[id number]]        Abnormal            Final result                 Please view results for these tests on the individual orders.    CBC  Auto Differential [[id number]]  (Abnormal) Collected: 12/04/21 0505    Specimen: Blood Updated: 12/04/21 0635     WBC 3.52 10*3/mm3      RBC 3.90 10*6/mm3      Hemoglobin 10.0 g/dL      Hematocrit 30.5 %      MCV 78.2 fL      MCH 25.6 pg      MCHC 32.8 g/dL      RDW 18.6 %      RDW-SD 50.9 fl      MPV --     Comment: TNP        Platelets 113 10*3/mm3     Comprehensive Metabolic Panel [[id number]]  (Abnormal) Collected: 12/04/21 0505    Specimen: Blood Updated: 12/04/21 0609     Glucose 82 mg/dL      BUN 20 mg/dL      Creatinine 1.37 mg/dL      Sodium 142 mmol/L      Potassium 4.4 mmol/L      Comment: Slight hemolysis detected by analyzer. Results may be affected.        Chloride 111 mmol/L      CO2 19.2 mmol/L      Calcium 9.0 mg/dL      Total Protein 6.4 g/dL      Albumin 3.20 g/dL      ALT (SGPT) 49 U/L      AST (SGOT) 47 U/L      Alkaline Phosphatase 228 U/L      Total Bilirubin 0.7 mg/dL      eGFR  African Amer 46 mL/min/1.73      Globulin 3.2 gm/dL      A/G Ratio 1.0 g/dL      BUN/Creatinine Ratio 14.6     Anion Gap 11.8 mmol/L     Narrative:      GFR Normal >60  Chronic Kidney Disease <60  Kidney Failure <15      Digoxin Level [[id number]]  (Normal) Collected: 12/04/21 0505    Specimen: Blood Updated: 12/04/21 0541     Digoxin 1.16 ng/mL     POC Glucose Once [[id number]]  (Normal) Collected: 12/03/21 1939    Specimen: Blood Updated: 12/03/21 1946     Glucose 116 mg/dL      Comment: Meter: [id number] : [id number] Praveen Hernández RN       Vitamin B12 [[id number]]  (Abnormal) Collected: 12/03/21 0939    Specimen: Blood Updated: 12/03/21 1721     Vitamin B-12 >2,000 pg/mL     Narrative:      Results may be falsely increased if patient taking Biotin.              Results from last 7 days   Lab Units 12/02/21  0955   TSH uIU/mL 0.332     Results from last 7 days   Lab Units 12/02/21  0955   PROBNP pg/mL 1,500.0*                         Glucose   Date/Time Value Ref Range Status   12/03/2021 1939 116 70 - 130  mg/dL Final     Comment:     Meter: [id number] : [id number] Praveen Hernández RN     Results from last 7 days   Lab Units 12/02/21  1055 12/02/21  0955   PROCALCITONIN ng/mL  --  0.04   LACTATE mmol/L 1.6  --      Results from last 7 days   Lab Units 12/02/21  1055 12/02/21  0955   BLOODCX  No growth at 2 days No growth at 2 days     Results from last 7 days   Lab Units 12/02/21  1050   NITRITE UA  Negative   WBC UA /HPF 0-2*   BACTERIA UA /HPF Trace*   SQUAM EPITHEL UA /HPF 0-2     Results from last 7 days   Lab Units 12/02/21  0957   INFLUENZA B PCR  Not Detected   INFLUENZA A PCR  Not Detected         Radiology:  Imaging Results (Last 24 Hours)     Procedure Component Value Units Date/Time    FL Video Swallow With Speech Single Contrast [[id number]] Collected: 12/03/21 1407     Updated: 12/03/21 1411    Narrative:      VIDEO SWALLOWING STUDY, 12/03/2021     HISTORY:  Abnormal CT scan demonstrating possible aspiration pneumonia.     TECHNIQUE:  Video swallowing study was conducted by the speech pathologist in my  presence and recorded on digital video disc.     Fluoroscopy time 3.2 minutes. A single spot image was recorded for  documentation purposes.     FINDINGS:  No evidence of aspiration identified. Flash penetration noted with  swallows of thin liquid barium. The more thicker barium coated  consistencies were negative for aspiration or penetration. There was  uncoordinated appearance of the oral pharyngeal phase with premature  spillage and pooling of barium at the vallecula and subsequent spillage  with all barium coated consistencies. Please see the full report of the  speech pathologist for further details and dietary recommendations.       Impression:      1. Penetration of the thin liquid barium. No aspiration identified.     This report was finalized on 12/3/2021 2:09 PM by Dr. Kamar Morel MD.             Cardiology:  ECG/EMG Results (last 24 hours)     ** No results found for the last 24 hours.  **          I have reviewed recent labs results and consult notes.  Parts of this note may have been copied and pasted but patient was examined and interviewed by me today    Assessment and Plan:      1.  Aspiration pneumonia patient on cefepime and vancomycin supportive care.       2.  Profound hypothermia secondary to sepsis and aspiration pneumonia improved.  Continue to monitor as patient had problem with recurrent hypothermia in the past      3.  Chronic atrial fibrillation on anticoagulation rate controlled continue with home Xarelto.       4.  Sepsis likely due to underlying aspiration pneumonia supportive care fluid resuscitation broad-spectrum IV antibiotics and cultures have been ordered     5.  [place] seizure disorder continue with Keppra     6.  Metabolic encephalopathy secondary to pneumonia hypothermia and sepsis we will continue to monitor     7.  Hypothyroidism continue with current medication     8.  Mild renal insufficiency on background chronic kidney disease stage III  at baseline IV fluids were discontinued     9.  Chronic diastolic congestive heart failure she is euvolemic     10.  Moderate protein calorie malnutrition nutritional consult and supportive care     11.  Anemia iron studies B12 folate are normal likely anemia chronic disease we will monitor     12.  ADLs below baseline PT and OT evaluate the patient     13.  Metabolic encephalopathy due to underlying sepsis and aspiration pneumonia hopefully will improve improvement of underlying medical problems     Much of this encounter note is an electronic transcription/translation of spoken language to printed text using Dragon Software        Total critical care time was 38  minutes, excluding any separately billable procedure time    Much of this encounter note is an electronic transcription/translation of spoken language to printed text using Dragon Software

## 2021-12-04 NOTE — THERAPY TREATMENT NOTE
Acute Care - Physical Therapy Treatment Note  MALLORY Asher     Patient Name: Argelia Nguyen  : 1950  MRN: [id number]  Today's Date: 2021      Visit Dx:     ICD-10-CM ICD-9-CM   1. Aspiration pneumonia of right lower lobe, unspecified aspiration pneumonia type (HCC)  J69.0 507.0   2. Hypothermia, initial encounter  T68.XXXA 991.6   3. Oropharyngeal dysphagia  R13.12 787.22     Patient Active Problem List   Diagnosis   • Abnormal laboratory test result   • Gastroesophageal reflux disease   • MGUS (monoclonal gammopathy of unknown significance)   • Normocytic anemia   • Hiatal hernia   • Colon polyps   • Internal hemorrhoids   • Chronic anticoagulation   • Esophageal dysphagia   • Severe malnutrition (HCC)   • Longstanding persistent atrial fibrillation ([facility])   • Sepsis associated hypotension ([facility])   • Hydronephrosis due to obstruction of ureter   • Acute metabolic encephalopathy   • Multiple wounds of skin   • Anasarca   • Valvular heart disease   • Acute kidney insufficiency   • Grand mal status epilepticus (HCC)   • Focal motor seizure ([facility])   • Delmar coma scale total score 3-8 ([facility])   • Pyelonephritis   • Altered mental status   • Hypothermia   • Encounter for rehabilitation   • Aspiration pneumonia of right lower lobe (HCC)     Past Medical History:   Diagnosis Date   • Anxiety    • Arthritis    • Chronic anticoagulation 2019   • Chronic diastolic (congestive) heart failure (HCC)    • Colon polyp    • Dysphagia    • GERD (gastroesophageal reflux disease)    • Hiatal hernia 2017   • Hypertension    • Hypothyroidism    • Internal hemorrhoids 2017   • Monoclonal gammopathy 3/25/2013   • Nonrheumatic mitral valve regurgitation     has ranged from mild-mod to severe depending on the study   • Persistent atrial fibrillation (HCC)    • Pyelonephritis 2021   • Reflux gastritis    • Seizure ([facility])    • Tricuspid regurgitation     mod-severe vs severe   • Type 2 diabetes mellitus ([facility])       Past Surgical History:   Procedure Laterality Date   • COLONOSCOPY N/A 3/29/2017    Procedure: COLONOSCOPY; POLYPECTOMY;  Surgeon: Brooke Garrido MD;  Location: [facility] OR;  Service:    • COLONOSCOPY N/A 8/3/2020    Procedure: COLONOSCOPY with polypectomy;  Surgeon: Rui Shi MD;  Location: [facility] OR;  Service: Gastroenterology;  Laterality: N/A;  ascending polyp  transverse polyp  rectal polyp   • CYST REMOVAL     • CYSTOSCOPY W/ URETERAL STENT PLACEMENT Right 4/13/2021    Procedure: CYSTOSCOPY URETERAL CATHETER/STENT INSERTION;  Surgeon: Onesimo Fuller MD;  Location: [facility] OR;  Service: Urology;  Laterality: Right;   • CYSTOSCOPY W/ URETERAL STENT PLACEMENT Right 4/28/2021    Procedure: CYSTOSCOPY URETERAL STENT REMOVAL;  Surgeon: Onesimo Fuller MD;  Location: [facility] OR;  Service: Urology;  Laterality: Right;  CYSTOSCOPY URETERAL STENT REMOVAL   • ENDOSCOPY N/A 3/29/2017    Procedure: ESOPHAGOGASTRODUODENOSCOPY WITH DILITATION;  Surgeon: Brooke Garrido MD;  Location: [facility] OR;  Service:    • ENDOSCOPY N/A 8/3/2020    Procedure: ESOPHAGOGASTRODUODENOSCOPY with biopsies;  Surgeon: Rui Shi MD;  Location: [facility] OR;  Service: Gastroenterology;  Laterality: N/A;  esophagitis  gastritis   • HERNIA REPAIR     • HYSTERECTOMY       PT Assessment (last 12 hours)     PT Evaluation and Treatment     Row Name 12/04/21 5081          Physical Therapy Time and Intention    Subjective Information complains of; fatigue  -BP     Document Type therapy note (daily note)  -BP     Mode of Treatment physical therapy  -BP     Patient Effort adequate  -BP     Symptoms Noted During/After Treatment fatigue  -BP     Comment Patient agreeable  -BP     Row Name 12/04/21 5138          General Information    Patient/Family/Caregiver Comments/Observations Patient supine in bed with HOB elevated. Patient agreeable to participate. RN present throughout.  -BP     Existing  Precautions/Restrictions fall  cognition  -BP     Risks Reviewed patient:; LOB; increased discomfort  -BP     Benefits Reviewed patient:; improve function; increase independence; increase strength  -BP     Barriers to Rehab previous functional deficit; medically complex; cognitive status  -BP     Row Name 12/04/21 0930          Pain Scale: Word Pre/Post-Treatment    Pre/Posttreatment Pain Comment Patient does not complain of pain  -BP     Row Name 12/04/21 0930          Bed Mobility    Supine-Sit Hunter (Bed Mobility) moderate assist (50% patient effort); maximum assist (25% patient effort); 2 person assist; verbal cues  -BP     Row Name 12/04/21 0930          Transfers    Transfers sit-stand transfer; stand-sit transfer; stand pivot/stand step transfer  -BP     Comment (Transfers) verbal cues for safety. Patient requires mod A x 2 from bed and min A x 2 from recliner.  -BP     Bed-Chair Hunter (Transfers) moderate assist (50% patient effort); 2 person assist; verbal cues  -BP     Assistive Device (Bed-Chair Transfers) walker, front-wheeled  -BP     Sit-Stand Hunter (Transfers) moderate assist (50% patient effort); 2 person assist; verbal cues; minimum assist (75% patient effort)  -BP     Stand-Sit Hunter (Transfers) moderate assist (50% patient effort); 2 person assist; verbal cues  -BP     Row Name 12/04/21 0930          Sit-Stand Transfer    Assistive Device (Sit-Stand Transfers) walker, front-wheeled  -BP     Row Name 12/04/21 0930          Stand-Sit Transfer    Assistive Device (Stand-Sit Transfers) walker, front-wheeled  -BP     Row Name 12/04/21 0930          Gait/Stairs (Locomotion)    Comment (Gait/Stairs) transfers only  -BP     Row Name 12/04/21 0930          Safety Issues, Functional Mobility    Safety Issues Affecting Function (Mobility) insight into deficits/self-awareness; positioning of assistive device; problem-solving; safety precaution awareness; judgment; sequencing  abilities  -BP     Comment, Safety Issues/Impairments (Mobility) Patient requries step by step cues for sequencing. Cues for safety with all mobility provided. Patient with poor insight and judegement  -BP     Row Name 12/04/21 0930          Balance    Comment, Balance SBA for static sitting balance. mod A x 2 for static standing with FWW  -BP     Row Name             Wound 04/12/21 1940 coccyx Pressure Injury    Wound - Properties Group Placement Date: 04/12/21 -AP Placement Time: 1940 -AP Present on Hospital Admission: Y  -AP Location: coccyx  -AP Primary Wound Type: Pressure inj  -AP Stage, Pressure Injury : Stage 2  -AP Wound Outcome: Healed  -KO     Retired Wound - Properties Group Date first assessed: 04/12/21  -AP Time first assessed: 1940 -AP Present on Hospital Admission: Y  -AP Location: coccyx  -AP Primary Wound Type: Pressure inj  -AP Wound Outcome: Healed  -KO     Row Name 12/04/21 0930          Plan of Care Review    Plan of Care Reviewed With patient  -BP     Outcome Summary PT: Patient performs supine to sit transfer with mod/max A x 2, sit to/from stand transfers with min/mod A x 2 and stand pivot transfer  bed to chair with mod A x 2 with use of FWW. Patient requires step by step cues for sequencing as well as cues for safety throughout. Patient alert throughout and follows commands. Continue to recommend SNF with potential to transition to LTC at discharge due to current cogntive and functional status.  -BP     Row Name 12/04/21 0930          Progress Summary (PT)    Progress Toward Functional Goals (PT) progress toward functional goals is gradual  -BP     Row Name 12/04/21 0930          Therapy Plan Review/Discharge Plan (PT)    Therapy Plan Review (PT) patient; participants included  -BP           User Key  (r) = Recorded By, (t) = Taken By, (c) = Cosigned By    Initials Name Provider Type    Love Joyce, PT Physical Therapist    Lety Sutherland, RN Registered Nurse    MOLLY  Shanita Pepe, RN Registered Nurse                Physical Therapy Education                 Title: PT OT SLP Therapies (In Progress)     Topic: Physical Therapy (In Progress)     Point: Mobility training (In Progress)     Learning Progress Summary           Patient Acceptance, E,TB, NR by BP at 12/4/2021 0955    Acceptance, E,TB, VU,NR by BP at 12/3/2021 1431                   Point: Home exercise program (Not Started)     Learner Progress:  Not documented in this visit.                      User Key     Initials Effective Dates Name Provider Type Discipline     06/16/21 -  Love Shaver, PT Physical Therapist PT              PT Recommendation and Plan  Anticipated Discharge Disposition (PT): skilled nursing facility, extended care facility  Planned Therapy Interventions (PT): balance training, bed mobility training, home exercise program, patient/family education, strengthening, transfer training  Therapy Frequency (PT): 6 times/wk  Progress Summary (PT)  Progress Toward Functional Goals (PT): progress toward functional goals is gradual  Plan of Care Reviewed With: patient  Outcome Summary: PT: Patient performs supine to sit transfer with mod/max A x 2, sit to/from stand transfers with min/mod A x 2 and stand pivot transfer  bed to chair with mod A x 2 with use of FWW. Patient requires step by step cues for sequencing as well as cues for safety throughout. Patient alert throughout and follows commands. Continue to recommend SNF with potential to transition to LTC at discharge due to current cogntive and functional status.   Outcome Measures     Row Name 12/04/21 0930             How much help from another person do you currently need...    Turning from your back to your side while in flat bed without using bedrails? 2  -BP      Moving from lying on back to sitting on the side of a flat bed without bedrails? 1  -BP      Moving to and from a bed to a chair (including a wheelchair)? 2  -BP      Standing  up from a chair using your arms (e.g., wheelchair, bedside chair)? 2  -BP      Climbing 3-5 steps with a railing? 1  -BP      To walk in hospital room? 1  -BP      AM-PAC 6 Clicks Score (PT) 9  -BP              Functional Assessment    Outcome Measure Options AM-PAC 6 Clicks Basic Mobility (PT)  -BP            User Key  (r) = Recorded By, (t) = Taken By, (c) = Cosigned By    Initials Name Provider Type    BP Love Shaver, PT Physical Therapist                 Time Calculation:    PT Charges     Row Name 12/04/21 0956             Time Calculation    Start Time 0932  -BP      Stop Time 0945  -BP      Time Calculation (min) 13 min  -BP      PT Received On 12/04/21  -BP      PT - Next Appointment 12/06/21  -BP              Timed Charges    66041 - PT Therapeutic Exercise Minutes 13  -BP              Total Minutes    Timed Charges Total Minutes 13  -BP       Total Minutes 13  -BP            User Key  (r) = Recorded By, (t) = Taken By, (c) = Cosigned By    Initials Name Provider Type    BP Love Shaver, PT Physical Therapist              Therapy Charges for Today     Code Description Service Date Service Provider Modifiers Qty    69005406561 HC PT EVAL MOD COMPLEXITY 2 12/3/2021 Love Shaver, PT GP 1    74180568476 HC PT THER PROC EA 15 MIN 12/4/2021 Love Shaver, PT GP 1          PT G-Codes  Outcome Measure Options: AM-PAC 6 Clicks Basic Mobility (PT)  AM-PAC 6 Clicks Score (PT): 9  AM-PAC 6 Clicks Score (OT): 16    Love Shaver PT  12/4/2021

## 2021-12-04 NOTE — PROGRESS NOTES
"Daily Progress Note:      Chief complaint: Fall with hypothermia, aspiration pneumonia    Subjective: Called to evaluate patient because of hypotension lethargy and recurrent hypothermia.  Patient was getting axillary temperatures as rectal temperature was checked and she was low again.  She has been more lethargic and certainly dropped her blood pressure early this evening initially given a fluid bolus and some improvement once again is hypotensive.  She is responding to fluid bolus at this time   LOS: 1 day     Vital Signs  Temp:  [95.36 °F (35.2 °C)-98.1 °F (36.7 °C)] 96.62 °F (35.9 °C)  Heart Rate:  [] 76  Resp:  [20-22] 20  BP: ()/(35-99) 93/75  Oxygen Therapy  SpO2: 90 %  Pulse Oximetry Type: Continuous  Device (Oxygen Therapy): room air}  Body mass index is 16.87 kg/m².  Flowsheet Rows      First Filed Value   Admission Height 160 cm (63\") Documented at 12/02/2021 0924   Admission Weight 43.5 kg (96 lb) Documented at 12/02/2021 0924                   Documented weights    12/02/21 0924 12/02/21 1830   Weight: 43.5 kg (96 lb) 43.2 kg (95 lb 3.2 oz)           Patient Vitals for the past 24 hrs:   BP Temp Temp src Pulse Resp SpO2   12/03/21 2100 93/75 96.62 °F (35.9 °C) -- 76 -- 90 %   12/03/21 2037 (!) 77/50 96.26 °F (35.7 °C) -- 59 -- 98 %   12/1950 -- 95.72 °F (35.4 °C) Axillary (!) 44 -- 98 %   12/03/21 1945 (!) 82/53 95.36 °F (35.2 °C) Axillary 50 -- 97 %   12/03/21 1939 (!) 78/45 -- -- 50 -- 97 %   12/03/21 1936 (!) 63/35 -- -- 54 -- 98 %   12/03/21 1920 (!) 73/50 -- -- (!) 49 -- 98 %   12/03/21 1916 115/84 -- -- 52 -- 95 %   12/03/21 1913 (!) 67/41 -- -- (!) 43 -- 98 %   12/03/21 1908 -- -- -- (!) 44 -- 98 %   12/03/21 1902 (!) 59/37 -- -- 54 -- 98 %   12/03/21 1900 (!) 60/35 -- -- (!) 49 -- 99 %   12/03/21 1800 116/87 -- -- 66 -- 100 %   12/03/21 1700 109/80 -- -- 66 20 99 %   12/03/21 1600 97/64 97.2 °F (36.2 °C) Axillary 61 20 99 %   12/03/21 1520 -- -- -- 63 -- 99 %   12/03/21 1500 " 137/83 -- -- 71 20 99 %   12/03/21 1400 119/75 -- -- -- -- 92 %   12/03/21 1200 106/73 97.1 °F (36.2 °C) Axillary 71 22 99 %   12/03/21 1100 99/58 -- -- 67 20 99 %   12/03/21 1000 118/69 -- -- 70 -- 98 %   12/03/21 0900 102/73 -- -- 92 20 97 %   12/03/21 0800 103/72 98.1 °F (36.7 °C) Axillary 72 20 99 %   12/03/21 0700 108/72 -- -- 71 20 97 %   12/03/21 0615 113/87 97.5 °F (36.4 °C) Axillary 58 20 94 %   12/03/21 0500 129/89 -- -- 85 -- 99 %   12/03/21 0400 106/63 97.4 °F (36.3 °C) Axillary 70 22 100 %   12/03/21 0300 113/71 -- -- 74 -- 100 %   12/03/21 0200 114/99 97 °F (36.1 °C) Axillary 86 -- 100 %   12/03/21 0100 102/70 -- -- 92 -- 96 %   12/03/21 0000 133/97 97.7 °F (36.5 °C) -- 117 22 99 %   12/02/21 2300 120/67 -- -- (!) 129 -- 97 %   12/02/21 2200 119/69 -- -- 109 -- 97 %       43.2 kg (95 lb 3.2 oz)    Intake/Output                       12/02/21 0701 - 12/03/21 0700 12/03/21 0701 - 12/04/21 0700     2708-6659 9212-8970 Total 0021-9368 1288-8449 Total                 Intake    P.O.  --  -- --  120  -- 120    I.V.  --  1025 1025  --  -- --    IV Piggyback  800  -- 800  --  -- --    Total Intake 800 1025 1825 120 -- 120       Output    Urine  --  -- --  700  -- 700    Total Output -- -- -- 700 -- 700           Intake/Output Summary (Last 24 hours) at 12/3/2021 2121  Last data filed at 12/3/2021 1807  Gross per 24 hour   Intake 1145 ml   Output 700 ml   Net 445 ml        Intake/Output Summary (Last 24 hours) at 12/3/2021 2121  Last data filed at 12/3/2021 1807  Gross per 24 hour   Intake 1145 ml   Output 700 ml   Net 445 ml        Review of Systems   Unable to perform ROS: Acuity of condition       Physical Exam  Vitals and nursing note reviewed.   Constitutional:       General: She is not in acute distress.     Appearance: She is well-developed and underweight. She is ill-appearing.   HENT:      Head: Normocephalic.   Eyes:      Conjunctiva/sclera: Conjunctivae normal.   Neck:      Thyroid: No thyromegaly.       Vascular: No JVD.   Cardiovascular:      Rate and Rhythm: Normal rate. Rhythm irregularly irregular.      Heart sounds: Normal heart sounds. No murmur heard.      Pulmonary:      Effort: Pulmonary effort is normal. No respiratory distress.      Breath sounds: Normal breath sounds. No wheezing or rales.   Abdominal:      General: Bowel sounds are normal. There is no distension.      Palpations: Abdomen is soft.      Tenderness: There is no abdominal tenderness. There is no guarding.   Musculoskeletal:      Cervical back: Normal range of motion.      Right lower leg: Edema (Trace bilateral) present.      Left lower leg: Edema present.   Skin:     General: Skin is warm and dry.      Findings: No rash.   Neurological:      General: No focal deficit present.      Mental Status: She is easily aroused. She is lethargic and disoriented.      GCS: GCS eye subscore is 4. GCS verbal subscore is 4. GCS motor subscore is 6.      Cranial Nerves: Cranial nerves are intact.      Motor: Motor function is intact.         Medication Review:   I have reviewed the patient's current medication list  Scheduled Meds:aspirin, 81 mg, Oral, Daily  cefepime, 1 g, Intravenous, Q24H  levETIRAcetam, 1,000 mg, Oral, Q12H  levothyroxine, 50 mcg, Oral, Q AM  pantoprazole, 40 mg, Intravenous, Q AM  rivaroxaban, 20 mg, Oral, Daily  sodium chloride, 500 mL, Intravenous, Once  sodium chloride, 10 mL, Intravenous, Q12H  vancomycin, 750 mg, Intravenous, Q24H      Continuous Infusions:Pharmacy Consult - Pharmacy to dose,   sodium chloride, 75 mL/hr, Last Rate: 75 mL/hr (12/03/21 0617)      PRN Meds:.•  acetaminophen **OR** acetaminophen **OR** acetaminophen  •  aluminum-magnesium hydroxide-simethicone  •  atropine  •  calcium carbonate  •  LORazepam  •  nitroglycerin  •  ondansetron **OR** ondansetron  •  Pharmacy Consult - Pharmacy to dose  •  sodium chloride  •  [COMPLETED] Insert peripheral IV **AND** sodium chloride  •  sodium  chloride      Labs:  Results from last 7 days   Lab Units 12/03/21  0422 12/02/21  0955   WBC 10*3/mm3 3.87 2.38*   HEMOGLOBIN g/dL 9.8* 11.9*   HEMATOCRIT % 29.8* 37.0   PLATELETS 10*3/mm3 108* 107*   MONOCYTES % %  --  16.0*     Results from last 7 days   Lab Units 12/03/21  0422 12/02/21  0955   SODIUM mmol/L 136 136   POTASSIUM mmol/L 5.0 5.0   CHLORIDE mmol/L 103 102   CO2 mmol/L 20.5* 16.9*   BUN mg/dL 27* 27*   CREATININE mg/dL 1.43* 1.40*   CALCIUM mg/dL 8.9 10.4   BILIRUBIN mg/dL  --  0.6   ALK PHOS U/L  --  249*   ALT (SGPT) U/L  --  74*   AST (SGOT) U/L  --  67*   GLUCOSE mg/dL 63* 107*           Results from last 7 days   Lab Units 12/03/21  0422 12/02/21  1055 12/02/21  0955   AST (SGOT) U/L  --   --  67*   ALT (SGPT) U/L  --   --  74*   PROCALCITONIN ng/mL  --   --  0.04   LACTATE mmol/L  --  1.6  --    PLATELETS 10*3/mm3 108*  --  107*         Lab Results (last 24 hours)     Procedure Component Value Units Date/Time    POC Glucose Once [[id number]]  (Normal) Collected: 12/03/21 1939    Specimen: Blood Updated: 12/03/21 1946     Glucose 116 mg/dL      Comment: Meter: [id number] : [id number] Praveen Hernández RN       Vitamin B12 [[id number]]  (Abnormal) Collected: 12/03/21 0939    Specimen: Blood Updated: 12/03/21 1721     Vitamin B-12 >2,000 pg/mL     Narrative:      Results may be falsely increased if patient taking Biotin.      Blood Culture - Blood, Arm, Right [[id number]]  (Normal) Collected: 12/02/21 1055    Specimen: Blood from Arm, Right Updated: 12/03/21 1101     Blood Culture No growth at 24 hours    Blood Culture - Blood, Hand, Left [[id number]]  (Normal) Collected: 12/02/21 0955    Specimen: Blood from Hand, Left Updated: 12/03/21 1015     Blood Culture No growth at 24 hours    Iron Profile [[id number]]  (Abnormal) Collected: 12/03/21 0422    Specimen: Blood Updated: 12/03/21 0832     Iron 47 mcg/dL      Iron Saturation 16 %      UIBC 241 mcg/dL      TIBC 288 mcg/dL     Basic Metabolic  Panel [[id number]]  (Abnormal) Collected: 12/03/21 0422    Specimen: Blood Updated: 12/03/21 0451     Glucose 63 mg/dL      BUN 27 mg/dL      Creatinine 1.43 mg/dL      Sodium 136 mmol/L      Potassium 5.0 mmol/L      Chloride 103 mmol/L      CO2 20.5 mmol/L      Calcium 8.9 mg/dL      eGFR  African Amer 44 mL/min/1.73      BUN/Creatinine Ratio 18.9     Anion Gap 12.5 mmol/L     Narrative:      GFR Normal >60  Chronic Kidney Disease <60  Kidney Failure <15      CBC (No Diff) [[id number]]  (Abnormal) Collected: 12/03/21 0422    Specimen: Blood Updated: 12/03/21 0446     WBC 3.87 10*3/mm3      RBC 3.86 10*6/mm3      Hemoglobin 9.8 g/dL      Hematocrit 29.8 %      MCV 77.2 fL      MCH 25.4 pg      MCHC 32.9 g/dL      RDW 17.8 %      RDW-SD 48.9 fl      Platelets 108 10*3/mm3     Narrative:      Flags resolved upon repeat analysis              Results from last 7 days   Lab Units 12/02/21  0955   TSH uIU/mL 0.332     Results from last 7 days   Lab Units 12/02/21  0955   PROBNP pg/mL 1,500.0*                         Glucose   Date/Time Value Ref Range Status   12/03/2021 1939 116 70 - 130 mg/dL Final     Comment:     Meter: [id number] : [id number] Praveen Hernández RN     Results from last 7 days   Lab Units 12/02/21  1055 12/02/21  0955   PROCALCITONIN ng/mL  --  0.04   LACTATE mmol/L 1.6  --      Results from last 7 days   Lab Units 12/02/21  1055 12/02/21  0955   BLOODCX  No growth at 24 hours No growth at 24 hours     Results from last 7 days   Lab Units 12/02/21  1050   NITRITE UA  Negative   WBC UA /HPF 0-2*   BACTERIA UA /HPF Trace*   SQUAM EPITHEL UA /HPF 0-2     Results from last 7 days   Lab Units 12/02/21  0957   INFLUENZA B PCR  Not Detected   INFLUENZA A PCR  Not Detected         Radiology:  Imaging Results (Last 24 Hours)     Procedure Component Value Units Date/Time    FL Video Swallow With Speech Single Contrast [[id number]] Collected: 12/03/21 1407     Updated: 12/03/21 1411    Narrative:      VIDEO  SWALLOWING STUDY, 12/03/2021     HISTORY:  Abnormal CT scan demonstrating possible aspiration pneumonia.     TECHNIQUE:  Video swallowing study was conducted by the speech pathologist in my  presence and recorded on digital video disc.     Fluoroscopy time 3.2 minutes. A single spot image was recorded for  documentation purposes.     FINDINGS:  No evidence of aspiration identified. Flash penetration noted with  swallows of thin liquid barium. The more thicker barium coated  consistencies were negative for aspiration or penetration. There was  uncoordinated appearance of the oral pharyngeal phase with premature  spillage and pooling of barium at the vallecula and subsequent spillage  with all barium coated consistencies. Please see the full report of the  speech pathologist for further details and dietary recommendations.       Impression:      1. Penetration of the thin liquid barium. No aspiration identified.     This report was finalized on 12/3/2021 2:09 PM by Dr. Kamar Morel MD.             Cardiology:  ECG/EMG Results (last 24 hours)     ** No results found for the last 24 hours. **          I have reviewed recent labs results and consult notes.  Parts of this note may have been copied and pasted but patient was examined and interviewed by me today    Assessment and Plan:  1.  Aspiration pneumonia patient on cefepime and vancomycin supportive care.       2.  Profound hypothermia secondary to sepsis and aspiration pneumonia recurrent back on bear hugger and improving    3.  Hypotension secondary to pneumonia sepsis and hypotension hypothermia.  Covid currently is responding to fluid boluses will monitor may need pressors if does not improve      Much of this encounter note is an electronic transcription/translation of spoken language to printed text using Dragon Software

## 2021-12-04 NOTE — PLAN OF CARE
Goal Outcome Evaluation:  Plan of Care Reviewed With: patient           Outcome Summary: PT: Patient performs supine to sit transfer with mod/max A x 2, sit to/from stand transfers with min/mod A x 2 and stand pivot transfer  bed to chair with mod A x 2 with use of FWW. Patient requires step by step cues for sequencing as well as cues for safety throughout. Patient alert throughout and follows commands. Continue to recommend SNF with potential to transition to LTC at discharge due to current cogntive and functional status.

## 2021-12-05 PROBLEM — R60.1 ANASARCA: Status: RESOLVED | Noted: 2021-01-01 | Resolved: 2021-01-01

## 2021-12-05 PROBLEM — R41.82 ALTERED MENTAL STATUS: Status: RESOLVED | Noted: 2021-01-01 | Resolved: 2021-01-01

## 2021-12-05 PROBLEM — K21.9 GASTROESOPHAGEAL REFLUX DISEASE: Status: RESOLVED | Noted: 2017-01-16 | Resolved: 2021-01-01

## 2021-12-05 PROBLEM — K64.8 INTERNAL HEMORRHOIDS: Status: RESOLVED | Noted: 2017-04-17 | Resolved: 2021-01-01

## 2021-12-05 PROBLEM — G40.401: Status: RESOLVED | Noted: 2021-01-01 | Resolved: 2021-01-01

## 2021-12-05 PROBLEM — N28.9 ACUTE KIDNEY INSUFFICIENCY: Status: RESOLVED | Noted: 2021-01-01 | Resolved: 2021-01-01

## 2021-12-05 PROBLEM — T68.XXXA HYPOTHERMIA: Status: RESOLVED | Noted: 2021-01-01 | Resolved: 2021-01-01

## 2021-12-05 PROBLEM — T14.8XXA MULTIPLE WOUNDS OF SKIN: Status: RESOLVED | Noted: 2021-01-01 | Resolved: 2021-01-01

## 2021-12-05 PROBLEM — R89.9 ABNORMAL LABORATORY TEST RESULT: Status: RESOLVED | Noted: 2017-01-16 | Resolved: 2021-01-01

## 2021-12-05 PROBLEM — G40.109 FOCAL MOTOR SEIZURE (HCC): Status: RESOLVED | Noted: 2021-01-01 | Resolved: 2021-01-01

## 2021-12-05 PROBLEM — R40.2430 GLASGOW COMA SCALE TOTAL SCORE 3-8: Status: RESOLVED | Noted: 2021-01-01 | Resolved: 2021-01-01

## 2021-12-05 PROBLEM — Z51.89 ENCOUNTER FOR REHABILITATION: Status: RESOLVED | Noted: 2021-01-01 | Resolved: 2021-01-01

## 2021-12-05 PROBLEM — N12 PYELONEPHRITIS: Status: RESOLVED | Noted: 2021-01-01 | Resolved: 2021-01-01

## 2021-12-05 NOTE — CASE MANAGEMENT/SOCIAL WORK
Continued Stay Note  MALLORY Asher     Patient Name: Argelia Nguyen  MRN: [id number]  Today's Date: 12/5/2021    Admit Date: 12/2/2021     Discharge Plan     Row Name 12/05/21 1653       Plan    Plan home w HH vs STR    Plan Comments Patient is resting in bed with her brother visiting at bedside. Per chart review, pateint remains confused with hallucinations over night. CM will continue to follow for dc planning.               Discharge Codes    No documentation.                     Montana Otero RN

## 2021-12-05 NOTE — PROGRESS NOTES
"Daily Progress Note:      Chief complaint: Follow-up of hypothermia, hypotension, sepsis, aspiration pneumonia, encephalopathy    Subjective: Became hypothermic again last night and patient ambient room temperature was increasing Bear markjoaquín applied she is better this morning she remains confused she has been hypertensive throughout the night. Remains confused            LOS: 3 days     Vital Signs  Temp:  [90.5 °F (32.5 °C)-97.7 °F (36.5 °C)] 95.72 °F (35.4 °C)  Heart Rate:  [] 89  Resp:  [15-20] 20  BP: (104-166)/() 149/114  Oxygen Therapy  SpO2: 96 %  Pulse Oximetry Type: Continuous  Device (Oxygen Therapy): room air}  Body mass index is 16.87 kg/m².  Flowsheet Rows      First Filed Value   Admission Height 160 cm (63\") Documented at 12/02/2021 0924   Admission Weight 43.5 kg (96 lb) Documented at 12/02/2021 0924                   Documented weights    12/02/21 0924 12/02/21 1830   Weight: 43.5 kg (96 lb) 43.2 kg (95 lb 3.2 oz)           Patient Vitals for the past 24 hrs:   BP Temp Temp src Pulse Resp SpO2   12/05/21 1200 (!) 149/114 95.72 °F (35.4 °C) -- 89 -- 96 %   12/05/21 1015 -- -- -- 82 -- --   12/05/21 0940 (!) 130/104 -- -- 101 -- 100 %   12/05/21 0921 150/100 97.5 °F (36.4 °C) Oral 95 20 --   12/05/21 0915 150/100 -- -- 98 -- --   12/05/21 0912 (!) 154/111 -- -- 96 -- --   12/05/21 0903 (!) 154/111 -- -- 96 -- --   12/05/21 0900 (!) 165/123 (!) 90.5 °F (32.5 °C) -- 93 -- --   12/05/21 0600 (!) 148/118 -- -- 100 -- 97 %   12/05/21 0500 (!) 144/113 -- -- 107 -- 94 %   12/05/21 0400 (!) 166/104 97.6 °F (36.4 °C) Oral 96 18 95 %   12/05/21 0300 150/80 -- -- 101 -- 96 %   12/05/21 0200 (!) 156/117 -- -- 102 -- 94 %   12/05/21 0100 (!) 149/107 -- -- 93 -- 94 %   12/05/21 0000 149/87 97.7 °F (36.5 °C) Oral 118 20 97 %   12/04/21 2300 104/83 -- -- 98 -- 96 %   12/04/21 2200 108/97 97.6 °F (36.4 °C) Oral 82 18 96 %   12/04/21 2000 109/75 -- -- 69 -- 98 %   12/04/21 1838 -- -- -- 60 -- --   12/04/21 " 1701 118/89 -- -- 66 15 93 %   12/04/21 1624 -- -- -- 67 -- 98 %   12/04/21 1600 118/98 96.7 °F (35.9 °C) Oral 76 16 97 %   12/04/21 1507 -- -- -- 74 -- --   12/04/21 1500 134/98 -- -- 71 -- 95 %   12/04/21 1400 -- -- -- 68 -- 99 %       43.2 kg (95 lb 3.2 oz)    Intake/Output                             12/03/21 0701 - 12/04/21 0700 12/04/21 0701 - 12/05/21 0700 12/05/21 0701 - 12/06/21 0700     2114-0887 8751-2176 Total 4731-0094 8455-9443 Total 1395-3012 9308-8252 Total                    Intake    P.O.  120  -- 120  220  -- 220  60  -- 60    I.V.  --  1778.8 1778.8  --  -- --  --  -- --    IV Piggyback  --  300 300  50  -- 50  250  -- 250    Total Intake 120 2078.8 2198.8 270 -- 270 310 -- 310       Output    Urine  700  350 1050  350  275 625  --  -- --    Total Output  350 275 625 -- -- --           Intake/Output Summary (Last 24 hours) at 12/5/2021 1302  Last data filed at 12/5/2021 1238  Gross per 24 hour   Intake 520 ml   Output 575 ml   Net -55 ml        Intake/Output Summary (Last 24 hours) at 12/5/2021 1302  Last data filed at 12/5/2021 1238  Gross per 24 hour   Intake 520 ml   Output 575 ml   Net -55 ml        Review of Systems   Unable to perform ROS: Acuity of condition       Physical Exam  Vitals and nursing note reviewed.   Constitutional:       General: She is not in acute distress.     Appearance: She is well-developed and underweight. She is ill-appearing.   HENT:      Head: Normocephalic.   Eyes:      Conjunctiva/sclera: Conjunctivae normal.   Neck:      Thyroid: No thyromegaly.      Vascular: No JVD.   Cardiovascular:      Rate and Rhythm: Normal rate. Rhythm irregularly irregular.      Heart sounds: Normal heart sounds. No murmur heard.      Pulmonary:      Effort: Pulmonary effort is normal. No respiratory distress.      Breath sounds: Normal breath sounds. No wheezing or rales.   Abdominal:      General: Bowel sounds are normal. There is no distension.      Palpations: Abdomen is  soft.      Tenderness: There is no abdominal tenderness. There is no guarding.   Musculoskeletal:      Cervical back: Normal range of motion.      Right lower leg: Edema (Trace bilateral) present.      Left lower leg: Edema present.   Skin:     General: Skin is warm and dry.      Findings: No rash.   Neurological:      General: No focal deficit present.      Mental Status: She is alert and easily aroused. She is disoriented.      Cranial Nerves: Cranial nerves are intact. No cranial nerve deficit.      Motor: Motor function is intact. No weakness.      Comments: Oriented to person only follows commands   Psychiatric:         Attention and Perception: Attention normal.         Mood and Affect: Mood normal.         Speech: Speech is delayed.         Cognition and Memory: Cognition is impaired. Memory is impaired.         Medication Review:   I have reviewed the patient's current medication list  Scheduled Meds:amLODIPine, 5 mg, Oral, Q24H  aspirin, 81 mg, Oral, Daily  cefepime, 1 g, Intravenous, Q24H  levETIRAcetam, 1,000 mg, Oral, Q12H  levothyroxine, 50 mcg, Oral, Q AM  pantoprazole, 40 mg, Intravenous, Q AM  rivaroxaban, 20 mg, Oral, Daily  sodium chloride, 10 mL, Intravenous, Q12H  vancomycin, 750 mg, Intravenous, Q24H      Continuous Infusions:Pharmacy Consult - Pharmacy to dose,       PRN Meds:.•  acetaminophen **OR** acetaminophen **OR** acetaminophen  •  aluminum-magnesium hydroxide-simethicone  •  atropine  •  calcium carbonate  •  hydrALAZINE  •  LORazepam  •  nitroglycerin  •  ondansetron **OR** ondansetron  •  Pharmacy Consult - Pharmacy to dose  •  sodium chloride  •  [COMPLETED] Insert peripheral IV **AND** sodium chloride  •  sodium chloride      Labs:  Results from last 7 days   Lab Units 12/05/21  0750 12/04/21  0505 12/03/21  0422 12/02/21  0955 12/02/21  0955   WBC 10*3/mm3 4.79 3.52 3.87  --  2.38*   HEMOGLOBIN g/dL 10.5* 10.0* 9.8*   < > 11.9*   HEMATOCRIT % 32.5* 30.5* 29.8*  --  37.0   PLATELETS  10*3/mm3 124* 113* 108*  --  107*   MONOCYTES % %  --  10.0  --   --  16.0*    < > = values in this interval not displayed.     Results from last 7 days   Lab Units 12/05/21  0750 12/04/21  0505 12/03/21  0422 12/02/21  0955   SODIUM mmol/L 142 142 136 136   POTASSIUM mmol/L 4.2 4.4 5.0 5.0   CHLORIDE mmol/L 111* 111* 103 102   CO2 mmol/L 17.8* 19.2* 20.5* 16.9*   BUN mg/dL 18 20 27* 27*   CREATININE mg/dL 1.22* 1.37* 1.43* 1.40*   CALCIUM mg/dL 10.1 9.0 8.9 10.4   BILIRUBIN mg/dL  --  0.7  --  0.6   ALK PHOS U/L  --  228*  --  249*   ALT (SGPT) U/L  --  49*  --  74*   AST (SGOT) U/L  --  47*  --  67*   GLUCOSE mg/dL 106* 82 63* 107*           Results from last 7 days   Lab Units 12/05/21  0750 12/04/21  0505 12/03/21  0422 12/02/21  1055 12/02/21  0955 12/02/21  0955   AST (SGOT) U/L  --  47*  --   --   --  67*   ALT (SGPT) U/L  --  49*  --   --   --  74*   PROCALCITONIN ng/mL  --   --   --   --   --  0.04   LACTATE mmol/L  --   --   --  1.6  --   --    PLATELETS 10*3/mm3 124* 113* 108*  --    < > 107*    < > = values in this interval not displayed.         Lab Results (last 24 hours)     Procedure Component Value Units Date/Time    Blood Culture - Blood, Arm, Right [[id number]]  (Normal) Collected: 12/02/21 1055    Specimen: Blood from Arm, Right Updated: 12/05/21 1101     Blood Culture No growth at 3 days    Blood Culture - Blood, Hand, Left [[id number]]  (Normal) Collected: 12/02/21 0955    Specimen: Blood from Hand, Left Updated: 12/05/21 1015     Blood Culture No growth at 3 days    Basic Metabolic Panel [[id number]]  (Abnormal) Collected: 12/05/21 0750    Specimen: Blood Updated: 12/05/21 0813     Glucose 106 mg/dL      BUN 18 mg/dL      Creatinine 1.22 mg/dL      Sodium 142 mmol/L      Potassium 4.2 mmol/L      Chloride 111 mmol/L      CO2 17.8 mmol/L      Calcium 10.1 mg/dL      eGFR  African Amer 53 mL/min/1.73      BUN/Creatinine Ratio 14.8     Anion Gap 13.2 mmol/L     Narrative:      GFR Normal  >60  Chronic Kidney Disease <60  Kidney Failure <15      CBC (No Diff) [[id number]]  (Abnormal) Collected: 12/05/21 0750    Specimen: Blood Updated: 12/05/21 0756     WBC 4.79 10*3/mm3      RBC 4.10 10*6/mm3      Hemoglobin 10.5 g/dL      Hematocrit 32.5 %      MCV 79.3 fL      MCH 25.6 pg      MCHC 32.3 g/dL      RDW 18.9 %      RDW-SD 51.9 fl      MPV --     Comment: TNP        Platelets 124 10*3/mm3             Results from last 7 days   Lab Units 12/02/21  0955   TSH uIU/mL 0.332     Results from last 7 days   Lab Units 12/02/21  0955   PROBNP pg/mL 1,500.0*                         Glucose   Date/Time Value Ref Range Status   12/03/2021 1939 116 70 - 130 mg/dL Final     Comment:     Meter: [id number] : [id number] Praveen Hernández RN     Results from last 7 days   Lab Units 12/02/21  1055 12/02/21  0955   PROCALCITONIN ng/mL  --  0.04   LACTATE mmol/L 1.6  --      Results from last 7 days   Lab Units 12/02/21  1055 12/02/21  0955   BLOODCX  No growth at 3 days No growth at 3 days     Results from last 7 days   Lab Units 12/02/21  1050   NITRITE UA  Negative   WBC UA /HPF 0-2*   BACTERIA UA /HPF Trace*   SQUAM EPITHEL UA /HPF 0-2     Results from last 7 days   Lab Units 12/02/21  0957   INFLUENZA B PCR  Not Detected   INFLUENZA A PCR  Not Detected         Radiology:  Imaging Results (Last 24 Hours)     ** No results found for the last 24 hours. **          Cardiology:  ECG/EMG Results (last 24 hours)     ** No results found for the last 24 hours. **          I have reviewed recent labs results and consult notes.  Parts of this note may have been copied and pasted but patient was examined and interviewed by me today    Assessment and Plan:      1.  Aspiration pneumonia patient on cefepime and vancomycin supportive care.       2.  Profound hypothermia. He had some recurrence last night has improved with treatment this morning    3.  Chronic atrial fibrillation on anticoagulation rate controlled continue with  home Xarelto.       4. Hypertension significant elevated this morning resume home amlodipine IV as needed hydralazine ordered     5.  [place] seizure disorder continue with Keppra     6.  Metabolic encephalopathy persistent not at baseline continue with supportive care    7.  Hypothyroidism continue with current medication     8.  Mild renal insufficiency on background chronic kidney disease stage III  at baseline      9.  Chronic diastolic congestive heart failure she is euvolemic     10.  Moderate protein calorie malnutrition nutritional consult and supportive care     11.  Anemia iron studies B12 folate are normal likely anemia chronic disease we will monitor     12.  ADLs below baseline PT and OT evaluate the patient                  Total critical care time was 34  minutes, excluding any separately billable procedure time    Much of this encounter note is an electronic transcription/translation of spoken language to printed text using Dragon Software

## 2021-12-05 NOTE — NURSING NOTE
Transported to Med Surg 1410 after report given to Rachelle Paredes RN.  Pt remains anxious. Reassured as approppriate. Placed on Telemetry prior to transfer.

## 2021-12-05 NOTE — PLAN OF CARE
Goal Outcome Evaluation: No Change                Pt awake most of night with hallucinations.  Rectal probe readings pt's body temp in low 90's, Room heater turned up and Ashlyn hugger applied.  Pt's body temp still low according to probe; probe assessed and repositioned with no change.  RN was able to get oral temp of 97.7.  BP of 150's/110's throughout morning, manual BP taken, still high.  MD notified, no new orders.

## 2021-12-06 NOTE — PLAN OF CARE
[address] [address]  Outpatient Physical Therapy    Treatment Note        Date: 3/26/2018  Patient: Rashaun Mtz  : 1965  ACCT #: [de-identified]  Referring Practitioner: Petr Clarke   Diagnosis: Strain of neck mm; Acute B LBP without sciatica     Visit Information:  PT Visit Information  PT Insurance Information: Generic [employer] Ins   Total # of Visits to Date: 3  No Show: 0  Canceled Appointment: 0  Progress Note Counter: 3/8-    Subjective: Pt presenting to appt reporting 8/10 pain and tightness in Rt shldr blade region. Pt w/ 5/10 pain in LB. Pt states \"I'll feel better when I leave here. \"   Comments: RTD 5/10/18   HEP Compliance:  [x] Good [] Fair [] Poor [] Reports not doing due to:    Vital Signs  Patient Currently in Pain: Yes   Pain Screening  Patient Currently in Pain: Yes  Pain Assessment  Pain Level: 8  Pain Type: Acute pain  Pain Location: Back  Pain Orientation: Mid;Upper;Right  Pain Descriptors: Tightness  Pain Frequency: Continuous  Clinical Progression: Gradually improving    OBJECTIVE:   Exercises  Exercise 1: LTR 5\"x10   Exercise 3: NS L 2 x 5 minutes (w/ UEs for dynamic warmup)   Exercise 6: HS stretch 3 x 20 seconds (long sitting)   Exercise 9: corner stretch 3x30\"   Exercise 10: R UT stretch 3 x 20 seconds  Exercise 11: scap retractions 5\"x10   Exercise 14: R shoulder scaption table slides 10 sec x 10  Exercise 15: Barrel stretch 10\"x5   Exercise 16: Post cap stretch 10\"x5   Exercise 20: HEP: UT stretch, corner stretch, barrel stretch, post cap stretch, scap retract    Strength: [x] NT  [] MMT completed:     ROM: [x] NT  [] ROM measurements:       Manual:   Manual therapy  Soft Tissue Mobalization: R periscapulars, UT as tolerated in sittting w/ tennis ball  8 min     Modalities:  Modalities  Cryotherapy (Minutes\Location): x10 min to LB with IFC and R periscapular region to decrease pain  E-stim (parameters): Pre mod to LB and Rt parascap x10 min with CP to decrease Goal Outcome Evaluation:              Outcome Summary: PT: Patient confused and anxious this AM with poor command following for attempted mobility.  Patient dependent x2 for supine to sit and pushed self into full extension with significant leaning posteriorly.  Patient did not correct with any verbal/tactile cues and unable to remain safely seated on EOB.  Patient dependent x2 for sit to supine transfers, would recommend mechanical lift for out of bed transfers at this time due to cognition and mobility status.

## 2021-12-06 NOTE — PROGRESS NOTES
"Daily Progress Note:      Chief complaint: Follow-up of hypothermia, hypotension, sepsis, aspiration pneumonia, encephalopathy    Subjective: No overnight events noted.  Has maintained her body temperature.  Still remains very confused much less than baseline          LOS: 4 days     Vital Signs  Temp:  [95.72 °F (35.4 °C)-98.3 °F (36.8 °C)] 97.6 °F (36.4 °C)  Heart Rate:  [] 90  Resp:  [18-24] 24  BP: (101-165)/() 164/80  Oxygen Therapy  SpO2: 95 %  Pulse Oximetry Type: Intermittent  Device (Oxygen Therapy): room air}  Body mass index is 16.87 kg/m².  Flowsheet Rows      First Filed Value   Admission Height 160 cm (63\") Documented at 12/02/2021 0924   Admission Weight 43.5 kg (96 lb) Documented at 12/02/2021 0924                   Documented weights    12/02/21 0924 12/02/21 1830   Weight: 43.5 kg (96 lb) 43.2 kg (95 lb 3.2 oz)           Patient Vitals for the past 24 hrs:   BP Temp Temp src Pulse Resp SpO2   12/06/21 0500 164/80 97.6 °F (36.4 °C) Axillary 90 24 95 %   12/05/21 2054 131/74 98.3 °F (36.8 °C) Axillary 104 22 97 %   12/05/21 2000 -- -- -- -- -- 96 %   12/05/21 1810 -- 98.06 °F (36.7 °C) -- 107 -- 94 %   12/05/21 1800 126/85 97.88 °F (36.6 °C) -- 106 22 90 %   12/05/21 1700 (!) 150/104 97.34 °F (36.3 °C) -- 92 -- 92 %   12/05/21 1600 102/84 97.34 °F (36.3 °C) -- 85 -- 94 %   12/05/21 1500 138/93 97.34 °F (36.3 °C) Rectal 101 18 95 %   12/05/21 1400 108/72 96.8 °F (36 °C) -- 96 -- 96 %   12/05/21 1309 -- -- -- -- -- 94 %   12/05/21 1301 101/81 96.26 °F (35.7 °C) -- -- -- 94 %   12/05/21 1242 (!) 144/103 96.08 °F (35.6 °C) -- 100 -- (!) 47 %   12/05/21 1200 (!) 149/114 95.72 °F (35.4 °C) -- 89 -- 96 %   12/05/21 1100 144/91 -- -- 94 -- --   12/05/21 1015 -- -- -- 82 -- --   12/05/21 1000 137/99 -- -- 98 -- 94 %   12/05/21 0940 (!) 130/104 -- -- 101 -- 100 %   12/05/21 0935 -- -- -- 96 -- 95 %   12/05/21 0921 150/100 97.5 °F (36.4 °C) Oral 95 20 --   12/05/21 0915 150/100 -- -- 98 -- -- "   12/05/21 0912 (!) 154/111 -- -- 96 -- --   12/05/21 0903 (!) 154/111 -- -- 96 -- --   12/05/21 0900 (!) 165/123 -- -- 93 -- --   12/05/21 0812 -- -- -- -- -- 94 %       43.2 kg (95 lb 3.2 oz)    Intake/Output                       12/04/21 0701 - 12/05/21 0700 12/05/21 0701 - 12/06/21 0700     1943-4600 [id number] Total 7421-2322 5380-8095 Total                 Intake    P.O.  220  -- 220  160  50 210    IV Piggyback  50  -- 50  300  -- 300    Total Intake 270 -- 270 460 50 510       Output    Urine  350  275 625  400  450 850    Total Output 350 275 625 400 450 850           Intake/Output Summary (Last 24 hours) at 12/6/2021 0748  Last data filed at 12/6/2021 0500  Gross per 24 hour   Intake 510 ml   Output 850 ml   Net -340 ml        Intake/Output Summary (Last 24 hours) at 12/6/2021 0748  Last data filed at 12/6/2021 0500  Gross per 24 hour   Intake 510 ml   Output 850 ml   Net -340 ml        Review of Systems   Unable to perform ROS: Acuity of condition       Physical Exam  Vitals and nursing note reviewed.   Constitutional:       General: She is not in acute distress.     Appearance: She is well-developed and underweight. She is ill-appearing.   HENT:      Head: Normocephalic.   Eyes:      Conjunctiva/sclera: Conjunctivae normal.   Neck:      Thyroid: No thyromegaly.      Vascular: No JVD.   Cardiovascular:      Rate and Rhythm: Normal rate. Rhythm irregularly irregular.      Heart sounds: Normal heart sounds. No murmur heard.      Pulmonary:      Effort: Pulmonary effort is normal. No respiratory distress.      Breath sounds: Normal breath sounds. No wheezing or rales.   Abdominal:      General: Bowel sounds are normal. There is no distension.      Palpations: Abdomen is soft.      Tenderness: There is no abdominal tenderness. There is no guarding.   Musculoskeletal:      Cervical back: Normal range of motion.      Right lower leg: Edema (Trace bilateral) present.      Left lower leg: Edema present.   Skin:      General: Skin is warm and dry.      Findings: No rash.   Neurological:      General: No focal deficit present.      Mental Status: She is alert and easily aroused. She is disoriented.      Cranial Nerves: Cranial nerves are intact. No cranial nerve deficit.      Motor: Motor function is intact. No weakness.      Comments: Oriented to person only follows commands   Psychiatric:         Attention and Perception: Attention normal.         Mood and Affect: Mood normal.         Speech: Speech is delayed.         Cognition and Memory: Cognition is impaired. Memory is impaired.         Medication Review:   I have reviewed the patient's current medication list  Scheduled Meds:amLODIPine, 5 mg, Oral, Q24H  aspirin, 81 mg, Oral, Daily  cefepime, 1 g, Intravenous, Q24H  levETIRAcetam, 1,000 mg, Oral, Q12H  levothyroxine, 50 mcg, Oral, Q AM  pantoprazole, 40 mg, Intravenous, Q AM  rivaroxaban, 20 mg, Oral, Daily  sodium chloride, 10 mL, Intravenous, Q12H  vancomycin, 750 mg, Intravenous, Q24H      Continuous Infusions:Pharmacy Consult - Pharmacy to dose,       PRN Meds:.•  acetaminophen **OR** acetaminophen **OR** acetaminophen  •  aluminum-magnesium hydroxide-simethicone  •  atropine  •  calcium carbonate  •  hydrALAZINE  •  LORazepam  •  nitroglycerin  •  ondansetron **OR** ondansetron  •  Pharmacy Consult - Pharmacy to dose  •  sodium chloride  •  [COMPLETED] Insert peripheral IV **AND** sodium chloride  •  sodium chloride      Labs:  Results from last 7 days   Lab Units 12/06/21  0312 12/05/21  0750 12/04/21  0505 12/03/21  0422 12/03/21  0422 12/02/21  0955 12/02/21  0955   WBC 10*3/mm3 4.71 4.79 3.52  --  3.87  --  2.38*   HEMOGLOBIN g/dL 10.6* 10.5* 10.0*   < > 9.8*   < > 11.9*   HEMATOCRIT % 32.8* 32.5* 30.5*  --  29.8*  --  37.0   PLATELETS 10*3/mm3 144 124* 113*  --  108*  --  107*   MONOCYTES % %  --   --  10.0  --   --   --  16.0*    < > = values in this interval not displayed.     Results from last 7 days   Lab  Units 12/06/21  0312 12/05/21  0750 12/04/21  0505 12/03/21  0422 12/02/21  0955   SODIUM mmol/L 143 142 142 136 136   POTASSIUM mmol/L 4.6 4.2 4.4 5.0 5.0   CHLORIDE mmol/L 110* 111* 111* 103 102   CO2 mmol/L 18.9* 17.8* 19.2* 20.5* 16.9*   BUN mg/dL 16 18 20 27* 27*   CREATININE mg/dL 1.12* 1.22* 1.37* 1.43* 1.40*   CALCIUM mg/dL 10.4 10.1 9.0 8.9 10.4   BILIRUBIN mg/dL 1.2  --  0.7  --  0.6   ALK PHOS U/L 316*  --  228*  --  249*   ALT (SGPT) U/L 46*  --  49*  --  74*   AST (SGOT) U/L 42*  --  47*  --  67*   GLUCOSE mg/dL 103* 106* 82 63* 107*           Results from last 7 days   Lab Units 12/06/21 0312 12/05/21  0750 12/04/21  0505 12/03/21  0422 12/02/21  1055 12/02/21  0955   AST (SGOT) U/L 42*  --  47*  --   --  67*   ALT (SGPT) U/L 46*  --  49*  --   --  74*   PROCALCITONIN ng/mL  --   --   --   --   --  0.04   LACTATE mmol/L  --   --   --   --  1.6  --    PLATELETS 10*3/mm3 144 124* 113*   < >  --  107*    < > = values in this interval not displayed.         Lab Results (last 24 hours)     Procedure Component Value Units Date/Time    Comprehensive Metabolic Panel [[id number]]  (Abnormal) Collected: 12/06/21 0312    Specimen: Blood Updated: 12/06/21 0453     Glucose 103 mg/dL      BUN 16 mg/dL      Creatinine 1.12 mg/dL      Sodium 143 mmol/L      Potassium 4.6 mmol/L      Chloride 110 mmol/L      CO2 18.9 mmol/L      Calcium 10.4 mg/dL      Total Protein 7.0 g/dL      Albumin 3.50 g/dL      ALT (SGPT) 46 U/L      AST (SGOT) 42 U/L      Alkaline Phosphatase 316 U/L      Total Bilirubin 1.2 mg/dL      eGFR  African Amer 58 mL/min/1.73      Globulin 3.5 gm/dL      A/G Ratio 1.0 g/dL      BUN/Creatinine Ratio 14.3     Anion Gap 14.1 mmol/L     Narrative:      GFR Normal >60  Chronic Kidney Disease <60  Kidney Failure <15      CBC (No Diff) [[id number]]  (Abnormal) Collected: 12/06/21 0312    Specimen: Blood Updated: 12/06/21 0429     WBC 4.71 10*3/mm3      RBC 4.18 10*6/mm3      Hemoglobin 10.6 g/dL       Hematocrit 32.8 %      MCV 78.5 fL      MCH 25.4 pg      MCHC 32.3 g/dL      RDW 19.4 %      RDW-SD 51.3 fl      MPV --     Comment: Unable to calculate        Platelets 144 10*3/mm3     Blood Culture - Blood, Arm, Right [[id number]]  (Normal) Collected: 12/02/21 1055    Specimen: Blood from Arm, Right Updated: 12/05/21 1101     Blood Culture No growth at 3 days    Blood Culture - Blood, Hand, Left [[id number]]  (Normal) Collected: 12/02/21 0955    Specimen: Blood from Hand, Left Updated: 12/05/21 1015     Blood Culture No growth at 3 days    Basic Metabolic Panel [[id number]]  (Abnormal) Collected: 12/05/21 0750    Specimen: Blood Updated: 12/05/21 0813     Glucose 106 mg/dL      BUN 18 mg/dL      Creatinine 1.22 mg/dL      Sodium 142 mmol/L      Potassium 4.2 mmol/L      Chloride 111 mmol/L      CO2 17.8 mmol/L      Calcium 10.1 mg/dL      eGFR  African Amer 53 mL/min/1.73      BUN/Creatinine Ratio 14.8     Anion Gap 13.2 mmol/L     Narrative:      GFR Normal >60  Chronic Kidney Disease <60  Kidney Failure <15      CBC (No Diff) [[id number]]  (Abnormal) Collected: 12/05/21 0750    Specimen: Blood Updated: 12/05/21 0756     WBC 4.79 10*3/mm3      RBC 4.10 10*6/mm3      Hemoglobin 10.5 g/dL      Hematocrit 32.5 %      MCV 79.3 fL      MCH 25.6 pg      MCHC 32.3 g/dL      RDW 18.9 %      RDW-SD 51.9 fl      MPV --     Comment: TNP        Platelets 124 10*3/mm3             Results from last 7 days   Lab Units 12/02/21  0955   TSH uIU/mL 0.332     Results from last 7 days   Lab Units 12/02/21  0955   PROBNP pg/mL 1,500.0*                         Glucose   Date/Time Value Ref Range Status   12/03/2021 1939 116 70 - 130 mg/dL Final     Comment:     Meter: [id number] : [id number] Praveen Hernández RN     Results from last 7 days   Lab Units 12/02/21  1055 12/02/21  0955   PROCALCITONIN ng/mL  --  0.04   LACTATE mmol/L 1.6  --      Results from last 7 days   Lab Units 12/02/21  1055 12/02/21  0955   BLOODCX  No growth at  3 days No growth at 3 days     Results from last 7 days   Lab Units 12/02/21  1050   NITRITE UA  Negative   WBC UA /HPF 0-2*   BACTERIA UA /HPF Trace*   SQUAM EPITHEL UA /HPF 0-2     Results from last 7 days   Lab Units 12/02/21  0957   INFLUENZA B PCR  Not Detected   INFLUENZA A PCR  Not Detected         Radiology:  Imaging Results (Last 24 Hours)     Procedure Component Value Units Date/Time    XR Chest 1 View [[id number]] Collected: 12/05/21 1952     Updated: 12/05/21 1954    Narrative:      CR Chest 1 Vw    INDICATION:   Shortness of breath with pneumonia     COMPARISON:    December 2, 2021    FINDINGS:  Portable AP view(s) of the chest.        Cardiomegaly is unchanged. Upper lung field infiltrates are again noted and have not changed since the previous exam. There is volume loss of both lung bases with retraction of the diaphragms. No effusions are seen. Vascular markings are normal.       Impression:      Upper lung field infiltrates are unchanged from the previous exam. No new infiltrates are seen.    Signer Name: hTomas Farah MD   Signed: 12/5/2021 7:52 PM   Workstation Name: [id number]    [facility]          Cardiology:  ECG/EMG Results (last 24 hours)     ** No results found for the last 24 hours. **          I have reviewed recent labs results and consult notes.  Parts of this note may have been copied and pasted but patient was examined and interviewed by me today    Assessment and Plan:      1.  Aspiration pneumonia chest x-ray unchanged we will change to p.o. Augmentin    2.  Profound hypothermia. He had some recurrence last night has improved with treatment this morning    3.  Chronic atrial fibrillation on anticoagulation rate controlled continue with home Xarelto.       4. Hypertension improved with initiation of medication     5.  [place] seizure disorder continue with Keppra     6.  Metabolic encephalopathy persistent not at baseline continue with supportive  care    7.  Hypothyroidism continue with current medication     8.  Mild renal insufficiency on background chronic kidney disease stage III  at baseline .  Has history of chronic hydroureter hydronephrosis     9.  Chronic diastolic congestive heart failure she is euvolemic     10.  Moderate protein calorie malnutrition nutritional consult and supportive care     11.  Anemia iron studies B12 folate are normal likely anemia chronic disease we will monitor     12.  ADLs below baseline PT and OT evaluate the patient                      Much of this encounter note is an electronic transcription/translation of spoken language to printed text using Dragon Software

## 2021-12-06 NOTE — THERAPY TREATMENT NOTE
Acute Care - Occupational Therapy Treatment Note   Liana Hernández     Patient Name: Argelia Nguyen  : 1950  MRN: [id number]  Today's Date: 2021             Admit Date: 2021       ICD-10-CM ICD-9-CM   1. Aspiration pneumonia of right lower lobe, unspecified aspiration pneumonia type (HCC)  J69.0 507.0   2. Hypothermia, initial encounter  T68.XXXA 991.6   3. Oropharyngeal dysphagia  R13.12 787.22     Patient Active Problem List   Diagnosis   • MGUS (monoclonal gammopathy of unknown significance)   • Normocytic anemia   • Hiatal hernia   • Colon polyps   • Chronic anticoagulation   • Esophageal dysphagia   • Severe malnutrition (HCC)   • Longstanding persistent atrial fibrillation (HCC)   • Sepsis associated hypotension (HCC)   • Hydronephrosis due to obstruction of ureter   • Acute metabolic encephalopathy   • Valvular heart disease   • Aspiration pneumonia of right lower lobe (HCC)     Past Medical History:   Diagnosis Date   • Anxiety    • Arthritis    • Chronic anticoagulation 2019   • Chronic diastolic (congestive) heart failure (HCC)    • Colon polyp    • Dysphagia    • GERD (gastroesophageal reflux disease)    • Hiatal hernia 2017   • Hypertension    • Hypothyroidism    • Internal hemorrhoids 2017   • Monoclonal gammopathy 3/25/2013   • Nonrheumatic mitral valve regurgitation     has ranged from mild-mod to severe depending on the study   • Persistent atrial fibrillation (HCC)    • Pyelonephritis 2021   • Reflux gastritis    • Seizure (HCC)    • Tricuspid regurgitation     mod-severe vs severe   • Type 2 diabetes mellitus (HCC)      Past Surgical History:   Procedure Laterality Date   • COLONOSCOPY N/A 3/29/2017    Procedure: COLONOSCOPY; POLYPECTOMY;  Surgeon: Brooke Garrido MD;  Location: [facility] OR;  Service:    • COLONOSCOPY N/A 8/3/2020    Procedure: COLONOSCOPY with polypectomy;  Surgeon: Rui Shi MD;  Location: [facility] OR;  Service: Gastroenterology;   Laterality: N/A;  ascending polyp  transverse polyp  rectal polyp   • CYST REMOVAL     • CYSTOSCOPY W/ URETERAL STENT PLACEMENT Right 4/13/2021    Procedure: CYSTOSCOPY URETERAL CATHETER/STENT INSERTION;  Surgeon: Onesimo Fuller MD;  Location: [facility] OR;  Service: Urology;  Laterality: Right;   • CYSTOSCOPY W/ URETERAL STENT PLACEMENT Right 4/28/2021    Procedure: CYSTOSCOPY URETERAL STENT REMOVAL;  Surgeon: Onesimo Fuller MD;  Location: [facility] OR;  Service: Urology;  Laterality: Right;  CYSTOSCOPY URETERAL STENT REMOVAL   • ENDOSCOPY N/A 3/29/2017    Procedure: ESOPHAGOGASTRODUODENOSCOPY WITH DILITATION;  Surgeon: Brooke Garrido MD;  Location: [facility] OR;  Service:    • ENDOSCOPY N/A 8/3/2020    Procedure: ESOPHAGOGASTRODUODENOSCOPY with biopsies;  Surgeon: Rui Shi MD;  Location: [facility] OR;  Service: Gastroenterology;  Laterality: N/A;  esophagitis  gastritis   • HERNIA REPAIR     • HYSTERECTOMY           OT ASSESSMENT FLOWSHEET (last 12 hours)     OT Evaluation and Treatment     Row Name 12/06/21 0832                   OT Time and Intention    Subjective Information --  Patient difficult to understand, high pitch squealing  -EN        Document Type therapy note (daily note)  -EN        Mode of Treatment occupational therapy  -EN        Patient Effort other (see comments)  confused, unable to follow commands  -EN        Comment Patient supine in bed. Patient difficult to understand, appeared anxious. RN present for most of treatment  -EN                  General Information    Patient/Family/Caregiver Comments/Observations Patient reclined in bed  -EN        Risks Reviewed patient:; LOB  -EN        Benefits Reviewed patient:; improve function; increase strength  -EN        Barriers to Rehab medically complex; previous functional deficit; cognitive status  -EN                  Cognition    Personal Safety Interventions nonskid shoes/slippers when out of bed  -EN                  Pain  Scale: Numbers Pre/Post-Treatment    Pretreatment Pain Rating --  did not c/o pain  -EN                  Bed Mobility    Supine-Sit [place] (Bed Mobility) verbal cues; dependent (less than 25% patient effort); 2 person assist  -EN        Bed Mobility, Safety Issues cognitive deficits limit understanding  -EN        Assistive Device (Bed Mobility) bed rails; draw sheet; head of bed elevated  -EN        Comment (Bed Mobility) Patient did not follow verbal commands for bed mobility. Was dependent of 2 for supine to sit  -EN                  Transfer Assessment/Treatment    Comment (Transfers) did not attempt due to poor sitting balance  -EN                  Balance    Comment, Balance Patient dependent X 2 for static sitting, pushed self in full extension and would not correct with cues.  -EN                  Plan of Care Review    Outcome Summary OT- Patient very confused and anxious this am and did not follow commands for mobility. Patient was dependent X 2 for supine to sit. Patient pushed self in full extension while seated EOB (posterior lean and arms/legs in full extension). Patient was dependent x 2 for sit to supine. Recommend mechanical lift for safe transfers at this time.  -EN                  Positioning and Restraints    Pre-Treatment Position in bed  -EN        Post Treatment Position bed  -EN        In Bed with nsg; supine; call light within reach; encouraged to call for assist; exit alarm on  RN and CNA in room  -EN                  Progress Summary (OT)    Progress Toward Functional Goals (OT) unable to show any progress toward functional goals  -EN        Daily Progress Summary (OT) declining  -EN        Barriers to Overall Progress (OT) cognition  -EN              User Key  (r) = Recorded By, (t) = Taken By, (c) = Cosigned By    Initials Name Effective Dates    EN Carmen Duarte OTR 06/16/21 -                  Occupational Therapy Education                 Title: PT OT SLP Therapies (In  Progress)     Topic: Occupational Therapy (In Progress)     Point: ADL training (In Progress)     Description:   Instruct learner(s) on proper safety adaptation and remediation techniques during self care or transfers.   Instruct in proper use of assistive devices.              Learning Progress Summary           Patient Acceptance, E, NL by EN at 12/6/2021 1114    Comment: educated on bed mobility    Acceptance, E, VU by EN at 12/4/2021 0955    Comment: safety during trasnfers    Acceptance, E, VU by EN at 12/3/2021 1440    Comment: Safety during transfers                   Point: Home exercise program (Not Started)     Description:   Instruct learner(s) on appropriate technique for monitoring, assisting and/or progressing therapeutic exercises/activities.              Learner Progress:  Not documented in this visit.                      User Key     Initials Effective Dates Name Provider Type Discipline    EN 06/16/21 -  Carmen Duarte OTR Occupational Therapist OT                  OT Recommendation and Plan  Planned Therapy Interventions (OT): BADL retraining, functional balance retraining, patient/caregiver education/training, strengthening exercise, transfer/mobility retraining  Therapy Frequency (OT): 5 times/wk  Progress Toward Functional Goals (OT): unable to show any progress toward functional goals  Plan of Care Review  Plan of Care Reviewed With: patient  Outcome Summary: OT- Patient very confused and anxious this am and did not follow commands for mobility. Patient was dependent X 2 for supine to sit. Patient pushed self in full extension while seated EOB (posterior lean and arms/legs in full extension). Patient was dependent x 2 for sit to supine. Recommend mechanical lift for safe transfers at this time.  Plan of Care Reviewed With: patient  Outcome Summary: OT- Patient very confused and anxious this am and did not follow commands for mobility. Patient was dependent X 2 for supine to sit.  Patient pushed self in full extension while seated EOB (posterior lean and arms/legs in full extension). Patient was dependent x 2 for sit to supine. Recommend mechanical lift for safe transfers at this time.     Outcome Measures     Row Name 12/06/21 1100 12/04/21 0933 12/04/21 0930       How much help from another person do you currently need...    Turning from your back to your side while in flat bed without using bedrails? -- -- 2  -BP    Moving from lying on back to sitting on the side of a flat bed without bedrails? -- -- 1  -BP    Moving to and from a bed to a chair (including a wheelchair)? -- -- 2  -BP    Standing up from a chair using your arms (e.g., wheelchair, bedside chair)? -- -- 2  -BP    Climbing 3-5 steps with a railing? -- -- 1  -BP    To walk in hospital room? -- -- 1  -BP    AM-PAC 6 Clicks Score (PT) -- -- 9  -BP       How much help from another is currently needed...    Putting on and taking off regular lower body clothing? 1  -EN 2  -EN --    Bathing (including washing, rinsing, and drying) 1  -EN 2  -EN --    Toileting (which includes using toilet bed pan or urinal) 1  -EN 2  -EN --    Putting on and taking off regular upper body clothing 2  -EN 3  -EN --    Taking care of personal grooming (such as brushing teeth) 2  -EN 3  -EN --    Eating meals 2  -EN 4  -EN --    AM-PAC 6 Clicks Score (OT) 9  -EN 16  -EN --       Functional Assessment    Outcome Measure Options -- -- AM-PAC 6 Clicks Basic Mobility (PT)  -BP          User Key  (r) = Recorded By, (t) = Taken By, (c) = Cosigned By    Initials Name Provider Type    EN Carmen Duarte OTR Occupational Therapist    BP Love Shaver, PT Physical Therapist                Time Calculation:    Time Calculation- OT     Row Name 12/06/21 7285             Time Calculation- OT    OT Start Time 0831  -EN      OT Stop Time 0842  -EN      OT Time Calculation (min) 11 min  -EN              Timed Charges    86315 - OT Therapeutic Activity Minutes  11  -EN              Total Minutes    Timed Charges Total Minutes 11  -EN       Total Minutes 11  -EN            User Key  (r) = Recorded By, (t) = Taken By, (c) = Cosigned By    Initials Name Provider Type    Carmen Resendez OTR Occupational Therapist              Therapy Charges for Today     Code Description Service Date Service Provider Modifiers Qty    [id number]  OT THERAPEUTIC ACT EA 15 MIN 12/6/2021 Carmen Duarte OTR GO 1               YAJAIRA Hernandez  12/6/2021

## 2021-12-06 NOTE — THERAPY TREATMENT NOTE
Acute Care - Physical Therapy Treatment Note   Liana Hernández     Patient Name: Argelia Nguyen  : 1950  MRN: [id number]  Today's Date: 2021      Visit Dx:     ICD-10-CM ICD-9-CM   1. Aspiration pneumonia of right lower lobe, unspecified aspiration pneumonia type (HCC)  J69.0 507.0   2. Hypothermia, initial encounter  T68.XXXA 991.6   3. Oropharyngeal dysphagia  R13.12 787.22     Patient Active Problem List   Diagnosis   • MGUS (monoclonal gammopathy of unknown significance)   • Normocytic anemia   • Hiatal hernia   • Colon polyps   • Chronic anticoagulation   • Esophageal dysphagia   • Severe malnutrition (HCC)   • Longstanding persistent atrial fibrillation (HCC)   • Sepsis associated hypotension (HCC)   • Hydronephrosis due to obstruction of ureter   • Acute metabolic encephalopathy   • Valvular heart disease   • Aspiration pneumonia of right lower lobe (HCC)     Past Medical History:   Diagnosis Date   • Anxiety    • Arthritis    • Chronic anticoagulation 2019   • Chronic diastolic (congestive) heart failure (HCC)    • Colon polyp    • Dysphagia    • GERD (gastroesophageal reflux disease)    • Hiatal hernia 2017   • Hypertension    • Hypothyroidism    • Internal hemorrhoids 2017   • Monoclonal gammopathy 3/25/2013   • Nonrheumatic mitral valve regurgitation     has ranged from mild-mod to severe depending on the study   • Persistent atrial fibrillation (HCC)    • Pyelonephritis 2021   • Reflux gastritis    • Seizure (HCC)    • Tricuspid regurgitation     mod-severe vs severe   • Type 2 diabetes mellitus (HCC)      Past Surgical History:   Procedure Laterality Date   • COLONOSCOPY N/A 3/29/2017    Procedure: COLONOSCOPY; POLYPECTOMY;  Surgeon: Brooke Garrido MD;  Location: [facility] OR;  Service:    • COLONOSCOPY N/A 8/3/2020    Procedure: COLONOSCOPY with polypectomy;  Surgeon: Rui Shi MD;  Location: [facility] OR;  Service: Gastroenterology;  Laterality: N/A;   ascending polyp  transverse polyp  rectal polyp   • CYST REMOVAL     • CYSTOSCOPY W/ URETERAL STENT PLACEMENT Right 4/13/2021    Procedure: CYSTOSCOPY URETERAL CATHETER/STENT INSERTION;  Surgeon: Onesimo Fuller MD;  Location:  [facility] OR;  Service: Urology;  Laterality: Right;   • CYSTOSCOPY W/ URETERAL STENT PLACEMENT Right 4/28/2021    Procedure: CYSTOSCOPY URETERAL STENT REMOVAL;  Surgeon: Onesimo Fuller MD;  Location:  [facility] OR;  Service: Urology;  Laterality: Right;  CYSTOSCOPY URETERAL STENT REMOVAL   • ENDOSCOPY N/A 3/29/2017    Procedure: ESOPHAGOGASTRODUODENOSCOPY WITH DILITATION;  Surgeon: Brooke Garrido MD;  Location: [facility] OR;  Service:    • ENDOSCOPY N/A 8/3/2020    Procedure: ESOPHAGOGASTRODUODENOSCOPY with biopsies;  Surgeon: Rui Shi MD;  Location: [facility] OR;  Service: Gastroenterology;  Laterality: N/A;  esophagitis  gastritis   • HERNIA REPAIR     • HYSTERECTOMY       PT Assessment (last 12 hours)     PT Evaluation and Treatment     Row Name 12/06/21 0830          Physical Therapy Time and Intention    Subjective Information --  pt difficult to understand, repeated high pitch squealing  -     Document Type therapy note (daily note)  -     Mode of Treatment physical therapy  -JW     Patient Effort other (see comments)  -     Symptoms Noted During/After Treatment --  confused, unable to follow commands consistently  -     Row Name 12/06/21 0830          General Information    Patient/Family/Caregiver Comments/Observations pt supine, RN present  -     Existing Precautions/Restrictions fall  cognition  -     Barriers to Rehab cognitive status; medically complex  -     Row Name 12/06/21 0830          Cognition    Personal Safety Interventions nonskid shoes/slippers when out of bed  -     Row Name 12/06/21 0830          Pain Scale: Word Pre/Post-Treatment    Pre/Posttreatment Pain Comment no c/o pain  -     Row Name 12/06/21 0830          Bed Mobility     Supine-Sit [place] (Bed Mobility) dependent (less than 25% patient effort); 2 person assist; verbal cues  -JW     Sit-Supine [place] (Bed Mobility) dependent (less than 25% patient effort); 2 person assist; verbal cues  -     Bed Mobility, Safety Issues cognitive deficits limit understanding  -     Assistive Device (Bed Mobility) bed rails; draw sheet; head of bed elevated  -     Comment (Bed Mobility) pt with poor command following during attempted transfer to EOB.  UPon sitting EOB, patient with full body extension and pushing posteriorly.  pt unable to remain safely sitting, returned to supine dependent x2  -     Row Name 12/06/21 0830          Transfers    Comment (Transfers) unsafe to attempt at this time  -     Row Name 12/06/21 0830          Balance    Comment, Balance dependent x2, full body extension and pushing posteriorly.  does not correct with cues  -     Row Name 12/06/21 0830          Plan of Care Review    Outcome Summary PT: Patient confused and anxious this AM with poor command following for attempted mobility.  Patient dependent x2 for supine to sit and pushed self into full extension with significant leaning posteriorly.  Patient did not correct with any verbal/tactile cues and unable to remain safely seated on EOB.  Patient dependent x2 for sit to supine transfers, would recommend mechanical lift for out of bed transfers at this time due to cognition and mobility status.  -     Row Name 12/06/21 0830          Positioning and Restraints    Pre-Treatment Position in bed  -     Post Treatment Position bed  -JW     In Bed supine; call light within reach; encouraged to call for assist; exit alarm on; with nsg  -     Row Name 12/06/21 0830          Progress Summary (PT)    Progress Toward Functional Goals (PT) unable to show any progress toward functional goals  -     Barriers to Overall Progress (PT) cognition  -           User Key  (r) = Recorded By, (t) = Taken By,  (c) = Cosigned By    Initials Name Provider Type    Evelin Kramer, PT Physical Therapist                Physical Therapy Education                 Title: PT OT SLP Therapies (In Progress)     Topic: Physical Therapy (In Progress)     Point: Mobility training (In Progress)     Learning Progress Summary           Patient Acceptance, E,TB, NR by  at 12/6/2021 1138    Acceptance, E,TB, NR by  at 12/4/2021 0955    Acceptance, E,TB, VU,NR by  at 12/3/2021 1431                   Point: Home exercise program (Not Started)     Learner Progress:  Not documented in this visit.                      User Key     Initials Effective Dates Name Provider Type Discipline     06/16/21 -  Love Shaver PT Physical Therapist PT    TENZIN 06/16/21 -  Evelin Chin PT Physical Therapist PT              PT Recommendation and Plan  Anticipated Discharge Disposition (PT): skilled nursing facility, extended care facility  Progress Summary (PT)  Progress Toward Functional Goals (PT): unable to show any progress toward functional goals  Barriers to Overall Progress (PT): cognition  Outcome Summary: PT: Patient confused and anxious this AM with poor command following for attempted mobility.  Patient dependent x2 for supine to sit and pushed self into full extension with significant leaning posteriorly.  Patient did not correct with any verbal/tactile cues and unable to remain safely seated on EOB.  Patient dependent x2 for sit to supine transfers, would recommend mechanical lift for out of bed transfers at this time due to cognition and mobility status.   Outcome Measures     Row Name 12/06/21 1100 12/06/21 0830 12/04/21 0933       How much help from another person do you currently need...    Turning from your back to your side while in flat bed without using bedrails? -- 1  -JW --    Moving from lying on back to sitting on the side of a flat bed without bedrails? -- 1  -JW --    Moving to and from a bed to a chair (including a  wheelchair)? -- 1  -JW --    Standing up from a chair using your arms (e.g., wheelchair, bedside chair)? -- 1  -JW --    Climbing 3-5 steps with a railing? -- 1  -JW --    To walk in hospital room? -- 1  -JW --    AM-PAC 6 Clicks Score (PT) -- 6  -JW --       How much help from another is currently needed...    Putting on and taking off regular lower body clothing? 1  -EN -- 2  -EN    Bathing (including washing, rinsing, and drying) 1  -EN -- 2  -EN    Toileting (which includes using toilet bed pan or urinal) 1  -EN -- 2  -EN    Putting on and taking off regular upper body clothing 2  -EN -- 3  -EN    Taking care of personal grooming (such as brushing teeth) 2  -EN -- 3  -EN    Eating meals 2  -EN -- 4  -EN    AM-PAC 6 Clicks Score (OT) 9  -EN -- 16  -EN       Functional Assessment    Outcome Measure Options -- AM-PAC 6 Clicks Basic Mobility (PT)  - --    Row Name 12/04/21 0930             How much help from another person do you currently need...    Turning from your back to your side while in flat bed without using bedrails? 2  -BP      Moving from lying on back to sitting on the side of a flat bed without bedrails? 1  -BP      Moving to and from a bed to a chair (including a wheelchair)? 2  -BP      Standing up from a chair using your arms (e.g., wheelchair, bedside chair)? 2  -BP      Climbing 3-5 steps with a railing? 1  -BP      To walk in hospital room? 1  -BP      AM-PAC 6 Clicks Score (PT) 9  -BP              Functional Assessment    Outcome Measure Options AM-PAC 6 Clicks Basic Mobility (PT)  -BP            User Key  (r) = Recorded By, (t) = Taken By, (c) = Cosigned By    Initials Name Provider Type    EN Carmen Duarte, OTR Occupational Therapist    Love Joyce, PT Physical Therapist    Evelin Kramer, PT Physical Therapist                 Time Calculation:    PT Charges     Row Name 12/06/21 1212             Time Calculation    Start Time 0830  -      Stop Time 0843  -      Time  Calculation (min) 13 min  -JW      PT Received On 12/06/21  -TENZIN      PT - Next Appointment 12/07/21  -              Timed Charges    02393 - PT Therapeutic Exercise Minutes 13  -JW              Total Minutes    Timed Charges Total Minutes 13  -JW       Total Minutes 13  -JW            User Key  (r) = Recorded By, (t) = Taken By, (c) = Cosigned By    Initials Name Provider Type    Evelin Kramer, PT Physical Therapist              Therapy Charges for Today     Code Description Service Date Service Provider Modifiers Qty    [id number] HC PT THER PROC EA 15 MIN 12/6/2021 Evelin Chin, PT GP 1          PT G-Codes  Outcome Measure Options: AM-PAC 6 Clicks Basic Mobility (PT)  AM-PAC 6 Clicks Score (PT): 6  AM-PAC 6 Clicks Score (OT): 9    Evelin Chin PT  12/6/2021

## 2021-12-06 NOTE — PLAN OF CARE
Goal Outcome Evaluation:  Plan of Care Reviewed With: patient           Outcome Summary: OT- Patient very confused and anxious this am and did not follow commands for mobility. Patient was dependent X 2 for supine to sit. Patient pushed self in full extension while seated EOB (posterior lean and arms/legs in full extension). Patient was dependent x 2 for sit to supine. Recommend mechanical lift for safe transfers at this time.

## 2021-12-06 NOTE — PLAN OF CARE
Goal Outcome Evaluation:  Plan of Care Reviewed With: patient           Outcome Summary: pt has times of what appears to be increased anxiety.  sat at pt bed and reassured pt that she was safe.  no other issues noted at this time.

## 2021-12-06 NOTE — THERAPY TREATMENT NOTE
Acute Care - Speech Language Pathology   Swallow Treatment Note  Liana Hernández     Patient Name: Argelia Nguyen  : 1950  MRN: [id number]  Today's Date: 2021               Admit Date: 2021    Visit Dx:     ICD-10-CM ICD-9-CM   1. Aspiration pneumonia of right lower lobe, unspecified aspiration pneumonia type (HCC)  J69.0 507.0   2. Hypothermia, initial encounter  T68.XXXA 991.6   3. Oropharyngeal dysphagia  R13.12 787.22     Patient Active Problem List   Diagnosis   • MGUS (monoclonal gammopathy of unknown significance)   • Normocytic anemia   • Hiatal hernia   • Colon polyps   • Chronic anticoagulation   • Esophageal dysphagia   • Severe malnutrition (HCC)   • Longstanding persistent atrial fibrillation (HCC)   • Sepsis associated hypotension (HCC)   • Hydronephrosis due to obstruction of ureter   • Acute metabolic encephalopathy   • Valvular heart disease   • Aspiration pneumonia of right lower lobe (HCC)     Past Medical History:   Diagnosis Date   • Anxiety    • Arthritis    • Chronic anticoagulation 2019   • Chronic diastolic (congestive) heart failure (HCC)    • Colon polyp    • Dysphagia    • GERD (gastroesophageal reflux disease)    • Hiatal hernia 2017   • Hypertension    • Hypothyroidism    • Internal hemorrhoids 2017   • Monoclonal gammopathy 3/25/2013   • Nonrheumatic mitral valve regurgitation     has ranged from mild-mod to severe depending on the study   • Persistent atrial fibrillation (HCC)    • Pyelonephritis 2021   • Reflux gastritis    • Seizure (HCC)    • Tricuspid regurgitation     mod-severe vs severe   • Type 2 diabetes mellitus (HCC)      Past Surgical History:   Procedure Laterality Date   • COLONOSCOPY N/A 3/29/2017    Procedure: COLONOSCOPY; POLYPECTOMY;  Surgeon: Brooke Garrido MD;  Location: [facility];  Service:    • COLONOSCOPY N/A 8/3/2020    Procedure: COLONOSCOPY with polypectomy;  Surgeon: Rui Shi MD;  Location: [facility] OR;   Service: Gastroenterology;  Laterality: N/A;  ascending polyp  transverse polyp  rectal polyp   • CYST REMOVAL     • CYSTOSCOPY W/ URETERAL STENT PLACEMENT Right 4/13/2021    Procedure: CYSTOSCOPY URETERAL CATHETER/STENT INSERTION;  Surgeon: Onesimo Fuller MD;  Location: [facility] OR;  Service: Urology;  Laterality: Right;   • CYSTOSCOPY W/ URETERAL STENT PLACEMENT Right 4/28/2021    Procedure: CYSTOSCOPY URETERAL STENT REMOVAL;  Surgeon: Onesimo Fuller MD;  Location: [facility] OR;  Service: Urology;  Laterality: Right;  CYSTOSCOPY URETERAL STENT REMOVAL   • ENDOSCOPY N/A 3/29/2017    Procedure: ESOPHAGOGASTRODUODENOSCOPY WITH DILITATION;  Surgeon: Brooke Garrido MD;  Location: [facility] OR;  Service:    • ENDOSCOPY N/A 8/3/2020    Procedure: ESOPHAGOGASTRODUODENOSCOPY with biopsies;  Surgeon: Rui Shi MD;  Location: [facility] OR;  Service: Gastroenterology;  Laterality: N/A;  esophagitis  gastritis   • HERNIA REPAIR     • HYSTERECTOMY         SLP Recommendation and Plan     SLP Diet Recommendation: soft textures, ground, nectar thick liquids (12/06/21 0915)  Recommended Precautions and Strategies: upright posture during/after eating, small bites of food and sips of liquid, check mouth frequently for oral residue/pocketing, general aspiration precautions, reflux precautions, fatigue precautions, 1:1 supervision, assist with feeding (12/06/21 0915)  SLP Rec. for Method of Medication Administration: meds whole, meds crushed, with pudding or applesauce, as tolerated (12/06/21 0915)     Monitor for Signs of Aspiration: no, notify SLP if any concerns (12/06/21 0915)  Recommended Diagnostics: reassess via clinical swallow evaluation, other (see comments) (for advancement of liquids to thins) (12/06/21 0915)     Anticipated Discharge Disposition (SLP): extended care facility (12/06/21 0915)     Therapy Frequency (Swallow): at least, 3 days per week (12/06/21 0915)  Predicted Duration Therapy  Intervention (Days): 2 weeks (12/06/21 0915)     Daily Summary of Progress (SLP): unable to show any progress toward functional goals (12/06/21 0915)         Patient/Family Concerns, Anticipated Discharge Disposition (SLP): Pt is unable to state at this time and no family currently present. (12/06/21 0915)       SWALLOW EVALUATION (last 72 hours)     SLP Adult Swallow Evaluation     Row Name 12/06/21 0915 12/03/21 4041                Rehab Evaluation    Document Type therapy note (daily note)  -AD evaluation  -AD       Subjective Information complains of  feeling 'sketchy'; anxious  -AD complains of; weakness  -AD       Patient Observations alert  confused  -AD alert; cooperative; agree to therapy  -AD       Patient/Family/Caregiver Comments/Observations Pt seen upright in bed for breakfast. PCA present and assisting pt with feeding. Pt refusing most po attempts. Very anxious but dificult to understanding at times. Tight  on bedrail w/left hand. Reaches out at times with right hand and attempting to grab things in the air. She reports she feels 'sketchy' but cannot elaborate further.  -AD Pt seen upright in video imaging chair, alert and cooperative. Appears fatigued and frail.  -AD       Patient Effort fair  -AD good  -AD       Symptoms Noted During/After Treatment other (see comments)  -AD fatigue  -AD       Symptoms Noted, Comment Anxious, ? visual hallucinations, confused, shaky arms  -AD --                General Information    Patient Profile Reviewed -- yes  -AD       Pertinent History Of Current Problem -- Pt is a 71 year old female admitted for generalized weakness. She wa found to be hypothermic and to have aspiration pneumonia of the RLL with unspecified type. CT of chest was remarkable for adv chr upper lung reticulondular dz w/chr volume loss and findings consistent for either sarcoidosis or silicosis. New airspace infiltrate in RLL greatest in dependent posterior basal segment. Pneumonitis is  likely, given presence of dilated fluid filled thoracic esophagus today (12/2/21). Aspiration pneumonia is additional consideration. Chronic right sided heart failure. Marked cardiomegaly and severe right heart chamber dilation. Edema and pleural effusiion present on prior studies have resolved.  -AD       Current Method of Nutrition -- NPO  -AD       Precautions/Limitations, Vision -- WFL; for purposes of eval  -AD       Precautions/Limitations, Hearing -- WFL; for purposes of eval  -AD       Prior Level of Function-Communication -- cognitive-linguistic impairment; motor speech impairment  -AD       Prior Level of Function-Swallowing -- mechanical soft textures  soft diet with chopped meats; prior NTL but resolved and returned to thin liquids by discharge in May 2021.  -AD       Plans/Goals Discussed with -- patient; other (see comments)  Pt indicates agreement but not sure of full understanding at this time.  -AD       Barriers to Rehab -- medically complex; previous functional deficit; cognitive status  swallowing deficit; esophageal dysphagia  -AD       Patient's Goals for Discharge -- patient could not state  -AD       Family Goals for Discharge -- other (see comments)  not present at time of examination  -AD                Pain    Additional Documentation --  Pt does not state pain, but difficult to get direct answers.  -AD --  no pain reported at this time  -AD                Oral Motor Structure and Function    Oral Lesions or Structural Abnormalities and/or variants -- none  -AD       Dentition Assessment -- natural, present and adequate; missing teeth  -AD       Secretion Management -- anterior loss; problems swallowing secretions; other (see comments)  excess secretions in mouth w/anterior loss  -AD       Mucosal Quality -- moist, healthy  -AD       Volitional Swallow -- delayed; weak  -AD       Volitional Cough -- weak  -AD                Oral Musculature and Cranial Nerve Assessment    Oral Motor  General Assessment -- generalized oral motor weakness  -AD       Oral Motor, Comment -- Generalized oral motor weakness of lips, tongue, jaw and larynx. Palate appears WFL.  -AD                General Eating/Swallowing Observations    Respiratory Support Currently in Use -- room air  -AD       Eating/Swallowing Skills -- fed by SLP; self-fed  cup/straw drinks self fed  -AD       Positioning During Eating -- upright 90 degree; upright in chair  -AD                Respiratory    Respiratory Status WFL; room air; during swallowing/eating  -AD WFL; room air; during swallowing/eating  -AD                MBS/VFSS    Utensils Used -- spoon; cup; straw  -AD       Consistencies Trialed -- regular textures; pureed; thin liquids; nectar/syrup-thick liquids; honey-thick liquids  -AD                MBS/VFSS Interpretation    Oral Prep Phase -- impaired oral phase of swallowing  -AD       Oral Transit Phase -- impaired  -AD       Oral Residue -- impaired  -AD       VFSS Summary -- Pt presents with an overall moderate oropharyngeal dysphagia. Moderate oral phase dysphagia with decreased control and anterior loss of secretions prior to the study. Pt with decreased bolus control and manipulation/transit for liquids. She utilized tongue pumping to move the bolus posteriorly in the oral cavity w/ all consistencies. Pooling of all material over the back of the tongue noted. Pt with premature spillage on one trial of thins from initial, small straw drink.Moderate pharyngeal dysphagia with greatest deficit of delay of swallow on all trials and consistencies ranging from 5 seconds to 11 seconds. Pooling of thins, nectar thick liquids, honey thick liquids and puree to the pyriforms. Solid to the valleculae only. Pt with mild vallecular residue on nectar thick and honey thick liquids. Mild to moderate vallecular and mild pyriform sinus residue on thins. Pt able to clear with both spontaneous and cued subsequent swallows. Trace, transient  penetration noted on small straw drink and mild to moderate, trace penetration on larger bolus of thins via straw. No aspiration seen and penetration did not reach a depth to the vocal folds during the study. No penetration was viewed on spoon size trials of thins. Delay of 5 seconds on thins, 9 seconds on nectar thick liquids, 6 seconds on honey thick liquids, 10-11 seconds on puree, 5 seconds on solids.  -AD       Oral Phase, Comment -- Pt presents with a moderate oral phase dysphagia with decreased control and anterior loss of secretions prior to the study. Pt with decreased bolus control and manipulation/transit for liquids. She utilized tongue pumping to move the bolus posteriorly in the oral cavity w/ all consistencies. Pooling of all material over the back of the tongue noted. Pt with premature spillage on one trial of thins from initial, small straw drink.  -AD                Oral Preparatory Phase    Oral Preparatory Phase -- anterior loss; other (see comments)  -AD       Anterior Loss -- other (see comments)  saliva  -AD       Oral Preparatory Phase, Comment -- anterior loss of secretions/saliva prior to the study  -AD                Oral Transit Phase    Impaired Oral Transit Phase -- tongue pumping; premature spillage of liquids into pharynx  -AD       Tongue Pumping -- all consistencies tested; secondary to reduced lingual control  -AD       Premature Spillage of Liquids into Pharynx -- thin liquids; secondary to reduced lingual control  -AD       Oral Transit Phase, Comment -- Decreased tongue control and bolus manipulation. Tongue pumping on all consistencies and premature spill of thins on initial trial from straw to valleculae.  -AD                Oral Residue    Impaired Oral Residue -- lingual residue; floor of mouth residue  -AD       Floor of Mouth Residue -- thin liquids; secondary to reduced lingual strength  -AD       Lingual Residue -- thin liquids; secondary to reduced lingual strength  -AD        Response to Oral Residue -- cleared residue; other (see comments)  -AD       Oral Residue, Comment -- Pt with mild lingual and floor of mouth residue of thins after the swallow due to decreased lingual strength and coordination. Able to partially clear with spontaneous subsequent swallows.  -AD                Initiation of Pharyngeal Swallow    Initiation of Pharyngeal Swallow -- bolus in valleculae; bolus in pyriform sinuses  -AD       Pharyngeal Phase -- impaired pharyngeal phase of swallowing  -AD       Penetration During the Swallow -- thin liquids; secondary to delayed swallow initiation or mistiming; secondary to reduced vestibular closure  -AD       Response to Penetration -- transient; shallow; no response; other (see comments)  did not reach the level of the vocal cords  -AD       Rosenbek's Scale -- thin:; 3--->level 3; nectar:; honey:; pudding/puree:; regular textures:; 1--->level 1  -AD       Pharyngeal Residue -- thin liquids; nectar-thick liquids; secondary to reduced base of tongue retraction  -AD       Response to Residue -- cleared residue with spontaneous subsequent swallow; cleared residue with cued swallow  cued swallow on trials of thins  -AD       Attempted Compensatory Maneuvers -- bolus size; additional subsequent swallow  -AD       Response to Attempted Compensatory Maneuvers -- prevented penetration; reduced residue  -AD       Pharyngeal Phase, Comment -- Pt presents with a moderate pharyngeal dysphagia with greatest deficit of delay of swallow on all trials and consistencies ranging from 5 seconds to 11 seconds. Pooling of thins, nectar thick liquids, honey thick liquids and puree to the pyriforms. Solid to the valleculae only. Pt with mild vallecular residue on nectar thick and honey thick liquids. Mild to moderate vallecular and mild pyriform sinus residue on thins. Pt able to clear with both spontaneous and cued subsequent swallows. Trace, transient penetration noted on small  straw drink and mild to moderate, trace penetration on larger bolus of thins via straw. No aspiration seen and penetration did not reach a depth to the vocal folds during the study. No penetration was viewed on spoon size trials of thins. Delay of 5 seconds on thins, 9 seconds on nectar thick liquids, 6 seconds on honey thick liquids, 10-11 seconds on puree, 5 seconds on solids.  -AD                Esophageal Phase    Esophageal Phase, Comment -- Not viewed at this time due to difficulty with positioning pt in AP view.  -AD                Clinical Impression    SLP Swallowing Diagnosis -- moderate; oral dysphagia; pharyngeal dysphagia  -AD       Functional Impact -- risk of aspiration/pneumonia; risk of malnutrition  -AD       Rehab Potential/Prognosis, Swallowing -- adequate, monitor progress closely  -AD       Swallow Criteria for Skilled Therapeutic Interventions Met -- demonstrates skilled criteria  -AD                SLP Treatment Clinical Impressions    Treatment Assessment (SLP) No significant progress towards her functional goals during this session due to confusion and not wanting to attempt or take in much po. Appears to take liquids best from a cup with better control on bolus size. Needs assistance currently due to weakness and confusion. Tolerating small drinks of NTL and small bites of mechanical soft ground items, but poor intake over w/less than 5% of meal/fluid intake.  -AD --       Daily Summary of Progress (SLP) unable to show any progress toward functional goals  -AD --       Barriers to Overall Progress (SLP) Cognitive status; Medically complex; Other (see comments)  confusion/anxiety  -AD --       Plan for Continued Treatment (SLP) continue treatment per plan of care  -AD --       Care Plan Review evaluation/treatment results reviewed; care plan/treatment goals reviewed; risks/benefits reviewed; current/potential barriers reviewed  pt unable to indicate understanding  -AD --       Care Plan  Review, Other Participant(s) other (see comments)  Merry WILSON  -AD --                Recommendations    Therapy Frequency (Swallow) at least; 3 days per week  -AD at least; 3 days per week  -AD       Predicted Duration Therapy Intervention (Days) 2 weeks  -AD 2 weeks  -AD       SLP Diet Recommendation soft textures; ground; nectar thick liquids  -AD soft textures; ground; nectar thick liquids  -AD       Recommended Diagnostics reassess via clinical swallow evaluation; other (see comments)  for advancement of liquids to thins  -AD reassess via clinical swallow evaluation; other (see comments)  for advancement of liquids to thins  -AD       Recommended Precautions and Strategies upright posture during/after eating; small bites of food and sips of liquid; check mouth frequently for oral residue/pocketing; general aspiration precautions; reflux precautions; fatigue precautions; 1:1 supervision; assist with feeding  -AD upright posture during/after eating; small bites of food and sips of liquid; check mouth frequently for oral residue/pocketing; general aspiration precautions; reflux precautions; fatigue precautions; 1:1 supervision; assist with feeding  -AD       Oral Care Recommendations Oral Care before breakfast, after meals and PRN; Toothbrush  -AD Oral Care before breakfast, after meals and PRN; Toothbrush  -AD       SLP Rec. for Method of Medication Administration meds whole; meds crushed; with pudding or applesauce; as tolerated  -AD meds whole; meds crushed; with pudding or applesauce; as tolerated  -AD       Monitor for Signs of Aspiration no; notify SLP if any concerns  -AD no; notify SLP if any concerns  -AD       Anticipated Discharge Disposition (SLP) extended care facility  -AD extended care facility  -AD       Patient/Family Concerns, Anticipated Discharge Disposition (SLP) Pt is unable to state at this time and no family currently present.  -AD Pt is unable to state at this time.  -AD                 Swallow Goals (SLP)    Oral Nutrition/Hydration Goal Selection (SLP) -- oral nutrition/hydration, SLP goal 1  -AD       Lingual Strengthening Goal Selection (SLP) -- lingual strengthening, SLP goal 1  -AD       Additional Documentation -- lingual strengthening goal selection (SLP)  -AD                Oral Nutrition/Hydration Goal 1 (SLP)    Oral Nutrition/Hydration Goal 1, SLP LTG:  Pt will demonstrate tolerance of a soft, ground diet with thin liquids w/o clinical s/s of aspiration or complications such as aspiration pneumonia.  -AD Pt will demonstrate tolerance of a soft, ground diet with thin liquids w/o clinical s/s of aspiration or complications such as aspiration pneumonia.  -AD       Time Frame (Oral Nutrition/Hydration Goal 1, SLP) 2 weeks  -AD 2 weeks; other (see comments)  LTG  -AD       Barriers (Oral Nutrition/Hydration Goal 1, SLP) medically complex; suspected esophageal dysphagia and prior deficit/cognitive deficit  -AD medically complex; suspected esophageal dysphagia and prior deficit/cognitive deficit  -AD       Progress/Outcomes (Oral Nutrition/Hydration Goal 1, SLP) unable to make needed progress; other (see comments)  limitations due to cognition/confusion/poor intake  -AD other (see comments)  New  -AD                Lingual Strengthening Goal 1 (SLP)    Activity (Lingual Strengthening Goal 1, SLP) increase lingual tone/sensation/control/coordination/movement; increase tongue back strength  -AD increase lingual tone/sensation/control/coordination/movement; increase tongue back strength  -AD       Increase Lingual Tone/Sensation/Control/Coordination/Movement lingual movement exercises  control/coordination  -AD lingual movement exercises  control/coordination  -AD       Increase Tongue Back Strength lingual movement exercises; lingual resistance exercises  -AD lingual movement exercises; lingual resistance exercises  -AD       [place]/Accuracy (Lingual Strengthening Goal 1, SLP) with  moderate cues (50-74% accuracy)  -AD with moderate cues (50-74% accuracy)  -AD       Time Frame (Lingual Strengthening Goal 1, SLP) short term goal (STG); 1 week  -AD short term goal (STG); 1 week  -AD       Barriers (Lingual Strengthening Goal 1, SLP) medically complex; suspected esophageal dysphagia and prior deficit/cognitive deficit  -AD medically complex; suspected esophageal dysphagia and prior deficit/cognitive deficit  -AD       Progress/Outcomes (Lingual Strengthening Goal 1, SLP) unable to make needed progress; medical status inhibiting progress; goal ongoing; other (see comments)  Pt unable to follow commands for exercises/participation at this time.  -AD other (see comments)  New  -AD             User Key  (r) = Recorded By, (t) = Taken By, (c) = Cosigned By    Initials Name Effective Dates    Viky Meier, MS CCC-SLP 06/16/21 -                 EDUCATION  The patient has been educated in the following areas:   Dysphagia (Swallowing Impairment) Modified Diet Instruction. Pt is unable to demonstrate understanding of teaching at this time due to confusion. PCA understanding of diet and demonstrates appropriate safe feeding of modified diet.       SLP GOALS     Row Name 12/06/21 0915 12/03/21 1340          Oral Nutrition/Hydration Goal 1 (SLP)    Oral Nutrition/Hydration Goal 1, SLP LTG:  Pt will demonstrate tolerance of a soft, ground diet with thin liquids w/o clinical s/s of aspiration or complications such as aspiration pneumonia.  -AD Pt will demonstrate tolerance of a soft, ground diet with thin liquids w/o clinical s/s of aspiration or complications such as aspiration pneumonia.  -AD     Time Frame (Oral Nutrition/Hydration Goal 1, SLP) 2 weeks  -AD 2 weeks; other (see comments)  LTG  -AD     Barriers (Oral Nutrition/Hydration Goal 1, SLP) medically complex; suspected esophageal dysphagia and prior deficit/cognitive deficit  -AD medically complex; suspected esophageal dysphagia and prior  deficit/cognitive deficit  -AD     Progress/Outcomes (Oral Nutrition/Hydration Goal 1, SLP) unable to make needed progress; other (see comments)  limitations due to cognition/confusion/poor intake  -AD other (see comments)  New  -AD            Lingual Strengthening Goal 1 (SLP)    Activity (Lingual Strengthening Goal 1, SLP) increase lingual tone/sensation/control/coordination/movement; increase tongue back strength  -AD increase lingual tone/sensation/control/coordination/movement; increase tongue back strength  -AD     Increase Lingual Tone/Sensation/Control/Coordination/Movement lingual movement exercises  control/coordination  -AD lingual movement exercises  control/coordination  -AD     Increase Tongue Back Strength lingual movement exercises; lingual resistance exercises  -AD lingual movement exercises; lingual resistance exercises  -AD     [place]/Accuracy (Lingual Strengthening Goal 1, SLP) with moderate cues (50-74% accuracy)  -AD with moderate cues (50-74% accuracy)  -AD     Time Frame (Lingual Strengthening Goal 1, SLP) short term goal (STG); 1 week  -AD short term goal (STG); 1 week  -AD     Barriers (Lingual Strengthening Goal 1, SLP) medically complex; suspected esophageal dysphagia and prior deficit/cognitive deficit  -AD medically complex; suspected esophageal dysphagia and prior deficit/cognitive deficit  -AD     Progress/Outcomes (Lingual Strengthening Goal 1, SLP) unable to make needed progress; medical status inhibiting progress; goal ongoing; other (see comments)  Pt unable to follow commands for exercises/participation at this time.  -AD other (see comments)  New  -AD           User Key  (r) = Recorded By, (t) = Taken By, (c) = Cosigned By    Initials Name Provider Type    AD Viky Aguirre MS CCC-SLP Speech and Language Pathologist                   Time Calculation:    Time Calculation- SLP     Row Name 12/06/21 1121             Time Calculation- SLP    SLP Start Time 0900  -AD       SLP Stop Time 0915  -AD      SLP Time Calculation (min) 15 min  -AD      Total Timed Code Minutes- SLP 0 minute(s)  -AD      SLP Non-Billable Time (min) 0 min  -AD      SLP Received On 12/06/21  -AD              Untimed Charges    [id number] Treatment Swallow Minutes 15  -AD              Total Minutes    Untimed Charges Total Minutes 15  -AD       Total Minutes 15  -AD            User Key  (r) = Recorded By, (t) = Taken By, (c) = Cosigned By    Initials Name Provider Type    AD Viky Aguirre, MS CCC-SLP Speech and Language Pathologist                Therapy Charges for Today     Code Description Service Date Service Provider Modifiers Qty    [id number] [facility] TREATMENT SWALLOW 1 12/6/2021 Viky Aguirre MS CCC-SLP GN 1               Viky Aguirre MS CCC-SLP  12/6/2021

## 2021-12-06 NOTE — PROGRESS NOTES
Adult Nutrition  Assessment/PES    Patient Name:  Argelia Nguyen  YOB: 1950  MRN: [id number]  Admit Date:  12/2/2021    Assessment Date:  12/6/2021    Comments:  Received order for calorie count, folder in place on door for data collection.  At this time pt is clearing not meeting needs, hardly able to hold cup at visit this am, assist pt with all meals/feeding.  Will follow calorie count . Clarify if care goals re: nutrition.      Reason for Assessment     Row Name 12/06/21 1141          Reason for Assessment    Reason For Assessment calorie count order; follow-up protocol     Diagnosis neurologic conditions  Aspiration PNA, Profound Hypothermia, Met Enceph , CHF evoluemic, hx HTN Sz                Nutrition/Diet History     Row Name 12/06/21 1142          Nutrition/Diet History    Typical Food/Fluid Intake Pt with SLP at visit this am, looking to right almost grabbing at things. SLP assisted pt in holding cup ( appears very shakey) to take a drink.                  Labs/Tests/Procedures/Meds     Row Name 12/06/21 1142          Labs/Procedures/Meds    Lab Results Reviewed reviewed     Lab Results Comments Crat 1.1            Diagnostic Tests/Procedures    Diagnostic Test/Procedure Reviewed reviewed            Medications    Pertinent Medications Reviewed reviewed     Pertinent Medications Comments MVT, risaquad                    Nutrition Prescription Ordered     Row Name 12/06/21 1143          Nutrition Prescription PO    Current PO Diet Soft Texture     Texture Ground     Fluid Consistency Nectar/syrup thick     Supplement Boost Plus (Ensure Enlive, Ensure Plus)     Supplement Frequency 3 times a day                Evaluation of Received Nutrient/Fluid Intake     Row Name 12/06/21 1143          Fluid Intake Evaluation    Oral Fluid (mL) 215  ave x 2, 17%            PO Evaluation    Number of Meals 7     % PO Intake 32                     Problem/Interventions:   Problem 1     Row Name 12/06/21  1144          Nutrition Diagnoses Problem 1    Problem 1 Malnutrition     Etiology (related to) Factors Affecting Nutrition; Medical Diagnosis     Signs/Symptoms (evidenced by) Report/Observation                      Intervention Goal     Row Name 12/06/21 1144          Intervention Goal    General Meet nutritional needs for age/condition     PO Increase intake; PO intake (%)     PO Intake % 50 %  or greater     Weight Appropriate weight gain                Nutrition Intervention     Row Name 12/06/21 1144          Nutrition Intervention    RD/Tech Action Encourage intake; Follow Tx progress                  Education/Evaluation     Row Name 12/06/21 1144          Education    Education Other (comment)  encouraged drinking at visit            Monitor/Evaluation    Monitor Per protocol; I&O; PO intake; Supplement intake; Pertinent labs; Weight; Symptoms                 Electronically signed by:  Jo Ann Ryan RD  12/06/21 11:45 EST

## 2021-12-06 NOTE — PLAN OF CARE
Goal Outcome Evaluation:  Plan of Care Reviewed With: patient, sibling        Progress: no change  Outcome Summary: Pt has had several anxious episodes this shift.  Pt was redirected and calmed down with a bed bath.  PRN Ativan also effective.  Pt's appetite has declined.  Staff has attempted to feed pt breakfast and lunch and pt has refused to eat either meal.  Calorie count was started this AM per Dr. Walters.  Supplemental drinks also ordered and offered but pt refuses to drink them.  Pt did take her medications for staff.  Medications were crushed and placed in applesauce.  Pt remains on soft ground texture diet with NTL.  Temperatures are only to be obtained rectally.  F/C to BSD remains patent with positive UOP.  Pt remains A-fib on telemetry and is A&Ox1 to self only.  Pt does not appear to be in any pain and denies pain when asked.  Call light within reach and bed alarm in place and functioning.

## 2021-12-07 NOTE — DISCHARGE PLACEMENT REQUEST
"Argelia Nguyen (71 y.o. Female)             Date of Birth Social Security Number Address Home Phone MRN    1950  [address]   [facility] [address] [phone] [id number]    [employer] Marital Status             [facility]       Admission Date Admission Type Admitting Provider Attending Provider Department, Room/Bed    12/2/21 Emergency Jose Walters MD Kemparajurs, Plavakeerthi, MD [facility] MED SURG, 1410/1    Discharge Date Discharge Disposition Discharge Destination                         Attending Provider: Jose Walters MD    Allergies: No Known Allergies    Isolation: None   Infection: MRSA/History Only (11/23/21)   Code Status: CPR   Advance Care Planning Activity    Ht: 160 cm (62.99\")   Wt: 42.5 kg (93 lb 11.2 oz)    Admission Cmt: None   Principal Problem: None                Active Insurance as of 12/2/2021     Primary Coverage     Payor Plan Insurance Group Employer/Plan Group    MEDICARE MEDICARE A & B      Payor Plan Address Payor Plan Phone Number Payor Plan Fax Number Effective Dates    [address] [phone]  2/1/2014 - None Entered    [facility] [id number]       Subscriber Name Subscriber Birth Date Member ID       ARGELIA NGUYEN 1950 [id number]           Secondary Coverage     Payor Plan Insurance Group Employer/Plan Group    [employer] [place] [employer] [place]      Payor Plan Address Payor Plan Phone Number Payor Plan Fax Number Effective Dates    [address] [place] [address] [phone]  7/2/2014 - None Entered    [place] [address]       Subscriber Name Subscriber Birth Date Member ID       ARGELIA NGUYEN 1950 [id number]                 Emergency Contacts      (Rel.) Home Phone Work Phone Mobile Phone    Germán Nguyen (Brother) [phone] -- [phone]    Caryl Brown (Sister) [phone] -- --    Henok Phelps (Brother) [phone] -- --            "

## 2021-12-07 NOTE — SIGNIFICANT NOTE
12/07/21 1136   OTHER   Discipline physical therapy assistant   Rehab Time/Intention   Session Not Performed other (see comments)  (PT: Patient s/p grand mal seizure this morning with subsequent fall out of bed. Patient with hematoma to left side of forehead. Attempted PT x 2 this morning, patient refuses each time. Will continue to follow.)

## 2021-12-07 NOTE — CASE MANAGEMENT/SOCIAL WORK
Continued Stay Note  MALLORY Asher     Patient Name: Argelia Nguyen  MRN: [id number]  Today's Date: 12/7/2021    Admit Date: 12/2/2021     Discharge Plan     Row Name 12/07/21 1537       Plan    Plan Comments I attempted to speak with the patient at bedside, pt was reported to have had seizures earlier today. Seizure precautions noted and patient sleeping in room.  I called and spoke with her brother about discharge plans and his preference for SNF is [facility].  I then spoke with Matilde with [facility] and made a referral. CM will continue to follow.               Discharge Codes    No documentation.                     Beronica Paul RN

## 2021-12-07 NOTE — PROGRESS NOTES
Adult Nutrition  Assessment/PES    Patient Name:  Argelia Nguyen  YOB: 1950  MRN: [id number]  Admit Date:  12/2/2021    Assessment Date:  12/7/2021    Comments:  Clarify nutrition care goals include nutrition support such as tube feedings pt appears unable to take adequate po/nutrition at this time.       Reason for Assessment     Row Name 12/07/21 1346          Reason for Assessment    Reason For Assessment calorie count order                Nutrition/Diet History     Row Name 12/07/21 1346          Nutrition/Diet History    Typical Food/Fluid Intake pt receiving care at visit this am, per rounds had sz s/p fall                Anthropometrics     Row Name 12/07/21 0630          Anthropometrics    Weight 42.5 kg (93 lb 11.2 oz)                   Diet; Soft Ground Nectar Thick Liquids, Boost Plus with meals    Estimated needs: 9223-2626 kcal , 52-65 gm pro    Day 1: 0% ( Monday all meals recorded)             Electronically signed by:  Jo Ann Ryan RD  12/07/21 13:47 EST

## 2021-12-07 NOTE — PLAN OF CARE
Goal Outcome Evaluation:  Plan of Care Reviewed With: patient        Progress: no change  Outcome Summary: Pt had a rectal temp of 101.6, Tylenol suppository given, temp down to 100.0. One dose of PRN Ativan needed this shift, relief noted. Oral care provided, lips and mouth very dry. Pt has difficulty swallowing, even with meds crushed in applesauce.

## 2021-12-07 NOTE — SIGNIFICANT NOTE
12/07/21 1151   OTHER   Discipline occupational therapist   Rehab Time/Intention   Session Not Performed patient/family declined, not feeling well

## 2021-12-07 NOTE — NURSING NOTE
"Pt's bed alarm going off, pt found on the floor by staff, KATIE Ortez and SAIRA Russo stated she was \"shaking all over,\" history of seizures. Left hematoma noted to left side of forehead, ice pack applied. Pt taken immediately for head CT accompanied by this RN.   Dr. Walters was notified at 0530.   "

## 2021-12-08 NOTE — NURSING NOTE
Call made to Dr. Walters, pt difficult to arouse. Responsive to painful stimuli, EMMETT, b/p 86/62 manual. POC glucose 159  Stat head CT ordered   IVF's ordered   Charge SAIRA sharpe    (3) adequate

## 2021-12-08 NOTE — PLAN OF CARE
Goal Outcome Evaluation:  Plan of Care Reviewed With: patient        Progress: no change  Outcome Summary: Pt difficult to arouse at beginning of shift, CT head ordered, no change, IVF's currently infusing @75ml/hr after getting a 250ml/hr bolus, after starting fluids pt did perk up and is more alert, blood pressure is improved. Pt taking her medication much better this shift, still some coughing after taking sips. No seizure activity seen during this shift. NSR on tele. Pt has not had a recorded BM since 12/1. Afebrile this shift.

## 2021-12-08 NOTE — PROGRESS NOTES
"Daily Progress Note:      Chief complaint: Follow-up of hypothermia, hypotension, sepsis, aspiration pneumonia, encephalopathy    Subjective: Patient is lethargic throughout the day yesterday.  Hard to arouse.  She became hypotensive which improved with hydration she is likely postictal.  Repeat CT scan did not show any acute intracranial pathology.  She is awake alert oriented x2 this morning        LOS: 6 days     Vital Signs  Temp:  [98.1 °F (36.7 °C)-100 °F (37.8 °C)] 98.1 °F (36.7 °C)  Heart Rate:  [] 57  Resp:  [16-24] 16  BP: ()/(51-76) 101/64  Oxygen Therapy  SpO2: 97 %  Pulse Oximetry Type: Intermittent  Device (Oxygen Therapy): room air}  Body mass index is 16.64 kg/m².  Flowsheet Rows      First Filed Value   Admission Height 160 cm (63\") Documented at 12/02/2021 0924   Admission Weight 43.5 kg (96 lb) Documented at 12/02/2021 0924                   Documented weights    12/02/21 0924 12/02/21 1830 12/07/21 0630 12/08/21 0541   Weight: 43.5 kg (96 lb) 43.2 kg (95 lb 3.2 oz) 42.5 kg (93 lb 11.2 oz) 42.6 kg (93 lb 14.4 oz)           Patient Vitals for the past 24 hrs:   BP Temp Temp src Pulse Resp SpO2 Weight   12/08/21 0541 101/64 98.1 °F (36.7 °C) Rectal 57 16 97 % 42.6 kg (93 lb 14.4 oz)   12/07/21 2308 97/60 100 °F (37.8 °C) Rectal 70 16 92 % --   12/07/21 2138 102/76 -- -- -- -- -- --   12/07/21 2038 98/74 -- -- -- -- 94 % --   12/07/21 1928 (!) 86/62 -- -- -- -- -- --   12/07/21 1913 (!) 88/51 100 °F (37.8 °C) Rectal 77 16 96 % --   12/07/21 1752 -- -- -- 70 -- 97 % --   12/07/21 1511 97/55 99 °F (37.2 °C) Rectal 70 16 97 % --   12/07/21 1305 105/63 -- -- 78 -- 98 % --   12/07/21 1102 -- 99.5 °F (37.5 °C) Rectal -- -- -- --   12/07/21 1047 126/68 -- -- 108 24 96 % --       42.6 kg (93 lb 14.4 oz)    Intake/Output                       12/06/21 0701 - 12/07/21 0700 12/07/21 0701 - 12/08/21 0700     [id number] 8207-5379 Total 0673-4439 0708-1048 Total                 Intake    P.O.  --  30 30 "  360  80 440    Total Intake -- 30 30 360 80 440       Output    Urine  450  450 900  350  425 775    Total Output 450 450 900 350 425 775           Intake/Output Summary (Last 24 hours) at 12/8/2021 0739  Last data filed at 12/8/2021 0540  Gross per 24 hour   Intake 440 ml   Output 775 ml   Net -335 ml        Intake/Output Summary (Last 24 hours) at 12/8/2021 0739  Last data filed at 12/8/2021 0540  Gross per 24 hour   Intake 440 ml   Output 775 ml   Net -335 ml        Review of Systems   Unable to perform ROS: Acuity of condition       Physical Exam  Vitals and nursing note reviewed.   Constitutional:       General: She is not in acute distress.     Appearance: She is well-developed and underweight. She is ill-appearing.   HENT:      Head: Normocephalic.      Comments: Small hematoma left frontal area  Eyes:      Conjunctiva/sclera: Conjunctivae normal.   Neck:      Thyroid: No thyromegaly.      Vascular: No JVD.   Cardiovascular:      Rate and Rhythm: Normal rate. Rhythm irregularly irregular.      Heart sounds: Normal heart sounds. No murmur heard.      Pulmonary:      Effort: Pulmonary effort is normal. No respiratory distress.      Breath sounds: Normal breath sounds. No wheezing or rales.   Abdominal:      General: Bowel sounds are normal. There is no distension.      Palpations: Abdomen is soft.      Tenderness: There is no abdominal tenderness. There is no guarding.   Musculoskeletal:      Cervical back: Normal range of motion.      Right lower leg: Edema (Trace bilateral) present.      Left lower leg: Edema present.   Skin:     General: Skin is warm and dry.      Findings: No rash.   Neurological:      General: No focal deficit present.      Mental Status: She is alert and easily aroused. She is disoriented.      Cranial Nerves: Cranial nerves are intact. No cranial nerve deficit.      Motor: Motor function is intact. No weakness.      Comments: Oriented to person and place only follows commands    Psychiatric:         Attention and Perception: Attention normal.         Mood and Affect: Mood normal.         Speech: Speech is delayed.         Cognition and Memory: Cognition is impaired. Memory is impaired.         Medication Review:   I have reviewed the patient's current medication list  Scheduled Meds:amLODIPine, 5 mg, Oral, Q24H  amoxicillin-clavulanate, 1 tablet, Oral, Q12H  aspirin, 81 mg, Oral, Daily  bisacodyl, 10 mg, Rectal, Once  lactobacillus acidophilus, 1 capsule, Oral, BID  levETIRAcetam, 1,000 mg, Oral, Q12H  levothyroxine, 50 mcg, Oral, Q AM  multivitamin with minerals, 1 tablet, Oral, Daily  pantoprazole, 40 mg, Intravenous, Q AM  polyethylene glycol, 17 g, Oral, Daily  rivaroxaban, 20 mg, Oral, Daily  senna-docusate sodium, 1 tablet, Oral, Nightly  sodium chloride, 10 mL, Intravenous, Q12H      Continuous Infusions:Pharmacy Consult - Pharmacy to dose,   sodium chloride, 75 mL/hr, Last Rate: 75 mL/hr (12/08/21 0430)      PRN Meds:.•  acetaminophen **OR** acetaminophen **OR** acetaminophen  •  aluminum-magnesium hydroxide-simethicone  •  atropine  •  calcium carbonate  •  hydrALAZINE  •  LORazepam  •  nitroglycerin  •  ondansetron **OR** ondansetron  •  Pharmacy Consult - Pharmacy to dose  •  sodium chloride  •  [COMPLETED] Insert peripheral IV **AND** sodium chloride  •  sodium chloride      Labs:  Results from last 7 days   Lab Units 12/08/21  0426 12/07/21  0406 12/06/21  0312 12/05/21  0750 12/05/21  0750 12/04/21  0505 12/04/21  0505 12/03/21  0422 12/03/21  0422 12/02/21  0955 12/02/21  0955   WBC 10*3/mm3 7.58 5.71 4.71  --  4.79  --  3.52  --  3.87  --  2.38*   HEMOGLOBIN g/dL 9.9* 10.4* 10.6*   < > 10.5*   < > 10.0*   < > 9.8*   < > 11.9*   HEMATOCRIT % 30.3* 31.5* 32.8*  --  32.5*  --  30.5*  --  29.8*  --  37.0   PLATELETS 10*3/mm3 145 173 144  --  124*  --  113*  --  108*  --  107*   MONOCYTES % %  --   --   --   --   --   --  10.0  --   --   --  16.0*    < > = values in this  interval not displayed.     Results from last 7 days   Lab Units 12/08/21  0426 12/07/21  0406 12/06/21 0312 12/05/21  0750 12/04/21  0505 12/03/21  0422 12/02/21  0955   SODIUM mmol/L 142 143 143 142 142 136 136   POTASSIUM mmol/L 3.9 4.1 4.6 4.2 4.4 5.0 5.0   CHLORIDE mmol/L 110* 109* 110* 111* 111* 103 102   CO2 mmol/L 22.0 18.7* 18.9* 17.8* 19.2* 20.5* 16.9*   BUN mg/dL 17 18 16 18 20 27* 27*   CREATININE mg/dL 1.07* 1.32* 1.12* 1.22* 1.37* 1.43* 1.40*   CALCIUM mg/dL 8.8 9.2 10.4 10.1 9.0 8.9 10.4   BILIRUBIN mg/dL  --   --  1.2  --  0.7  --  0.6   ALK PHOS U/L  --   --  316*  --  228*  --  249*   ALT (SGPT) U/L  --   --  46*  --  49*  --  74*   AST (SGOT) U/L  --   --  42*  --  47*  --  67*   GLUCOSE mg/dL 128* 70 103* 106* 82 63* 107*           Results from last 7 days   Lab Units 12/08/21 0426 12/07/21  0931 12/07/21 0406 12/06/21 0312 12/05/21 0750 12/04/21  0505 12/03/21 0422 12/02/21  1055 12/02/21  0955   AST (SGOT) U/L  --   --   --  42*  --  47*  --   --  67*   ALT (SGPT) U/L  --   --   --  46*  --  49*  --   --  74*   PROCALCITONIN ng/mL  --  0.13  --   --   --   --   --   --  0.04   LACTATE mmol/L  --   --   --   --   --   --   --  1.6  --    PLATELETS 10*3/mm3 145  --  173 144   < > 113*   < >  --  107*    < > = values in this interval not displayed.         Lab Results (last 24 hours)     Procedure Component Value Units Date/Time    Basic Metabolic Panel [[id number]]  (Abnormal) Collected: 12/08/21 0426    Specimen: Blood Updated: 12/08/21 0505     Glucose 128 mg/dL      BUN 17 mg/dL      Creatinine 1.07 mg/dL      Sodium 142 mmol/L      Potassium 3.9 mmol/L      Chloride 110 mmol/L      CO2 22.0 mmol/L      Calcium 8.8 mg/dL      eGFR   Amer 61 mL/min/1.73      BUN/Creatinine Ratio 15.9     Anion Gap 10.0 mmol/L     Narrative:      GFR Normal >60  Chronic Kidney Disease <60  Kidney Failure <15      CBC (No Diff) [[id number]]  (Abnormal) Collected: 12/08/21 0426    Specimen: Blood  Updated: 12/08/21 0444     WBC 7.58 10*3/mm3      RBC 3.84 10*6/mm3      Hemoglobin 9.9 g/dL      Hematocrit 30.3 %      MCV 78.9 fL      MCH 25.8 pg      MCHC 32.7 g/dL      RDW 18.8 %      RDW-SD 50.4 fl      Platelets 145 10*3/mm3     Narrative:      Test repeated, results confirmed    POC Glucose Once [[id number]]  (Abnormal) Collected: 12/07/21 1923    Specimen: Blood Updated: 12/07/21 1929     Glucose 159 mg/dL      Comment: Meter: [id number] : [id number] Kennedy Graff RN       POC Glucose Once [[id number]]  (Normal) Collected: 12/07/21 1726    Specimen: Blood Updated: 12/07/21 1733     Glucose 71 mg/dL      Comment: Meter: [id number] : [id number] Reena Le NURSING ASSISTANT       POC Glucose Once [[id number]]  (Abnormal) Collected: 12/07/21 1302    Specimen: Blood Updated: 12/07/21 1309     Glucose 66 mg/dL      Comment: Meter: [id number] : [id number] Alberta Morgan RN Float       Urinalysis, Microscopic Only - Urine, Catheter [[id number]]  (Abnormal) Collected: 12/07/21 1144    Specimen: Urine, Catheter Updated: 12/07/21 1236     RBC, UA 13-20 /HPF      WBC, UA None Seen /HPF      Bacteria, UA Trace /HPF      Squamous Epithelial Cells, UA 0-2 /HPF      Hyaline Casts, UA None Seen /LPF      Methodology Manual Light Microscopy    Urinalysis With Culture If Indicated - Urine, Catheter [[id number]]  (Abnormal) Collected: 12/07/21 1144    Specimen: Urine, Catheter Updated: 12/07/21 1215     Color, UA Yellow     Appearance, UA Clear     pH, UA <=5.0     Specific Gravity, UA 1.025     Glucose, UA Negative     Ketones, UA 15 mg/dL (1+)     Bilirubin, UA Negative     Blood, UA Large (3+)     Protein, UA 30 mg/dL (1+)     Leuk Esterase, UA Negative     Nitrite, UA Negative     Urobilinogen, UA 0.2 E.U./dL    Blood Culture - Blood, Arm, Right [[id number]]  (Normal) Collected: 12/02/21 1055    Specimen: Blood from Arm, Right Updated: 12/07/21 1100     Blood Culture No growth at 5 days    Blood Culture -  Blood, Hand, Left [[id number]]  (Normal) Collected: 12/02/21 0955    Specimen: Blood from Hand, Left Updated: 12/07/21 1015     Blood Culture No growth at 5 days    Procalcitonin [[id number]]  (Normal) Collected: 12/07/21 0931    Specimen: Blood Updated: 12/07/21 1015     Procalcitonin 0.13 ng/mL     Narrative:      Results may be falsely decreased if patient taking Biotin.     Blood Culture - Blood, Hand, Right [[id number]] Collected: 12/07/21 0931    Specimen: Blood from Hand, Right Updated: 12/07/21 0944    Blood Culture - Blood, Hand, Left [[id number]] Collected: 12/07/21 0931    Specimen: Blood from Hand, Left Updated: 12/07/21 0943    POC Glucose Once [[id number]]  (Normal) Collected: 12/07/21 0927    Specimen: Blood Updated: 12/07/21 0935     Glucose 73 mg/dL      Comment: Meter: [id number] : [id number] Reena Le NURSING ASSISTANT               Results from last 7 days   Lab Units 12/02/21  0955   TSH uIU/mL 0.332     Results from last 7 days   Lab Units 12/02/21 0955   PROBNP pg/mL 1,500.0*                         Glucose   Date/Time Value Ref Range Status   12/07/2021 1923 159 (H) 70 - 130 mg/dL Final     Comment:     Meter: [id number] : [id number] Kennedy Graff RN   12/07/2021 1726 71 70 - 130 mg/dL Final     Comment:     Meter: [id number] : [id number] Reena Le NURSING ASSISTANT   12/07/2021 1302 66 (L) 70 - 130 mg/dL Final     Comment:     Meter: [id number] : [id number] Alberta Morgan RN Float   12/07/2021 0927 73 70 - 130 mg/dL Final     Comment:     Meter: [id number] : [id number] Reena Le NURSING ASSISTANT   12/07/2021 0522 69 (L) 70 - 130 mg/dL Final     Comment:     Meter: [id number] : [id number] Gris Estrada RN     Results from last 7 days   Lab Units 12/07/21 0931 12/02/21  1055 12/02/21  0955   PROCALCITONIN ng/mL 0.13  --  0.04   LACTATE mmol/L  --  1.6  --      Results from last 7 days   Lab Units 12/02/21  1055 12/02/21  0955   BLOODCX  No growth at 5  days No growth at 5 days     Results from last 7 days   Lab Units 12/07/21  1144   NITRITE UA  Negative   WBC UA /HPF None Seen   BACTERIA UA /HPF Trace*   SQUAM EPITHEL UA /HPF 0-2     Results from last 7 days   Lab Units 12/02/21  0957   INFLUENZA B PCR  Not Detected   INFLUENZA A PCR  Not Detected         Radiology:  Imaging Results (Last 24 Hours)     Procedure Component Value Units Date/Time    CT Head Without Contrast [[id number]] Collected: 12/07/21 2009     Updated: 12/07/21 2011    Narrative:      CT Head WO: 12/7/2021 8:58 PM    HISTORY:   Mental status change    TECHNIQUE:   Axial unenhanced head CT. Radiation dose reduction techniques included automated exposure control or exposure modulation based on body size. Radiation audit for number of CT and nuclear cardiology exams performed in the last year: 0.      COMPARISON:   12/7/2021 at 5:41 AM    FINDINGS:   No intracranial hemorrhage, mass, or infarct. No hydrocephalus or extra-axial fluid collection. The skull base, calvarium, and extracranial soft tissues are normal apart from soft tissue swelling involving the left scalp.      Impression:      No acute intracranial abnormality. No interval change from prior.          Signer Name: Caryl Fraire MD   Signed: 12/7/2021 8:09 PM   Workstation Name: [id number]    [facility]          Cardiology:  ECG/EMG Results (last 24 hours)     ** No results found for the last 24 hours. **          I have reviewed recent labs results and consult notes.  Parts of this note may have been copied and pasted but patient was examined and interviewed by me today    Assessment and Plan:      1.  Aspiration pneumonia chest x-ray unchanged changed to p.o. Augmentin on 12/5/2021.  Patient was febrile 12/7/2021.  Procalcitonin unremarkable urinalysis unremarkable continue present medications    2.  Chronic kidney disease stage III at baseline    3.  Chronic atrial fibrillation on anticoagulation rate controlled  continue with home Xarelto.       4. Hypertension improved with initiation of medication     5.  [place] seizure disorder post ictal 12/7/2021.  She has improved morning     6.  Metabolic encephalopathy persistent not at baseline continue with supportive care    7.  Hypothyroidism continue with current medication     8.  Anemia of chronic disease hemoglobin appears to be stable     9.  Head injury from recent fall CT unremarkable     10.  Moderate protein calorie malnutrition nutritional consult and supportive care     11.  ADLs below baseline PT and OT evaluate the patient need short-term rehab                      Much of this encounter note is an electronic transcription/translation of spoken language to printed text using Dragon Software

## 2021-12-08 NOTE — PLAN OF CARE
Goal Outcome Evaluation:  Plan of Care Reviewed With: patient           Outcome Summary: PT: Patient initially very lethargic/drowsy, became more alert and talkative upon sitting EOB. Patient is dependent x 2 for supine to sit transfer, sit to supine transfer with max A x 2 and sit to/from stand transfers from elevated bed surface with max A x 2. Patient with significant posterior lean in standing, unable to perform gait or stand pivot transfers due to amount of assist required for static standing. Patient able to take two side steps along EOB with max A x 2 with walker. Patient is oriented to person and place. Continue to recommend SNF with potential to transition to LTC at discharge.

## 2021-12-08 NOTE — PLAN OF CARE
Goal Outcome Evaluation:  Plan of Care Reviewed With: patient           Outcome Summary: OT- Patient more alert today and agreed to therapy. Patient performed supine to sit with mod assist. Patient sat at EOB and demonstrated ind with UE exercises. Patient fed self breakfast while seated EOB with supervision after set up. Patient required max A for sit to supine.

## 2021-12-08 NOTE — PROGRESS NOTES
Adult Nutrition  Assessment/PES    Patient Name:  Argelia Nguyen  YOB: 1950  MRN: [id number]  Admit Date:  12/2/2021    Assessment Date:  12/8/2021    Comments:  90% ave kcal ( 1 meal this am) and 100% ave pro ( 1 meal this am)  Encourage po and voice prefs.      Reason for Assessment     Row Name 12/08/21 1154          Reason for Assessment    Reason For Assessment calorie count order                Nutrition/Diet History     Row Name 12/08/21 1154          Nutrition/Diet History    Typical Food/Fluid Intake Rn reports awake, eating, feeding self today much improved. At visit spoke w pt who was able to help CNA recall what she had this am.                Anthropometrics     Row Name 12/08/21 0541          Anthropometrics    Weight 42.6 kg (93 lb 14.4 oz)                 Diet; Soft Ground Nectar Thick Liquids, Boost Plus with meals     Estimated needs: 5641-3647 kcal , 52-65 gm pro     Day 1: 0% ( Monday all meals recorded  Day 2: 0% per CNA no data available  Day 3: 425 kcal, 20 gm pro ( breakfast + Boost Plus today)                   Electronically signed by:  Jo Ann Ryan RD  12/08/21 11:55 EST

## 2021-12-08 NOTE — PLAN OF CARE
Goal Outcome Evaluation:           Progress: declining       Pt alert able to take medication and drink PO.  During afternoon neuro check pt alert to arousal.  Vitals, pupils, glucose checked, all WNL.  Next neuro check, pt aroused by sternal rub only.  MD notified, no new orders.  1728 Pt more alert, able to drink cup of juice.

## 2021-12-08 NOTE — THERAPY TREATMENT NOTE
Acute Care - Physical Therapy Treatment Note   Liana Hernández     Patient Name: Argelia Nguyen  : 1950  MRN: [id number]  Today's Date: 2021      Visit Dx:     ICD-10-CM ICD-9-CM   1. Aspiration pneumonia of right lower lobe, unspecified aspiration pneumonia type (HCC)  J69.0 507.0   2. Hypothermia, initial encounter  T68.XXXA 991.6   3. Oropharyngeal dysphagia  R13.12 787.22     Patient Active Problem List   Diagnosis   • MGUS (monoclonal gammopathy of unknown significance)   • Normocytic anemia   • Hiatal hernia   • Colon polyps   • Chronic anticoagulation   • Esophageal dysphagia   • Severe malnutrition (HCC)   • Longstanding persistent atrial fibrillation (HCC)   • Sepsis associated hypotension (HCC)   • Hydronephrosis due to obstruction of ureter   • Acute metabolic encephalopathy   • Valvular heart disease   • Aspiration pneumonia of right lower lobe (HCC)     Past Medical History:   Diagnosis Date   • Anxiety    • Arthritis    • Chronic anticoagulation 2019   • Chronic diastolic (congestive) heart failure (HCC)    • Colon polyp    • Dysphagia    • GERD (gastroesophageal reflux disease)    • Hiatal hernia 2017   • Hypertension    • Hypothyroidism    • Internal hemorrhoids 2017   • Monoclonal gammopathy 3/25/2013   • Nonrheumatic mitral valve regurgitation     has ranged from mild-mod to severe depending on the study   • Persistent atrial fibrillation (HCC)    • Pyelonephritis 2021   • Reflux gastritis    • Seizure (HCC)    • Tricuspid regurgitation     mod-severe vs severe   • Type 2 diabetes mellitus (HCC)      Past Surgical History:   Procedure Laterality Date   • COLONOSCOPY N/A 3/29/2017    Procedure: COLONOSCOPY; POLYPECTOMY;  Surgeon: Brooke Garrido MD;  Location: [facility] OR;  Service:    • COLONOSCOPY N/A 8/3/2020    Procedure: COLONOSCOPY with polypectomy;  Surgeon: Rui Shi MD;  Location: [facility] OR;  Service: Gastroenterology;  Laterality: N/A;   "ascending polyp  transverse polyp  rectal polyp   • CYST REMOVAL     • CYSTOSCOPY W/ URETERAL STENT PLACEMENT Right 4/13/2021    Procedure: CYSTOSCOPY URETERAL CATHETER/STENT INSERTION;  Surgeon: Onesimo Fuller MD;  Location:  [facility] OR;  Service: Urology;  Laterality: Right;   • CYSTOSCOPY W/ URETERAL STENT PLACEMENT Right 4/28/2021    Procedure: CYSTOSCOPY URETERAL STENT REMOVAL;  Surgeon: Onesimo Fuller MD;  Location:  [facility] OR;  Service: Urology;  Laterality: Right;  CYSTOSCOPY URETERAL STENT REMOVAL   • ENDOSCOPY N/A 3/29/2017    Procedure: ESOPHAGOGASTRODUODENOSCOPY WITH DILITATION;  Surgeon: Brooke Garrido MD;  Location: [facility] OR;  Service:    • ENDOSCOPY N/A 8/3/2020    Procedure: ESOPHAGOGASTRODUODENOSCOPY with biopsies;  Surgeon: Rui Shi MD;  Location: [facility] OR;  Service: Gastroenterology;  Laterality: N/A;  esophagitis  gastritis   • HERNIA REPAIR     • HYSTERECTOMY       PT Assessment (last 12 hours)     PT Evaluation and Treatment     Row Name 12/08/21 1025          Physical Therapy Time and Intention    Subjective Information no complaints  patient states \"i'm sleepy.\"  -BP     Document Type therapy note (daily note)  -BP     Mode of Treatment physical therapy  -BP     Patient Effort adequate  -BP     Symptoms Noted During/After Treatment fatigue  -BP     Comment Patient very lethargic initially, RN present, reports she had received her Keppra which may be causing her drowsiness. Upon sitting up, patient became more alert and talkative. Oriented to person and place.  -BP     Row Name 12/08/21 1025          General Information    Patient Observations agree to therapy  -BP     Patient/Family/Caregiver Comments/Observations Patient supine in bed, seizure precautions-pads on bedrails and on floor. Per RN, patient ok to participate with  PT.  -BP     Existing Precautions/Restrictions fall  cognition  -BP     Risks Reviewed patient:; LOB; increased discomfort  -BP     " "Benefits Reviewed patient:; improve function; increase independence; increase strength  -BP     Barriers to Rehab medically complex; previous functional deficit; cognitive status  -BP     Row Name 12/08/21 1025          Cognition    Orientation Status (Cognition) oriented to; person; place  oriented to month but reports year as \"1971\"  -BP     Personal Safety Interventions gait belt; nonskid shoes/slippers when out of bed  -BP     Row Name 12/08/21 1025          Pain Scale: Numbers Pre/Post-Treatment    Pretreatment Pain Rating 0/10 - no pain  -BP     Posttreatment Pain Rating 0/10 - no pain  -BP     Row Name 12/08/21 1025          Bed Mobility    Scooting/Bridging [place] (Bed Mobility) dependent (less than 25% patient effort); 2 person assist; verbal cues  -BP     Supine-Sit [place] (Bed Mobility) dependent (less than 25% patient effort); 2 person assist; verbal cues  -BP     Sit-Supine [place] (Bed Mobility) maximum assist (25% patient effort); 2 person assist; verbal cues  -BP     Bed Mobility, Safety Issues cognitive deficits limit understanding  -BP     Assistive Device (Bed Mobility) bed rails; draw sheet; head of bed elevated  -BP     Comment (Bed Mobility) --  Patient requires cues for sequencing. Patient provides no assistance with supine to sit, able to lift legs to assist with sit to supine transfer.  -BP     Row Name 12/08/21 1025          Transfers    Transfers sit-stand transfer; stand-sit transfer  -     Comment (Transfers) Performed sit to/from stand transfers from elevated bed surface with max A x 2. Patient unable to fully transfer to stand either time due to posterior lean. Unable to perform bed to chair transfer due to posterior lean. Able to take 2 side steps along EOB with max A x 2 with walker.  -BP     Sit-Stand [place] (Transfers) maximum assist (25% patient effort); verbal cues  -BP     Stand-Sit [place] (Transfers) maximum assist (25% patient effort); 2 person " assist; verbal cues  -     Row Name 12/08/21 1025          Sit-Stand Transfer    Assistive Device (Sit-Stand Transfers) walker, front-wheeled  -     Row Name 12/08/21 1025          Stand-Sit Transfer    Assistive Device (Stand-Sit Transfers) walker, front-wheeled  -     Row Name 12/08/21 1025          Gait/Stairs (Locomotion)    Comment (Gait/Stairs) Unable to perfom due to posterior lean.  -     Row Name 12/08/21 1025          Safety Issues, Functional Mobility    Safety Issues Affecting Function (Mobility) insight into deficits/self-awareness; at risk behavior observed; awareness of need for assistance; judgment; positioning of assistive device; problem-solving; sequencing abilities  -     Comment, Safety Issues/Impairments (Mobility) Patient requires max cues for safety and sequencing.  -     Row Name 12/08/21 1025          Balance    Comment, Balance static sitting balance with CGA, standing balance with max A x 2 with walker.  -     Row Name 12/08/21 1025          Plan of Care Review    Plan of Care Reviewed With patient  -BP     Outcome Summary PT: Patient initially very lethargic/drowsy, became more alert and talkative upon sitting EOB. Patient is dependent x 2 for supine to sit transfer, sit to supine transfer with max A x 2 and sit to/from stand transfers from elevated bed surface with max A x 2. Patient with significant posterior lean in standing, unable to perform gait or stand pivot transfers due to amount of assist required for static standing. Patient able to take two side steps along EOB with max A x 2 with walker. Patient is oriented to person and place. Continue to recommend SNF with potential to transition to LTC at discharge.  -     Row Name 12/08/21 1025          Bed Mobility Goal 1 (PT)    Activity/Assistive Device (Bed Mobility Goal 1, PT) bed mobility activities, all  -BP     [place] Level/Cues Needed (Bed Mobility Goal 1, PT) moderate assist (50-74% patient effort)  -      Time Frame (Bed Mobility Goal 1, PT) 5 days  -BP     Progress/Outcomes (Bed Mobility Goal 1, PT) goal ongoing; progress slower than expected; goal revised this date  -BP     Row Name 12/08/21 1025          Transfer Goal 1 (PT)    Activity/Assistive Device (Transfer Goal 1, PT) sit-to-stand/stand-to-sit; bed-to-chair/chair-to-bed; walker, rolling  -BP     [place] Level/Cues Needed (Transfer Goal 1, PT) moderate assist (50-74% patient effort)  -BP     Time Frame (Transfer Goal 1, PT) 5 days  -BP     Progress/Outcome (Transfer Goal 1, PT) progress slower than expected; medical status inhibiting progress; goal ongoing  -BP     Row Name 12/08/21 1025          Positioning and Restraints    Pre-Treatment Position in bed  -BP     Post Treatment Position bed  -BP     In Bed supine; notified nsg; call light within reach; encouraged to call for assist; exit alarm on  seizure pads in place  -BP     Row Name 12/08/21 1025          Therapy Assessment/Plan (PT)    Predicted Duration of Therapy Intervention (PT) 5 days  -BP     Row Name 12/08/21 1025          Progress Summary (PT)    Progress Toward Functional Goals (PT) progress toward functional goals is gradual  -BP     Row Name 12/08/21 1025          Therapy Plan Review/Discharge Plan (PT)    Therapy Plan Review (PT) patient; risks/benefits reviewed  -BP           User Key  (r) = Recorded By, (t) = Taken By, (c) = Cosigned By    Initials Name Provider Type    BP Love Shaver, PT Physical Therapist                Physical Therapy Education                 Title: PT OT SLP Therapies (In Progress)     Topic: Physical Therapy (In Progress)     Point: Mobility training (Done)     Learning Progress Summary           Patient Acceptance, E,TB, VU by BP at 12/8/2021 1103    Acceptance, E,TB, NR by JW at 12/6/2021 1138    Acceptance, E,TB, NR by BP at 12/4/2021 0955    Acceptance, E,TB, VU,NR by BP at 12/3/2021 1431                   Point: Home exercise program (Not  Started)     Learner Progress:  Not documented in this visit.                      User Key     Initials Effective Dates Name Provider Type Discipline    BP 06/16/21 -  Love Shaver, PT Physical Therapist PT    JW 06/16/21 -  Evelin Chin PT Physical Therapist PT              PT Recommendation and Plan  Anticipated Discharge Disposition (PT): skilled nursing facility, extended care facility  Planned Therapy Interventions (PT): balance training, bed mobility training, home exercise program, patient/family education, strengthening, transfer training  Therapy Frequency (PT): 5 times/wk  Progress Summary (PT)  Progress Toward Functional Goals (PT): progress toward functional goals is gradual  Plan of Care Reviewed With: patient  Outcome Summary: PT: Patient initially very lethargic/drowsy, became more alert and talkative upon sitting EOB. Patient is dependent x 2 for supine to sit transfer, sit to supine transfer with max A x 2 and sit to/from stand transfers from elevated bed surface with max A x 2. Patient with significant posterior lean in standing, unable to perform gait or stand pivot transfers due to amount of assist required for static standing. Patient able to take two side steps along EOB with max A x 2 with walker. Patient is oriented to person and place. Continue to recommend SNF with potential to transition to LTC at discharge.   Outcome Measures     Row Name 12/08/21 1025 12/06/21 1100 12/06/21 0830       How much help from another person do you currently need...    Turning from your back to your side while in flat bed without using bedrails? 1  -BP -- 1  -JW    Moving from lying on back to sitting on the side of a flat bed without bedrails? 1  -BP -- 1  -JW    Moving to and from a bed to a chair (including a wheelchair)? 1  -BP -- 1  -JW    Standing up from a chair using your arms (e.g., wheelchair, bedside chair)? 1  -BP -- 1  -JW    Climbing 3-5 steps with a railing? 1  -BP -- 1  -JW    To walk  in hospital room? 1  -BP -- 1  -JW    AM-PAC 6 Clicks Score (PT) 6  -BP -- 6  -JW       How much help from another is currently needed...    Putting on and taking off regular lower body clothing? -- 1  -EN --    Bathing (including washing, rinsing, and drying) -- 1  -EN --    Toileting (which includes using toilet bed pan or urinal) -- 1  -EN --    Putting on and taking off regular upper body clothing -- 2  -EN --    Taking care of personal grooming (such as brushing teeth) -- 2  -EN --    Eating meals -- 2  -EN --    AM-PAC 6 Clicks Score (OT) -- 9  -EN --       Functional Assessment    Outcome Measure Options AM-PAC 6 Clicks Basic Mobility (PT)  -BP -- AM-PAC 6 Clicks Basic Mobility (PT)  -JW          User Key  (r) = Recorded By, (t) = Taken By, (c) = Cosigned By    Initials Name Provider Type    Carmen Resendez, OTR Occupational Therapist    Love Joyce, PT Physical Therapist    Evelin Kramer, PT Physical Therapist                 Time Calculation:    PT Charges     Row Name 12/08/21 1103             Time Calculation    Start Time 1025  -BP      Stop Time 1041  -BP      Time Calculation (min) 16 min  -BP      PT Received On 12/08/21  -BP      PT - Next Appointment 12/09/21  -BP              Timed Charges    43022 - PT Therapeutic Exercise Minutes 16  -BP              Total Minutes    Timed Charges Total Minutes 16  -BP       Total Minutes 16  -BP            User Key  (r) = Recorded By, (t) = Taken By, (c) = Cosigned By    Initials Name Provider Type    Love Joyce, PT Physical Therapist              Therapy Charges for Today     Code Description Service Date Service Provider Modifiers Qty    99542852099 HC PT THER PROC EA 15 MIN 12/8/2021 Love Shaver, PT GP 1    67933363922 HC PT THER SUPP EA 15 MIN 12/8/2021 Love Shaver, PT GP 1          PT G-Codes  Outcome Measure Options: AM-PAC 6 Clicks Basic Mobility (PT)  AM-PAC 6 Clicks Score (PT): 6  AM-PAC 6 Clicks Score (OT):  9    Love Shaver, PT  12/8/2021

## 2021-12-08 NOTE — THERAPY TREATMENT NOTE
Acute Care - Occupational Therapy Treatment Note   Liana Hernández     Patient Name: Argelia Nguyen  : 1950  MRN: [id number]  Today's Date: 2021             Admit Date: 2021       ICD-10-CM ICD-9-CM   1. Aspiration pneumonia of right lower lobe, unspecified aspiration pneumonia type (HCC)  J69.0 507.0   2. Hypothermia, initial encounter  T68.XXXA 991.6   3. Oropharyngeal dysphagia  R13.12 787.22     Patient Active Problem List   Diagnosis   • MGUS (monoclonal gammopathy of unknown significance)   • Normocytic anemia   • Hiatal hernia   • Colon polyps   • Chronic anticoagulation   • Esophageal dysphagia   • Severe malnutrition (HCC)   • Longstanding persistent atrial fibrillation (HCC)   • Sepsis associated hypotension (HCC)   • Hydronephrosis due to obstruction of ureter   • Acute metabolic encephalopathy   • Valvular heart disease   • Aspiration pneumonia of right lower lobe (HCC)     Past Medical History:   Diagnosis Date   • Anxiety    • Arthritis    • Chronic anticoagulation 2019   • Chronic diastolic (congestive) heart failure (HCC)    • Colon polyp    • Dysphagia    • GERD (gastroesophageal reflux disease)    • Hiatal hernia 2017   • Hypertension    • Hypothyroidism    • Internal hemorrhoids 2017   • Monoclonal gammopathy 3/25/2013   • Nonrheumatic mitral valve regurgitation     has ranged from mild-mod to severe depending on the study   • Persistent atrial fibrillation (HCC)    • Pyelonephritis 2021   • Reflux gastritis    • Seizure (HCC)    • Tricuspid regurgitation     mod-severe vs severe   • Type 2 diabetes mellitus (HCC)      Past Surgical History:   Procedure Laterality Date   • COLONOSCOPY N/A 3/29/2017    Procedure: COLONOSCOPY; POLYPECTOMY;  Surgeon: Brooke Garrido MD;  Location: [facility] [place];  Service:    • COLONOSCOPY N/A 8/3/2020    Procedure: COLONOSCOPY with polypectomy;  Surgeon: Rui Shi MD;  Location: [facility] OR;  Service: Gastroenterology;   "Laterality: N/A;  ascending polyp  transverse polyp  rectal polyp   • CYST REMOVAL     • CYSTOSCOPY W/ URETERAL STENT PLACEMENT Right 4/13/2021    Procedure: CYSTOSCOPY URETERAL CATHETER/STENT INSERTION;  Surgeon: Onesimo Fuller MD;  Location: [facility];  Service: Urology;  Laterality: Right;   • CYSTOSCOPY W/ URETERAL STENT PLACEMENT Right 4/28/2021    Procedure: CYSTOSCOPY URETERAL STENT REMOVAL;  Surgeon: Onesimo Fuller MD;  Location: [facility] OR;  Service: Urology;  Laterality: Right;  CYSTOSCOPY URETERAL STENT REMOVAL   • ENDOSCOPY N/A 3/29/2017    Procedure: ESOPHAGOGASTRODUODENOSCOPY WITH DILITATION;  Surgeon: Brooke Garrido MD;  Location: [facility] OR;  Service:    • ENDOSCOPY N/A 8/3/2020    Procedure: ESOPHAGOGASTRODUODENOSCOPY with biopsies;  Surgeon: Rui Shi MD;  Location: [facility] OR;  Service: Gastroenterology;  Laterality: N/A;  esophagitis  gastritis   • HERNIA REPAIR     • HYSTERECTOMY           OT ASSESSMENT FLOWSHEET (last 12 hours)     OT Evaluation and Treatment     Row Name 12/08/21 0836                   OT Time and Intention    Subjective Information no complaints  -EN        Document Type therapy note (daily note)  -EN        Mode of Treatment occupational therapy  -EN        Patient Effort good  -EN        Comment Patient more alert today and asking questions regarding when she was admitted and why. \"Did I come from the nursing home?\"  -EN                  General Information    Patient/Family/Caregiver Comments/Observations Patient reclined in bed, seizure pads in place.  -EN        Risks Reviewed patient:; LOB  -EN        Benefits Reviewed patient:; improve function; increase balance; increase strength; increase independence  -EN        Barriers to Rehab medically complex; previous functional deficit; cognitive status  -EN                  Pain Scale: Word Pre/Post-Treatment    Pre/Posttreatment Pain Comment reported no pain  -EN                  Bed Mobility    " "Supine-Sit [place] (Bed Mobility) moderate assist (50% patient effort); verbal cues  -EN        Sit-Supine [place] (Bed Mobility) maximum assist (25% patient effort)  -EN                  Motor Skills    Therapeutic Exercise --  Patient raised arms up and down several times and stated, \"I've been doing my exercises.\"  -EN                  Balance    Comment, Balance Patient sat at EOB with supervision and fed self after set up with no LOB  -EN                  Plan of Care Review    Plan of Care Reviewed With patient  -EN        Outcome Summary OT- Patient more alert today and agreed to therapy. Patient performed supine to sit with mod assist. Patient sat at EOB and demonstrated ind with UE exercises. Patient fed self breakfast while seated EOB with supervision after set up. Patient required max A for sit to supine.  -EN                  Positioning and Restraints    Pre-Treatment Position in bed  -EN        Post Treatment Position bed  -EN        In Bed call light within reach; encouraged to call for assist; exit alarm on  Reclined in bed  -EN                  Progress Summary (OT)    Progress Toward Functional Goals (OT) progress toward functional goals as expected  -EN        Daily Progress Summary (OT) improved  -EN              User Key  (r) = Recorded By, (t) = Taken By, (c) = Cosigned By    Initials Name Effective Dates    Carmen Resendez, OTR 06/16/21 -                  Occupational Therapy Education                 Title: PT OT SLP Therapies (In Progress)     Topic: Occupational Therapy (In Progress)     Point: ADL training (In Progress)     Description:   Instruct learner(s) on proper safety adaptation and remediation techniques during self care or transfers.   Instruct in proper use of assistive devices.              Learning Progress Summary           Patient Acceptance, E NL by EN at 12/6/2021 1114    Comment: educated on bed mobility    Acceptance E VU by EN at 12/4/2021 0955    " Comment: safety during trasnfers    NAILA Cavazos VU by MATTIE at 12/3/2021 8360    Comment: Safety during transfers                   Point: Home exercise program (Not Started)     Description:   Instruct learner(s) on appropriate technique for monitoring, assisting and/or progressing therapeutic exercises/activities.              Learner Progress:  Not documented in this visit.                      User Key     Initials Effective Dates Name Provider Type Discipline    EN 06/16/21 -  Carmen Duarte OTR Occupational Therapist OT                  OT Recommendation and Plan  Planned Therapy Interventions (OT): BADL retraining, functional balance retraining, patient/caregiver education/training, strengthening exercise, transfer/mobility retraining  Therapy Frequency (OT): 5 times/wk  Progress Toward Functional Goals (OT): progress toward functional goals as expected  Plan of Care Review  Plan of Care Reviewed With: patient  Outcome Summary: OT- Patient more alert today and agreed to therapy. Patient performed supine to sit with mod assist. Patient sat at EOB and demonstrated ind with UE exercises. Patient fed self breakfast while seated EOB with supervision after set up. Patient required max A for sit to supine.  Plan of Care Reviewed With: patient  Outcome Summary: OT- Patient more alert today and agreed to therapy. Patient performed supine to sit with mod assist. Patient sat at EOB and demonstrated ind with UE exercises. Patient fed self breakfast while seated EOB with supervision after set up. Patient required max A for sit to supine.     Outcome Measures     Row Name 12/08/21 1100 12/08/21 1025 12/06/21 1100       How much help from another person do you currently need...    Turning from your back to your side while in flat bed without using bedrails? -- 1  -BP --    Moving from lying on back to sitting on the side of a flat bed without bedrails? -- 1  -BP --    Moving to and from a bed to a chair (including a  wheelchair)? -- 1  -BP --    Standing up from a chair using your arms (e.g., wheelchair, bedside chair)? -- 1  -BP --    Climbing 3-5 steps with a railing? -- 1  -BP --    To walk in hospital room? -- 1  -BP --    AM-PAC 6 Clicks Score (PT) -- 6  -BP --       How much help from another is currently needed...    Putting on and taking off regular lower body clothing? 2  -EN -- 1  -EN    Bathing (including washing, rinsing, and drying) 2  -EN -- 1  -EN    Toileting (which includes using toilet bed pan or urinal) 2  -EN -- 1  -EN    Putting on and taking off regular upper body clothing 2  -EN -- 2  -EN    Taking care of personal grooming (such as brushing teeth) 3  -EN -- 2  -EN    Eating meals 3  -EN -- 2  -EN    AM-PAC 6 Clicks Score (OT) 14  -EN -- 9  -EN       Functional Assessment    Outcome Measure Options -- AM-PAC 6 Clicks Basic Mobility (PT)  -BP --    Row Name 12/06/21 0830             How much help from another person do you currently need...    Turning from your back to your side while in flat bed without using bedrails? 1  -JW      Moving from lying on back to sitting on the side of a flat bed without bedrails? 1  -JW      Moving to and from a bed to a chair (including a wheelchair)? 1  -JW      Standing up from a chair using your arms (e.g., wheelchair, bedside chair)? 1  -JW      Climbing 3-5 steps with a railing? 1  -JW      To walk in hospital room? 1  -JW      AM-PAC 6 Clicks Score (PT) 6  -JW              Functional Assessment    Outcome Measure Options AM-PAC 6 Clicks Basic Mobility (PT)  -            User Key  (r) = Recorded By, (t) = Taken By, (c) = Cosigned By    Initials Name Provider Type    EN Carmen Duarte, OTR Occupational Therapist    Love Joyce, PT Physical Therapist    Evelin Kramer, PT Physical Therapist                Time Calculation:    Time Calculation- OT     Row Name 12/08/21 1105             Time Calculation- OT    OT Start Time 0836  -EN      OT Stop Time  0900  -EN      OT Time Calculation (min) 24 min  -EN              Timed Charges    66006 - OT Self Care/Mgmt Minutes 24  -EN              Total Minutes    Timed Charges Total Minutes 24  -EN       Total Minutes 24  -EN            User Key  (r) = Recorded By, (t) = Taken By, (c) = Cosigned By    Initials Name Provider Type    EN Carmen Duarte, OTR Occupational Therapist              Therapy Charges for Today     Code Description Service Date Service Provider Modifiers Qty    [id number] HC OT SELF CARE/MGMT/TRAIN EA 15 MIN 12/8/2021 Carmen Duarte OTR GO 2               YAJAIRA Hernandez  12/8/2021

## 2021-12-08 NOTE — NURSING NOTE
Returned to pt's room 1410 at this time from stat head CT. This RN accompanied pt to CT as well as SAIRA Estrada.   IVF's infusing

## 2021-12-09 NOTE — DISCHARGE SUMMARY
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          71-year-old -American female with multiple medical problems including seizure disorder atrial fibrillation on chronic anticoagulation malnutrition hypothyroidism who is brought to the emergency by her brother because of generalized weakness cough chest congestion.  She is in the emergency room several times in last week with nausea and vomiting dizziness and was found to have supratherapeutic digoxin level is being held.  Patient has had some episodes of vomiting and she has been having more chest congestion generalized weakness.  She was found to be significantly hypothermic with a core temperature of 87.8 was noted to have several episodes of bradycardia while in the ER.  Further work-up revealed bilateral infiltrates consistent probable aspiration pneumonia should be admitted for evaluation treatment.     The patient is a very poor historian I spoke to the brother was at bedside said she was doing well for last couple months until about 2 weeks ago.  She had been in a assisted living and was discharged back home.  She is usually ambulatory at home feeds herself and has been able to go to her doctor's visits and privilege to care for self at home until about 2 weeks ago he started  having nausea vomiting and diarrhea.  He relates no fever chills she has noticed some chest congestion and cough last week.    Histoy of Present Illness:    71-year-old -American female with multiple medical problems including seizure disorder atrial fibrillation on chronic anticoagulation malnutrition hypothyroidism who is brought to the emergency by her brother because of generalized weakness cough chest congestion.  She is in the emergency room several times in last week with nausea and vomiting dizziness and was found to have supratherapeutic digoxin level is being held.  Patient has had some episodes of vomiting and she has been having more chest congestion generalized weakness.  She was found to be significantly hypothermic with a core temperature of 87.8 was noted to have several episodes of bradycardia while in the ER.  Further work-up revealed bilateral infiltrates consistent probable aspiration pneumonia should be admitted for evaluation treatment.     The patient is a very poor historian I spoke to the brother was at bedside said she was doing well for last couple months until about 2 weeks ago.  She had been in a assisted living and was discharged back home.  She is usually ambulatory at home feeds herself and has been able to go to her doctor's visits and privilege to care for self at home until about 2 weeks ago he started having nausea vomiting and diarrhea.  He relates no fever chills she has noticed some chest congestion and cough last week.    Hospital Course    Patient admitted to the ICU pancultured she was fluids resuscitated for early sepsis and started on broad-spectrum IV antibiotics cefepime for presumed aspiration pneumonia.  She started on a heating blanket and ambient temperatures remain Elevated.  She continued to be confused and her mental status is much less than baseline.  She became lethargic and thermic again on the evening of 12 2.  She was fluid bolused and improved did not  require any pressors.  Blood cultures remain negative speech evaluation was done for the patient was able to resume her diet from home.     Her mental status continued much less than baseline.  Gradually improved.  Chest x-ray improvement of infiltrates urine output improved and renal insufficiency due to decreased p.o. intake and sepsis also improved.  Metabolic cephalopathy was persistent but gradually improved.  She was transferred out of the ICU to monitored bed.  Her heart rhythm remained stable with controlled ventricular sponsor she remained in A. fib.  She progressed slowly with therapies mental status gradually improved was less than baseline spell that the patient will require short-term rehab and she was transferred to [facility].  Her IV antibiotics were changed to p.o. Augmentin which she will complete a 10-day course      Operations and Procedures Performed:       CT Head Without Contrast    Result Date: 12/7/2021  Narrative: CT Head WO: 12/7/2021 8:58 PM HISTORY: Mental status change TECHNIQUE: Axial unenhanced head CT. Radiation dose reduction techniques included automated exposure control or exposure modulation based on body size. Radiation audit for number of CT and nuclear cardiology exams performed in the last year: 0.  COMPARISON: 12/7/2021 at 5:41 AM FINDINGS: No intracranial hemorrhage, mass, or infarct. No hydrocephalus or extra-axial fluid collection. The skull base, calvarium, and extracranial soft tissues are normal apart from soft tissue swelling involving the left scalp.     Impression: No acute intracranial abnormality. No interval change from prior. Signer Name: Caryl Fraire MD  Signed: 12/7/2021 8:09 PM  Workstation Name: [id number]  [facility]    CT Head Without Contrast    Result Date: 12/7/2021  Narrative: CT Head WO HISTORY: Seizure, hematoma left forehead TECHNIQUE: Axial unenhanced head CT. Radiation dose reduction techniques included  automated exposure control or exposure modulation based on body size. Count of known CT and cardiac nuc med studies performed in previous 12 months: 2. Time of scan: 5:41 AM COMPARISON: 5/10/2021 FINDINGS: No intracranial hemorrhage, mass, or infarct. No hydrocephalus or extra-axial fluid collection. Brain parenchymal density is normal. There is a left frontal scalp hematoma     Impression: The brain is normal. There is a left frontal scalp hematoma. Signer Name: Samir Velez MD  Signed: 12/7/2021 5:54 AM  Workstation Name: [facility] [employer]  [facility]    CT Chest Without Contrast Diagnostic    Result Date: 12/2/2021  Narrative: CT CHEST, NONCONTRAST, 12/2/2021 HISTORY: 71-year-old female in the ED with generalized weakness, severe bradycardia. Abnormal chest x-ray. Of note, oldest available chest x-rays here have shown evidence of significant chronic reticulonodular interstitial lung disease with an upper lung and perihilar predominance compatible with sarcoidosis, pneumoconiosis or prior fungal infection. CT here in May showing significant right heart failure and congestive hepatopathy. TECHNIQUE: CT imaging of the chest ordered without IV contrast. Radiation dose reduction techniques included automated exposure control. Radiation audit for CT and nuclear cardiology exams in the last 12 months: 7. COMPARISON: *  CTA chest, 5/12/2021. *  Chest x-ray, 12/2/2021 and 1/16/2019. FINDINGS: Marked cardiomegaly with severe right heart chamber dilatation and distended IVC and hepatic veins compatible with chronically elevated right heart pressures. Pleural effusions and body wall edema have resolved since the prior study. Patchy airspace infiltrate scattered throughout the right lower lung, greatest in the posterior basal segment compatible with pneumonia. Of note, the thoracic esophagus is significantly dilated and fluid-filled today, and aspiration pneumonia is an additional consideration. Advanced  chronic reticulonodular interstitial lung disease with upper lobe predominance associated with upper lung volume loss, upward hilar retraction and the central upper perihilar conglomerate masses. The appearance is compatible with sarcoidosis or pneumoconiosis (typical for silicosis), correlate clinically. As noted above, this has been present on older chest x-rays. Limited upper abdominal images show chronic right hydronephrosis.     Impression: 1.  Advanced chronic upper lung reticulonodular lung disease with chronic volume loss and findings typical for either sarcoidosis or silicosis. This has been present on older chest x-rays. 2.  New airspace infiltrate in the right lower lobe, greatest in the dependent posterior basal segment. Pneumonitis is likely. Given the presence of a dilated and fluid-filled thoracic esophagus today, aspiration pneumonia is an additional consideration. 3.  Chronic right heart failure. Marked cardiomegaly and severe right heart chamber dilatation. Edema and pleural effusions present on the prior study have resolved. Signer Name: Rg Holman MD  Signed: 12/2/2021 12:21 PM  Workstation Name: [id number]  [facility] Video Swallow With Speech Single Contrast    Result Date: 12/3/2021  Narrative: VIDEO SWALLOWING STUDY, 12/03/2021  HISTORY: Abnormal CT scan demonstrating possible aspiration pneumonia.  TECHNIQUE: Video swallowing study was conducted by the speech pathologist in my presence and recorded on digital video disc.  Fluoroscopy time 3.2 minutes. A single spot image was recorded for documentation purposes.  FINDINGS: No evidence of aspiration identified. Flash penetration noted with swallows of thin liquid barium. The more thicker barium coated consistencies were negative for aspiration or penetration. There was uncoordinated appearance of the oral pharyngeal phase with premature spillage and pooling of barium at the vallecula and subsequent spillage  with all barium coated consistencies. Please see the full report of the speech pathologist for further details and dietary recommendations.      Impression: 1. Penetration of the thin liquid barium. No aspiration identified.  This report was finalized on 12/3/2021 2:09 PM by Dr. Kamar Morel MD.      XR Chest 1 View    Result Date: 12/5/2021  Narrative: CR Chest 1 Vw INDICATION: Shortness of breath with pneumonia COMPARISON:  December 2, 2021 FINDINGS: Portable AP view(s) of the chest. Cardiomegaly is unchanged. Upper lung field infiltrates are again noted and have not changed since the previous exam. There is volume loss of both lung bases with retraction of the diaphragms. No effusions are seen. Vascular markings are normal.     Impression: Upper lung field infiltrates are unchanged from the previous exam. No new infiltrates are seen. Signer Name: Thomas Farah MD  Signed: 12/5/2021 7:52 PM  Workstation Name: [facility]  [facility]    XR Chest 1 View    Result Date: 12/2/2021  Narrative: XR CHEST 1 VW-: 12/2/2021 10:27 AM  INDICATION: Generalized weakness. Severe bradycardia.  COMPARISON:  05/20/2021.  FINDINGS: Single Portable AP view(s) of the chest. Cardiac silhouette is borderline enlarged and stable. Right-sided PICC line has been removed. Small effusions have decreased and there is improved aeration of the left base. Chronic appearing perihilar and suprahilar opacities bilaterally are stable to worse. Increasing opacities in the suprahilar aspect of both lobes and in the right hilum. Imaging features suspicious for acute pneumonia upon superimposed chronic changes. There is no pneumothorax.      Impression:  1. Worsening opacities in the suprahilar and perihilar lungs bilaterally suspicious for pneumonia upon superimposed more chronic masslike consolidation. This could be better assessed with CT. 2. Improved aeration of the left base. Resolution of bilateral effusions.  This report  was finalized on 12/2/2021 10:55 AM by Dr. Kamar Morel MD.        Labs:  Results from last 7 days   Lab Units 12/09/21 0346 12/08/21 0426 12/07/21  0406 12/06/21 0312 12/06/21 0312 12/05/21  0750 12/05/21  0750 12/04/21  0505 12/04/21  0505 12/03/21  0422 12/03/21  0422   WBC 10*3/mm3 7.28 7.58 5.71  --  4.71  --  4.79  --  3.52  --  3.87   HEMOGLOBIN g/dL 9.8* 9.9* 10.4*   < > 10.6*   < > 10.5*   < > 10.0*   < > 9.8*   HEMATOCRIT % 31.6* 30.3* 31.5*  --  32.8*  --  32.5*  --  30.5*  --  29.8*   PLATELETS 10*3/mm3 162 145 173  --  144  --  124*  --  113*  --  108*   MONOCYTES % %  --   --   --   --   --   --   --   --  10.0  --   --     < > = values in this interval not displayed.     Results from last 7 days   Lab Units 12/09/21 0346 12/08/21 0426 12/07/21  0406 12/06/21 0312 12/05/21  0750 12/04/21  0505 12/03/21  0422   SODIUM mmol/L 145 142 143 143 142 142 136   POTASSIUM mmol/L 4.0 3.9 4.1 4.6 4.2 4.4 5.0   CHLORIDE mmol/L 113* 110* 109* 110* 111* 111* 103   CO2 mmol/L 22.8 22.0 18.7* 18.9* 17.8* 19.2* 20.5*   BUN mg/dL 19 17 18 16 18 20 27*   CREATININE mg/dL 0.94 1.07* 1.32* 1.12* 1.22* 1.37* 1.43*   CALCIUM mg/dL 8.2* 8.8 9.2 10.4 10.1 9.0 8.9   BILIRUBIN mg/dL  --   --   --  1.2  --  0.7  --    ALK PHOS U/L  --   --   --  316*  --  228*  --    ALT (SGPT) U/L  --   --   --  46*  --  49*  --    AST (SGOT) U/L  --   --   --  42*  --  47*  --    GLUCOSE mg/dL 118* 128* 70 103* 106* 82 63*           Lab Results (last 24 hours)     Procedure Component Value Units Date/Time    COVID PRE-OP / PRE-PROCEDURE SCREENING ORDER (NO ISOLATION) - Swab, Nasal Cavity [[id number]]  (Normal) Collected: 12/09/21 1249    Specimen: Swab from Nasal Cavity Updated: 12/09/21 1327    Narrative:      The following orders were created for panel order COVID PRE-OP / PRE-PROCEDURE SCREENING ORDER (NO ISOLATION) - Swab, Nasal Cavity.  Procedure                               Abnormality         Status                     ---------                                -----------         ------                     COVID-19,[employer] IN-MAGDI...[[id number]]  Normal              Final result                 Please view results for these tests on the individual orders.    COVID-19,[employer] IN-HOUSE,Nasal Swab No Transport Media 3-4 HR TAT - Swab, Nasal Cavity [[id number]]  (Normal) Collected: 12/09/21 1249    Specimen: Swab from Nasal Cavity Updated: 12/09/21 1327     COVID19 Not Detected    Narrative:      Fact sheet for providers: https://www.fda.gov/media/409949/download     Fact sheet for patients: https://www.fda.gov/media/697192/download    Test performed by PCR.    Consider negative results in combination with clinical observations, patient history, and epidemiological information.    Blood Culture - Blood, Hand, Right [[id number]]  (Normal) Collected: 12/07/21 0931    Specimen: Blood from Hand, Right Updated: 12/09/21 0946     Blood Culture No growth at 2 days    Blood Culture - Blood, Hand, Left [[id number]]  (Normal) Collected: 12/07/21 0931    Specimen: Blood from Hand, Left Updated: 12/09/21 0946     Blood Culture No growth at 2 days    Basic Metabolic Panel [[id number]]  (Abnormal) Collected: 12/09/21 0346    Specimen: Blood Updated: 12/09/21 0536     Glucose 118 mg/dL      BUN 19 mg/dL      Creatinine 0.94 mg/dL      Sodium 145 mmol/L      Potassium 4.0 mmol/L      Chloride 113 mmol/L      CO2 22.8 mmol/L      Calcium 8.2 mg/dL      eGFR  African Amer 71 mL/min/1.73      BUN/Creatinine Ratio 20.2     Anion Gap 9.2 mmol/L     Narrative:      GFR Normal >60  Chronic Kidney Disease <60  Kidney Failure <15      CBC (No Diff) [[id number]]  (Abnormal) Collected: 12/09/21 0346    Specimen: Blood Updated: 12/09/21 0512     WBC 7.28 10*3/mm3      RBC 3.84 10*6/mm3      Hemoglobin 9.8 g/dL      Hematocrit 31.6 %      MCV 82.3 fL      MCH 25.5 pg      MCHC 31.0 g/dL      RDW 19.5 %      RDW-SD 54.9 fl      Platelets 162 10*3/mm3     Narrative:      Test  repeated, results confirmed                                    Invalid input(s): LDLCALC          Glucose   Date/Time Value Ref Range Status   12/07/2021 1923 159 (H) 70 - 130 mg/dL Final     Comment:     Meter: [id number] : [id number] Kennedy Graff RN   12/07/2021 1726 71 70 - 130 mg/dL Final     Comment:     Meter: [id number] : [id number] Reena Le NURSING ASSISTANT   12/07/2021 1302 66 (L) 70 - 130 mg/dL Final     Comment:     Meter: [id number] : [id number] Alberta Morgan RN Float   12/07/2021 0927 73 70 - 130 mg/dL Final     Comment:     Meter: [id number] : [id number] Reena Le NURSING ASSISTANT   12/07/2021 0522 69 (L) 70 - 130 mg/dL Final     Comment:     Meter: [id number] : [id number] Gris Estrada RN     Results from last 7 days   Lab Units 12/07/21  0931   PROCALCITONIN ng/mL 0.13     Results from last 7 days   Lab Units 12/07/21  0931   BLOODCX  No growth at 2 days  No growth at 2 days     Results from last 7 days   Lab Units 12/07/21  1144   NITRITE UA  Negative   WBC UA /HPF None Seen   BACTERIA UA /HPF Trace*   SQUAM EPITHEL UA /HPF 0-2             Radiology:  Imaging Results (Last 24 Hours)     ** No results found for the last 24 hours. **          PROCEDURES          Allergies:  has No Known Allergies.      Discharge Medications:     Your medication list      START taking these medications      Instructions Last Dose Given Next Dose Due   amoxicillin-clavulanate 875-125 MG per tablet  Commonly known as: AUGMENTIN      Take 1 tablet by mouth Every 12 (Twelve) Hours for 7 doses. Indications: Pneumonia       lactobacillus acidophilus capsule capsule      Take 1 capsule by mouth 2 (Two) Times a Day.       polyethylene glycol 17 g packet  Commonly known as: MIRALAX  Start taking on: December 10, 2021      Take 17 g by mouth Daily.       sennosides-docusate 8.6-50 MG per tablet  Commonly known as: PERICOLACE      Take 1 tablet by mouth Every Night.          CONTINUE  taking these medications      Instructions Last Dose Given Next Dose Due   amLODIPine 5 MG tablet  Commonly known as: NORVASC      Take 1 tablet by mouth Daily.       aspirin 81 MG chewable tablet      Chew 1 tablet Daily.       levETIRAcetam 1000 MG tablet  Commonly known as: Keppra      Take 1 tablet by mouth 2 (Two) Times a Day.       levothyroxine 75 MCG tablet  Commonly known as: SYNTHROID, LEVOTHROID      Take 1 tablet by mouth Daily.       LORazepam 0.5 MG tablet  Commonly known as: ATIVAN      Take 0.5 mg by mouth Every 12 (Twelve) Hours As Needed for Anxiety.       nitroglycerin 0.4 MG SL tablet  Commonly known as: NITROSTAT      Place 0.4 mg under the tongue Every 5 (Five) Minutes As Needed for Chest Pain. Take no more than 3 doses in 15 minutes.       pantoprazole 40 MG EC tablet  Commonly known as: PROTONIX      Take 40 mg by mouth Daily.       rivaroxaban 20 MG tablet  Commonly known as: XARELTO      Take 1 tablet by mouth Daily.          STOP taking these medications    famotidine 40 MG tablet  Commonly known as: PEPCID        hydrOXYzine 25 MG tablet  Commonly known as: ATARAX              Where to Get Your Medications      Information about where to get these medications is not yet available    Ask your nurse or doctor about these medications  · amoxicillin-clavulanate 875-125 MG per tablet  · lactobacillus acidophilus capsule capsule  · polyethylene glycol 17 g packet  · sennosides-docusate 8.6-50 MG per tablet         Home medications and discharge medications reconciled with patient      Condition on Discharge:  Stable     Discharge Disposition  [facility]    Visiting Nurse:    No     Home PT/OT:  No     Home Safety Evaluation:  No     DME  None    Discharge Diet: regular cardiac    KEEP AMBIENT ROOM TEMP AT 80F  RECTAL TEMPS ONLY     Dietary Orders (From admission, onward)     Start     Ordered    12/06/21 0800  Dietary Nutrition Supplement: Boost Plus (Ensure Enlive, Ensure Plus)  Daily  "With Breakfast, Lunch & Dinner      Question:  Select Supplement:  Answer:  Boost Plus (Ensure Enlive, Ensure Plus)    12/06/21 0750    12/06/21 0751  Calorie Count  (Calorie Count)  Until Discontinued        Question:  Duration of Calorie Count  Answer:  3 Days   \"And\" Linked Group Details    12/06/21 0750    12/03/21 1445  Diet Soft Texture; Ground; Nectar / Syrup Thick  Diet Effective Now        Question Answer Comment   Diet Texture / Consistency Soft Texture    Select Texture: Ground    Fluid Consistency [place] / Syrup Thick        12/03/21 1445                Activity at Discharge:  With physical therapies      Follow-up Appointments:   Follow-up Information     Hany Oviedo MD .    Specialty: Family Medicine  Contact information:  [address] [place] [id number] [phone]                         Test Results Pending at Discharge  Pending Labs     Order Current Status    Blood Culture - Blood, Hand, Left Preliminary result    Blood Culture - Blood, Hand, Right Preliminary result           Jose Walters MD  12/09/21  15:47 EST    Much of this encounter note is an electronic transcription/translation of spoken language to printed text using Dragon Software            "

## 2021-12-09 NOTE — PROGRESS NOTES
Adult Nutrition  Assessment/PES    Patient Name:  Argelia Nguyen  YOB: 1950  MRN: [id number]  Admit Date:  12/2/2021    Assessment Date:  12/9/2021    Comments:  >100% ave/kcal meal and 85% ave pro /meal on 12/8/21 data.  Pt much improved po intake.      Reason for Assessment     Row Name 12/09/21 1109          Reason for Assessment    Reason For Assessment calorie count order                Nutrition/Diet History     Row Name 12/09/21 1109          Nutrition/Diet History    Typical Food/Fluid Intake Pt feeding self breakfast at visit, no needs voiced.                 Diet; Soft Ground Nectar Thick Liquids, Boost Plus with meals     Estimated needs: 6572-8768 kcal , 52-65 gm pro     Day 1: 0% ( Monday all meals recorded  Day 2: 0% per CNA no data available  Day 3: 1118 kcal, 34 gm pro ( 2 meals, 2 supplements)                 Electronically signed by:  Jo Ann Ryan RD  12/09/21 11:10 EST

## 2021-12-09 NOTE — CASE MANAGEMENT/SOCIAL WORK
Continued Stay Note  MALLORY Asher     Patient Name: Argelia Nguyen  MRN: [id number]  Today's Date: 12/9/2021    Admit Date: 12/2/2021     Discharge Plan     Row Name 12/09/21 1627       Plan    Plan Comments I received a call from Matilde at [facility] who advised that she can accept the patient today.  I advised the patients brother Germán who was at bedside and he is agreeable to same.               Discharge Codes    No documentation.               Expected Discharge Date and Time     Expected Discharge Date Expected Discharge Time    Dec 9, 2021             Beronica Paul RN

## 2021-12-09 NOTE — PLAN OF CARE
Goal Outcome Evaluation:              Outcome Summary: PT: Patient oriented x3 and able to follow commands.  Patient performs supine to sit with mod assist and sit to stand with mod assist.  Patient performs stand pivot transfer with max assist x2 and use of rolling walker.  Patient continues to improve in overall mobility and assistance levels, however would recommend SNF/ECF at discharge.

## 2021-12-09 NOTE — PLAN OF CARE
Goal Outcome Evaluation:  Plan of Care Reviewed With: patient        Progress: improving  Outcome Summary: pt alert and sitting up in bed, pt is assist x 2 with transfers, pt is medication given with applesauce, no coughing noted post medication. pt is alert to self and situation, no complaints noted will cont to monitor.

## 2021-12-09 NOTE — PLAN OF CARE
Goal Outcome Evaluation:  Plan of Care Reviewed With: patient        Progress: improving       Pt more alert today, possibly back to baseline, has been feeding meals to herself.  No BM since 12/1, MD notified, orders given.  PT had 2 BM today.

## 2021-12-09 NOTE — THERAPY TREATMENT NOTE
Acute Care - Occupational Therapy Treatment Note   Liana Hernández     Patient Name: Argelia Nguyen  : 1950  MRN: [id number]  Today's Date: 2021             Admit Date: 2021       ICD-10-CM ICD-9-CM   1. Aspiration pneumonia of right lower lobe, unspecified aspiration pneumonia type (HCC)  J69.0 507.0   2. Hypothermia, initial encounter  T68.XXXA 991.6   3. Oropharyngeal dysphagia  R13.12 787.22     Patient Active Problem List   Diagnosis   • MGUS (monoclonal gammopathy of unknown significance)   • Normocytic anemia   • Hiatal hernia   • Colon polyps   • Chronic anticoagulation   • Esophageal dysphagia   • Severe malnutrition (HCC)   • Longstanding persistent atrial fibrillation (HCC)   • Sepsis associated hypotension (HCC)   • Hydronephrosis due to obstruction of ureter   • Acute metabolic encephalopathy   • Valvular heart disease   • Aspiration pneumonia of right lower lobe (HCC)     Past Medical History:   Diagnosis Date   • Anxiety    • Arthritis    • Chronic anticoagulation 2019   • Chronic diastolic (congestive) heart failure (HCC)    • Colon polyp    • Dysphagia    • GERD (gastroesophageal reflux disease)    • Hiatal hernia 2017   • Hypertension    • Hypothyroidism    • Internal hemorrhoids 2017   • Monoclonal gammopathy 3/25/2013   • Nonrheumatic mitral valve regurgitation     has ranged from mild-mod to severe depending on the study   • Persistent atrial fibrillation (HCC)    • Pyelonephritis 2021   • Reflux gastritis    • Seizure (HCC)    • Tricuspid regurgitation     mod-severe vs severe   • Type 2 diabetes mellitus (HCC)      Past Surgical History:   Procedure Laterality Date   • COLONOSCOPY N/A 3/29/2017    Procedure: COLONOSCOPY; POLYPECTOMY;  Surgeon: Brooke Garrido MD;  Location: [facility] OR;  Service:    • COLONOSCOPY N/A 8/3/2020    Procedure: COLONOSCOPY with polypectomy;  Surgeon: Rui Shi MD;  Location: [facility] OR;  Service: Gastroenterology;   Laterality: N/A;  ascending polyp  transverse polyp  rectal polyp   • CYST REMOVAL     • CYSTOSCOPY W/ URETERAL STENT PLACEMENT Right 4/13/2021    Procedure: CYSTOSCOPY URETERAL CATHETER/STENT INSERTION;  Surgeon: Onesimo Fuller MD;  Location:  [facility] OR;  Service: Urology;  Laterality: Right;   • CYSTOSCOPY W/ URETERAL STENT PLACEMENT Right 4/28/2021    Procedure: CYSTOSCOPY URETERAL STENT REMOVAL;  Surgeon: Onesimo Fuller MD;  Location: [facility] OR;  Service: Urology;  Laterality: Right;  CYSTOSCOPY URETERAL STENT REMOVAL   • ENDOSCOPY N/A 3/29/2017    Procedure: ESOPHAGOGASTRODUODENOSCOPY WITH DILITATION;  Surgeon: Brooke Garrido MD;  Location: [facility] OR;  Service:    • ENDOSCOPY N/A 8/3/2020    Procedure: ESOPHAGOGASTRODUODENOSCOPY with biopsies;  Surgeon: Rui Shi MD;  Location: [facility] OR;  Service: Gastroenterology;  Laterality: N/A;  esophagitis  gastritis   • HERNIA REPAIR     • HYSTERECTOMY           OT ASSESSMENT FLOWSHEET (last 12 hours)     OT Evaluation and Treatment     Row Name 12/09/21 [facility]                   OT Time and Intention    Subjective Information no complaints  -JJ        Document Type therapy note (daily note)  -JJ        Mode of Treatment occupational therapy  -JJ        Patient Effort good  -JJ        Symptoms Noted During/After Treatment fatigue  -JJ                  General Information    Patient/Family/Caregiver Comments/Observations pt supine in bed, agreed to treatment  -JJ        Existing Precautions/Restrictions fall  -JJ        Barriers to Rehab medically complex; previous functional deficit  -JJ                  Cognition    Orientation Status (Cognition) oriented x 3  -JJ        Follows Commands (Cognition) WFL  -JJ        Personal Safety Interventions gait belt; nonskid shoes/slippers when out of bed  -JJ                  Pain Scale: Numbers Pre/Post-Treatment    Pretreatment Pain Rating 0/10 - no pain  -JJ        Posttreatment Pain Rating 0/10 -  no pain  -JJ                  Bed Mobility    Supine-Sit [place] (Bed Mobility) moderate assist (50% patient effort); 2 person assist; verbal cues  -JJ        Assistive Device (Bed Mobility) bed rails; head of bed elevated; draw sheet  -JJ                  Transfer Assessment/Treatment    Comment (Transfers) pt required mod assist x 2 for functional trasnfers from elevated bed surface with RW. pt reuqired extended time and cues to not lean against bed and achieve full upright posture. pt required max assist x 2 and cues to perform stand pivot transfer from bed to chair.  -JJ                  Transfers    Bed-Chair [place] (Transfers) maximum assist (25% patient effort); 2 person assist; verbal cues  -JJ        Assistive Device (Bed-Chair Transfers) walker, front-wheeled  -JJ        Sit-Stand [place] (Transfers) moderate assist (50% patient effort); 2 person assist; verbal cues  -JJ        Stand-Sit [place] (Transfers) moderate assist (50% patient effort); 2 person assist; verbal cues  -JJ                  Sit-Stand Transfer    Assistive Device (Sit-Stand Transfers) walker, front-wheeled  -JJ                  Stand-Sit Transfer    Assistive Device (Stand-Sit Transfers) walker, front-wheeled  -JJ                  Stand Pivot/Stand Step Transfer    Stand Pivot/Stand Step [place] (Transfers) moderate assist (50% patient effort); 2 person assist; verbal cues  -JJ        Assistive Device (Stand Pivot Stand Step Transfer) walker, front-wheeled  -JJ                  Safety Issues, Functional Mobility    Safety Issues Affecting Function (Mobility) insight into deficits/self-awareness; problem-solving  -JJ        Impairments Affecting Function (Mobility) cognition; balance  -JJ                  Balance    Comment, Balance pt required SBA for static sitting balance at EOB. pt required mod assist x 2 for static standing balance with RW  -JJ                  Plan of Care Review    Plan of Care Reviewed  With patient  -J        Outcome Summary OT: pt oriented x 3 today and able to follow motor commands. pt required mod assist x 2 for supine to sit transer to EOB. pt required mod assist x 2 for functional transfer from elevated bed surface with RW x 2 trials. pt required SBA for static sitting balance at EOB and mod assist x 2 for static standing balance with RW. pt required max assist x 2 for stand pivot transfer to chair with RW. pt with improved cognition today and participation with therapy. anticipated discharge to SNF  -                  Positioning and Restraints    Pre-Treatment Position in bed  -J        Post Treatment Position chair  -JJ        In Chair reclined; call light within reach; encouraged to call for assist; exit alarm on; notified nsg  -                  Progress Summary (OT)    Progress Toward Functional Goals (OT) progress toward functional goals as expected  -        Daily Progress Summary (OT) improved, cont poc  -                  Therapy Plan Review/Discharge Plan (OT)    Anticipated Discharge Disposition (OT) skilled nursing facility  -              User Key  (r) = Recorded By, (t) = Taken By, (c) = Cosigned By    Initials Name Effective Dates    Nohemy Pritchard, OTR 06/16/21 -                  Occupational Therapy Education                 Title: PT OT SLP Therapies (In Progress)     Topic: Occupational Therapy (In Progress)     Point: ADL training (Done)     Description:   Instruct learner(s) on proper safety adaptation and remediation techniques during self care or transfers.   Instruct in proper use of assistive devices.              Learning Progress Summary           Patient Acceptance, E,TB, VU,NR by TANIA at 12/9/2021 1147    Comment: pt educated on safety with functional transfers and balance    Acceptance, E, NL by MATTIE at 12/6/2021 1114    Comment: educated on bed mobility    Acceptance, E, VU by EN at 12/4/2021 0955    Comment: safety during trasnfers     Dirk, HU YOO by MATTIE at 12/3/2021 9838    Comment: Safety during transfers                   Point: Home exercise program (Not Started)     Description:   Instruct learner(s) on appropriate technique for monitoring, assisting and/or progressing therapeutic exercises/activities.              Learner Progress:  Not documented in this visit.                      User Key     Initials Effective Dates Name Provider Type Discipline    EN 06/16/21 -  Carmen Duarte, OTR Occupational Therapist OT    TANIA 06/16/21 -  Nohemy Lao OTR Occupational Therapist OT                  OT Recommendation and Plan     Progress Toward Functional Goals (OT): progress toward functional goals as expected  Plan of Care Review  Plan of Care Reviewed With: patient  Outcome Summary: OT: pt oriented x 3 today and able to follow motor commands. pt required mod assist x 2 for supine to sit transer to EOB. pt required mod assist x 2 for functional transfer from elevated bed surface with RW x 2 trials. pt required SBA for static sitting balance at EOB and mod assist x 2 for static standing balance with RW. pt required max assist x 2 for stand pivot transfer to chair with RW. pt with improved cognition today and participation with therapy. anticipated discharge to SNF  Plan of Care Reviewed With: patient  Outcome Summary: OT: pt oriented x 3 today and able to follow motor commands. pt required mod assist x 2 for supine to sit transer to EOB. pt required mod assist x 2 for functional transfer from elevated bed surface with RW x 2 trials. pt required SBA for static sitting balance at EOB and mod assist x 2 for static standing balance with RW. pt required max assist x 2 for stand pivot transfer to chair with RW. pt with improved cognition today and participation with therapy. anticipated discharge to SNF     Outcome Measures     Row Name 12/09/21 1024 12/08/21 1100 12/08/21 1025       How much help from another person do you currently  need...    Turning from your back to your side while in flat bed without using bedrails? -- -- 1  -BP    Moving from lying on back to sitting on the side of a flat bed without bedrails? -- -- 1  -BP    Moving to and from a bed to a chair (including a wheelchair)? -- -- 1  -BP    Standing up from a chair using your arms (e.g., wheelchair, bedside chair)? -- -- 1  -BP    Climbing 3-5 steps with a railing? -- -- 1  -BP    To walk in hospital room? -- -- 1  -BP    AM-PAC 6 Clicks Score (PT) -- -- 6  -BP       How much help from another is currently needed...    Putting on and taking off regular lower body clothing? 2  -JJ 2  -EN --    Bathing (including washing, rinsing, and drying) 2  -JJ 2  -EN --    Toileting (which includes using toilet bed pan or urinal) 2  -JJ 2  -EN --    Putting on and taking off regular upper body clothing 2  -JJ 2  -EN --    Taking care of personal grooming (such as brushing teeth) 3  -JJ 3  -EN --    Eating meals 3  -JJ 3  -EN --    AM-PAC 6 Clicks Score (OT) 14  -JJ 14  -EN --       Functional Assessment    Outcome Measure Options -- -- AM-PAC 6 Clicks Basic Mobility (PT)  -BP          User Key  (r) = Recorded By, (t) = Taken By, (c) = Cosigned By    Initials Name Provider Type    EN Carmen Duarte, OTR Occupational Therapist    Nohemy Pritchard, OTR Occupational Therapist    BP Love Shaver, PT Physical Therapist                Time Calculation:    Time Calculation- OT     Row Name 12/09/21 1148             Time Calculation- OT    OT Start Time 1023  -      OT Stop Time 1038  -      OT Time Calculation (min) 15 min  -            User Key  (r) = Recorded By, (t) = Taken By, (c) = Cosigned By    Initials Name Provider Type    Nohemy Pritchard, OTR Occupational Therapist              Therapy Charges for Today     Code Description Service Date Service Provider Modifiers Qty    17498712109  OT SELF CARE/MGMT/TRAIN EA 15 MIN 12/9/2021 Nohemy Lao,  OTR GO 2               Nohemy Lao, OTR  12/9/2021

## 2021-12-09 NOTE — THERAPY TREATMENT NOTE
Acute Care - Physical Therapy Treatment Note   Liana Hernández     Patient Name: Argelia Nguyen  : 1950  MRN: [id number]  Today's Date: 2021      Visit Dx:     ICD-10-CM ICD-9-CM   1. Aspiration pneumonia of right lower lobe, unspecified aspiration pneumonia type (HCC)  J69.0 507.0   2. Hypothermia, initial encounter  T68.XXXA 991.6   3. Oropharyngeal dysphagia  R13.12 787.22     Patient Active Problem List   Diagnosis   • MGUS (monoclonal gammopathy of unknown significance)   • Normocytic anemia   • Hiatal hernia   • Colon polyps   • Chronic anticoagulation   • Esophageal dysphagia   • Severe malnutrition (HCC)   • Longstanding persistent atrial fibrillation (HCC)   • Sepsis associated hypotension (HCC)   • Hydronephrosis due to obstruction of ureter   • Acute metabolic encephalopathy   • Valvular heart disease   • Aspiration pneumonia of right lower lobe (HCC)     Past Medical History:   Diagnosis Date   • Anxiety    • Arthritis    • Chronic anticoagulation 2019   • Chronic diastolic (congestive) heart failure (HCC)    • Colon polyp    • Dysphagia    • GERD (gastroesophageal reflux disease)    • Hiatal hernia 2017   • Hypertension    • Hypothyroidism    • Internal hemorrhoids 2017   • Monoclonal gammopathy 3/25/2013   • Nonrheumatic mitral valve regurgitation     has ranged from mild-mod to severe depending on the study   • Persistent atrial fibrillation (HCC)    • Pyelonephritis 2021   • Reflux gastritis    • Seizure (HCC)    • Tricuspid regurgitation     mod-severe vs severe   • Type 2 diabetes mellitus (HCC)      Past Surgical History:   Procedure Laterality Date   • COLONOSCOPY N/A 3/29/2017    Procedure: COLONOSCOPY; POLYPECTOMY;  Surgeon: Brooke Garrido MD;  Location: [facility] OR;  Service:    • COLONOSCOPY N/A 8/3/2020    Procedure: COLONOSCOPY with polypectomy;  Surgeon: Rui Shi MD;  Location: [facility] OR;  Service: Gastroenterology;  Laterality: N/A;   ascending polyp  transverse polyp  rectal polyp   • CYST REMOVAL     • CYSTOSCOPY W/ URETERAL STENT PLACEMENT Right 4/13/2021    Procedure: CYSTOSCOPY URETERAL CATHETER/STENT INSERTION;  Surgeon: Onesimo Fuller MD;  Location:  [facility] OR;  Service: Urology;  Laterality: Right;   • CYSTOSCOPY W/ URETERAL STENT PLACEMENT Right 4/28/2021    Procedure: CYSTOSCOPY URETERAL STENT REMOVAL;  Surgeon: Onesimo Fuller MD;  Location:  [facility] OR;  Service: Urology;  Laterality: Right;  CYSTOSCOPY URETERAL STENT REMOVAL   • ENDOSCOPY N/A 3/29/2017    Procedure: ESOPHAGOGASTRODUODENOSCOPY WITH DILITATION;  Surgeon: Brooke Garrido MD;  Location: [facility] OR;  Service:    • ENDOSCOPY N/A 8/3/2020    Procedure: ESOPHAGOGASTRODUODENOSCOPY with biopsies;  Surgeon: Rui Shi MD;  Location: [facility] OR;  Service: Gastroenterology;  Laterality: N/A;  esophagitis  gastritis   • HERNIA REPAIR     • HYSTERECTOMY       PT Assessment (last 12 hours)     PT Evaluation and Treatment     Row Name 12/09/21 1023          Physical Therapy Time and Intention    Subjective Information no complaints  -JW     Document Type therapy note (daily note)  -JW     Mode of Treatment physical therapy  -JW     Patient Effort good  -JW     Symptoms Noted During/After Treatment fatigue  -JW     Comment pt with improved alertness and following commands  -     Row Name 12/09/21 1023          General Information    Patient/Family/Caregiver Comments/Observations pt supine, agrees to therapy  -     Existing Precautions/Restrictions fall  -     Barriers to Rehab medically complex  -     Row Name 12/09/21 1023          Cognition    Orientation Status (Cognition) oriented x 3  -JW     Personal Safety Interventions gait belt; nonskid shoes/slippers when out of bed  -     Row Name 12/09/21 1023          Pain Scale: Numbers Pre/Post-Treatment    Pretreatment Pain Rating 0/10 - no pain  -JW     Posttreatment Pain Rating 0/10 - no pain  -JW      Row Name 12/09/21 1023          Pain Scale: Word Pre/Post-Treatment    Pre/Posttreatment Pain Comment no pain  -     Row Name 12/09/21 1023          Bed Mobility    Bed Mobility supine-sit  -     Supine-Sit [place] (Bed Mobility) moderate assist (50% patient effort); 2 person assist; verbal cues  -     Assistive Device (Bed Mobility) bed rails; head of bed elevated  -     Comment (Bed Mobility) requires verbal cues for sequencing  -     Row Name 12/09/21 1023          Transfers    Transfers sit-stand transfer; stand-sit transfer; stand pivot/stand step transfer  -     Comment (Transfers) pt requires increased time and verbal cues to complete.  pt performs stand pivot from bed to chair with max assist x2 and rolling walker  -     Sit-Stand [facility] (Transfers) moderate assist (50% patient effort); verbal cues  -     Stand-Sit [place] (Transfers) minimum assist (75% patient effort); 2 person assist; verbal cues  -     Row Name 12/09/21 1023          Sit-Stand Transfer    Assistive Device (Sit-Stand Transfers) walker, front-wheeled  -     Row Name 12/09/21 1023          Stand-Sit Transfer    Assistive Device (Stand-Sit Transfers) walker, front-wheeled  [facility]     Row Name 12/09/21 1023          Stand Pivot/Stand Step Transfer    Stand Pivot/Stand Step [facility] (Transfers) maximum assist (25% patient effort); 2 person assist  -     Assistive Device (Stand Pivot Stand Step Transfer) walker, front-wheeled  -     Row Name 12/09/21 1023          Gait/Stairs (Locomotion)    Comment (Gait/Stairs) stand pivot transfers only  -     Row Name 12/09/21 1023          Safety Issues, Functional Mobility    Safety Issues Affecting Function (Mobility) judgment; positioning of assistive device; problem-solving  -     Comment, Safety Issues/Impairments (Mobility) cues for safety  -     Row Name 12/09/21 1023          Balance    Comment, Balance pt able to maintain sitting balance with SBA   -     Row Name 12/09/21 1023          Plan of Care Review    Outcome Summary PT: Patient oriented x3 and able to follow commands.  Patient performs supine to sit with mod assist and sit to stand with mod assist.  Patient performs stand pivot transfer with max assist x2 and use of rolling walker.  Patient continues to improve in overall mobility and assistance levels, however would recommend SNF/ECF at discharge.  -     Row Name 12/09/21 1023          Positioning and Restraints    Pre-Treatment Position in bed  -     Post Treatment Position chair  -     In Chair reclined; call light within reach; encouraged to call for assist; exit alarm on  -     Row Name 12/09/21 1023          Progress Summary (PT)    Progress Toward Functional Goals (PT) progress toward functional goals is good  -     Barriers to Overall Progress (PT) cognition  -           User Key  (r) = Recorded By, (t) = Taken By, (c) = Cosigned By    Initials Name Provider Type    Evelin Kramer, PT Physical Therapist                Physical Therapy Education                 Title: PT OT SLP Therapies (In Progress)     Topic: Physical Therapy (In Progress)     Point: Mobility training (Done)     Learning Progress Summary           Patient Acceptance, E,TB, VU by  at 12/9/2021 1255    Acceptance, E,TB, VU by  at 12/8/2021 1103    Acceptance, E,TB, NR by  at 12/6/2021 1138    Acceptance, E,TB, NR by  at 12/4/2021 0955    Acceptance, E,TB, VU,NR by  at 12/3/2021 1431                   Point: Home exercise program (Not Started)     Learner Progress:  Not documented in this visit.                      User Key     Initials Effective Dates Name Provider Type Discipline     06/16/21 -  Love Shaver, PT Physical Therapist PT     06/16/21 -  Evelin Chin PT Physical Therapist PT              PT Recommendation and Plan  Anticipated Discharge Disposition (PT): skilled nursing facility, extended care facility  Progress Summary  (PT)  Progress Toward Functional Goals (PT): progress toward functional goals is good  Barriers to Overall Progress (PT): cognition  Outcome Summary: PT: Patient oriented x3 and able to follow commands.  Patient performs supine to sit with mod assist and sit to stand with mod assist.  Patient performs stand pivot transfer with max assist x2 and use of rolling walker.  Patient continues to improve in overall mobility and assistance levels, however would recommend SNF/ECF at discharge.   Outcome Measures     Row Name 12/09/21 1024 12/09/21 1023 12/08/21 1100       How much help from another person do you currently need...    Turning from your back to your side while in flat bed without using bedrails? -- 1  -JW --    Moving from lying on back to sitting on the side of a flat bed without bedrails? -- 1  -JW --    Moving to and from a bed to a chair (including a wheelchair)? -- 1  -JW --    Standing up from a chair using your arms (e.g., wheelchair, bedside chair)? -- 1  -JW --    Climbing 3-5 steps with a railing? -- 1  -JW --    To walk in hospital room? -- 1  -JW --    AM-PAC 6 Clicks Score (PT) -- 6  -JW --       How much help from another is currently needed...    Putting on and taking off regular lower body clothing? 2  -JJ -- 2  -EN    Bathing (including washing, rinsing, and drying) 2  -JJ -- 2  -EN    Toileting (which includes using toilet bed pan or urinal) 2  -JJ -- 2  -EN    Putting on and taking off regular upper body clothing 2  -JJ -- 2  -EN    Taking care of personal grooming (such as brushing teeth) 3  -JJ -- 3  -EN    Eating meals 3  -JJ -- 3  -EN    AM-PAC 6 Clicks Score (OT) 14  -JJ -- 14  -EN       Functional Assessment    Outcome Measure Options -- AM-PAC 6 Clicks Basic Mobility (PT)  -JW --    Row Name 12/08/21 1025             How much help from another person do you currently need...    Turning from your back to your side while in flat bed without using bedrails? 1  -BP      Moving from lying on  back to sitting on the side of a flat bed without bedrails? 1  -BP      Moving to and from a bed to a chair (including a wheelchair)? 1  -BP      Standing up from a chair using your arms (e.g., wheelchair, bedside chair)? 1  -BP      Climbing 3-5 steps with a railing? 1  -BP      To walk in hospital room? 1  -BP      AM-PAC 6 Clicks Score (PT) 6  -BP              Functional Assessment    Outcome Measure Options AM-PAC 6 Clicks Basic Mobility (PT)  -BP            User Key  (r) = Recorded By, (t) = Taken By, (c) = Cosigned By    Initials Name Provider Type    EN Carmen Duarte, OTR Occupational Therapist    Nohemy Pritchard, OTR Occupational Therapist    Love Joyce, PT Physical Therapist    Evelin Kramer, PT Physical Therapist                 Time Calculation:    PT Charges     Row Name 12/09/21 1255             Time Calculation    Start Time 1023  -      Stop Time 1036  -      Time Calculation (min) 13 min  -      PT Received On 12/09/21  -      PT - Next Appointment 12/10/21  -              Timed Charges    55432 - PT Therapeutic Exercise Minutes 13  -              Total Minutes    Timed Charges Total Minutes 13  -       Total Minutes 13  -            User Key  (r) = Recorded By, (t) = Taken By, (c) = Cosigned By    Initials Name Provider Type    Evelin Kramer, PT Physical Therapist              Therapy Charges for Today     Code Description Service Date Service Provider Modifiers Qty    39051335167 HC PT THER PROC EA 15 MIN 12/9/2021 Evelin Chin, PT GP 1          PT G-Codes  Outcome Measure Options: AM-PAC 6 Clicks Basic Mobility (PT)  AM-PAC 6 Clicks Score (PT): 6  AM-PAC 6 Clicks Score (OT): 14    Evelin Chin PT  12/9/2021

## 2021-12-09 NOTE — PLAN OF CARE
Goal Outcome Evaluation:  Plan of Care Reviewed With: patient           Outcome Summary: SLP: Improved intake and tolerance of soft diet w/NTL. Continue with assistance for fully upright positioning, oral care after meals (only needs setup) and tray set up. Recommend continued aspiration precautions, fatigue precautions and reflux precautions.

## 2021-12-09 NOTE — THERAPY TREATMENT NOTE
Acute Care - Speech Language Pathology   Swallow Treatment Note MALLORY Asher     Patient Name: Argelia Nguyen  : 1950  MRN: [id number]  Today's Date: 2021               Admit Date: 2021    Visit Dx:     ICD-10-CM ICD-9-CM   1. Aspiration pneumonia of right lower lobe, unspecified aspiration pneumonia type (HCC)  J69.0 507.0   2. Hypothermia, initial encounter  T68.XXXA 991.6   3. Oropharyngeal dysphagia  R13.12 787.22     Patient Active Problem List   Diagnosis   • MGUS (monoclonal gammopathy of unknown significance)   • Normocytic anemia   • Hiatal hernia   • Colon polyps   • Chronic anticoagulation   • Esophageal dysphagia   • Severe malnutrition (HCC)   • Longstanding persistent atrial fibrillation (HCC)   • Sepsis associated hypotension (HCC)   • Hydronephrosis due to obstruction of ureter   • Acute metabolic encephalopathy   • Valvular heart disease   • Aspiration pneumonia of right lower lobe (HCC)     Past Medical History:   Diagnosis Date   • Anxiety    • Arthritis    • Chronic anticoagulation 2019   • Chronic diastolic (congestive) heart failure (HCC)    • Colon polyp    • Dysphagia    • GERD (gastroesophageal reflux disease)    • Hiatal hernia 2017   • Hypertension    • Hypothyroidism    • Internal hemorrhoids 2017   • Monoclonal gammopathy 3/25/2013   • Nonrheumatic mitral valve regurgitation     has ranged from mild-mod to severe depending on the study   • Persistent atrial fibrillation (HCC)    • Pyelonephritis 2021   • Reflux gastritis    • Seizure (HCC)    • Tricuspid regurgitation     mod-severe vs severe   • Type 2 diabetes mellitus (HCC)      Past Surgical History:   Procedure Laterality Date   • COLONOSCOPY N/A 3/29/2017    Procedure: COLONOSCOPY; POLYPECTOMY;  Surgeon: Brooke Garrido MD;  Location: [facility];  Service:    • COLONOSCOPY N/A 8/3/2020    Procedure: COLONOSCOPY with polypectomy;  Surgeon: Rui Shi MD;  Location: [facility] OR;   Service: Gastroenterology;  Laterality: N/A;  ascending polyp  transverse polyp  rectal polyp   • CYST REMOVAL     • CYSTOSCOPY W/ URETERAL STENT PLACEMENT Right 4/13/2021    Procedure: CYSTOSCOPY URETERAL CATHETER/STENT INSERTION;  Surgeon: Onesimo Fuller MD;  Location: [facility] OR;  Service: Urology;  Laterality: Right;   • CYSTOSCOPY W/ URETERAL STENT PLACEMENT Right 4/28/2021    Procedure: CYSTOSCOPY URETERAL STENT REMOVAL;  Surgeon: Onesimo Fuller MD;  Location: [facility] OR;  Service: Urology;  Laterality: Right;  CYSTOSCOPY URETERAL STENT REMOVAL   • ENDOSCOPY N/A 3/29/2017    Procedure: ESOPHAGOGASTRODUODENOSCOPY WITH DILITATION;  Surgeon: Brooke Garrido MD;  Location: [facility] OR;  Service:    • ENDOSCOPY N/A 8/3/2020    Procedure: ESOPHAGOGASTRODUODENOSCOPY with biopsies;  Surgeon: Rui Shi MD;  Location: [facility] OR;  Service: Gastroenterology;  Laterality: N/A;  esophagitis  gastritis   • HERNIA REPAIR     • HYSTERECTOMY         SLP Recommendation and Plan     SLP Diet Recommendation: soft textures, ground, nectar thick liquids (12/09/21 1000)  Recommended Precautions and Strategies: upright posture during/after eating, small bites of food and sips of liquid, check mouth frequently for oral residue/pocketing, general aspiration precautions, reflux precautions, fatigue precautions, other (see comments) (intermittent supervision; assist with setup) (12/09/21 1000)  SLP Rec. for Method of Medication Administration: meds whole, meds crushed, with pudding or applesauce, as tolerated (12/09/21 1000)     Monitor for Signs of Aspiration: no, notify SLP if any concerns (12/09/21 1000)  Recommended Diagnostics: reassess via clinical swallow evaluation, other (see comments) (for advancement of liquids to thins) (12/09/21 1000)     Anticipated Discharge Disposition (SLP): extended care facility (12/09/21 1000)     Therapy Frequency (Swallow): at least, 3 days per week (12/09/21  1000)  Predicted Duration Therapy Intervention (Days): 2 weeks (12/09/21 1000)     Daily Summary of Progress (SLP): progress toward functional goals is good (12/09/21 1000)         Patient/Family Concerns, Anticipated Discharge Disposition (SLP): Pt reports she is agreeable to rehab at [facility] in [place]. (12/09/21 1000)         Plan of Care Reviewed With: patient  Outcome Summary: SLP: Improved intake and tolerance of soft diet w/NTL. Continue with assistance for fully upright positioning, oral care after meals (only needs setup) and tray set up. Recommend continued aspiration precautions, fatigue precautions and reflux precautions.      SWALLOW EVALUATION (last 72 hours)     SLP Adult Swallow Evaluation     Row Name 12/09/21 1000                   Rehab Evaluation    Document Type therapy note (daily note)  -AD        Subjective Information no complaints  -AD        Patient Observations alert; cooperative; agree to therapy  -AD        Patient/Family/Caregiver Comments/Observations Pt seen semi-reclined in bed, eating breakfast. Called for assistance and pt repositioned upright in bed near 90 degrees. Pt with significantly improved alertness from prior visit.  -AD        Patient Effort good  -AD        Symptoms Noted During/After Treatment none  -AD                  Pain    Additional Documentation --  no current pain reported  -AD                  Respiratory    Respiratory Status WFL; room air; during swallowing/eating  -AD                  SLP Treatment Clinical Impressions    Treatment Assessment (SLP) Pt with significant improvement in swallowing and overall status due to improved alertness and improved orientation. She is tolerating soft foods w/o adequate mastication. Also tolerating nectar thick liquids w/o issue when in a fully upright position. Pt reports continued difficulty with pills and is tolerating crushed in applesauce. Noted larger pieces of pills get stuck on her tongue surface and she is having  difficulty getting down. Use of NTL liquid wash helpful along with extra bites of applesauce to clear pill pieces from the oral cavity. Pt continues to report difficulty with her esophagus when swallowing, especially r/t pill intake. SLP provided with education regarding reflux precautions of remaining upright for 30 minutes after eating, keep head of bed elevated to 30-45 at night and to not eat or drink 2 hours before going to bed.  -AD        Daily Summary of Progress (SLP) progress toward functional goals is good  -AD        Barriers to Overall Progress (SLP) Medically complex; Baseline deficits  -AD        Plan for Continued Treatment (SLP) continue treatment per plan of care  -AD        Care Plan Review care plan/treatment goals reviewed; risks/benefits reviewed; current/potential barriers reviewed; patient/other agree to care plan  -AD                  Recommendations    Therapy Frequency (Swallow) at least; 3 days per week  -AD        Predicted Duration Therapy Intervention (Days) 2 weeks  -AD        SLP Diet Recommendation soft textures; ground; nectar thick liquids  -AD        Recommended Diagnostics reassess via clinical swallow evaluation; other (see comments)  for advancement of liquids to thins  -AD        Recommended Precautions and Strategies upright posture during/after eating; small bites of food and sips of liquid; check mouth frequently for oral residue/pocketing; general aspiration precautions; reflux precautions; fatigue precautions; other (see comments)  intermittent supervision; assist with setup  -AD        Oral Care Recommendations Oral Care before breakfast, after meals and PRN; Toothbrush  -AD        SLP Rec. for Method of Medication Administration meds whole; meds crushed; with pudding or applesauce; as tolerated  -AD        Monitor for Signs of Aspiration no; notify SLP if any concerns  -AD        Anticipated Discharge Disposition (SLP) extended care facility  -AD        Patient/Family  Concerns, Anticipated Discharge Disposition (SLP) Pt reports she is agreeable to rehab at [facility] in [place].  -AD              User Key  (r) = Recorded By, (t) = Taken By, (c) = Cosigned By    Initials Name Effective Dates    Viky Meier, MS CCC-SLP 06/16/21 -                 EDUCATION  The patient has been educated in the following areas:   Dysphagia (Swallowing Impairment) Modified Diet Instruction Reflux Precautions. Pt verbalizes understanding. Assistance needed with positioning at this time.      SLP GOALS     Row Name 12/09/21 1000             Oral Nutrition/Hydration Goal 1 (SLP)    Oral Nutrition/Hydration Goal 1, SLP LTG:  Pt will demonstrate tolerance of a soft, ground diet with thin liquids w/o clinical s/s of aspiration or complications such as aspiration pneumonia.  -AD      Time Frame (Oral Nutrition/Hydration Goal 1, SLP) 2 weeks  -AD      Barriers (Oral Nutrition/Hydration Goal 1, SLP) medically complex; suspected esophageal dysphagia and prior deficit/cognitive deficit  -AD      Progress/Outcomes (Oral Nutrition/Hydration Goal 1, SLP) good progress toward goal; goal ongoing  -AD              Lingual Strengthening Goal 1 (SLP)    Activity (Lingual Strengthening Goal 1, SLP) increase lingual tone/sensation/control/coordination/movement; increase tongue back strength  -AD      Increase Lingual Tone/Sensation/Control/Coordination/Movement lingual movement exercises  control/coordination  -AD      Increase Tongue Back Strength lingual movement exercises; lingual resistance exercises  -AD      [place]/Accuracy (Lingual Strengthening Goal 1, SLP) with moderate cues (50-74% accuracy)  -AD      Time Frame (Lingual Strengthening Goal 1, SLP) short term goal (STG); 1 week  -AD      Barriers (Lingual Strengthening Goal 1, SLP) medically complex; suspected esophageal dysphagia and prior deficit/cognitive deficit  -AD      Progress/Outcomes (Lingual Strengthening Goal 1, SLP) progress slower than  expected; goal ongoing  -AD            User Key  (r) = Recorded By, (t) = Taken By, (c) = Cosigned By    Initials Name Provider Type    Viky Meier MS CCC-SLP Speech and Language Pathologist                   Time Calculation:    Time Calculation- SLP     Row Name 12/09/21 1332             Time Calculation- SLP    SLP Start Time 0910  -AD      SLP Stop Time 1000  -AD      SLP Time Calculation (min) 50 min  -AD      Total Timed Code Minutes- SLP 0 minute(s)  -AD      SLP Non-Billable Time (min) 0 min  -AD      SLP Received On 12/09/21  -AD              Untimed Charges    50704-SZ Treatment Swallow Minutes 50  -AD              Total Minutes    Untimed Charges Total Minutes 50  -AD       Total Minutes 50  -AD            User Key  (r) = Recorded By, (t) = Taken By, (c) = Cosigned By    Initials Name Provider Type    Viky Meier MS CCC-SLP Speech and Language Pathologist                Therapy Charges for Today     Code Description Service Date Service Provider Modifiers Qty    [id number]  ST TREATMENT SWALLOW 3 12/9/2021 Viky Aguirre MS CCC-SLP GN 1               Viky Aguirre MS CCC-LENNIE  12/9/2021

## 2021-12-09 NOTE — PLAN OF CARE
Goal Outcome Evaluation:  Plan of Care Reviewed With: patient           Outcome Summary: OT: pt oriented x 3 today and able to follow motor commands. pt required mod assist x 2 for supine to sit transer to EOB. pt required mod assist x 2 for functional transfer from elevated bed surface with RW x 2 trials. pt required SBA for static sitting balance at EOB and mod assist x 2 for static standing balance with RW. pt required max assist x 2 for stand pivot transfer to chair with RW. pt with improved cognition today and participation with therapy. anticipated discharge to SNF

## 2021-12-09 NOTE — PROGRESS NOTES
"Daily Progress Note:      Chief complaint: Follow-up of hypothermia, hypotension, sepsis, aspiration pneumonia, encephalopathy    Subjective: No overnight events noted.  She is awake alert oriented x2 this morning follows commands        LOS: 7 days     Vital Signs  Temp:  [96.8 °F (36 °C)-98.3 °F (36.8 °C)] 97.8 °F (36.6 °C)  Heart Rate:  [68-88] 77  Resp:  [16-18] 16  BP: ()/(56-71) 111/71  Oxygen Therapy  SpO2: 93 %  Pulse Oximetry Type: Intermittent  Device (Oxygen Therapy): room air}  Body mass index is 16.64 kg/m².  Flowsheet Rows      First Filed Value   Admission Height 160 cm (63\") Documented at 12/02/2021 0924   Admission Weight 43.5 kg (96 lb) Documented at 12/02/2021 0924                   Documented weights    12/02/21 0924 12/02/21 1830 12/07/21 0630 12/08/21 0541   Weight: 43.5 kg (96 lb) 43.2 kg (95 lb 3.2 oz) 42.5 kg (93 lb 11.2 oz) 42.6 kg (93 lb 14.4 oz)           Patient Vitals for the past 24 hrs:   BP Temp Temp src Pulse Resp SpO2   12/09/21 0549 111/71 97.8 °F (36.6 °C) Oral 77 16 93 %   12/08/21 2322 102/70 98.3 °F (36.8 °C) Oral 76 16 96 %   12/08/21 2003 91/57 96.8 °F (36 °C) Axillary 68 18 95 %   12/08/21 1930 -- -- -- -- -- 98 %   12/08/21 1556 98/68 97.5 °F (36.4 °C) Oral 80 16 95 %   12/08/21 1100 93/56 98 °F (36.7 °C) Oral 79 16 96 %   12/08/21 0919 (!) 88/57 -- -- 88 -- 92 %       42.6 kg (93 lb 14.4 oz)    Intake/Output                       12/07/21 0701 - 12/08/21 0700 12/08/21 0701 - 12/09/21 0700     [id number] 1901-0700 Total [id number] 1901-0700 Total                 Intake    P.O.  360  80 440  600  120 720    I.V.  --  -- --  --  900 900    Total Intake 360 80  1620       Output    Urine  350  425 775  150  150 300    Total Output 350 425 775 150 150 300           Intake/Output Summary (Last 24 hours) at 12/9/2021 0728  Last data filed at 12/9/2021 0613  Gross per 24 hour   Intake 1620 ml   Output 300 ml   Net 1320 ml        Intake/Output Summary (Last 24 " hours) at 12/9/2021 0728  Last data filed at 12/9/2021 0613  Gross per 24 hour   Intake 1620 ml   Output 300 ml   Net 1320 ml        Review of Systems   Reason unable to perform ROS: Some history may be unreliable due to patient's mental status.   Constitutional: Positive for appetite change. Negative for activity change and fatigue.   HENT: Negative for congestion.    Respiratory: Negative for cough, chest tightness, shortness of breath and wheezing.    Cardiovascular: Negative for chest pain.   Gastrointestinal: Negative for abdominal distention, abdominal pain, diarrhea, nausea and vomiting.   Skin: Negative for rash.   Neurological: Negative for light-headedness.   Hematological: Does not bruise/bleed easily.   Psychiatric/Behavioral: Negative for agitation and behavioral problems.       Physical Exam  Vitals and nursing note reviewed.   Constitutional:       General: She is not in acute distress.     Appearance: She is well-developed and underweight. She is not ill-appearing.   HENT:      Head: Normocephalic.      Comments: Small hematoma left frontal area  Eyes:      Conjunctiva/sclera: Conjunctivae normal.   Neck:      Thyroid: No thyromegaly.      Vascular: No JVD.   Cardiovascular:      Rate and Rhythm: Normal rate. Rhythm irregularly irregular.      Heart sounds: Normal heart sounds. No murmur heard.      Pulmonary:      Effort: Pulmonary effort is normal. No respiratory distress.      Breath sounds: Normal breath sounds. No wheezing or rales.   Abdominal:      General: Bowel sounds are normal. There is no distension.      Palpations: Abdomen is soft.      Tenderness: There is no abdominal tenderness. There is no guarding.   Musculoskeletal:      Cervical back: Normal range of motion.      Right lower leg: Edema (Trace bilateral) present.      Left lower leg: Edema present.   Skin:     General: Skin is warm and dry.      Findings: No rash.   Neurological:      General: No focal deficit present.      Mental  Status: She is alert and easily aroused. She is disoriented.      Cranial Nerves: Cranial nerves are intact. No cranial nerve deficit.      Motor: Motor function is intact. No weakness.      Comments: Oriented to person and place alert cooperative   Psychiatric:         Attention and Perception: Attention normal.         Mood and Affect: Mood normal.         Speech: Speech is not delayed.         Cognition and Memory: Cognition is impaired. Memory is impaired.         Medication Review:   I have reviewed the patient's current medication list  Scheduled Meds:amLODIPine, 5 mg, Oral, Q24H  amoxicillin-clavulanate, 1 tablet, Oral, Q12H  aspirin, 81 mg, Oral, Daily  lactobacillus acidophilus, 1 capsule, Oral, BID  levETIRAcetam, 1,000 mg, Oral, Q12H  levothyroxine, 50 mcg, Oral, Q AM  multivitamin with minerals, 1 tablet, Oral, Daily  pantoprazole, 40 mg, Intravenous, Q AM  polyethylene glycol, 17 g, Oral, Daily  rivaroxaban, 20 mg, Oral, Daily  senna-docusate sodium, 1 tablet, Oral, Nightly  sodium chloride, 10 mL, Intravenous, Q12H      Continuous Infusions:sodium chloride, 75 mL/hr, Last Rate: 75 mL/hr (12/09/21 0613)      PRN Meds:.•  acetaminophen **OR** acetaminophen **OR** acetaminophen  •  aluminum-magnesium hydroxide-simethicone  •  atropine  •  calcium carbonate  •  hydrALAZINE  •  LORazepam  •  magnesium hydroxide  •  nitroglycerin  •  ondansetron **OR** ondansetron  •  sodium chloride  •  [COMPLETED] Insert peripheral IV **AND** sodium chloride  •  sodium chloride      Labs:  Results from last 7 days   Lab Units 12/09/21  0346 12/08/21  0426 12/07/21  0406 12/06/21  0312 12/06/21  0312 12/05/21  0750 12/05/21  0750 12/04/21  0505 12/04/21  0505 12/03/21  0422 12/03/21  0422 12/02/21  0955 12/02/21  0955   WBC 10*3/mm3 7.28 7.58 5.71  --  4.71  --  4.79  --  3.52  --  3.87   < > 2.38*   HEMOGLOBIN g/dL 9.8* 9.9* 10.4*   < > 10.6*   < > 10.5*   < > 10.0*   < > 9.8*   < > 11.9*   HEMATOCRIT % 31.6* 30.3* 31.5*   --  32.8*  --  32.5*  --  30.5*  --  29.8*   < > 37.0   PLATELETS 10*3/mm3 162 145 173  --  144  --  124*  --  113*  --  108*   < > 107*   MONOCYTES % %  --   --   --   --   --   --   --   --  10.0  --   --   --  16.0*    < > = values in this interval not displayed.     Results from last 7 days   Lab Units 12/09/21  0346 12/08/21  0426 12/07/21  0406 12/06/21  0312 12/05/21  0750 12/04/21  0505 12/03/21  0422 12/02/21  0955 12/02/21  0955   SODIUM mmol/L 145 142 143 143 142 142 136   < > 136   POTASSIUM mmol/L 4.0 3.9 4.1 4.6 4.2 4.4 5.0   < > 5.0   CHLORIDE mmol/L 113* 110* 109* 110* 111* 111* 103   < > 102   CO2 mmol/L 22.8 22.0 18.7* 18.9* 17.8* 19.2* 20.5*   < > 16.9*   BUN mg/dL 19 17 18 16 18 20 27*   < > 27*   CREATININE mg/dL 0.94 1.07* 1.32* 1.12* 1.22* 1.37* 1.43*   < > 1.40*   CALCIUM mg/dL 8.2* 8.8 9.2 10.4 10.1 9.0 8.9   < > 10.4   BILIRUBIN mg/dL  --   --   --  1.2  --  0.7  --   --  0.6   ALK PHOS U/L  --   --   --  316*  --  228*  --   --  249*   ALT (SGPT) U/L  --   --   --  46*  --  49*  --   --  74*   AST (SGOT) U/L  --   --   --  42*  --  47*  --   --  67*   GLUCOSE mg/dL 118* 128* 70 103* 106* 82 63*   < > 107*    < > = values in this interval not displayed.           Results from last 7 days   Lab Units 12/09/21  0346 12/08/21  0426 12/07/21  0931 12/07/21  0406 12/06/21  0312 12/06/21  0312 12/05/21  0750 12/04/21  0505 12/03/21  0422 12/02/21  1055 12/02/21  0955   AST (SGOT) U/L  --   --   --   --   --  42*  --  47*  --   --  67*   ALT (SGPT) U/L  --   --   --   --   --  46*  --  49*  --   --  74*   PROCALCITONIN ng/mL  --   --  0.13  --   --   --   --   --   --   --  0.04   LACTATE mmol/L  --   --   --   --   --   --   --   --   --  1.6  --    PLATELETS 10*3/mm3 162 145  --  173   < > 144   < > 113*   < >  --  107*    < > = values in this interval not displayed.         Lab Results (last 24 hours)     Procedure Component Value Units Date/Time    Basic Metabolic Panel [[id number]]  (Abnormal)  Collected: 12/09/21 0346    Specimen: Blood Updated: 12/09/21 0536     Glucose 118 mg/dL      BUN 19 mg/dL      Creatinine 0.94 mg/dL      Sodium 145 mmol/L      Potassium 4.0 mmol/L      Chloride 113 mmol/L      CO2 22.8 mmol/L      Calcium 8.2 mg/dL      eGFR  African Amer 71 mL/min/1.73      BUN/Creatinine Ratio 20.2     Anion Gap 9.2 mmol/L     Narrative:      GFR Normal >60  Chronic Kidney Disease <60  Kidney Failure <15      CBC (No Diff) [[id number]]  (Abnormal) Collected: 12/09/21 0346    Specimen: Blood Updated: 12/09/21 0512     WBC 7.28 10*3/mm3      RBC 3.84 10*6/mm3      Hemoglobin 9.8 g/dL      Hematocrit 31.6 %      MCV 82.3 fL      MCH 25.5 pg      MCHC 31.0 g/dL      RDW 19.5 %      RDW-SD 54.9 fl      Platelets 162 10*3/mm3     Narrative:      Test repeated, results confirmed      Blood Culture - Blood, Hand, Left [[id number]]  (Normal) Collected: 12/07/21 0931    Specimen: Blood from Hand, Left Updated: 12/08/21 0946     Blood Culture No growth at 24 hours    Blood Culture - Blood, Hand, Right [[id number]]  (Normal) Collected: 12/07/21 0931    Specimen: Blood from Hand, Right Updated: 12/08/21 0946     Blood Culture No growth at 24 hours            Results from last 7 days   Lab Units 12/02/21  0955   TSH uIU/mL 0.332     Results from last 7 days   Lab Units 12/02/21  0955   PROBNP pg/mL 1,500.0*                         Glucose   Date/Time Value Ref Range Status   12/07/2021 1923 159 (H) 70 - 130 mg/dL Final     Comment:     Meter: [id number] : [id number] Kennedy Graff RN   12/07/2021 1726 71 70 - 130 mg/dL Final     Comment:     Meter: [id number] : [id number] Reena Le NURSING ASSISTANT   12/07/2021 1302 66 (L) 70 - 130 mg/dL Final     Comment:     Meter: [id number] : [id number] Alberta Morgan RN Float   12/07/2021 0927 73 70 - 130 mg/dL Final     Comment:     Meter: [id number] : [id number] Reena Le NURSING ASSISTANT   12/07/2021 0522 69 (L) 70 - 130 mg/dL Final      Comment:     Meter: [id number] : [id number] Gris Estrada RN     Results from last 7 days   Lab Units 12/07/21  0931 12/02/21  1055 12/02/21  0955   PROCALCITONIN ng/mL 0.13  --  0.04   LACTATE mmol/L  --  1.6  --      Results from last 7 days   Lab Units 12/07/21  0931 12/02/21  1055 12/02/21  0955   BLOODCX  No growth at 24 hours  No growth at 24 hours No growth at 5 days No growth at 5 days     Results from last 7 days   Lab Units 12/07/21  1144   NITRITE UA  Negative   WBC UA /HPF None Seen   BACTERIA UA /HPF Trace*   SQUAM EPITHEL UA /HPF 0-2     Results from last 7 days   Lab Units 12/02/21  0957   INFLUENZA B PCR  Not Detected   INFLUENZA A PCR  Not Detected         Radiology:  Imaging Results (Last 24 Hours)     ** No results found for the last 24 hours. **          Cardiology:  ECG/EMG Results (last 24 hours)     ** No results found for the last 24 hours. **          I have reviewed recent labs results and consult notes.  Parts of this note may have been copied and pasted but patient was examined and interviewed by me today    Assessment and Plan:      1.  Aspiration pneumonia chest x-ray unchanged changed to p.o. Augmentin on 12/5/2021.  Patient stable ready for discharge awaiting placement    2.  Chronic kidney disease stage III at baseline    3.  Chronic atrial fibrillation on anticoagulation rate controlled continue with home Xarelto.       4. Hypertension improved with initiation of medication     5.  [place] seizure disorder post ictal 12/7/2021.  She has improved morning     6.  Metabolic encephalopathy improved almost back to baseline    7.  Hypothyroidism continue with current medication     8.  Anemia of chronic disease hemoglobin appears to be stable     9.  Head injury from recent fall CT unremarkable     10.  Moderate protein calorie malnutrition nutritional consult and supportive care     11.  ADLs below baseline PT and OT evaluate the patient need short-term rehab    12.   Transaminitis longstanding stable                      Much of this encounter note is an electronic transcription/translation of spoken language to printed text using Dragon Software

## 2021-12-10 NOTE — OUTREACH NOTE
Prep Survey      Responses   [facility] facility patient discharged from? [place]   Is LACE score < 7 ? No   Emergency Room discharge w/ pulse ox? No   Eligibility Not Eligible   What are the reasons patient is not eligible? [facility]   Does the patient have one of the following disease processes/diagnoses(primary or secondary)? COPD/Pneumonia   Prep survey completed? Yes          Sarika Ceron RN

## 2021-12-10 NOTE — TELEPHONE ENCOUNTER
Returned phone call to Matilde Dow, SLP at [facility] regarding swallow study results. Synopsis of MBS provided over the phone for continued care.

## 2021-12-10 NOTE — CASE MANAGEMENT/SOCIAL WORK
Case Management Discharge Note      Final Note: dc SNF    Provided Post Acute Provider List?: Yes  Post Acute Provider List: Inpatient Rehab, Nursing Home  Provided Post Acute Provider Quality & Resource List?: Yes  Post Acute Provider Quality and Resource List: Inpatient Rehab, Nursing Home  Delivered To: Support Person, Patient  Method of Delivery: In person    Selected Continued Care - Discharged on 12/9/2021 Admission date: 12/2/2021 - Discharge disposition: Home or Self Care    Destination Coordination complete.    Service Provider Selected Services Address Phone Fax Patient Preferred    [facility]  Skilled Nursing [address] [place] [id number] [phone] --          Durable Medical Equipment    No services have been selected for the patient.              Dialysis/Infusion    No services have been selected for the patient.              Home Medical Care    No services have been selected for the patient.              Therapy    No services have been selected for the patient.              Community Resources    No services have been selected for the patient.              Community & DME    No services have been selected for the patient.                       Final Discharge Disposition Code: 03 - skilled nursing facility (SNF)

## 2021-12-30 NOTE — PROGRESS NOTES
Date of Office Visit: 2021  Encounter Provider: EDEL Acosta  Place of Service: [facility] CARDIOLOGY  Patient Name: Argelia Nguyen  :1950  Primary Cardiologist:     Chief Complaint   Patient presents with   • paroxysmal afib     2 wk   :     Dear Dr. Oviedo    HPI: Argelia Nguyen is a pleasant 71 y.o. female who presents 2021 for cardiac follow up.  I reviewed her past medical records including notes, labs and testing in preparation for today's visit. She has paroxysmal atrial fibrillation, CDCHF, MR, hypertension, and SVT.     She was seen on 2019 in our office and medical therapy was adjusted after she was found to have paroxysmal atrial fibrillation.  Metoprolol was increased and losartan was also added because of hypertension.      Echocardiogram 19:   · Calculated EF = 63.0%.  · Left ventricular systolic function is normal.  · The left ventricular cavity is small.  · Left ventricular wall thickness is consistent with moderate concentric   hypertrophy.  · Right atrial cavity size is severely dilated.  · Left atrial cavity size is moderate-to-severely dilated.  · Right ventricular cavity is severely dilated.  · Right ventricular wall thickness is consistent with moderate   hypertrophy.  · Mildly reduced right ventricular systolic function noted.  · Severe mitral valve regurgitation is present  · Severe tricuspid valve regurgitation is present.  · Estimated right ventricular systolic pressure from tricuspid   regurgitation is mildly elevated (35-45 mmHg).  · Calculated right ventricular systolic pressure from tricuspid   regurgitation is 43 mmHg.     Holter monitor 19:  An abnormal monitor study. · Evidence of atrial fibrillation was noted. Had atrial fibrillation 59.7%  of the time.     She saw  on 10/7/2020 because she has been feeling worse.  She was getting more shortness of breath with activity.  In  she  had an echocardiogram that Dr. Dos Santos felt that the mitral valve was only leaking mild to moderately, although when he review it, he thought it  possibly could be more significant than that, but was unsure.  She wore a Holter monitor that showed reasonable rate control.  We tried to increase her metoprolol, to get additional rate control and see if that would help, but she stated she just felt worse and felt dizzy.  She complained of getting very SOA with any activity as well as some shortness of breath at rest.  She was having some increasing lower extremity edema.  She states that when she walked from the waiting room to her exam room that day,  she got short of breath.  She was having a little bit of chest discomfort at times when she gets real short of breath.  She cut her metoprolol in half because she was having dizziness and that did not seem to help; she did not notice any change in the degree of shortness of breath.     Echocardiogram June 2020:  Interpretation Summary      · Calculated right ventricular systolic pressure from tricuspid regurgitation is 41.0 mmHg.  · Left ventricular systolic function is normal.  · Calculated EF = 60.0%.  · Right ventricular cavity is severely dilated.  · Left atrial cavity size is severely dilated.  · Right atrial cavity size is severely dilated.  · Mild-to-moderate mitral valve regurgitation is present  · Severe tricuspid valve regurgitation is present.          As echo was repeated 10/30/2020:  Interpretation Summary     · Estimated left ventricular EF = 60% Left ventricular systolic function is normal.  · The right ventricular cavity is moderately dilated.  · The left atrial cavity is severely dilated.  · The right atrial cavity is severely dilated.  · Mild mitral valve regurgitation is present.  · Moderate to severe tricuspid valve regurgitation is present      Valsartan 80 mg was ordered but she has been unable to tolerate it.  She got dizzy and weak even if she  cut it to 40 mg daily. She was seen in the ED on 12/12/2020 with complaints of dizziness and weakness.  She was instructed to stop the Valsartan and follow up in our office.     I saw her 12/17/2020 in follow-up.  She felt much better since stopping the valsartan.  It was noted noted she was also intolerant to metoprolol.  Both of these medications made her feel weak and very dizzy.  She did still have some dizziness but it was better than when she was taking the other medications.  She continued to have some shortness of breath but it has improved as well.  She had not been doing a lot because she still just does not feel herself.  She had lower extremity edema.  Her legs are tight and shiny with 2+ edema mildly pitting.  They more not weeping.  She was taking the torsemide 10 mg and her Xarelto.   She remains in atrial fibrillation with a heart rate 100-105.  Her blood pressure is well controlled being off of the blood pressure medications.  Today at visit she is 116/70.  I started her on low-dose digoxin.     She has had multiple hospitalizations in 2021.  She was treated for pneumonia in January 2021.  She was in the hospital from February 17 through March 2 for unresponsiveness, septic shock and acute kidney injury.  She was seen in the ER on March 20 for acute mental status changes.  She was admitted from April 12 through April 30 for altered mental status, urinary sepsis and septic shock secondary to right kidney infection.  She was admitted again with altered mental status from May 3 through May 7.  She was admitted from May 10 through May 13 for altered mental status and pneumonia.  She was then sent to rehab from May 14 through the 25th.  All during those admissions, she was living in a nursing home.     She was then seen in the ER on November 23 for abdominal pain and found to have an increased dig level.  She was told to hurt her digoxin for 2 days and was sent home.  She was reseen in the ER on 1126  with a low heart rate, complaints of dizziness.  She still had a high dig level and was told to hold her digoxin for another 2 days and to be seen in cardiology.     I saw her 11/30/2021.  She was back living with her brother who is 6 years younger and helps to take care of her and her medicines.  She states that he is good to her.  She is very shaky, frail, and has trouble focusing on what she wants to say and sequencing her thoughts.  She denies any palpitations or shortness of breath.  She has no lower extremity edema.  She states she has intermittent dizziness.  She will occasionally has some chest discomfort but that is not very often.  Her biggest complaint is fatigue, shakiness and weakness.  She states compliant with her medications.  She states she has home health coming once a week.  She admits to not drinking enough water.  She states she is eating and drinking and has plenty of access to these things and does not have trouble with meals.    Her digoxin was stopped.     She was brought to the emergency on 12/2/2021 by her brother because of generalized weakness cough chest congestion.  She had been in the emergency room several times in last week with nausea and vomiting dizziness and was found to have supratherapeutic digoxin level is being held.  Patient has had some episodes of vomiting and she has been having more chest congestion generalized weakness.  She was found to be significantly hypothermic with a core temperature of 87.8 was noted to have several episodes of bradycardia while in the ER.  Further work-up revealed bilateral infiltrates consistent probable aspiration pneumonia should be admitted for evaluation treatment.  Most of her assessing pain from her brother who is at her bedside.  He stated he she had been doing well for the last couple of months until about 2 weeks prior to this episode.  She had been in a assisted living and was discharged back home.  She is usually ambulatory at home  feeds herself and has been able to go to her doctor's visits and privilege to care for self at home until about 2 weeks ago he started having nausea vomiting and diarrhea.  He relates no fever chills she has noticed some chest congestion and cough last week.    Patient admitted to the ICU pancultured she was fluids resuscitated for early sepsis and started on broad-spectrum IV antibiotics cefepime for presumed aspiration pneumonia.  She started on a heating blanket and ambient temperatures remain Elevated.  She continued to be confused and her mental status is much less than baseline.  She became lethargic and thermic again on the evening of 12 2.  She was fluid bolused and improved did not require any pressors.  Blood cultures remain negative speech evaluation was done for the patient was able to resume her diet from home.      Her mental status continued much less than baseline.  Gradually improved.  Chest x-ray improvement of infiltrates urine output improved and renal insufficiency due to decreased p.o. intake and sepsis also improved.  Metabolic cephalopathy was persistent but gradually improved.  She was transferred out of the ICU to monitored bed.  Her heart rhythm remained stable with controlled ventricular sponsor she remained in A. fib.  She progressed slowly with therapies mental status gradually improved was less than baseline spell that the patient will require short-term rehab and she was transferred to [facility].  Her IV antibiotics were changed to p.o. Augmentin which she will complete a 10-day course.  She was discharged to the rehab facility on 12/9/2021.    She presents today in follow-up.  She is awake alert and oriented x3.  She states she has been breathing okay but does get short of breath with some activity.  She will occasionally feel some palpitations.  She states she will have some dizziness at times if she has a seizure.  She does complain of fatigue.  She denies  any lower extremity edema chest pain or chest pressure.  Her blood pressure is low normal.  She remains in rate controlled atrial fibrillation.  She is back on her Xarelto and states she has not had any trouble with bleeding.  Her biggest complaint is she just cannot gain weight.  She states she has been eating good and having boost as well but has not gained any weight.  She still currently resides at the nursing facility.          Past Medical History:   Diagnosis Date   • Anxiety    • Arthritis    • Chronic anticoagulation 1/23/2019   • Chronic diastolic (congestive) heart failure (HCC)    • Colon polyp    • Dysphagia    • GERD (gastroesophageal reflux disease)    • Hiatal hernia 4/17/2017   • Hypertension    • Hypothyroidism    • Internal hemorrhoids 4/17/2017   • Monoclonal gammopathy 3/25/2013   • Nonrheumatic mitral valve regurgitation     has ranged from mild-mod to severe depending on the study   • Persistent atrial fibrillation (HCC)    • Pyelonephritis 4/30/2021   • Reflux gastritis    • Seizure (HCC)    • Tricuspid regurgitation     mod-severe vs severe   • Type 2 diabetes mellitus (HCC)        Past Surgical History:   Procedure Laterality Date   • COLONOSCOPY N/A 3/29/2017    Procedure: COLONOSCOPY; POLYPECTOMY;  Surgeon: Brooke Garrido MD;  Location: [facility];  Service:    • COLONOSCOPY N/A 8/3/2020    Procedure: COLONOSCOPY with polypectomy;  Surgeon: Rui Shi MD;  Location: [facility];  Service: Gastroenterology;  Laterality: N/A;  ascending polyp  transverse polyp  rectal polyp   • CYST REMOVAL     • CYSTOSCOPY W/ URETERAL STENT PLACEMENT Right 4/13/2021    Procedure: CYSTOSCOPY URETERAL CATHETER/STENT INSERTION;  Surgeon: nOesimo Fuller MD;  Location: [facility] OR;  Service: Urology;  Laterality: Right;   • CYSTOSCOPY W/ URETERAL STENT PLACEMENT Right 4/28/2021    Procedure: CYSTOSCOPY URETERAL STENT REMOVAL;  Surgeon: Onesimo Fuller MD;  Location: [facility] OR;  Service:  Urology;  Laterality: Right;  CYSTOSCOPY URETERAL STENT REMOVAL   • ENDOSCOPY N/A 3/29/2017    Procedure: ESOPHAGOGASTRODUODENOSCOPY WITH DILITATION;  Surgeon: Brooke Garrido MD;  Location: [facility];  Service:    • ENDOSCOPY N/A 8/3/2020    Procedure: ESOPHAGOGASTRODUODENOSCOPY with biopsies;  Surgeon: Rui Shi MD;  Location: [facility] OR;  Service: Gastroenterology;  Laterality: N/A;  esophagitis  gastritis   • HERNIA REPAIR     • HYSTERECTOMY         Social History     Socioeconomic History   • Marital status: Single   Tobacco Use   • Smoking status: Never Smoker   • Smokeless tobacco: Never Used   • Tobacco comment: daily caffiene   Vaping Use   • Vaping Use: Never used   Substance and Sexual Activity   • Alcohol use: No   • Drug use: No   • Sexual activity: Defer       Family History   Problem Relation Age of Onset   • Colon cancer Mother    • Colon cancer Other    • Lung cancer Father    • Breast cancer Maternal Aunt        The following portion of the patient's history were reviewed and updated as appropriate: past medical history, past surgical history, past social history, past family history, allergies, current medications, and problem list.    Review of Systems   Constitutional: Positive for malaise/fatigue and weight loss. Negative for diaphoresis and fever.   HENT: Negative for congestion, hearing loss, hoarse voice, nosebleeds and sore throat.    Eyes: Negative for photophobia, vision loss in left eye, vision loss in right eye and visual disturbance.   Cardiovascular: Positive for palpitations (occ). Negative for chest pain, dyspnea on exertion, irregular heartbeat, leg swelling, near-syncope, orthopnea, paroxysmal nocturnal dyspnea and syncope.   Respiratory: Positive for shortness of breath (with exertion). Negative for cough, hemoptysis, sleep disturbances due to breathing, snoring, sputum production and wheezing.    Endocrine: Negative for cold intolerance, heat intolerance,  polydipsia, polyphagia and polyuria.   Hematologic/Lymphatic: Negative for bleeding problem. Does not bruise/bleed easily.   Skin: Negative for color change, dry skin, poor wound healing, rash and suspicious lesions.   Musculoskeletal: Negative for arthritis, back pain, falls, gout, joint pain, joint swelling, muscle cramps, muscle weakness and myalgias.   Gastrointestinal: Negative for bloating, abdominal pain, constipation, diarrhea, dysphagia, melena, nausea and vomiting.   Neurological: Positive for dizziness (with seizures) and weakness. Negative for excessive daytime sleepiness, headaches, light-headedness, loss of balance, numbness, paresthesias, seizures and vertigo.   Psychiatric/Behavioral: Negative for depression, memory loss and substance abuse. The patient is not nervous/anxious.        No Known Allergies      Current Outpatient Medications:   •  aspirin 81 MG chewable tablet, Chew 1 tablet Daily., Disp:  , Rfl:   •  lactobacillus acidophilus (RISAQUAD) capsule capsule, Take 1 capsule by mouth 2 (Two) Times a Day., Disp: , Rfl:   •  levETIRAcetam (Keppra) 1000 MG tablet, Take 1 tablet by mouth 2 (Two) Times a Day., Disp: , Rfl:   •  levothyroxine (SYNTHROID, LEVOTHROID) 75 MCG tablet, Take 1 tablet by mouth Daily., Disp: , Rfl:   •  LORazepam (ATIVAN) 0.5 MG tablet, Take 0.5 mg by mouth Every 12 (Twelve) Hours As Needed for Anxiety., Disp: , Rfl:   •  nitroglycerin (NITROSTAT) 0.4 MG SL tablet, Place 0.4 mg under the tongue Every 5 (Five) Minutes As Needed for Chest Pain. Take no more than 3 doses in 15 minutes., Disp: , Rfl:   •  pantoprazole (PROTONIX) 40 MG EC tablet, Take 40 mg by mouth Daily., Disp: , Rfl:   •  polyethylene glycol (MIRALAX) 17 g packet, Take 17 g by mouth Daily., Disp: , Rfl:   •  rivaroxaban (XARELTO) 20 MG tablet, Take 1 tablet by mouth Daily., Disp: 30 tablet, Rfl: 5  •  sennosides-docusate (PERICOLACE) 8.6-50 MG per tablet, Take 1 tablet by mouth Every Night., Disp: , Rfl:   •   "amLODIPine (NORVASC) 5 MG tablet, Take 1 tablet by mouth Daily., Disp: , Rfl:         Objective:     Vitals:    12/30/21 1320   BP: 102/62   Pulse: 61   Weight: 38.1 kg (84 lb)   Height: 160 cm (63\")     Body mass index is 14.88 kg/m².      Vitals reviewed.   Constitutional:       General: Not in acute distress.     Appearance: Underweight. Frail. Chronically ill-appearing.   Eyes:      General:         Right eye: No discharge.         Left eye: No discharge.      Conjunctiva/sclera: Conjunctivae normal.   HENT:      Head: Normocephalic and atraumatic.      Right Ear: External ear normal.      Left Ear: External ear normal.      Nose: Nose normal.   Neck:      Thyroid: No thyromegaly.      Vascular: No JVD.      Trachea: No tracheal deviation.      Lymphadenopathy: No cervical adenopathy.   Pulmonary:      Effort: Pulmonary effort is normal. No respiratory distress.      Breath sounds: Normal breath sounds. No wheezing. No rales.   Chest:      Chest wall: Not tender to palpatation.   Cardiovascular:      Normal rate. Irregularly irregular rhythm.      No gallop.   Pulses:     Intact distal pulses.   Edema:     Peripheral edema absent.   Abdominal:      General: There is no distension.      Palpations: Abdomen is soft.      Tenderness: There is no abdominal tenderness.   Musculoskeletal: Normal range of motion.         General: No tenderness or deformity.      Cervical back: Normal range of motion and neck supple. Skin:     General: Skin is warm and dry.      Findings: No erythema or rash.   Neurological:      Mental Status: Alert and oriented to person, place, and time.      Coordination: Coordination normal.   Psychiatric:         Attention and Perception: Attention normal.         Mood and Affect: Mood normal.         Speech: Speech normal.         Behavior: Behavior normal. Behavior is cooperative.         Cognition and Memory: Cognition normal.               ECG 12 Lead    Date/Time: 12/30/2021 2:12 " PM  Performed by: Alyssa Choi APRN  Authorized by: Alyssa Choi APRN   Comparison: compared with previous ECG from 11/30/2021  Similar to previous ECG  Rhythm: atrial fibrillation  Rate: normal  Conduction: conduction normal  ST Segments: ST segments normal  T Waves: T waves normal  QRS axis: borderline right axis deviaiton.    Clinical impression: abnormal EKG              Assessment:       Diagnosis Plan   1. Longstanding persistent atrial fibrillation (HCC)     2. Chronic anticoagulation     3. Valvular heart disease            Plan:     1.  Atrial Fibrillation and Atrial Flutter  Assessment  • The patient has paroxysmal atrial fibrillation.  Intolerant to metoprolol.  She is actually rate controlled without any medication.  She had a supratherapeutic digoxin level and therefore her digoxin was stopped and has not been restarted.  • The patient's CHADS2-VASc score is 2  • A [id number] score of 2 or more is considered a high risk for a thromboembolic event  . Xarelto prescribed.  .  Plan  • Attempt to maintain sinus rhythm  • Continue rivaroxaban for antithrombotic therapy, bleeding issues discussed.  Denies any unexplained bleeding, dark or tarry stools.     2.  SVT - no episodes recorded during Holter monitoring     3.  Chronic anticoagulation -she is on Xarelto without bleeding problems.     4.  HTN -  Intolerant to valsartan due to severe dizziness and weakness. She is on amlodipine 5 and well controlled.     5.  Severe mitral and trace regurgitation echocardiogram in June was felt to show only mild to moderate MR.  Repeat echo 10/30/2020 reveal mild MV regurgitation with moderate to severe tricuspid valve regurgitation.      6.  Chronic diastolic congestive heart failure - appears euvolemic.  She is not on any diuretic therapy at this point.     7. She has had multiple hospitalizations from January through May 2021. She had many episodes of altered mental status. She was treated for pneumonia twice.  She was treated for septic shock twice. She is frail.  She was recently hospitalized for 7 days secondary to pneumonia/aspiration pneumonitis, hypothermia and bradycardia.  She is currently residing in a nursing facility for PT/OT.  She continues to lose weight even though she states she is eating good and having boost supplements.    No changes today  RTO in 8 weeks with JA      As always, it has been a pleasure to participate in your patient's care. Thank you.       Sincerely,       EDEL Acosta      Current Outpatient Medications:   •  aspirin 81 MG chewable tablet, Chew 1 tablet Daily., Disp:  , Rfl:   •  lactobacillus acidophilus (RISAQUAD) capsule capsule, Take 1 capsule by mouth 2 (Two) Times a Day., Disp: , Rfl:   •  levETIRAcetam (Keppra) 1000 MG tablet, Take 1 tablet by mouth 2 (Two) Times a Day., Disp: , Rfl:   •  levothyroxine (SYNTHROID, LEVOTHROID) 75 MCG tablet, Take 1 tablet by mouth Daily., Disp: , Rfl:   •  LORazepam (ATIVAN) 0.5 MG tablet, Take 0.5 mg by mouth Every 12 (Twelve) Hours As Needed for Anxiety., Disp: , Rfl:   •  nitroglycerin (NITROSTAT) 0.4 MG SL tablet, Place 0.4 mg under the tongue Every 5 (Five) Minutes As Needed for Chest Pain. Take no more than 3 doses in 15 minutes., Disp: , Rfl:   •  pantoprazole (PROTONIX) 40 MG EC tablet, Take 40 mg by mouth Daily., Disp: , Rfl:   •  polyethylene glycol (MIRALAX) 17 g packet, Take 17 g by mouth Daily., Disp: , Rfl:   •  rivaroxaban (XARELTO) 20 MG tablet, Take 1 tablet by mouth Daily., Disp: 30 tablet, Rfl: 5  •  sennosides-docusate (PERICOLACE) 8.6-50 MG per tablet, Take 1 tablet by mouth Every Night., Disp: , Rfl:   •  amLODIPine (NORVASC) 5 MG tablet, Take 1 tablet by mouth Daily., Disp: , Rfl:       Dictated utilizing Dragon dictation

## 2022-01-01 ENCOUNTER — APPOINTMENT (OUTPATIENT)
Dept: CT IMAGING | Facility: HOSPITAL | Age: 72
End: 2022-01-01

## 2022-01-01 ENCOUNTER — HOSPITAL ENCOUNTER (INPATIENT)
Facility: HOSPITAL | Age: 72
LOS: 6 days | Discharge: HOSPICE | End: 2022-03-05
Attending: EMERGENCY MEDICINE | Admitting: INTERNAL MEDICINE

## 2022-01-01 ENCOUNTER — HOSPITAL ENCOUNTER (EMERGENCY)
Facility: HOSPITAL | Age: 72
Discharge: HOME | End: 2022-02-20
Attending: EMERGENCY MEDICINE | Admitting: EMERGENCY MEDICINE

## 2022-01-01 ENCOUNTER — APPOINTMENT (OUTPATIENT)
Dept: GENERAL RADIOLOGY | Facility: HOSPITAL | Age: 72
End: 2022-01-01

## 2022-01-01 ENCOUNTER — HOSPITAL ENCOUNTER (EMERGENCY)
Facility: HOSPITAL | Age: 72
Discharge: HOME | End: 2022-02-25
Attending: EMERGENCY MEDICINE | Admitting: EMERGENCY MEDICINE

## 2022-01-01 ENCOUNTER — HOSPITAL ENCOUNTER (INPATIENT)
Facility: HOSPITAL | Age: 72
LOS: 1 days | End: 2022-03-06
Attending: HOSPITALIST | Admitting: HOSPITALIST

## 2022-01-01 VITALS
WEIGHT: 94.3 LBS | BODY MASS INDEX: 15.69 KG/M2 | DIASTOLIC BLOOD PRESSURE: 78 MMHG | RESPIRATION RATE: 16 BRPM | OXYGEN SATURATION: 94 % | TEMPERATURE: 97.6 F | HEART RATE: 64 BPM | SYSTOLIC BLOOD PRESSURE: 90 MMHG

## 2022-01-01 VITALS
OXYGEN SATURATION: 97 % | DIASTOLIC BLOOD PRESSURE: 90 MMHG | HEART RATE: 72 BPM | BODY MASS INDEX: 15.71 KG/M2 | RESPIRATION RATE: 18 BRPM | TEMPERATURE: 98.4 F | WEIGHT: 94.3 LBS | SYSTOLIC BLOOD PRESSURE: 110 MMHG | HEIGHT: 65 IN

## 2022-01-01 VITALS
OXYGEN SATURATION: 94 % | WEIGHT: 119.05 LBS | TEMPERATURE: 99.8 F | SYSTOLIC BLOOD PRESSURE: 135 MMHG | HEIGHT: 62 IN | HEART RATE: 98 BPM | RESPIRATION RATE: 24 BRPM | DIASTOLIC BLOOD PRESSURE: 90 MMHG | BODY MASS INDEX: 21.91 KG/M2

## 2022-01-01 VITALS
HEIGHT: 62 IN | TEMPERATURE: 98.8 F | WEIGHT: 119 LBS | OXYGEN SATURATION: 85 % | DIASTOLIC BLOOD PRESSURE: 72 MMHG | RESPIRATION RATE: 16 BRPM | HEART RATE: 89 BPM | BODY MASS INDEX: 21.9 KG/M2 | SYSTOLIC BLOOD PRESSURE: 104 MMHG

## 2022-01-01 DIAGNOSIS — R42 DIZZINESS, NONSPECIFIC: ICD-10-CM

## 2022-01-01 DIAGNOSIS — E86.0 DEHYDRATION: Primary | ICD-10-CM

## 2022-01-01 DIAGNOSIS — D62 ANEMIA DUE TO ACUTE BLOOD LOSS: ICD-10-CM

## 2022-01-01 DIAGNOSIS — J18.9 POSTOBSTRUCTIVE PNEUMONIA: ICD-10-CM

## 2022-01-01 DIAGNOSIS — D70.9 NEUTROPENIA, UNSPECIFIED TYPE: ICD-10-CM

## 2022-01-01 DIAGNOSIS — T68.XXXA HYPOTHERMIA, INITIAL ENCOUNTER: ICD-10-CM

## 2022-01-01 DIAGNOSIS — R65.21 SEPTIC SHOCK: Primary | ICD-10-CM

## 2022-01-01 DIAGNOSIS — A41.9 SEPTIC SHOCK: Primary | ICD-10-CM

## 2022-01-01 DIAGNOSIS — D72.819 LEUKOPENIA, UNSPECIFIED TYPE: ICD-10-CM

## 2022-01-01 DIAGNOSIS — E86.0 DEHYDRATION: ICD-10-CM

## 2022-01-01 DIAGNOSIS — I48.11 LONGSTANDING PERSISTENT ATRIAL FIBRILLATION: ICD-10-CM

## 2022-01-01 DIAGNOSIS — K92.2 GASTROINTESTINAL HEMORRHAGE, UNSPECIFIED GASTROINTESTINAL HEMORRHAGE TYPE: ICD-10-CM

## 2022-01-01 DIAGNOSIS — R53.1 WEAKNESS GENERALIZED: Primary | ICD-10-CM

## 2022-01-01 LAB
ABO GROUP BLD: NORMAL
ALBUMIN SERPL-MCNC: 3 G/DL (ref 3.5–5.2)
ALBUMIN SERPL-MCNC: 3.2 G/DL (ref 3.5–5.2)
ALBUMIN SERPL-MCNC: 3.4 G/DL (ref 3.5–5.2)
ALBUMIN SERPL-MCNC: 3.4 G/DL (ref 3.5–5.2)
ALBUMIN SERPL-MCNC: 3.9 G/DL (ref 3.5–5.2)
ALBUMIN SERPL-MCNC: 4.1 G/DL (ref 3.5–5.2)
ALBUMIN/GLOB SERPL: 0.9 G/DL
ALBUMIN/GLOB SERPL: 1 G/DL
ALBUMIN/GLOB SERPL: 1.1 G/DL
ALBUMIN/GLOB SERPL: 1.1 G/DL
ALBUMIN/GLOB SERPL: 1.2 G/DL
ALP SERPL-CCNC: 168 U/L (ref 39–117)
ALP SERPL-CCNC: 178 U/L (ref 39–117)
ALP SERPL-CCNC: 203 U/L (ref 39–117)
ALP SERPL-CCNC: 208 U/L (ref 39–117)
ALP SERPL-CCNC: 217 U/L (ref 39–117)
ALP SERPL-CCNC: 251 U/L (ref 39–117)
ALT SERPL W P-5'-P-CCNC: 251 U/L (ref 1–33)
ALT SERPL W P-5'-P-CCNC: 256 U/L (ref 1–33)
ALT SERPL W P-5'-P-CCNC: 274 U/L (ref 1–33)
ALT SERPL W P-5'-P-CCNC: 46 U/L (ref 1–33)
ALT SERPL W P-5'-P-CCNC: 54 U/L (ref 1–33)
ALT SERPL W P-5'-P-CCNC: 62 U/L (ref 1–33)
AMMONIA BLD-SCNC: 45 UMOL/L (ref 11–51)
AMORPH URATE CRY URNS QL MICRO: ABNORMAL /HPF
AMPHET+METHAMPHET UR QL: NEGATIVE
AMPHETAMINES UR QL: POSITIVE
ANION GAP SERPL CALCULATED.3IONS-SCNC: 13.4 MMOL/L (ref 5–15)
ANION GAP SERPL CALCULATED.3IONS-SCNC: 15.4 MMOL/L (ref 5–15)
ANION GAP SERPL CALCULATED.3IONS-SCNC: 15.8 MMOL/L (ref 5–15)
ANION GAP SERPL CALCULATED.3IONS-SCNC: 16 MMOL/L (ref 5–15)
ANION GAP SERPL CALCULATED.3IONS-SCNC: 8.3 MMOL/L (ref 5–15)
ANION GAP SERPL CALCULATED.3IONS-SCNC: 9.1 MMOL/L (ref 5–15)
ANION GAP SERPL CALCULATED.3IONS-SCNC: 9.5 MMOL/L (ref 5–15)
ANISOCYTOSIS BLD QL: ABNORMAL
ANISOCYTOSIS BLD QL: ABNORMAL
ANISOCYTOSIS BLD QL: NORMAL
AST SERPL-CCNC: 255 U/L (ref 1–32)
AST SERPL-CCNC: 301 U/L (ref 1–32)
AST SERPL-CCNC: 318 U/L (ref 1–32)
AST SERPL-CCNC: 48 U/L (ref 1–32)
AST SERPL-CCNC: 65 U/L (ref 1–32)
AST SERPL-CCNC: 71 U/L (ref 1–32)
B PARAPERT DNA SPEC QL NAA+PROBE: NOT DETECTED
B PERT DNA SPEC QL NAA+PROBE: NOT DETECTED
BACTERIA SPEC AEROBE CULT: NORMAL
BACTERIA SPEC AEROBE CULT: NORMAL
BACTERIA UR QL AUTO: ABNORMAL /HPF
BARBITURATES UR QL SCN: NEGATIVE
BASOPHILS # BLD AUTO: 0.01 10*3/MM3 (ref 0–0.2)
BASOPHILS # BLD AUTO: 0.03 10*3/MM3 (ref 0–0.2)
BASOPHILS NFR BLD AUTO: 0.4 % (ref 0–1.5)
BASOPHILS NFR BLD AUTO: 1.4 % (ref 0–1.5)
BENZODIAZ UR QL SCN: POSITIVE
BH BB BLOOD EXPIRATION DATE: NORMAL
BH BB BLOOD TYPE BARCODE: 5100
BH BB DISPENSE STATUS: NORMAL
BH BB PRODUCT CODE: NORMAL
BH BB UNIT NUMBER: NORMAL
BILIRUB CONJ SERPL-MCNC: 0.3 MG/DL (ref 0–0.3)
BILIRUB INDIRECT SERPL-MCNC: 0.8 MG/DL
BILIRUB SERPL-MCNC: 0.6 MG/DL (ref 0–1.2)
BILIRUB SERPL-MCNC: 0.9 MG/DL (ref 0–1.2)
BILIRUB SERPL-MCNC: 1 MG/DL (ref 0–1.2)
BILIRUB SERPL-MCNC: 1.1 MG/DL (ref 0–1.2)
BILIRUB SERPL-MCNC: 1.1 MG/DL (ref 0–1.2)
BILIRUB SERPL-MCNC: 1.2 MG/DL (ref 0–1.2)
BILIRUB UR QL STRIP: ABNORMAL
BILIRUB UR QL STRIP: NEGATIVE
BILIRUB UR QL STRIP: NEGATIVE
BLASTS NFR BLD MANUAL: 0 % (ref 0–0)
BLD GP AB SCN SERPL QL: NEGATIVE
BUN SERPL-MCNC: 26 MG/DL (ref 8–23)
BUN SERPL-MCNC: 32 MG/DL (ref 8–23)
BUN SERPL-MCNC: 45 MG/DL (ref 8–23)
BUN SERPL-MCNC: 48 MG/DL (ref 8–23)
BUN SERPL-MCNC: 59 MG/DL (ref 8–23)
BUN SERPL-MCNC: 59 MG/DL (ref 8–23)
BUN SERPL-MCNC: 66 MG/DL (ref 8–23)
BUN/CREAT SERPL: 22.6 (ref 7–25)
BUN/CREAT SERPL: 24.2 (ref 7–25)
BUN/CREAT SERPL: 30.4 (ref 7–25)
BUN/CREAT SERPL: 31.6 (ref 7–25)
BUN/CREAT SERPL: 34.4 (ref 7–25)
BUN/CREAT SERPL: 36.7 (ref 7–25)
BUN/CREAT SERPL: 37.3 (ref 7–25)
BUPRENORPHINE SERPL-MCNC: NEGATIVE NG/ML
BURR CELLS BLD QL SMEAR: ABNORMAL
C PNEUM DNA NPH QL NAA+NON-PROBE: NOT DETECTED
CALCIUM SPEC-SCNC: 10.5 MG/DL (ref 8.6–10.5)
CALCIUM SPEC-SCNC: 10.8 MG/DL (ref 8.6–10.5)
CALCIUM SPEC-SCNC: 8.8 MG/DL (ref 8.6–10.5)
CALCIUM SPEC-SCNC: 9.1 MG/DL (ref 8.6–10.5)
CALCIUM SPEC-SCNC: 9.4 MG/DL (ref 8.6–10.5)
CALCIUM SPEC-SCNC: 9.4 MG/DL (ref 8.6–10.5)
CALCIUM SPEC-SCNC: 9.9 MG/DL (ref 8.6–10.5)
CANNABINOIDS SERPL QL: NEGATIVE
CHLORIDE SERPL-SCNC: 102 MMOL/L (ref 98–107)
CHLORIDE SERPL-SCNC: 105 MMOL/L (ref 98–107)
CHLORIDE SERPL-SCNC: 105 MMOL/L (ref 98–107)
CHLORIDE SERPL-SCNC: 106 MMOL/L (ref 98–107)
CHLORIDE SERPL-SCNC: 112 MMOL/L (ref 98–107)
CHLORIDE SERPL-SCNC: 113 MMOL/L (ref 98–107)
CHLORIDE SERPL-SCNC: 114 MMOL/L (ref 98–107)
CLARITY UR: CLEAR
CO2 SERPL-SCNC: 15.6 MMOL/L (ref 22–29)
CO2 SERPL-SCNC: 17 MMOL/L (ref 22–29)
CO2 SERPL-SCNC: 17.9 MMOL/L (ref 22–29)
CO2 SERPL-SCNC: 19.2 MMOL/L (ref 22–29)
CO2 SERPL-SCNC: 19.7 MMOL/L (ref 22–29)
CO2 SERPL-SCNC: 20.6 MMOL/L (ref 22–29)
CO2 SERPL-SCNC: 25.5 MMOL/L (ref 22–29)
COCAINE UR QL: NEGATIVE
COLOR UR: YELLOW
CREAT SERPL-MCNC: 1.15 MG/DL (ref 0.57–1)
CREAT SERPL-MCNC: 1.31 MG/DL (ref 0.57–1)
CREAT SERPL-MCNC: 1.32 MG/DL (ref 0.57–1)
CREAT SERPL-MCNC: 1.52 MG/DL (ref 0.57–1)
CREAT SERPL-MCNC: 1.58 MG/DL (ref 0.57–1)
CREAT SERPL-MCNC: 1.8 MG/DL (ref 0.57–1)
CREAT SERPL-MCNC: 1.94 MG/DL (ref 0.57–1)
CROSSMATCH INTERPRETATION: NORMAL
D DIMER PPP FEU-MCNC: 1.81 MCGFEU/ML (ref 0–0.46)
D-LACTATE SERPL-SCNC: 2.3 MMOL/L (ref 0.5–2)
D-LACTATE SERPL-SCNC: 2.5 MMOL/L (ref 0.5–2)
D-LACTATE SERPL-SCNC: 3.7 MMOL/L (ref 0.5–2)
D-LACTATE SERPL-SCNC: 3.9 MMOL/L (ref 0.5–2)
D-LACTATE SERPL-SCNC: 3.9 MMOL/L (ref 0.5–2)
DACRYOCYTES BLD QL SMEAR: NORMAL
DEPRECATED RDW RBC AUTO: 57.1 FL (ref 37–54)
DEPRECATED RDW RBC AUTO: 58.1 FL (ref 37–54)
DEPRECATED RDW RBC AUTO: 58.4 FL (ref 37–54)
DEPRECATED RDW RBC AUTO: 59.1 FL (ref 37–54)
DEPRECATED RDW RBC AUTO: 59.1 FL (ref 37–54)
DEPRECATED RDW RBC AUTO: 59.4 FL (ref 37–54)
DEPRECATED RDW RBC AUTO: 61.3 FL (ref 37–54)
EGFRCR SERPLBLD CKD-EPI 2021: 26.9 ML/MIN/1.73
EGFRCR SERPLBLD CKD-EPI 2021: 36.1 ML/MIN/1.73
EGFRCR SERPLBLD CKD-EPI 2021: 42.7 ML/MIN/1.73
ELLIPTOCYTES BLD QL SMEAR: NORMAL
EOSINOPHIL # BLD AUTO: 0.02 10*3/MM3 (ref 0–0.4)
EOSINOPHIL # BLD AUTO: 0.08 10*3/MM3 (ref 0–0.4)
EOSINOPHIL NFR BLD AUTO: 0.8 % (ref 0.3–6.2)
EOSINOPHIL NFR BLD AUTO: 3.8 % (ref 0.3–6.2)
ERYTHROCYTE [DISTWIDTH] IN BLOOD BY AUTOMATED COUNT: 19.3 % (ref 12.3–15.4)
ERYTHROCYTE [DISTWIDTH] IN BLOOD BY AUTOMATED COUNT: 19.3 % (ref 12.3–15.4)
ERYTHROCYTE [DISTWIDTH] IN BLOOD BY AUTOMATED COUNT: 19.6 % (ref 12.3–15.4)
ERYTHROCYTE [DISTWIDTH] IN BLOOD BY AUTOMATED COUNT: 19.8 % (ref 12.3–15.4)
ERYTHROCYTE [DISTWIDTH] IN BLOOD BY AUTOMATED COUNT: 19.9 % (ref 12.3–15.4)
ERYTHROCYTE [DISTWIDTH] IN BLOOD BY AUTOMATED COUNT: 20 % (ref 12.3–15.4)
ERYTHROCYTE [DISTWIDTH] IN BLOOD BY AUTOMATED COUNT: 20.9 % (ref 12.3–15.4)
ERYTHROCYTE [SEDIMENTATION RATE] IN BLOOD: 33 MM/HR (ref 0–30)
ETHANOL BLD-MCNC: <10 MG/DL (ref 0–10)
ETHANOL UR QL: <0.01 %
FLUAV RNA RESP QL NAA+PROBE: NOT DETECTED
FLUAV SUBTYP SPEC NAA+PROBE: NOT DETECTED
FLUBV RNA ISLT QL NAA+PROBE: NOT DETECTED
FLUBV RNA RESP QL NAA+PROBE: NOT DETECTED
GFR SERPL CREATININE-BSD FRML MDRD: 34 ML/MIN/1.73
GFR SERPL CREATININE-BSD FRML MDRD: 39 ML/MIN/1.73
GFR SERPL CREATININE-BSD FRML MDRD: 48 ML/MIN/1.73
GFR SERPL CREATININE-BSD FRML MDRD: 56 ML/MIN/1.73
GIANT PLATELETS: ABNORMAL
GLOBULIN UR ELPH-MCNC: 2.8 GM/DL
GLOBULIN UR ELPH-MCNC: 3.2 GM/DL
GLOBULIN UR ELPH-MCNC: 3.5 GM/DL
GLOBULIN UR ELPH-MCNC: 3.7 GM/DL
GLOBULIN UR ELPH-MCNC: 3.8 GM/DL
GLUCOSE BLDC GLUCOMTR-MCNC: 101 MG/DL (ref 70–130)
GLUCOSE BLDC GLUCOMTR-MCNC: 102 MG/DL (ref 70–130)
GLUCOSE BLDC GLUCOMTR-MCNC: 107 MG/DL (ref 70–130)
GLUCOSE BLDC GLUCOMTR-MCNC: 128 MG/DL (ref 70–130)
GLUCOSE BLDC GLUCOMTR-MCNC: 146 MG/DL (ref 70–130)
GLUCOSE BLDC GLUCOMTR-MCNC: 61 MG/DL (ref 70–130)
GLUCOSE BLDC GLUCOMTR-MCNC: 80 MG/DL (ref 70–130)
GLUCOSE SERPL-MCNC: 101 MG/DL (ref 65–99)
GLUCOSE SERPL-MCNC: 104 MG/DL (ref 65–99)
GLUCOSE SERPL-MCNC: 108 MG/DL (ref 65–99)
GLUCOSE SERPL-MCNC: 109 MG/DL (ref 65–99)
GLUCOSE SERPL-MCNC: 64 MG/DL (ref 65–99)
GLUCOSE SERPL-MCNC: 79 MG/DL (ref 65–99)
GLUCOSE SERPL-MCNC: 90 MG/DL (ref 65–99)
GLUCOSE UR STRIP-MCNC: NEGATIVE MG/DL
HADV DNA SPEC NAA+PROBE: NOT DETECTED
HCOV 229E RNA SPEC QL NAA+PROBE: NOT DETECTED
HCOV HKU1 RNA SPEC QL NAA+PROBE: NOT DETECTED
HCOV NL63 RNA SPEC QL NAA+PROBE: NOT DETECTED
HCOV OC43 RNA SPEC QL NAA+PROBE: NOT DETECTED
HCT VFR BLD AUTO: 25.2 % (ref 34–46.6)
HCT VFR BLD AUTO: 26.2 % (ref 34–46.6)
HCT VFR BLD AUTO: 26.9 % (ref 34–46.6)
HCT VFR BLD AUTO: 27.7 % (ref 34–46.6)
HCT VFR BLD AUTO: 28.6 % (ref 34–46.6)
HCT VFR BLD AUTO: 33.2 % (ref 34–46.6)
HCT VFR BLD AUTO: 33.6 % (ref 34–46.6)
HCT VFR BLD AUTO: 36.7 % (ref 34–46.6)
HEMOCCULT STL QL: POSITIVE
HGB BLD-MCNC: 10.4 G/DL (ref 12–15.9)
HGB BLD-MCNC: 10.5 G/DL (ref 12–15.9)
HGB BLD-MCNC: 11.5 G/DL (ref 12–15.9)
HGB BLD-MCNC: 8.1 G/DL (ref 12–15.9)
HGB BLD-MCNC: 8.4 G/DL (ref 12–15.9)
HGB BLD-MCNC: 8.7 G/DL (ref 12–15.9)
HGB BLD-MCNC: 8.8 G/DL (ref 12–15.9)
HGB BLD-MCNC: 9.1 G/DL (ref 12–15.9)
HGB UR QL STRIP.AUTO: NEGATIVE
HMPV RNA NPH QL NAA+NON-PROBE: NOT DETECTED
HOLD SPECIMEN: NORMAL
HOLD SPECIMEN: NORMAL
HPIV1 RNA ISLT QL NAA+PROBE: NOT DETECTED
HPIV2 RNA SPEC QL NAA+PROBE: NOT DETECTED
HPIV3 RNA NPH QL NAA+PROBE: NOT DETECTED
HPIV4 P GENE NPH QL NAA+PROBE: NOT DETECTED
HYALINE CASTS UR QL AUTO: ABNORMAL /LPF
HYPOCHROMIA BLD QL: NORMAL
IMM GRANULOCYTES # BLD AUTO: 0 10*3/MM3 (ref 0–0.05)
IMM GRANULOCYTES # BLD AUTO: 0 10*3/MM3 (ref 0–0.05)
IMM GRANULOCYTES NFR BLD AUTO: 0 % (ref 0–0.5)
IMM GRANULOCYTES NFR BLD AUTO: 0 % (ref 0–0.5)
IRON 24H UR-MRATE: 181 MCG/DL (ref 37–145)
IRON SATN MFR SERPL: 65 % (ref 20–50)
KETONES UR QL STRIP: NEGATIVE
LARGE PLATELETS: ABNORMAL
LEUKOCYTE ESTERASE UR QL STRIP.AUTO: NEGATIVE
LYMPHOCYTES # BLD AUTO: 0.61 10*3/MM3 (ref 0.7–3.1)
LYMPHOCYTES # BLD AUTO: 0.93 10*3/MM3 (ref 0.7–3.1)
LYMPHOCYTES # BLD MANUAL: 0.33 10*3/MM3 (ref 0.7–3.1)
LYMPHOCYTES # BLD MANUAL: 0.81 10*3/MM3 (ref 0.7–3.1)
LYMPHOCYTES NFR BLD AUTO: 25.3 % (ref 19.6–45.3)
LYMPHOCYTES NFR BLD AUTO: 44.5 % (ref 19.6–45.3)
LYMPHOCYTES NFR BLD MANUAL: 3 % (ref 5–12)
LYMPHOCYTES NFR BLD MANUAL: 7 % (ref 5–12)
M PNEUMO IGG SER IA-ACNC: NOT DETECTED
MACROCYTES BLD QL SMEAR: ABNORMAL
MCH RBC QN AUTO: 25.7 PG (ref 26.6–33)
MCH RBC QN AUTO: 25.8 PG (ref 26.6–33)
MCH RBC QN AUTO: 26.1 PG (ref 26.6–33)
MCH RBC QN AUTO: 26.5 PG (ref 26.6–33)
MCH RBC QN AUTO: 26.6 PG (ref 26.6–33)
MCH RBC QN AUTO: 26.6 PG (ref 26.6–33)
MCH RBC QN AUTO: 26.8 PG (ref 26.6–33)
MCHC RBC AUTO-ENTMCNC: 31 G/DL (ref 31.5–35.7)
MCHC RBC AUTO-ENTMCNC: 31.3 G/DL (ref 31.5–35.7)
MCHC RBC AUTO-ENTMCNC: 31.6 G/DL (ref 31.5–35.7)
MCHC RBC AUTO-ENTMCNC: 31.8 G/DL (ref 31.5–35.7)
MCHC RBC AUTO-ENTMCNC: 31.8 G/DL (ref 31.5–35.7)
MCHC RBC AUTO-ENTMCNC: 32.1 G/DL (ref 31.5–35.7)
MCHC RBC AUTO-ENTMCNC: 32.3 G/DL (ref 31.5–35.7)
MCV RBC AUTO: 81.4 FL (ref 79–97)
MCV RBC AUTO: 82.2 FL (ref 79–97)
MCV RBC AUTO: 82.3 FL (ref 79–97)
MCV RBC AUTO: 83.1 FL (ref 79–97)
MCV RBC AUTO: 83.4 FL (ref 79–97)
MCV RBC AUTO: 83.4 FL (ref 79–97)
MCV RBC AUTO: 85 FL (ref 79–97)
METAMYELOCYTES NFR BLD MANUAL: 0 % (ref 0–0)
METAMYELOCYTES NFR BLD MANUAL: 1 % (ref 0–0)
METHADONE UR QL SCN: NEGATIVE
MONOCYTES # BLD AUTO: 0.25 10*3/MM3 (ref 0.1–0.9)
MONOCYTES # BLD AUTO: 0.25 10*3/MM3 (ref 0.1–0.9)
MONOCYTES # BLD: 0.13 10*3/MM3 (ref 0.1–0.9)
MONOCYTES # BLD: 0.15 10*3/MM3 (ref 0.1–0.9)
MONOCYTES NFR BLD AUTO: 10.4 % (ref 5–12)
MONOCYTES NFR BLD AUTO: 12 % (ref 5–12)
MRSA SPEC QL CULT: NORMAL
MYELOCYTES NFR BLD MANUAL: 0 % (ref 0–0)
NEUTROPHILS # BLD AUTO: 1.59 10*3/MM3 (ref 1.7–7)
NEUTROPHILS # BLD AUTO: 3.55 10*3/MM3 (ref 1.7–7)
NEUTROPHILS NFR BLD AUTO: 0.8 10*3/MM3 (ref 1.7–7)
NEUTROPHILS NFR BLD AUTO: 1.52 10*3/MM3 (ref 1.7–7)
NEUTROPHILS NFR BLD AUTO: 38.3 % (ref 42.7–76)
NEUTROPHILS NFR BLD AUTO: 63.1 % (ref 42.7–76)
NEUTROPHILS NFR BLD MANUAL: 73 % (ref 42.7–76)
NEUTROPHILS NFR BLD MANUAL: 79 % (ref 42.7–76)
NEUTS BAND NFR BLD MANUAL: 0 % (ref 0–5)
NEUTS BAND NFR BLD MANUAL: 3 % (ref 0–5)
NITRITE UR QL STRIP: NEGATIVE
NRBC BLD AUTO-RTO: 0.8 /100 WBC (ref 0–0.2)
NRBC SPEC MANUAL: 2 /100 WBC (ref 0–0.2)
NRBC SPEC MANUAL: 8 /100 WBC (ref 0–0.2)
NT-PROBNP SERPL-MCNC: 4527 PG/ML (ref 0–900)
OPIATES UR QL: NEGATIVE
OTHER CELLS %: 0 % (ref 0–0)
OVALOCYTES BLD QL SMEAR: ABNORMAL
OXYCODONE UR QL SCN: NEGATIVE
PCP UR QL SCN: NEGATIVE
PH UR STRIP.AUTO: <=5 [PH] (ref 4.5–8)
PLASMA CELL PREC NFR BLD MANUAL: 0 % (ref 0–0)
PLAT MORPH BLD: NORMAL
PLAT MORPH BLD: NORMAL
PLATELET # BLD AUTO: 106 10*3/MM3 (ref 140–450)
PLATELET # BLD AUTO: 111 10*3/MM3 (ref 140–450)
PLATELET # BLD AUTO: 155 10*3/MM3 (ref 140–450)
PLATELET # BLD AUTO: 79 10*3/MM3 (ref 140–450)
PLATELET # BLD AUTO: 87 10*3/MM3 (ref 140–450)
PLATELET # BLD AUTO: 90 10*3/MM3 (ref 140–450)
PLATELET # BLD AUTO: 93 10*3/MM3 (ref 140–450)
PMV BLD AUTO: ABNORMAL FL
POIKILOCYTOSIS BLD QL SMEAR: ABNORMAL
POIKILOCYTOSIS BLD QL SMEAR: ABNORMAL
POIKILOCYTOSIS BLD QL SMEAR: NORMAL
POTASSIUM SERPL-SCNC: 3.4 MMOL/L (ref 3.5–5.2)
POTASSIUM SERPL-SCNC: 3.7 MMOL/L (ref 3.5–5.2)
POTASSIUM SERPL-SCNC: 4 MMOL/L (ref 3.5–5.2)
POTASSIUM SERPL-SCNC: 4.1 MMOL/L (ref 3.5–5.2)
POTASSIUM SERPL-SCNC: 4.3 MMOL/L (ref 3.5–5.2)
POTASSIUM SERPL-SCNC: 4.6 MMOL/L (ref 3.5–5.2)
POTASSIUM SERPL-SCNC: 5.1 MMOL/L (ref 3.5–5.2)
PROCALCITONIN SERPL-MCNC: 0.04 NG/ML (ref 0–0.25)
PROCALCITONIN SERPL-MCNC: 0.04 NG/ML (ref 0–0.25)
PROCALCITONIN SERPL-MCNC: 0.11 NG/ML (ref 0–0.25)
PROLYMPHOCYTES NFR BLD MANUAL: 0 % (ref 0–0)
PROMYELOCYTES NFR BLD MANUAL: 0 % (ref 0–0)
PROPOXYPH UR QL: NEGATIVE
PROT SERPL-MCNC: 5.8 G/DL (ref 6–8.5)
PROT SERPL-MCNC: 6.4 G/DL (ref 6–8.5)
PROT SERPL-MCNC: 6.6 G/DL (ref 6–8.5)
PROT SERPL-MCNC: 7.1 G/DL (ref 6–8.5)
PROT SERPL-MCNC: 7.6 G/DL (ref 6–8.5)
PROT SERPL-MCNC: 7.7 G/DL (ref 6–8.5)
PROT UR QL STRIP: ABNORMAL
PROT UR QL STRIP: NEGATIVE
PROT UR QL STRIP: NEGATIVE
QT INTERVAL: 449 MS
QT INTERVAL: 452 MS
RBC # BLD AUTO: 3.14 10*6/MM3 (ref 3.77–5.28)
RBC # BLD AUTO: 3.27 10*6/MM3 (ref 3.77–5.28)
RBC # BLD AUTO: 3.37 10*6/MM3 (ref 3.77–5.28)
RBC # BLD AUTO: 3.44 10*6/MM3 (ref 3.77–5.28)
RBC # BLD AUTO: 4.03 10*6/MM3 (ref 3.77–5.28)
RBC # BLD AUTO: 4.08 10*6/MM3 (ref 3.77–5.28)
RBC # BLD AUTO: 4.32 10*6/MM3 (ref 3.77–5.28)
RBC # UR STRIP: ABNORMAL /HPF
REF LAB TEST METHOD: ABNORMAL
RH BLD: POSITIVE
RHINOVIRUS RNA SPEC NAA+PROBE: NOT DETECTED
RSV RNA NPH QL NAA+NON-PROBE: NOT DETECTED
SARS-COV-2 RNA NPH QL NAA+NON-PROBE: NOT DETECTED
SARS-COV-2 RNA RESP QL NAA+PROBE: NOT DETECTED
SCHISTOCYTES BLD QL SMEAR: NORMAL
SMALL PLATELETS BLD QL SMEAR: ADEQUATE
SODIUM SERPL-SCNC: 138 MMOL/L (ref 136–145)
SODIUM SERPL-SCNC: 139 MMOL/L (ref 136–145)
SODIUM SERPL-SCNC: 140 MMOL/L (ref 136–145)
SODIUM SERPL-SCNC: 141 MMOL/L (ref 136–145)
SODIUM SERPL-SCNC: 142 MMOL/L (ref 136–145)
SP GR UR STRIP: 1.02 (ref 1–1.03)
SQUAMOUS #/AREA URNS HPF: ABNORMAL /HPF
T&S EXPIRATION DATE: NORMAL
TIBC SERPL-MCNC: 280 MCG/DL (ref 298–536)
TRICYCLICS UR QL SCN: NEGATIVE
TROPONIN T SERPL-MCNC: <0.01 NG/ML (ref 0–0.03)
TROPONIN T SERPL-MCNC: <0.01 NG/ML (ref 0–0.03)
TSH SERPL DL<=0.05 MIU/L-ACNC: 0.15 UIU/ML (ref 0.27–4.2)
UIBC SERPL-MCNC: 99 MCG/DL (ref 112–346)
UNIT  ABO: NORMAL
UNIT  RH: NORMAL
UROBILINOGEN UR QL STRIP: ABNORMAL
UROBILINOGEN UR QL STRIP: NORMAL
UROBILINOGEN UR QL STRIP: NORMAL
VANCOMYCIN SERPL-MCNC: 13.4 MCG/ML (ref 5–40)
VANCOMYCIN SERPL-MCNC: 19.3 MCG/ML (ref 5–40)
VANCOMYCIN SERPL-MCNC: 7.4 MCG/ML (ref 5–40)
VARIANT LYMPHS NFR BLD MANUAL: 0 % (ref 0–5)
VARIANT LYMPHS NFR BLD MANUAL: 16 % (ref 19.6–45.3)
VARIANT LYMPHS NFR BLD MANUAL: 18 % (ref 19.6–45.3)
WBC # UR STRIP: ABNORMAL /HPF
WBC MORPH BLD: NORMAL
WBC NRBC COR # BLD: 2.09 10*3/MM3 (ref 3.4–10.8)
WBC NRBC COR # BLD: 2.09 10*3/MM3 (ref 3.4–10.8)
WBC NRBC COR # BLD: 2.41 10*3/MM3 (ref 3.4–10.8)
WBC NRBC COR # BLD: 3.47 10*3/MM3 (ref 3.4–10.8)
WBC NRBC COR # BLD: 4.49 10*3/MM3 (ref 3.4–10.8)
WBC NRBC COR # BLD: 5 10*3/MM3 (ref 3.4–10.8)
WBC NRBC COR # BLD: 7.37 10*3/MM3 (ref 3.4–10.8)
WHOLE BLOOD HOLD SPECIMEN: NORMAL
WHOLE BLOOD HOLD SPECIMEN: NORMAL

## 2022-01-01 PROCEDURE — 99233 SBSQ HOSP IP/OBS HIGH 50: CPT | Performed by: INTERNAL MEDICINE

## 2022-01-01 PROCEDURE — 87040 BLOOD CULTURE FOR BACTERIA: CPT | Performed by: EMERGENCY MEDICINE

## 2022-01-01 PROCEDURE — 85027 COMPLETE CBC AUTOMATED: CPT | Performed by: FAMILY MEDICINE

## 2022-01-01 PROCEDURE — 86900 BLOOD TYPING SEROLOGIC ABO: CPT | Performed by: EMERGENCY MEDICINE

## 2022-01-01 PROCEDURE — 83605 ASSAY OF LACTIC ACID: CPT | Performed by: INTERNAL MEDICINE

## 2022-01-01 PROCEDURE — 80306 DRUG TEST PRSMV INSTRMNT: CPT | Performed by: EMERGENCY MEDICINE

## 2022-01-01 PROCEDURE — 80048 BASIC METABOLIC PNL TOTAL CA: CPT | Performed by: INTERNAL MEDICINE

## 2022-01-01 PROCEDURE — 80202 ASSAY OF VANCOMYCIN: CPT | Performed by: INTERNAL MEDICINE

## 2022-01-01 PROCEDURE — 83605 ASSAY OF LACTIC ACID: CPT | Performed by: EMERGENCY MEDICINE

## 2022-01-01 PROCEDURE — 0 HYDROMORPHONE PER 4 MG

## 2022-01-01 PROCEDURE — 83880 ASSAY OF NATRIURETIC PEPTIDE: CPT | Performed by: EMERGENCY MEDICINE

## 2022-01-01 PROCEDURE — 25010000002 HYDROMORPHONE PER 4 MG: Performed by: STUDENT IN AN ORGANIZED HEALTH CARE EDUCATION/TRAINING PROGRAM

## 2022-01-01 PROCEDURE — 86850 RBC ANTIBODY SCREEN: CPT | Performed by: EMERGENCY MEDICINE

## 2022-01-01 PROCEDURE — 83550 IRON BINDING TEST: CPT | Performed by: FAMILY MEDICINE

## 2022-01-01 PROCEDURE — 82962 GLUCOSE BLOOD TEST: CPT

## 2022-01-01 PROCEDURE — 99221 1ST HOSP IP/OBS SF/LOW 40: CPT | Performed by: HOSPITALIST

## 2022-01-01 PROCEDURE — 84145 PROCALCITONIN (PCT): CPT | Performed by: NURSE PRACTITIONER

## 2022-01-01 PROCEDURE — 86901 BLOOD TYPING SEROLOGIC RH(D): CPT | Performed by: EMERGENCY MEDICINE

## 2022-01-01 PROCEDURE — 81001 URINALYSIS AUTO W/SCOPE: CPT | Performed by: EMERGENCY MEDICINE

## 2022-01-01 PROCEDURE — 83605 ASSAY OF LACTIC ACID: CPT | Performed by: STUDENT IN AN ORGANIZED HEALTH CARE EDUCATION/TRAINING PROGRAM

## 2022-01-01 PROCEDURE — 25010000002 CEFEPIME-DEXTROSE 2-5 GM-%(50ML) RECONSTITUTED SOLUTION: Performed by: INTERNAL MEDICINE

## 2022-01-01 PROCEDURE — 80076 HEPATIC FUNCTION PANEL: CPT | Performed by: FAMILY MEDICINE

## 2022-01-01 PROCEDURE — 86923 COMPATIBILITY TEST ELECTRIC: CPT

## 2022-01-01 PROCEDURE — 25010000002 LORAZEPAM PER 2 MG

## 2022-01-01 PROCEDURE — 25010000002 VANCOMYCIN 750 MG RECONSTITUTED SOLUTION: Performed by: INTERNAL MEDICINE

## 2022-01-01 PROCEDURE — 93010 ELECTROCARDIOGRAM REPORT: CPT | Performed by: INTERNAL MEDICINE

## 2022-01-01 PROCEDURE — 84145 PROCALCITONIN (PCT): CPT | Performed by: EMERGENCY MEDICINE

## 2022-01-01 PROCEDURE — 81003 URINALYSIS AUTO W/O SCOPE: CPT | Performed by: EMERGENCY MEDICINE

## 2022-01-01 PROCEDURE — 84443 ASSAY THYROID STIM HORMONE: CPT | Performed by: EMERGENCY MEDICINE

## 2022-01-01 PROCEDURE — 87081 CULTURE SCREEN ONLY: CPT | Performed by: INTERNAL MEDICINE

## 2022-01-01 PROCEDURE — 36415 COLL VENOUS BLD VENIPUNCTURE: CPT

## 2022-01-01 PROCEDURE — 99222 1ST HOSP IP/OBS MODERATE 55: CPT | Performed by: INTERNAL MEDICINE

## 2022-01-01 PROCEDURE — P9612 CATHETERIZE FOR URINE SPEC: HCPCS

## 2022-01-01 PROCEDURE — 71250 CT THORAX DX C-: CPT

## 2022-01-01 PROCEDURE — 36430 TRANSFUSION BLD/BLD COMPNT: CPT

## 2022-01-01 PROCEDURE — 85025 COMPLETE CBC W/AUTO DIFF WBC: CPT | Performed by: EMERGENCY MEDICINE

## 2022-01-01 PROCEDURE — 99291 CRITICAL CARE FIRST HOUR: CPT | Performed by: INTERNAL MEDICINE

## 2022-01-01 PROCEDURE — 25010000002 HYDROMORPHONE PER 4 MG: Performed by: NURSE PRACTITIONER

## 2022-01-01 PROCEDURE — 71045 X-RAY EXAM CHEST 1 VIEW: CPT

## 2022-01-01 PROCEDURE — 92526 ORAL FUNCTION THERAPY: CPT

## 2022-01-01 PROCEDURE — 25010000002 LORAZEPAM PER 2 MG: Performed by: INTERNAL MEDICINE

## 2022-01-01 PROCEDURE — 25010000002 LEVETIRACETAM IN NACL 0.82% 500 MG/100ML SOLUTION: Performed by: INTERNAL MEDICINE

## 2022-01-01 PROCEDURE — 82272 OCCULT BLD FECES 1-3 TESTS: CPT | Performed by: FAMILY MEDICINE

## 2022-01-01 PROCEDURE — 85652 RBC SED RATE AUTOMATED: CPT | Performed by: NURSE PRACTITIONER

## 2022-01-01 PROCEDURE — 83540 ASSAY OF IRON: CPT | Performed by: FAMILY MEDICINE

## 2022-01-01 PROCEDURE — 82140 ASSAY OF AMMONIA: CPT | Performed by: EMERGENCY MEDICINE

## 2022-01-01 PROCEDURE — 0202U NFCT DS 22 TRGT SARS-COV-2: CPT | Performed by: INTERNAL MEDICINE

## 2022-01-01 PROCEDURE — 99284 EMERGENCY DEPT VISIT MOD MDM: CPT

## 2022-01-01 PROCEDURE — 25010000002 MORPHINE PER 10 MG: Performed by: INTERNAL MEDICINE

## 2022-01-01 PROCEDURE — 85018 HEMOGLOBIN: CPT | Performed by: STUDENT IN AN ORGANIZED HEALTH CARE EDUCATION/TRAINING PROGRAM

## 2022-01-01 PROCEDURE — 25010000002 IOPAMIDOL 61 % SOLUTION: Performed by: FAMILY MEDICINE

## 2022-01-01 PROCEDURE — 25010000002 POTASSIUM CHLORIDE PER 2 MEQ OF POTASSIUM: Performed by: FAMILY MEDICINE

## 2022-01-01 PROCEDURE — 85014 HEMATOCRIT: CPT | Performed by: STUDENT IN AN ORGANIZED HEALTH CARE EDUCATION/TRAINING PROGRAM

## 2022-01-01 PROCEDURE — 80053 COMPREHEN METABOLIC PANEL: CPT | Performed by: EMERGENCY MEDICINE

## 2022-01-01 PROCEDURE — 82077 ASSAY SPEC XCP UR&BREATH IA: CPT | Performed by: EMERGENCY MEDICINE

## 2022-01-01 PROCEDURE — 25010000002 VANCOMYCIN PER 500 MG: Performed by: FAMILY MEDICINE

## 2022-01-01 PROCEDURE — 94799 UNLISTED PULMONARY SVC/PX: CPT

## 2022-01-01 PROCEDURE — 25010000002 DIGOXIN PER 500 MCG: Performed by: FAMILY MEDICINE

## 2022-01-01 PROCEDURE — 85007 BL SMEAR W/DIFF WBC COUNT: CPT | Performed by: EMERGENCY MEDICINE

## 2022-01-01 PROCEDURE — 85379 FIBRIN DEGRADATION QUANT: CPT | Performed by: EMERGENCY MEDICINE

## 2022-01-01 PROCEDURE — 84484 ASSAY OF TROPONIN QUANT: CPT | Performed by: EMERGENCY MEDICINE

## 2022-01-01 PROCEDURE — 25010000002 HYDROMORPHONE 1 MG/ML SOLUTION: Performed by: NURSE PRACTITIONER

## 2022-01-01 PROCEDURE — 94761 N-INVAS EAR/PLS OXIMETRY MLT: CPT

## 2022-01-01 PROCEDURE — 25010000002 LEVETIRACETAM IN NACL 0.82% 500 MG/100ML SOLUTION: Performed by: HOSPITALIST

## 2022-01-01 PROCEDURE — 85007 BL SMEAR W/DIFF WBC COUNT: CPT | Performed by: INTERNAL MEDICINE

## 2022-01-01 PROCEDURE — 80053 COMPREHEN METABOLIC PANEL: CPT | Performed by: FAMILY MEDICINE

## 2022-01-01 PROCEDURE — 71046 X-RAY EXAM CHEST 2 VIEWS: CPT

## 2022-01-01 PROCEDURE — 85025 COMPLETE CBC W/AUTO DIFF WBC: CPT | Performed by: INTERNAL MEDICINE

## 2022-01-01 PROCEDURE — 0 HYDROMORPHONE PER 4 MG: Performed by: HOSPITALIST

## 2022-01-01 PROCEDURE — 74177 CT ABD & PELVIS W/CONTRAST: CPT

## 2022-01-01 PROCEDURE — 93005 ELECTROCARDIOGRAM TRACING: CPT | Performed by: EMERGENCY MEDICINE

## 2022-01-01 PROCEDURE — 87636 SARSCOV2 & INF A&B AMP PRB: CPT | Performed by: EMERGENCY MEDICINE

## 2022-01-01 PROCEDURE — P9016 RBC LEUKOCYTES REDUCED: HCPCS

## 2022-01-01 PROCEDURE — 99233 SBSQ HOSP IP/OBS HIGH 50: CPT | Performed by: NURSE PRACTITIONER

## 2022-01-01 PROCEDURE — 99283 EMERGENCY DEPT VISIT LOW MDM: CPT | Performed by: NURSE PRACTITIONER

## 2022-01-01 PROCEDURE — 25010000002 VANCOMYCIN PER 500 MG: Performed by: HOSPITALIST

## 2022-01-01 PROCEDURE — 99283 EMERGENCY DEPT VISIT LOW MDM: CPT

## 2022-01-01 PROCEDURE — 25010000002 MORPHINE PER 10 MG: Performed by: HOSPITALIST

## 2022-01-01 PROCEDURE — 70450 CT HEAD/BRAIN W/O DYE: CPT

## 2022-01-01 PROCEDURE — 99238 HOSP IP/OBS DSCHRG MGMT 30/<: CPT | Performed by: HOSPITALIST

## 2022-01-01 PROCEDURE — 51702 INSERT TEMP BLADDER CATH: CPT

## 2022-01-01 PROCEDURE — 99232 SBSQ HOSP IP/OBS MODERATE 35: CPT | Performed by: NURSE PRACTITIONER

## 2022-01-01 PROCEDURE — 99283 EMERGENCY DEPT VISIT LOW MDM: CPT | Performed by: EMERGENCY MEDICINE

## 2022-01-01 PROCEDURE — 25010000002 CEFEPIME PER 500 MG: Performed by: INTERNAL MEDICINE

## 2022-01-01 PROCEDURE — 99291 CRITICAL CARE FIRST HOUR: CPT | Performed by: EMERGENCY MEDICINE

## 2022-01-01 PROCEDURE — 25010000002 VANCOMYCIN PER 500 MG: Performed by: INTERNAL MEDICINE

## 2022-01-01 PROCEDURE — 86900 BLOOD TYPING SEROLOGIC ABO: CPT

## 2022-01-01 RX ORDER — LEVETIRACETAM 5 MG/ML
500 INJECTION INTRAVASCULAR EVERY 12 HOURS SCHEDULED
Status: DISCONTINUED | OUTPATIENT
Start: 2022-01-01 | End: 2022-01-01

## 2022-01-01 RX ORDER — LORAZEPAM 2 MG/ML
2 CONCENTRATE ORAL
Status: DISCONTINUED | OUTPATIENT
Start: 2022-01-01 | End: 2022-01-01 | Stop reason: HOSPADM

## 2022-01-01 RX ORDER — SODIUM CHLORIDE 9 MG/ML
500 INJECTION, SOLUTION INTRAVENOUS CONTINUOUS
Status: DISCONTINUED | OUTPATIENT
Start: 2022-01-01 | End: 2022-01-01

## 2022-01-01 RX ORDER — BISACODYL 10 MG
10 SUPPOSITORY, RECTAL RECTAL ONCE
Status: COMPLETED | OUTPATIENT
Start: 2022-01-01 | End: 2022-01-01

## 2022-01-01 RX ORDER — NICOTINE POLACRILEX 4 MG
15 LOZENGE BUCCAL
Status: DISCONTINUED | OUTPATIENT
Start: 2022-01-01 | End: 2022-01-01

## 2022-01-01 RX ORDER — ACETAMINOPHEN 325 MG/1
650 TABLET ORAL EVERY 4 HOURS PRN
Status: DISCONTINUED | OUTPATIENT
Start: 2022-01-01 | End: 2022-01-01 | Stop reason: HOSPADM

## 2022-01-01 RX ORDER — ACETAMINOPHEN 160 MG/5ML
650 SOLUTION ORAL EVERY 4 HOURS PRN
Status: DISCONTINUED | OUTPATIENT
Start: 2022-01-01 | End: 2022-01-01 | Stop reason: HOSPADM

## 2022-01-01 RX ORDER — LORAZEPAM 2 MG/ML
2 INJECTION INTRAMUSCULAR
Status: DISCONTINUED | OUTPATIENT
Start: 2022-01-01 | End: 2022-01-01 | Stop reason: HOSPADM

## 2022-01-01 RX ORDER — HALOPERIDOL 1 MG/1
1 TABLET ORAL EVERY 4 HOURS PRN
Status: DISCONTINUED | OUTPATIENT
Start: 2022-01-01 | End: 2022-01-01 | Stop reason: HOSPADM

## 2022-01-01 RX ORDER — LORAZEPAM 2 MG/ML
1 INJECTION INTRAMUSCULAR
Status: DISCONTINUED | OUTPATIENT
Start: 2022-01-01 | End: 2022-01-01 | Stop reason: HOSPADM

## 2022-01-01 RX ORDER — LORAZEPAM 2 MG/ML
0.5 INJECTION INTRAMUSCULAR
Status: DISCONTINUED | OUTPATIENT
Start: 2022-01-01 | End: 2022-01-01 | Stop reason: HOSPADM

## 2022-01-01 RX ORDER — LORAZEPAM 2 MG/ML
1 CONCENTRATE ORAL
Status: DISCONTINUED | OUTPATIENT
Start: 2022-01-01 | End: 2022-01-01 | Stop reason: HOSPADM

## 2022-01-01 RX ORDER — CEFEPIME HYDROCHLORIDE 2 G/50ML
2 INJECTION, SOLUTION INTRAVENOUS EVERY 24 HOURS
Status: DISCONTINUED | OUTPATIENT
Start: 2022-01-01 | End: 2022-01-01

## 2022-01-01 RX ORDER — LORAZEPAM 2 MG/ML
0.5 INJECTION INTRAMUSCULAR EVERY 6 HOURS
Status: DISCONTINUED | OUTPATIENT
Start: 2022-01-01 | End: 2022-01-01 | Stop reason: HOSPADM

## 2022-01-01 RX ORDER — SCOLOPAMINE TRANSDERMAL SYSTEM 1 MG/1
1 PATCH, EXTENDED RELEASE TRANSDERMAL
Status: DISCONTINUED | OUTPATIENT
Start: 2022-01-01 | End: 2022-01-01 | Stop reason: HOSPADM

## 2022-01-01 RX ORDER — ACETAMINOPHEN 650 MG/1
650 SUPPOSITORY RECTAL EVERY 4 HOURS PRN
Status: DISCONTINUED | OUTPATIENT
Start: 2022-01-01 | End: 2022-01-01 | Stop reason: HOSPADM

## 2022-01-01 RX ORDER — FAMOTIDINE 10 MG/ML
20 INJECTION, SOLUTION INTRAVENOUS EVERY 12 HOURS SCHEDULED
Status: DISCONTINUED | OUTPATIENT
Start: 2022-01-01 | End: 2022-01-01

## 2022-01-01 RX ORDER — SODIUM CHLORIDE 9 MG/ML
100 INJECTION, SOLUTION INTRAVENOUS CONTINUOUS
Status: DISCONTINUED | OUTPATIENT
Start: 2022-01-01 | End: 2022-01-01

## 2022-01-01 RX ORDER — DEXTROSE AND SODIUM CHLORIDE 5; .45 G/100ML; G/100ML
75 INJECTION, SOLUTION INTRAVENOUS CONTINUOUS
Status: DISCONTINUED | OUTPATIENT
Start: 2022-01-01 | End: 2022-01-01

## 2022-01-01 RX ORDER — SODIUM CHLORIDE 9 MG/ML
INJECTION, SOLUTION INTRAVENOUS
Status: COMPLETED
Start: 2022-01-01 | End: 2022-01-01

## 2022-01-01 RX ORDER — BUMETANIDE 0.25 MG/ML
2 INJECTION INTRAMUSCULAR; INTRAVENOUS ONCE
Status: COMPLETED | OUTPATIENT
Start: 2022-01-01 | End: 2022-01-01

## 2022-01-01 RX ORDER — LORAZEPAM 2 MG/ML
INJECTION INTRAMUSCULAR
Status: COMPLETED
Start: 2022-01-01 | End: 2022-01-01

## 2022-01-01 RX ORDER — SODIUM CHLORIDE 9 MG/ML
40 INJECTION, SOLUTION INTRAVENOUS AS NEEDED
Status: DISCONTINUED | OUTPATIENT
Start: 2022-01-01 | End: 2022-01-01

## 2022-01-01 RX ORDER — LORAZEPAM 1 MG/1
1 TABLET ORAL
Status: DISCONTINUED | OUTPATIENT
Start: 2022-01-01 | End: 2022-01-01 | Stop reason: HOSPADM

## 2022-01-01 RX ORDER — LORAZEPAM 2 MG/ML
2 INJECTION INTRAMUSCULAR EVERY 4 HOURS PRN
Status: DISCONTINUED | OUTPATIENT
Start: 2022-01-01 | End: 2022-01-01

## 2022-01-01 RX ORDER — NITROGLYCERIN 0.4 MG/1
0.4 TABLET SUBLINGUAL
Status: DISCONTINUED | OUTPATIENT
Start: 2022-01-01 | End: 2022-01-01

## 2022-01-01 RX ORDER — CEFEPIME HYDROCHLORIDE 2 G/50ML
2 INJECTION, SOLUTION INTRAVENOUS ONCE
Status: COMPLETED | OUTPATIENT
Start: 2022-01-01 | End: 2022-01-01

## 2022-01-01 RX ORDER — DEXTROSE MONOHYDRATE 25 G/50ML
25 INJECTION, SOLUTION INTRAVENOUS
Status: DISCONTINUED | OUTPATIENT
Start: 2022-01-01 | End: 2022-01-01

## 2022-01-01 RX ORDER — HYDROMORPHONE HCL 110MG/55ML
0.5 PATIENT CONTROLLED ANALGESIA SYRINGE INTRAVENOUS EVERY 4 HOURS PRN
Status: DISCONTINUED | OUTPATIENT
Start: 2022-01-01 | End: 2022-01-01

## 2022-01-01 RX ORDER — SODIUM CHLORIDE 0.9 % (FLUSH) 0.9 %
10 SYRINGE (ML) INJECTION AS NEEDED
Status: DISCONTINUED | OUTPATIENT
Start: 2022-01-01 | End: 2022-01-01

## 2022-01-01 RX ORDER — DIPHENOXYLATE HYDROCHLORIDE AND ATROPINE SULFATE 2.5; .025 MG/1; MG/1
1 TABLET ORAL
Status: DISCONTINUED | OUTPATIENT
Start: 2022-01-01 | End: 2022-01-01 | Stop reason: HOSPADM

## 2022-01-01 RX ORDER — HALOPERIDOL 5 MG/ML
1 INJECTION INTRAMUSCULAR EVERY 4 HOURS PRN
Status: DISCONTINUED | OUTPATIENT
Start: 2022-01-01 | End: 2022-01-01 | Stop reason: HOSPADM

## 2022-01-01 RX ORDER — PANTOPRAZOLE SODIUM 40 MG/10ML
40 INJECTION, POWDER, LYOPHILIZED, FOR SOLUTION INTRAVENOUS EVERY 12 HOURS SCHEDULED
Status: DISCONTINUED | OUTPATIENT
Start: 2022-01-01 | End: 2022-01-01

## 2022-01-01 RX ORDER — DIGOXIN 0.25 MG/ML
250 INJECTION INTRAMUSCULAR; INTRAVENOUS ONCE
Status: COMPLETED | OUTPATIENT
Start: 2022-01-01 | End: 2022-01-01

## 2022-01-01 RX ORDER — SODIUM CHLORIDE 9 MG/ML
125 INJECTION, SOLUTION INTRAVENOUS CONTINUOUS
Status: DISCONTINUED | OUTPATIENT
Start: 2022-01-01 | End: 2022-01-01

## 2022-01-01 RX ORDER — BUMETANIDE 0.25 MG/ML
1 INJECTION INTRAMUSCULAR; INTRAVENOUS ONCE
Status: COMPLETED | OUTPATIENT
Start: 2022-01-01 | End: 2022-01-01

## 2022-01-01 RX ORDER — MORPHINE SULFATE 2 MG/ML
2 INJECTION, SOLUTION INTRAMUSCULAR; INTRAVENOUS
Status: DISCONTINUED | OUTPATIENT
Start: 2022-01-01 | End: 2022-01-01

## 2022-01-01 RX ORDER — SODIUM CHLORIDE 0.9 % (FLUSH) 0.9 %
10 SYRINGE (ML) INJECTION AS NEEDED
Status: DISCONTINUED | OUTPATIENT
Start: 2022-01-01 | End: 2022-01-01 | Stop reason: HOSPADM

## 2022-01-01 RX ORDER — LORAZEPAM 0.5 MG/1
0.5 TABLET ORAL
Status: DISCONTINUED | OUTPATIENT
Start: 2022-01-01 | End: 2022-01-01 | Stop reason: HOSPADM

## 2022-01-01 RX ORDER — ONDANSETRON 4 MG/1
4 TABLET, FILM COATED ORAL EVERY 6 HOURS PRN
Status: DISCONTINUED | OUTPATIENT
Start: 2022-01-01 | End: 2022-01-01 | Stop reason: HOSPADM

## 2022-01-01 RX ORDER — LORAZEPAM 1 MG/1
2 TABLET ORAL
Status: DISCONTINUED | OUTPATIENT
Start: 2022-01-01 | End: 2022-01-01 | Stop reason: HOSPADM

## 2022-01-01 RX ORDER — LORAZEPAM 2 MG/ML
0.5 CONCENTRATE ORAL
Status: DISCONTINUED | OUTPATIENT
Start: 2022-01-01 | End: 2022-01-01 | Stop reason: HOSPADM

## 2022-01-01 RX ORDER — NALOXONE HCL 0.4 MG/ML
0.1 VIAL (ML) INJECTION
Status: DISCONTINUED | OUTPATIENT
Start: 2022-01-01 | End: 2022-01-01 | Stop reason: HOSPADM

## 2022-01-01 RX ORDER — ONDANSETRON 2 MG/ML
4 INJECTION INTRAMUSCULAR; INTRAVENOUS EVERY 6 HOURS PRN
Status: DISCONTINUED | OUTPATIENT
Start: 2022-01-01 | End: 2022-01-01 | Stop reason: HOSPADM

## 2022-01-01 RX ORDER — POLYVINYL ALCOHOL 14 MG/ML
1 SOLUTION/ DROPS OPHTHALMIC
Status: DISCONTINUED | OUTPATIENT
Start: 2022-01-01 | End: 2022-01-01 | Stop reason: HOSPADM

## 2022-01-01 RX ORDER — HYDROMORPHONE HCL 110MG/55ML
0.25 PATIENT CONTROLLED ANALGESIA SYRINGE INTRAVENOUS EVERY 4 HOURS PRN
Status: DISCONTINUED | OUTPATIENT
Start: 2022-01-01 | End: 2022-01-01

## 2022-01-01 RX ORDER — HYDROMORPHONE HCL 110MG/55ML
0.5 PATIENT CONTROLLED ANALGESIA SYRINGE INTRAVENOUS
Status: DISCONTINUED | OUTPATIENT
Start: 2022-01-01 | End: 2022-01-01

## 2022-01-01 RX ORDER — SODIUM CHLORIDE 0.9 % (FLUSH) 0.9 %
10 SYRINGE (ML) INJECTION EVERY 12 HOURS SCHEDULED
Status: DISCONTINUED | OUTPATIENT
Start: 2022-01-01 | End: 2022-01-01

## 2022-01-01 RX ADMIN — HYDROMORPHONE HYDROCHLORIDE 0.25 MG: 2 INJECTION INTRAMUSCULAR; INTRAVENOUS; SUBCUTANEOUS at 12:24

## 2022-01-01 RX ADMIN — Medication 10 ML: at 20:49

## 2022-01-01 RX ADMIN — LORAZEPAM 0.5 MG: 2 INJECTION INTRAMUSCULAR at 17:39

## 2022-01-01 RX ADMIN — METOPROLOL TARTRATE 5 MG: 5 INJECTION INTRAVENOUS at 06:04

## 2022-01-01 RX ADMIN — HYDROMORPHONE HYDROCHLORIDE 0.25 MG: 2 INJECTION INTRAMUSCULAR; INTRAVENOUS; SUBCUTANEOUS at 03:26

## 2022-01-01 RX ADMIN — BUMETANIDE 1 MG: 0.25 INJECTION INTRAMUSCULAR; INTRAVENOUS at 14:20

## 2022-01-01 RX ADMIN — MORPHINE SULFATE 2 MG: 2 INJECTION, SOLUTION INTRAMUSCULAR; INTRAVENOUS at 20:44

## 2022-01-01 RX ADMIN — HYDROMORPHONE HYDROCHLORIDE 1 MG: 1 INJECTION, SOLUTION INTRAMUSCULAR; INTRAVENOUS; SUBCUTANEOUS at 03:58

## 2022-01-01 RX ADMIN — HYDROMORPHONE HYDROCHLORIDE 1 MG: 1 INJECTION, SOLUTION INTRAMUSCULAR; INTRAVENOUS; SUBCUTANEOUS at 00:50

## 2022-01-01 RX ADMIN — Medication 10 ML: at 22:22

## 2022-01-01 RX ADMIN — VANCOMYCIN HYDROCHLORIDE 500 MG: 500 INJECTION, POWDER, LYOPHILIZED, FOR SOLUTION INTRAVENOUS at 16:40

## 2022-01-01 RX ADMIN — HYDROMORPHONE HYDROCHLORIDE 0.5 MG: 2 INJECTION, SOLUTION INTRAMUSCULAR; INTRAVENOUS; SUBCUTANEOUS at 05:10

## 2022-01-01 RX ADMIN — HYDROMORPHONE HYDROCHLORIDE 0.5 MG: 2 INJECTION, SOLUTION INTRAMUSCULAR; INTRAVENOUS; SUBCUTANEOUS at 09:31

## 2022-01-01 RX ADMIN — PANTOPRAZOLE SODIUM 40 MG: 40 INJECTION, POWDER, FOR SOLUTION INTRAVENOUS at 08:44

## 2022-01-01 RX ADMIN — HYDROMORPHONE HYDROCHLORIDE 1 MG: 1 INJECTION, SOLUTION INTRAMUSCULAR; INTRAVENOUS; SUBCUTANEOUS at 21:53

## 2022-01-01 RX ADMIN — METOPROLOL TARTRATE 5 MG: 5 INJECTION INTRAVENOUS at 17:07

## 2022-01-01 RX ADMIN — SODIUM CHLORIDE 40 ML: 9 INJECTION, SOLUTION INTRAVENOUS at 16:41

## 2022-01-01 RX ADMIN — FAMOTIDINE 20 MG: 10 INJECTION, SOLUTION INTRAVENOUS at 09:39

## 2022-01-01 RX ADMIN — HYDROMORPHONE HYDROCHLORIDE 0.25 MG: 2 INJECTION INTRAMUSCULAR; INTRAVENOUS; SUBCUTANEOUS at 20:53

## 2022-01-01 RX ADMIN — BUMETANIDE 1 MG: 0.25 INJECTION INTRAMUSCULAR; INTRAVENOUS at 12:20

## 2022-01-01 RX ADMIN — METOPROLOL TARTRATE 5 MG: 5 INJECTION INTRAVENOUS at 00:07

## 2022-01-01 RX ADMIN — LORAZEPAM 2 MG: 2 INJECTION INTRAMUSCULAR; INTRAVENOUS at 21:02

## 2022-01-01 RX ADMIN — CEFEPIME HYDROCHLORIDE 2 G: 2 INJECTION, SOLUTION INTRAVENOUS at 18:30

## 2022-01-01 RX ADMIN — ASCORBIC ACID, VITAMIN A PALMITATE, CHOLECALCIFEROL, THIAMINE HYDROCHLORIDE, RIBOFLAVIN-5 PHOSPHATE SODIUM, PYRIDOXINE HYDROCHLORIDE, NIACINAMIDE, DEXPANTHENOL, ALPHA-TOCOPHEROL ACETATE, VITAMIN K1, FOLIC ACID, BIOTIN, CYANOCOBALAMIN: 200; 3300; 200; 6; 3.6; 6; 40; 15; 10; 150; 600; 60; 5 INJECTION, SOLUTION INTRAVENOUS at 08:35

## 2022-01-01 RX ADMIN — Medication 10 ML: at 20:53

## 2022-01-01 RX ADMIN — PANTOPRAZOLE SODIUM 40 MG: 40 INJECTION, POWDER, FOR SOLUTION INTRAVENOUS at 09:11

## 2022-01-01 RX ADMIN — CEFEPIME HYDROCHLORIDE 2 G: 2 INJECTION, SOLUTION INTRAVENOUS at 14:43

## 2022-01-01 RX ADMIN — LEVETIRACETAM 500 MG: 5 INJECTION INTRAVENOUS at 20:52

## 2022-01-01 RX ADMIN — Medication 10 MG: at 14:20

## 2022-01-01 RX ADMIN — SODIUM CHLORIDE, POTASSIUM CHLORIDE, SODIUM LACTATE AND CALCIUM CHLORIDE 500 ML: 600; 310; 30; 20 INJECTION, SOLUTION INTRAVENOUS at 11:55

## 2022-01-01 RX ADMIN — LEVETIRACETAM 500 MG: 5 INJECTION INTRAVENOUS at 08:44

## 2022-01-01 RX ADMIN — LORAZEPAM 0.5 MG: 2 INJECTION INTRAMUSCULAR; INTRAVENOUS at 17:39

## 2022-01-01 RX ADMIN — LEVETIRACETAM 500 MG: 5 INJECTION INTRAVENOUS at 09:11

## 2022-01-01 RX ADMIN — LORAZEPAM 2 MG: 2 INJECTION INTRAMUSCULAR; INTRAVENOUS at 15:21

## 2022-01-01 RX ADMIN — MORPHINE SULFATE 2 MG: 2 INJECTION, SOLUTION INTRAMUSCULAR; INTRAVENOUS at 09:46

## 2022-01-01 RX ADMIN — DEXTROSE MONOHYDRATE 25 G: 500 INJECTION PARENTERAL at 06:27

## 2022-01-01 RX ADMIN — MORPHINE SULFATE 2 MG: 2 INJECTION, SOLUTION INTRAMUSCULAR; INTRAVENOUS at 06:22

## 2022-01-01 RX ADMIN — DEXTROSE AND SODIUM CHLORIDE 75 ML/HR: 5; 450 INJECTION, SOLUTION INTRAVENOUS at 12:16

## 2022-01-01 RX ADMIN — CEFEPIME HYDROCHLORIDE 2 G: 2 INJECTION, SOLUTION INTRAVENOUS at 14:23

## 2022-01-01 RX ADMIN — HYDROMORPHONE HYDROCHLORIDE 0.5 MG: 2 INJECTION, SOLUTION INTRAMUSCULAR; INTRAVENOUS; SUBCUTANEOUS at 12:54

## 2022-01-01 RX ADMIN — MORPHINE SULFATE 2 MG: 2 INJECTION, SOLUTION INTRAMUSCULAR; INTRAVENOUS at 12:48

## 2022-01-01 RX ADMIN — SODIUM CHLORIDE 500 ML/HR: 9 INJECTION, SOLUTION INTRAVENOUS at 10:35

## 2022-01-01 RX ADMIN — SODIUM CHLORIDE 100 ML/HR: 9 INJECTION, SOLUTION INTRAVENOUS at 08:19

## 2022-01-01 RX ADMIN — SODIUM CHLORIDE 125 ML/HR: 9 INJECTION, SOLUTION INTRAVENOUS at 00:36

## 2022-01-01 RX ADMIN — VANCOMYCIN HYDROCHLORIDE 500 MG: 500 INJECTION, POWDER, LYOPHILIZED, FOR SOLUTION INTRAVENOUS at 16:26

## 2022-01-01 RX ADMIN — HYDROMORPHONE HYDROCHLORIDE 1 MG: 1 INJECTION, SOLUTION INTRAMUSCULAR; INTRAVENOUS; SUBCUTANEOUS at 18:12

## 2022-01-01 RX ADMIN — LEVETIRACETAM 500 MG: 5 INJECTION INTRAVENOUS at 08:00

## 2022-01-01 RX ADMIN — SODIUM CHLORIDE 125 ML/HR: 9 INJECTION, SOLUTION INTRAVENOUS at 14:44

## 2022-01-01 RX ADMIN — VANCOMYCIN HYDROCHLORIDE 500 MG: 500 INJECTION, POWDER, LYOPHILIZED, FOR SOLUTION INTRAVENOUS at 09:47

## 2022-01-01 RX ADMIN — DIGOXIN 250 MCG: 0.25 INJECTION INTRAMUSCULAR; INTRAVENOUS at 17:07

## 2022-01-01 RX ADMIN — MORPHINE SULFATE 2 MG: 2 INJECTION, SOLUTION INTRAMUSCULAR; INTRAVENOUS at 18:35

## 2022-01-01 RX ADMIN — HYDROMORPHONE HYDROCHLORIDE: 10 INJECTION INTRAMUSCULAR; INTRAVENOUS; SUBCUTANEOUS at 08:39

## 2022-01-01 RX ADMIN — LEVETIRACETAM 500 MG: 5 INJECTION INTRAVENOUS at 22:22

## 2022-01-01 RX ADMIN — IOPAMIDOL 100 ML: 612 INJECTION, SOLUTION INTRAVENOUS at 14:12

## 2022-01-01 RX ADMIN — SODIUM CHLORIDE 1000 ML: 9 INJECTION, SOLUTION INTRAVENOUS at 10:45

## 2022-01-01 RX ADMIN — SODIUM CHLORIDE 1000 ML: 9 INJECTION, SOLUTION INTRAVENOUS at 04:15

## 2022-01-01 RX ADMIN — LORAZEPAM 2 MG: 2 INJECTION INTRAMUSCULAR; INTRAVENOUS at 03:50

## 2022-01-01 RX ADMIN — HYDROMORPHONE HYDROCHLORIDE 1 MG: 1 INJECTION, SOLUTION INTRAMUSCULAR; INTRAVENOUS; SUBCUTANEOUS at 12:00

## 2022-01-01 RX ADMIN — Medication 10 ML: at 20:51

## 2022-01-01 RX ADMIN — HYDROMORPHONE HYDROCHLORIDE 0.25 MG: 2 INJECTION INTRAMUSCULAR; INTRAVENOUS; SUBCUTANEOUS at 09:33

## 2022-01-01 RX ADMIN — Medication 10 ML: at 20:43

## 2022-01-01 RX ADMIN — SODIUM CHLORIDE 750 MG: 900 INJECTION, SOLUTION INTRAVENOUS at 17:08

## 2022-01-01 RX ADMIN — SODIUM CHLORIDE 100 ML/HR: 9 INJECTION, SOLUTION INTRAVENOUS at 06:00

## 2022-01-01 RX ADMIN — SODIUM CHLORIDE 125 ML/HR: 9 INJECTION, SOLUTION INTRAVENOUS at 01:00

## 2022-01-01 RX ADMIN — DEXTROSE AND SODIUM CHLORIDE 75 ML/HR: 5; 450 INJECTION, SOLUTION INTRAVENOUS at 18:26

## 2022-01-01 RX ADMIN — SCOPALAMINE 1 PATCH: 1 PATCH, EXTENDED RELEASE TRANSDERMAL at 00:50

## 2022-01-01 RX ADMIN — SODIUM CHLORIDE 750 MG: 900 INJECTION, SOLUTION INTRAVENOUS at 18:09

## 2022-01-01 RX ADMIN — HYDROMORPHONE HYDROCHLORIDE 1 MG: 1 INJECTION, SOLUTION INTRAMUSCULAR; INTRAVENOUS; SUBCUTANEOUS at 15:37

## 2022-01-01 RX ADMIN — PANTOPRAZOLE SODIUM 40 MG: 40 INJECTION, POWDER, FOR SOLUTION INTRAVENOUS at 08:00

## 2022-01-01 RX ADMIN — HYDROMORPHONE HYDROCHLORIDE: 10 INJECTION INTRAMUSCULAR; INTRAVENOUS; SUBCUTANEOUS at 00:47

## 2022-01-01 RX ADMIN — MORPHINE SULFATE 2 MG: 2 INJECTION, SOLUTION INTRAMUSCULAR; INTRAVENOUS at 02:34

## 2022-01-01 RX ADMIN — LEVETIRACETAM 500 MG: 5 INJECTION INTRAVENOUS at 20:43

## 2022-01-01 RX ADMIN — ASCORBIC ACID, VITAMIN A PALMITATE, CHOLECALCIFEROL, THIAMINE HYDROCHLORIDE, RIBOFLAVIN-5 PHOSPHATE SODIUM, PYRIDOXINE HYDROCHLORIDE, NIACINAMIDE, DEXPANTHENOL, ALPHA-TOCOPHEROL ACETATE, VITAMIN K1, FOLIC ACID, BIOTIN, CYANOCOBALAMIN: 200; 3300; 200; 6; 3.6; 6; 40; 15; 10; 150; 600; 60; 5 INJECTION, SOLUTION INTRAVENOUS at 02:34

## 2022-01-01 RX ADMIN — DEXTROSE AND SODIUM CHLORIDE 75 ML/HR: 5; 450 INJECTION, SOLUTION INTRAVENOUS at 06:03

## 2022-01-01 RX ADMIN — Medication 10 ML: at 08:44

## 2022-01-01 RX ADMIN — SODIUM CHLORIDE 500 ML: 9 INJECTION, SOLUTION INTRAVENOUS at 19:46

## 2022-01-01 RX ADMIN — LEVETIRACETAM 500 MG: 5 INJECTION INTRAVENOUS at 09:39

## 2022-01-01 RX ADMIN — FAMOTIDINE 20 MG: 10 INJECTION, SOLUTION INTRAVENOUS at 20:49

## 2022-01-01 RX ADMIN — LORAZEPAM 2 MG: 2 INJECTION INTRAMUSCULAR; INTRAVENOUS at 18:53

## 2022-01-01 RX ADMIN — MORPHINE SULFATE 2 MG: 2 INJECTION, SOLUTION INTRAMUSCULAR; INTRAVENOUS at 06:47

## 2022-01-01 RX ADMIN — Medication 10 ML: at 09:40

## 2022-01-01 RX ADMIN — MORPHINE SULFATE 2 MG: 2 INJECTION, SOLUTION INTRAMUSCULAR; INTRAVENOUS at 01:57

## 2022-01-01 RX ADMIN — Medication 10 ML: at 09:12

## 2022-01-01 RX ADMIN — DEXTROSE MONOHYDRATE 25 G: 500 INJECTION PARENTERAL at 11:37

## 2022-01-01 RX ADMIN — Medication 2 G: at 18:10

## 2022-01-01 RX ADMIN — LORAZEPAM 2 MG: 2 INJECTION INTRAMUSCULAR; INTRAVENOUS at 14:47

## 2022-01-01 RX ADMIN — HYDROMORPHONE HYDROCHLORIDE 0.25 MG: 2 INJECTION INTRAMUSCULAR; INTRAVENOUS; SUBCUTANEOUS at 00:35

## 2022-01-01 RX ADMIN — PANTOPRAZOLE SODIUM 40 MG: 40 INJECTION, POWDER, FOR SOLUTION INTRAVENOUS at 14:20

## 2022-01-01 RX ADMIN — HYDROMORPHONE HYDROCHLORIDE 0.5 MG: 2 INJECTION, SOLUTION INTRAMUSCULAR; INTRAVENOUS; SUBCUTANEOUS at 21:49

## 2022-01-01 RX ADMIN — Medication 10 ML: at 08:01

## 2022-01-01 RX ADMIN — MORPHINE SULFATE 2 MG: 2 INJECTION, SOLUTION INTRAMUSCULAR; INTRAVENOUS at 08:35

## 2022-01-01 RX ADMIN — SODIUM CHLORIDE 1000 ML: 9 INJECTION, SOLUTION INTRAVENOUS at 02:23

## 2022-01-01 RX ADMIN — HYDROMORPHONE HYDROCHLORIDE: 10 INJECTION INTRAMUSCULAR; INTRAVENOUS; SUBCUTANEOUS at 18:03

## 2022-01-01 RX ADMIN — BUMETANIDE 2 MG: 0.25 INJECTION INTRAMUSCULAR; INTRAVENOUS at 08:00

## 2022-01-01 RX ADMIN — PANTOPRAZOLE SODIUM 40 MG: 40 INJECTION, POWDER, FOR SOLUTION INTRAVENOUS at 20:43

## 2022-01-01 RX ADMIN — METOPROLOL TARTRATE 5 MG: 5 INJECTION INTRAVENOUS at 13:20

## 2022-01-01 RX ADMIN — HYDROMORPHONE HYDROCHLORIDE 0.5 MG: 2 INJECTION, SOLUTION INTRAMUSCULAR; INTRAVENOUS; SUBCUTANEOUS at 02:16

## 2022-01-01 RX ADMIN — LEVETIRACETAM 500 MG: 5 INJECTION INTRAVENOUS at 20:48

## 2022-01-01 RX ADMIN — HYDROMORPHONE HYDROCHLORIDE 1 MG: 1 INJECTION, SOLUTION INTRAMUSCULAR; INTRAVENOUS; SUBCUTANEOUS at 06:00

## 2022-01-01 RX ADMIN — PANTOPRAZOLE SODIUM 40 MG: 40 INJECTION, POWDER, FOR SOLUTION INTRAVENOUS at 22:22

## 2022-02-21 NOTE — DISCHARGE INSTRUCTIONS
Sure you are drinking plenty of fluids and eating a healthy diet.  Please follow-up with your primary care doctor this week as instructed.  Your white blood cells are slightly low and this will require follow-up from your primary care provider.  Please be cautious when changing positions.   Continue your normal medications.  Please return the emergency department should you have dizziness that does not resolve, chest pain, shortness of breath, fever, or any symptoms that do not feel normal for you.

## 2022-02-27 PROBLEM — A41.9 SEPTIC SHOCK (HCC): Status: ACTIVE | Noted: 2022-01-01

## 2022-02-27 PROBLEM — G04.90 INFECTIOUS ENCEPHALITIS: Status: ACTIVE | Noted: 2022-01-01

## 2022-02-27 PROBLEM — R65.21 SEPTIC SHOCK: Status: ACTIVE | Noted: 2022-01-01

## 2022-02-27 PROBLEM — J69.0 ASPIRATION PNEUMONIA (HCC): Status: ACTIVE | Noted: 2022-01-01

## 2022-03-05 PROBLEM — A41.9 SEPSIS (HCC): Status: ACTIVE | Noted: 2022-01-01

## 2022-03-05 NOTE — H&P
[facility] HOSPITALIST     Hany Oviedo MD    CHIEF COMPLAINT: End-of-Life Care in Hospice    HISTORY OF PRESENT ILLNESS:    Ms. Nguyen is a 73 y/o AAF who initially presented in septic shock due to recurrent aspiration pneumonia and hypothermia.  She has presented similarly in the past, but this time her encephalopathy persisted and did not clear despite appropriate therapy.   and providers met w/ family, and given her poor prognosis, hospice evaluation.  We started her on comfort medications as she was moaning in pain.  She has now been admitted to a scatter bed status after family met with the [facility] coordinator.        Past Medical History:   Diagnosis Date   • Anxiety    • Arthritis    • Chronic anticoagulation 1/23/2019   • Chronic diastolic (congestive) heart failure (HCC)    • Colon polyp    • Dysphagia    • GERD (gastroesophageal reflux disease)    • Hiatal hernia 4/17/2017   • Hypertension    • Hypothyroidism    • Internal hemorrhoids 4/17/2017   • Monoclonal gammopathy 3/25/2013   • Nonrheumatic mitral valve regurgitation     has ranged from mild-mod to severe depending on the study   • Persistent atrial fibrillation (HCC)    • Pyelonephritis 4/30/2021   • Reflux gastritis    • Seizure (HCC)    • Tricuspid regurgitation     mod-severe vs severe   • Type 2 diabetes mellitus (HCC)      Past Surgical History:   Procedure Laterality Date   • COLONOSCOPY N/A 3/29/2017    Procedure: COLONOSCOPY; POLYPECTOMY;  Surgeon: Brooke Garrido MD;  Location: [facility];  Service:    • COLONOSCOPY N/A 8/3/2020    Procedure: COLONOSCOPY with polypectomy;  Surgeon: Rui Shi MD;  Location: [facility] OR;  Service: Gastroenterology;  Laterality: N/A;  ascending polyp  transverse polyp  rectal polyp   • CYST REMOVAL     • CYSTOSCOPY W/ URETERAL STENT PLACEMENT Right 4/13/2021    Procedure: CYSTOSCOPY URETERAL CATHETER/STENT INSERTION;  Surgeon: Onesimo Fuller MD;  Location:   [facility] OR;  Service: Urology;  Laterality: Right;   • CYSTOSCOPY W/ URETERAL STENT PLACEMENT Right 4/28/2021    Procedure: CYSTOSCOPY URETERAL STENT REMOVAL;  Surgeon: Onesimo Fuller MD;  Location: [facility] OR;  Service: Urology;  Laterality: Right;  CYSTOSCOPY URETERAL STENT REMOVAL   • ENDOSCOPY N/A 3/29/2017    Procedure: ESOPHAGOGASTRODUODENOSCOPY WITH DILITATION;  Surgeon: Brooke Garrido MD;  Location: [facility] OR;  Service:    • ENDOSCOPY N/A 8/3/2020    Procedure: ESOPHAGOGASTRODUODENOSCOPY with biopsies;  Surgeon: Rui Shi MD;  Location: [facility] OR;  Service: Gastroenterology;  Laterality: N/A;  esophagitis  gastritis   • HERNIA REPAIR     • HYSTERECTOMY       Family History   Problem Relation Age of Onset   • Colon cancer Mother    • Colon cancer Other    • Lung cancer Father    • Breast cancer Maternal Aunt      Social History     Tobacco Use   • Smoking status: Never Smoker   • Smokeless tobacco: Never Used   • Tobacco comment: daily caffiene   Vaping Use   • Vaping Use: Never used   Substance Use Topics   • Alcohol use: No   • Drug use: No     No medications prior to admission.     Allergies:  Patient has no known allergies.    REVIEW OF SYSTEMS:  Please see the above history of present illness for pertinent positives and negatives.    Vital Signs  Temp:  [98.5 °F (36.9 °C)-100.8 °F (38.2 °C)] 99.8 °F (37.7 °C)  Heart Rate:  [72-98] 98  Resp:  [10-24] 24  BP: (122-135)/(90-94) 135/90          Physical Exam:  Physical Exam   Constitutional: Cachetic, elderly appearing female in no acute distress.   Cardiovascular: Regular rate, regular rhythm, S1 normal and S2 normal.  Exam reveals no gallop and no friction rub.  No murmur heard.  Pulmonary/Chest: Lungs are diminished to auscultation bilaterally. No respiratory distress. No wheezes. No rhonchi. No rales.   Abdominal: Bowel sounds are diminished     Results Review:    I reviewed the patient's new clinical results.  Lab Results (most  recent)     None          Imaging Results (Most Recent)     None          ECG/EMG Results (most recent)     None          Assessment/Plan     1.  End-of-Life Care due to Sepsis from Recurrent Aspiration: Hospice order set initiated.  Started Dilaudid drip this morning so will continue.  Other orders as per order set.  Prognosis is hours to days.  Appreciate Dr. Voss's and Danielle Restrepo's help with Ms. Nguyen.    Jt Gold MD  03/05/22  16:30 EST

## 2022-03-06 NOTE — CASE MANAGEMENT/SOCIAL WORK
Case Management Discharge Note      Final Note:          Selected Continued Care - Discharged on 3/6/2022 Admission date: 3/5/2022 - Discharge disposition:     Destination    No services have been selected for the patient.              Durable Medical Equipment    No services have been selected for the patient.              Dialysis/Infusion    No services have been selected for the patient.              Home Medical Care    No services have been selected for the patient.              Therapy    No services have been selected for the patient.              Community Resources    No services have been selected for the patient.              Community & DME    No services have been selected for the patient.                Selected Continued Care - Prior Encounters Includes selections from prior encounters from 2021 to 3/6/2022    Discharged on 2021 Admission date: 2021 - Discharge disposition: Skilled Nursing Facility (DC - External)    Destination     Service Provider Selected Services Address Phone Fax Patient Preferred    [facility]  Skilled Nursing [address] [place] [address] [phone] --                         Final Discharge Disposition Code: 20 -

## 2022-03-06 NOTE — DISCHARGE SUMMARY
Notified by nurse that patient had passed.  Patient was seen and examined and was not responsive to painful stimulation.  Heart and lung sounds were absent. No spontaneous cardiac or respiratory activity. No corneal pupillary reflex present. Pupils fixed and dilated. Patient was pronounced dead at 02:52 on 03/06/2022.  Cause of death: Septic shock from aspiration pneumonia
